# Patient Record
Sex: FEMALE | Race: WHITE | NOT HISPANIC OR LATINO | Employment: OTHER | URBAN - METROPOLITAN AREA
[De-identification: names, ages, dates, MRNs, and addresses within clinical notes are randomized per-mention and may not be internally consistent; named-entity substitution may affect disease eponyms.]

---

## 2017-01-11 ENCOUNTER — HOSPITAL ENCOUNTER (OUTPATIENT)
Dept: MAMMOGRAPHY | Facility: HOSPITAL | Age: 62
Discharge: HOME | End: 2017-01-11

## 2017-01-11 ENCOUNTER — HOSPITAL ENCOUNTER (OUTPATIENT)
Dept: MAMMOGRAPHY | Facility: HOSPITAL | Age: 62
Discharge: HOME | End: 2017-01-11
Admitting: OBSTETRICS & GYNECOLOGY

## 2017-01-11 DIAGNOSIS — R92.8 ABNORMAL MAMMOGRAM: ICD-10-CM

## 2017-01-11 PROCEDURE — 19081 BX BREAST 1ST LESION STRTCTC: CPT | Performed by: RADIOLOGY

## 2017-01-11 PROCEDURE — 88305 TISSUE EXAM BY PATHOLOGIST: CPT | Performed by: RADIOLOGY

## 2017-01-11 PROCEDURE — 76098 X-RAY EXAM SURGICAL SPECIMEN: CPT

## 2017-01-11 RX ORDER — LIDOCAINE HYDROCHLORIDE 10 MG/ML
5 INJECTION, SOLUTION INFILTRATION; PERINEURAL ONCE
Status: COMPLETED | OUTPATIENT
Start: 2017-01-11 | End: 2017-01-11

## 2017-01-11 RX ORDER — LIDOCAINE HYDROCHLORIDE AND EPINEPHRINE 10; 10 MG/ML; UG/ML
20 INJECTION, SOLUTION INFILTRATION; PERINEURAL ONCE
Status: COMPLETED | OUTPATIENT
Start: 2017-01-11 | End: 2017-01-11

## 2017-01-11 RX ADMIN — LIDOCAINE HYDROCHLORIDE 5 ML: 10 INJECTION, SOLUTION INFILTRATION; PERINEURAL at 10:54

## 2017-01-11 RX ADMIN — LIDOCAINE HYDROCHLORIDE AND EPINEPHRINE 10 ML: 10; 10 INJECTION, SOLUTION INFILTRATION; PERINEURAL at 10:55

## 2017-01-11 NOTE — PROGRESS NOTES
Alert and orientated. Denies discomfort. No active bleeding. Steri-strips & gauze dsg intact. Ice packs given. Verbalizes and demonstrates understanding of post-care instructions and written copy given.

## 2017-01-12 LAB
CYTO UR: NORMAL
LAB AP CASE REPORT: NORMAL
LAB AP CLINICAL INFORMATION: NORMAL
LAB AP DIAGNOSIS COMMENT: NORMAL
Lab: NORMAL
PATH REPORT.FINAL DX SPEC: NORMAL
PATH REPORT.GROSS SPEC: NORMAL

## 2017-01-13 ENCOUNTER — TELEPHONE (OUTPATIENT)
Dept: MAMMOGRAPHY | Facility: HOSPITAL | Age: 62
End: 2017-01-13

## 2017-01-13 ENCOUNTER — TRANSCRIBE ORDERS (OUTPATIENT)
Dept: ADMINISTRATIVE | Facility: HOSPITAL | Age: 62
End: 2017-01-13

## 2017-01-13 DIAGNOSIS — R92.8 ABNORMAL MAMMOGRAPHY: Primary | ICD-10-CM

## 2017-04-03 ENCOUNTER — TRANSCRIBE ORDERS (OUTPATIENT)
Dept: ADMINISTRATIVE | Facility: HOSPITAL | Age: 62
End: 2017-04-03

## 2017-04-03 ENCOUNTER — HOSPITAL ENCOUNTER (OUTPATIENT)
Dept: MAMMOGRAPHY | Facility: HOSPITAL | Age: 62
Discharge: HOME | End: 2017-04-03
Admitting: OBSTETRICS & GYNECOLOGY

## 2017-04-03 DIAGNOSIS — R92.8 ABNORMAL MAMMOGRAM: Primary | ICD-10-CM

## 2017-04-03 DIAGNOSIS — R92.8 ABNORMAL MAMMOGRAPHY: ICD-10-CM

## 2017-04-03 PROCEDURE — 77065 DX MAMMO INCL CAD UNI: CPT | Performed by: RADIOLOGY

## 2017-04-03 PROCEDURE — G0206 DX MAMMO INCL CAD UNI: HCPCS

## 2017-08-09 ENCOUNTER — HOSPITAL ENCOUNTER (OUTPATIENT)
Dept: MAMMOGRAPHY | Facility: HOSPITAL | Age: 62
Discharge: HOME | End: 2017-08-09
Admitting: OBSTETRICS & GYNECOLOGY

## 2017-08-09 DIAGNOSIS — R92.8 ABNORMAL MAMMOGRAM: ICD-10-CM

## 2017-08-09 PROCEDURE — 77065 DX MAMMO INCL CAD UNI: CPT | Performed by: RADIOLOGY

## 2017-08-09 PROCEDURE — G0206 DX MAMMO INCL CAD UNI: HCPCS

## 2018-02-20 ENCOUNTER — TRANSCRIBE ORDERS (OUTPATIENT)
Dept: ADMINISTRATIVE | Facility: HOSPITAL | Age: 63
End: 2018-02-20

## 2018-05-11 ENCOUNTER — APPOINTMENT (OUTPATIENT)
Dept: MAMMOGRAPHY | Facility: HOSPITAL | Age: 63
End: 2018-05-11

## 2018-10-17 ENCOUNTER — TRANSCRIBE ORDERS (OUTPATIENT)
Dept: ADMINISTRATIVE | Facility: HOSPITAL | Age: 63
End: 2018-10-17

## 2018-10-17 DIAGNOSIS — R92.8 ABNORMAL MAMMOGRAM: Primary | ICD-10-CM

## 2019-01-17 ENCOUNTER — HOSPITAL ENCOUNTER (OUTPATIENT)
Dept: MAMMOGRAPHY | Facility: HOSPITAL | Age: 64
Discharge: HOME | End: 2019-01-17
Admitting: OBSTETRICS & GYNECOLOGY

## 2019-01-17 DIAGNOSIS — R92.8 ABNORMAL MAMMOGRAM: ICD-10-CM

## 2019-01-17 PROCEDURE — 77066 DX MAMMO INCL CAD BI: CPT | Performed by: RADIOLOGY

## 2019-01-17 PROCEDURE — G0279 TOMOSYNTHESIS, MAMMO: HCPCS

## 2019-01-17 PROCEDURE — 77062 BREAST TOMOSYNTHESIS BI: CPT | Performed by: RADIOLOGY

## 2019-01-17 PROCEDURE — 77066 DX MAMMO INCL CAD BI: CPT

## 2020-05-21 ENCOUNTER — TRANSCRIBE ORDERS (OUTPATIENT)
Dept: ADMINISTRATIVE | Facility: HOSPITAL | Age: 65
End: 2020-05-21

## 2020-05-21 DIAGNOSIS — R92.8 ABNORMAL MAMMOGRAM: Primary | ICD-10-CM

## 2020-06-10 ENCOUNTER — HOSPITAL ENCOUNTER (OUTPATIENT)
Dept: MAMMOGRAPHY | Facility: HOSPITAL | Age: 65
Discharge: HOME | End: 2020-06-10
Admitting: NURSE PRACTITIONER

## 2020-06-10 DIAGNOSIS — R92.8 ABNORMAL MAMMOGRAM: ICD-10-CM

## 2020-06-10 PROCEDURE — 77066 DX MAMMO INCL CAD BI: CPT

## 2020-06-10 PROCEDURE — 77062 BREAST TOMOSYNTHESIS BI: CPT | Performed by: RADIOLOGY

## 2020-06-10 PROCEDURE — 77066 DX MAMMO INCL CAD BI: CPT | Performed by: RADIOLOGY

## 2020-06-10 PROCEDURE — G0279 TOMOSYNTHESIS, MAMMO: HCPCS

## 2021-07-02 ENCOUNTER — APPOINTMENT (OUTPATIENT)
Dept: CARDIOLOGY | Facility: HOSPITAL | Age: 66
End: 2021-07-02

## 2021-07-02 ENCOUNTER — APPOINTMENT (OUTPATIENT)
Dept: GENERAL RADIOLOGY | Facility: HOSPITAL | Age: 66
End: 2021-07-02

## 2021-07-02 ENCOUNTER — HOSPITAL ENCOUNTER (OUTPATIENT)
Facility: HOSPITAL | Age: 66
Setting detail: OBSERVATION
Discharge: HOME | End: 2021-07-02
Attending: EMERGENCY MEDICINE | Admitting: EMERGENCY MEDICINE

## 2021-07-02 VITALS
HEIGHT: 60 IN | SYSTOLIC BLOOD PRESSURE: 152 MMHG | WEIGHT: 136 LBS | RESPIRATION RATE: 12 BRPM | BODY MASS INDEX: 26.7 KG/M2 | TEMPERATURE: 98.1 F | DIASTOLIC BLOOD PRESSURE: 80 MMHG | OXYGEN SATURATION: 100 % | HEART RATE: 58 BPM

## 2021-07-02 DIAGNOSIS — R07.9 CHEST PAIN, UNSPECIFIED TYPE: ICD-10-CM

## 2021-07-02 DIAGNOSIS — R11.2 NON-INTRACTABLE VOMITING WITH NAUSEA, UNSPECIFIED VOMITING TYPE: Primary | ICD-10-CM

## 2021-07-02 PROBLEM — F41.0 ANXIETY ATTACK: Status: ACTIVE | Noted: 2021-07-02

## 2021-07-02 PROBLEM — I10 ESSENTIAL HYPERTENSION: Status: ACTIVE | Noted: 2021-07-02

## 2021-07-02 PROBLEM — Z63.4 BEREAVEMENT: Status: ACTIVE | Noted: 2021-07-02

## 2021-07-02 LAB
ALBUMIN SERPL-MCNC: 4.7 G/DL (ref 3.5–5.2)
ALBUMIN/GLOB SERPL: 1.7 G/DL
ALP SERPL-CCNC: 103 U/L (ref 39–117)
ALT SERPL W P-5'-P-CCNC: 13 U/L (ref 1–33)
ANION GAP SERPL CALCULATED.3IONS-SCNC: 11 MMOL/L (ref 5–15)
AST SERPL-CCNC: 16 U/L (ref 1–32)
BASOPHILS # BLD AUTO: 0.1 10*3/MM3 (ref 0–0.2)
BASOPHILS NFR BLD AUTO: 0.9 % (ref 0–1.5)
BH CV ECHO MEAS - AI DEC SLOPE: 194 CM/SEC^2
BH CV ECHO MEAS - AI MAX PG: 65.8 MMHG
BH CV ECHO MEAS - AI MAX VEL: 405 CM/SEC
BH CV ECHO MEAS - AI P1/2T: 611.5 MSEC
BH CV ECHO MEAS - AO ROOT AREA (BSA CORRECTED): 1.7
BH CV ECHO MEAS - AO ROOT AREA: 5.7 CM^2
BH CV ECHO MEAS - AO ROOT DIAM: 2.7 CM
BH CV ECHO MEAS - ASC AORTA: 2.9 CM
BH CV ECHO MEAS - BSA(HAYCOCK): 1.6 M^2
BH CV ECHO MEAS - BSA: 1.6 M^2
BH CV ECHO MEAS - BZI_BMI: 26.7 KILOGRAMS/M^2
BH CV ECHO MEAS - BZI_METRIC_HEIGHT: 152 CM
BH CV ECHO MEAS - BZI_METRIC_WEIGHT: 61.7 KG
BH CV ECHO MEAS - EDV(CUBED): 68.9 ML
BH CV ECHO MEAS - EDV(MOD-SP2): 69.9 ML
BH CV ECHO MEAS - EDV(MOD-SP4): 106 ML
BH CV ECHO MEAS - EDV(TEICH): 74.2 ML
BH CV ECHO MEAS - EF(CUBED): 77.3 %
BH CV ECHO MEAS - EF(MOD-BP): 57.3 %
BH CV ECHO MEAS - EF(MOD-SP2): 61.2 %
BH CV ECHO MEAS - EF(MOD-SP4): 53 %
BH CV ECHO MEAS - EF(TEICH): 69.9 %
BH CV ECHO MEAS - ESV(CUBED): 15.6 ML
BH CV ECHO MEAS - ESV(MOD-SP2): 27.1 ML
BH CV ECHO MEAS - ESV(MOD-SP4): 49.8 ML
BH CV ECHO MEAS - ESV(TEICH): 22.3 ML
BH CV ECHO MEAS - FS: 39 %
BH CV ECHO MEAS - IVS/LVPW: 0.9
BH CV ECHO MEAS - IVSD: 0.9 CM
BH CV ECHO MEAS - LA DIMENSION: 2.9 CM
BH CV ECHO MEAS - LA/AO: 1.1
BH CV ECHO MEAS - LAD MAJOR: 4.8 CM
BH CV ECHO MEAS - LAT PEAK E' VEL: 9.5 CM/SEC
BH CV ECHO MEAS - LATERAL E/E' RATIO: 7.3
BH CV ECHO MEAS - LV DIASTOLIC VOL/BSA (35-75): 67 ML/M^2
BH CV ECHO MEAS - LV MASS(C)D: 123 GRAMS
BH CV ECHO MEAS - LV MASS(C)DI: 77.8 GRAMS/M^2
BH CV ECHO MEAS - LV MAX PG: 6.6 MMHG
BH CV ECHO MEAS - LV MEAN PG: 2 MMHG
BH CV ECHO MEAS - LV SYSTOLIC VOL/BSA (12-30): 31.5 ML/M^2
BH CV ECHO MEAS - LV V1 MAX: 128 CM/SEC
BH CV ECHO MEAS - LV V1 MEAN: 65.2 CM/SEC
BH CV ECHO MEAS - LV V1 VTI: 29.6 CM
BH CV ECHO MEAS - LVIDD: 4.1 CM
BH CV ECHO MEAS - LVIDS: 2.5 CM
BH CV ECHO MEAS - LVLD AP2: 8.2 CM
BH CV ECHO MEAS - LVLD AP4: 7.9 CM
BH CV ECHO MEAS - LVLS AP2: 6.6 CM
BH CV ECHO MEAS - LVLS AP4: 6.8 CM
BH CV ECHO MEAS - LVOT AREA (M): 3.1 CM^2
BH CV ECHO MEAS - LVOT AREA: 3.1 CM^2
BH CV ECHO MEAS - LVOT DIAM: 2 CM
BH CV ECHO MEAS - LVPWD: 1 CM
BH CV ECHO MEAS - MED PEAK E' VEL: 7.8 CM/SEC
BH CV ECHO MEAS - MEDIAL E/E' RATIO: 8.9
BH CV ECHO MEAS - MV A MAX VEL: 83.6 CM/SEC
BH CV ECHO MEAS - MV DEC SLOPE: 344 CM/SEC^2
BH CV ECHO MEAS - MV DEC TIME: 0.2 SEC
BH CV ECHO MEAS - MV E MAX VEL: 69.4 CM/SEC
BH CV ECHO MEAS - MV E/A: 0.83
BH CV ECHO MEAS - MV MAX PG: 2.8 MMHG
BH CV ECHO MEAS - MV MEAN PG: 1 MMHG
BH CV ECHO MEAS - MV P1/2T MAX VEL: 90.9 CM/SEC
BH CV ECHO MEAS - MV P1/2T: 77.4 MSEC
BH CV ECHO MEAS - MV V2 MAX: 83.2 CM/SEC
BH CV ECHO MEAS - MV V2 MEAN: 49.2 CM/SEC
BH CV ECHO MEAS - MV V2 VTI: 32 CM
BH CV ECHO MEAS - MVA P1/2T LCG: 2.4 CM^2
BH CV ECHO MEAS - MVA(P1/2T): 2.8 CM^2
BH CV ECHO MEAS - MVA(VTI): 2.9 CM^2
BH CV ECHO MEAS - PA ACC TIME: 0.13 SEC
BH CV ECHO MEAS - PA PR(ACCEL): 20.5 MMHG
BH CV ECHO MEAS - PI END-D VEL: 92.3 CM/SEC
BH CV ECHO MEAS - SI(CUBED): 33.7 ML/M^2
BH CV ECHO MEAS - SI(LVOT): 58.8 ML/M^2
BH CV ECHO MEAS - SI(MOD-SP2): 27.1 ML/M^2
BH CV ECHO MEAS - SI(MOD-SP4): 35.5 ML/M^2
BH CV ECHO MEAS - SI(TEICH): 32.8 ML/M^2
BH CV ECHO MEAS - SV(CUBED): 53.3 ML
BH CV ECHO MEAS - SV(LVOT): 93 ML
BH CV ECHO MEAS - SV(MOD-SP2): 42.8 ML
BH CV ECHO MEAS - SV(MOD-SP4): 56.2 ML
BH CV ECHO MEAS - SV(TEICH): 51.9 ML
BH CV ECHO MEAS - TAPSE (>1.6): 2.1 CM
BH CV ECHO MEASUREMENTS AVERAGE E/E' RATIO: 8.02
BH CV VAS BP LEFT ARM: NORMAL MMHG
BH CV XLRA - RV BASE: 3.3 CM
BH CV XLRA - RV LENGTH: 5.6 CM
BH CV XLRA - RV MID: 2.4 CM
BH CV XLRA - TDI S': 22.5 CM/SEC
BILIRUB SERPL-MCNC: 0.4 MG/DL (ref 0–1.2)
BUN SERPL-MCNC: 8 MG/DL (ref 8–23)
BUN/CREAT SERPL: 10.7 (ref 7–25)
CALCIUM SPEC-SCNC: 9.6 MG/DL (ref 8.6–10.5)
CHLORIDE SERPL-SCNC: 104 MMOL/L (ref 98–107)
CO2 SERPL-SCNC: 26 MMOL/L (ref 22–29)
CREAT SERPL-MCNC: 0.75 MG/DL (ref 0.57–1)
DEPRECATED RDW RBC AUTO: 43.8 FL (ref 37–54)
EOSINOPHIL # BLD AUTO: 0.25 10*3/MM3 (ref 0–0.4)
EOSINOPHIL NFR BLD AUTO: 2.3 % (ref 0.3–6.2)
ERYTHROCYTE [DISTWIDTH] IN BLOOD BY AUTOMATED COUNT: 12.6 % (ref 12.3–15.4)
GFR SERPL CREATININE-BSD FRML MDRD: 78 ML/MIN/1.73
GLOBULIN UR ELPH-MCNC: 2.8 GM/DL
GLUCOSE SERPL-MCNC: 143 MG/DL (ref 65–99)
HCT VFR BLD AUTO: 42.5 % (ref 34–46.6)
HGB BLD-MCNC: 14.2 G/DL (ref 12–15.9)
HOLD SPECIMEN: NORMAL
IMM GRANULOCYTES # BLD AUTO: 0.03 10*3/MM3 (ref 0–0.05)
IMM GRANULOCYTES NFR BLD AUTO: 0.3 % (ref 0–0.5)
LEFT ATRIUM VOLUME INDEX: 20.6 ML/M^2
LEFT ATRIUM VOLUME: 32.5 ML
LIPASE SERPL-CCNC: 21 U/L (ref 13–60)
LYMPHOCYTES # BLD AUTO: 2.15 10*3/MM3 (ref 0.7–3.1)
LYMPHOCYTES NFR BLD AUTO: 20 % (ref 19.6–45.3)
MAXIMAL PREDICTED HEART RATE: 155 BPM
MCH RBC QN AUTO: 31.7 PG (ref 26.6–33)
MCHC RBC AUTO-ENTMCNC: 33.4 G/DL (ref 31.5–35.7)
MCV RBC AUTO: 94.9 FL (ref 79–97)
MONOCYTES # BLD AUTO: 0.66 10*3/MM3 (ref 0.1–0.9)
MONOCYTES NFR BLD AUTO: 6.1 % (ref 5–12)
NEUTROPHILS NFR BLD AUTO: 7.58 10*3/MM3 (ref 1.7–7)
NEUTROPHILS NFR BLD AUTO: 70.4 % (ref 42.7–76)
NRBC BLD AUTO-RTO: 0 /100 WBC (ref 0–0.2)
NT-PROBNP SERPL-MCNC: 267.2 PG/ML (ref 0–900)
PLATELET # BLD AUTO: 319 10*3/MM3 (ref 140–450)
PMV BLD AUTO: 9.4 FL (ref 6–12)
POTASSIUM SERPL-SCNC: 3.6 MMOL/L (ref 3.5–5.2)
PROT SERPL-MCNC: 7.5 G/DL (ref 6–8.5)
QT INTERVAL: 426 MS
QT INTERVAL: 444 MS
QTC INTERVAL: 428 MS
QTC INTERVAL: 439 MS
RBC # BLD AUTO: 4.48 10*6/MM3 (ref 3.77–5.28)
SODIUM SERPL-SCNC: 141 MMOL/L (ref 136–145)
STRESS TARGET HR: 132 BPM
TROPONIN T SERPL-MCNC: <0.01 NG/ML (ref 0–0.03)
WBC # BLD AUTO: 10.77 10*3/MM3 (ref 3.4–10.8)
WHOLE BLOOD HOLD SPECIMEN: NORMAL

## 2021-07-02 PROCEDURE — 93005 ELECTROCARDIOGRAM TRACING: CPT

## 2021-07-02 PROCEDURE — G0378 HOSPITAL OBSERVATION PER HR: HCPCS

## 2021-07-02 PROCEDURE — 93005 ELECTROCARDIOGRAM TRACING: CPT | Performed by: EMERGENCY MEDICINE

## 2021-07-02 PROCEDURE — 96374 THER/PROPH/DIAG INJ IV PUSH: CPT

## 2021-07-02 PROCEDURE — 71045 X-RAY EXAM CHEST 1 VIEW: CPT

## 2021-07-02 PROCEDURE — 83880 ASSAY OF NATRIURETIC PEPTIDE: CPT | Performed by: EMERGENCY MEDICINE

## 2021-07-02 PROCEDURE — 84484 ASSAY OF TROPONIN QUANT: CPT | Performed by: INTERNAL MEDICINE

## 2021-07-02 PROCEDURE — 93306 TTE W/DOPPLER COMPLETE: CPT

## 2021-07-02 PROCEDURE — 83690 ASSAY OF LIPASE: CPT | Performed by: EMERGENCY MEDICINE

## 2021-07-02 PROCEDURE — 99236 HOSP IP/OBS SAME DATE HI 85: CPT | Performed by: INTERNAL MEDICINE

## 2021-07-02 PROCEDURE — 84484 ASSAY OF TROPONIN QUANT: CPT | Performed by: EMERGENCY MEDICINE

## 2021-07-02 PROCEDURE — 96375 TX/PRO/DX INJ NEW DRUG ADDON: CPT

## 2021-07-02 PROCEDURE — 25010000002 ONDANSETRON PER 1 MG: Performed by: EMERGENCY MEDICINE

## 2021-07-02 PROCEDURE — 85025 COMPLETE CBC W/AUTO DIFF WBC: CPT | Performed by: EMERGENCY MEDICINE

## 2021-07-02 PROCEDURE — 93306 TTE W/DOPPLER COMPLETE: CPT | Performed by: INTERNAL MEDICINE

## 2021-07-02 PROCEDURE — 80053 COMPREHEN METABOLIC PANEL: CPT | Performed by: EMERGENCY MEDICINE

## 2021-07-02 PROCEDURE — 99284 EMERGENCY DEPT VISIT MOD MDM: CPT

## 2021-07-02 PROCEDURE — 25010000002 LORAZEPAM PER 2 MG: Performed by: EMERGENCY MEDICINE

## 2021-07-02 RX ORDER — ALPRAZOLAM 0.5 MG/1
0.5 TABLET ORAL 3 TIMES DAILY PRN
COMMUNITY
End: 2021-07-24

## 2021-07-02 RX ORDER — SODIUM CHLORIDE 0.9 % (FLUSH) 0.9 %
10 SYRINGE (ML) INJECTION AS NEEDED
Status: CANCELLED | OUTPATIENT
Start: 2021-07-02

## 2021-07-02 RX ORDER — MAGNESIUM SULFATE HEPTAHYDRATE 40 MG/ML
4 INJECTION, SOLUTION INTRAVENOUS AS NEEDED
Status: CANCELLED | OUTPATIENT
Start: 2021-07-02

## 2021-07-02 RX ORDER — POTASSIUM CHLORIDE 750 MG/1
40 CAPSULE, EXTENDED RELEASE ORAL AS NEEDED
Status: CANCELLED | OUTPATIENT
Start: 2021-07-02

## 2021-07-02 RX ORDER — METOPROLOL TARTRATE 50 MG/1
50 TABLET, FILM COATED ORAL 2 TIMES DAILY
COMMUNITY
End: 2021-07-02

## 2021-07-02 RX ORDER — SODIUM CHLORIDE 0.9 % (FLUSH) 0.9 %
10 SYRINGE (ML) INJECTION AS NEEDED
Status: DISCONTINUED | OUTPATIENT
Start: 2021-07-02 | End: 2021-07-02 | Stop reason: HOSPADM

## 2021-07-02 RX ORDER — METOPROLOL SUCCINATE 50 MG/1
50 TABLET, EXTENDED RELEASE ORAL DAILY
COMMUNITY

## 2021-07-02 RX ORDER — MAGNESIUM SULFATE HEPTAHYDRATE 40 MG/ML
2 INJECTION, SOLUTION INTRAVENOUS AS NEEDED
Status: CANCELLED | OUTPATIENT
Start: 2021-07-02

## 2021-07-02 RX ORDER — ONDANSETRON 2 MG/ML
4 INJECTION INTRAMUSCULAR; INTRAVENOUS EVERY 6 HOURS PRN
Status: CANCELLED | OUTPATIENT
Start: 2021-07-02

## 2021-07-02 RX ORDER — ACETAMINOPHEN 650 MG/1
650 SUPPOSITORY RECTAL EVERY 4 HOURS PRN
Status: CANCELLED | OUTPATIENT
Start: 2021-07-02

## 2021-07-02 RX ORDER — METOPROLOL TARTRATE 50 MG/1
50 TABLET, FILM COATED ORAL EVERY 12 HOURS SCHEDULED
Status: CANCELLED | OUTPATIENT
Start: 2021-07-02

## 2021-07-02 RX ORDER — POTASSIUM CHLORIDE 7.45 MG/ML
10 INJECTION INTRAVENOUS
Status: CANCELLED | OUTPATIENT
Start: 2021-07-02

## 2021-07-02 RX ORDER — POTASSIUM CHLORIDE 1.5 G/1.77G
40 POWDER, FOR SOLUTION ORAL AS NEEDED
Status: CANCELLED | OUTPATIENT
Start: 2021-07-02

## 2021-07-02 RX ORDER — ACETAMINOPHEN 325 MG/1
650 TABLET ORAL EVERY 4 HOURS PRN
Status: CANCELLED | OUTPATIENT
Start: 2021-07-02

## 2021-07-02 RX ORDER — LORAZEPAM 2 MG/ML
0.5 INJECTION INTRAMUSCULAR ONCE
Status: COMPLETED | OUTPATIENT
Start: 2021-07-02 | End: 2021-07-02

## 2021-07-02 RX ORDER — SODIUM CHLORIDE 0.9 % (FLUSH) 0.9 %
10 SYRINGE (ML) INJECTION EVERY 12 HOURS SCHEDULED
Status: CANCELLED | OUTPATIENT
Start: 2021-07-02

## 2021-07-02 RX ORDER — ASPIRIN 81 MG/1
324 TABLET, CHEWABLE ORAL ONCE
Status: COMPLETED | OUTPATIENT
Start: 2021-07-02 | End: 2021-07-02

## 2021-07-02 RX ORDER — ONDANSETRON 4 MG/1
4 TABLET, FILM COATED ORAL EVERY 6 HOURS PRN
Status: CANCELLED | OUTPATIENT
Start: 2021-07-02

## 2021-07-02 RX ORDER — CHOLECALCIFEROL (VITAMIN D3) 125 MCG
5 CAPSULE ORAL NIGHTLY PRN
Status: CANCELLED | OUTPATIENT
Start: 2021-07-02

## 2021-07-02 RX ORDER — ACETAMINOPHEN 160 MG/5ML
650 SOLUTION ORAL EVERY 4 HOURS PRN
Status: CANCELLED | OUTPATIENT
Start: 2021-07-02

## 2021-07-02 RX ORDER — ESCITALOPRAM OXALATE 10 MG/1
10 TABLET ORAL DAILY
COMMUNITY
End: 2021-07-24

## 2021-07-02 RX ORDER — LORAZEPAM 0.5 MG/1
0.5 TABLET ORAL EVERY 8 HOURS PRN
Status: CANCELLED | OUTPATIENT
Start: 2021-07-02 | End: 2021-07-09

## 2021-07-02 RX ORDER — ONDANSETRON 2 MG/ML
4 INJECTION INTRAMUSCULAR; INTRAVENOUS
Status: DISCONTINUED | OUTPATIENT
Start: 2021-07-02 | End: 2021-07-02 | Stop reason: HOSPADM

## 2021-07-02 RX ADMIN — LORAZEPAM 0.5 MG: 2 INJECTION INTRAMUSCULAR; INTRAVENOUS at 04:45

## 2021-07-02 RX ADMIN — SODIUM CHLORIDE 1000 ML: 9 INJECTION, SOLUTION INTRAVENOUS at 04:41

## 2021-07-02 RX ADMIN — ASPIRIN 324 MG: 81 TABLET, CHEWABLE ORAL at 04:35

## 2021-07-02 RX ADMIN — ONDANSETRON 4 MG: 2 INJECTION INTRAMUSCULAR; INTRAVENOUS at 04:43

## 2021-07-02 NOTE — CASE MANAGEMENT/SOCIAL WORK
Discharge Planning Assessment  [facility]     Patient Name: Clara Gonzalez  MRN: [id number]  Today's Date: 7/2/2021    Admit Date: 7/2/2021    Discharge Needs Assessment     Row Name 07/02/21 0910       Living Environment    Lives With  alone    Current Living Arrangements  home/apartment/condo    Potentially Unsafe Housing Conditions  unable to assess    Primary Care Provided by  self    Provides Primary Care For  no one    Family Caregiver if Needed  child(tila), adult    Family Caregiver Names  Mary Carmen Ramires (Daughter) [phone] (Home Phone)    Quality of Family Relationships  helpful;involved;supportive    Able to Return to Prior Arrangements  yes       Resource/Environmental Concerns    Resource/Environmental Concerns  none    Transportation Concerns  car, none       Transition Planning    Patient/Family Anticipates Transition to  home    Patient/Family Anticipated Services at Transition  none       Discharge Needs Assessment    Readmission Within the Last 30 Days  no previous admission in last 30 days    Equipment Currently Used at Home  none    Concerns to be Addressed  grief and loss    Anticipated Changes Related to Illness  none    Equipment Needed After Discharge  none    Provided Post Acute Provider List?  N/A    N/A Provider List Comment  denies need for outpt services    Provided Post Acute Provider Quality & Resource List?  N/A        Discharge Plan     Row Name 07/02/21 0912       Plan    Plan  initial    Plan Comments  Pt lives alone in [facility]. She is AxO, independent. She recently lost her spouse and is experiencing anxiety and depression. She has no past medical hx on file. Her daughter is with her today. No POA on file. Her plan is to return home at d/c. PCP is MEI Tran    Final Discharge Disposition Code  30 - still a patient        Continued Care and Services - Admitted Since 7/2/2021    Coordination has not been started for this encounter.         Demographic Summary    No  documentation.       Functional Status     Row Name 07/02/21 0909       Functional Status    Usual Activity Tolerance  good       Functional Status, IADL    Medications  independent    Meal Preparation  independent    Housekeeping  independent    Laundry  independent    Shopping  independent       Mental Status    General Appearance WDL  WDL       Mental Status Summary    Recent Changes in Mental Status/Cognitive Functioning  no changes       Employment/    Employment Status  retired        Psychosocial    No documentation.       Abuse/Neglect    No documentation.       Legal    No documentation.       Substance Abuse    No documentation.       Patient Forms    No documentation.           Ashley Cochran RN

## 2021-07-02 NOTE — ED PROVIDER NOTES
[facility]    EMERGENCY DEPARTMENT ENCOUNTER      Pt Name: Clara Gonzalez  MRN: [id number]  YOB: 1955  Date of evaluation: 7/2/2021  Provider: Loi Sarmiento DO    CHIEF COMPLAINT       Chief Complaint   Patient presents with   • Chest Pain         HISTORY OF PRESENT ILLNESS  (Location/Symptom, Timing/Onset, Context/Setting, Quality, Duration, Modifying Factors, Severity.)   Clara Gonzalez is a 66 y.o. female who presents to the emergency department for evaluation of generalized nausea, vomiting, intermittent chest which she describes as tingling, just not feeling well.  Increase stress and anxiety she just lost her  3 weeks ago yesterday.  She is rebounding with this pretty significantly and her anxiety is gone pretty bad with her increased stress.  She did see her PCP at [facility] who started her on Lexapro and check some basic labs and was told it was going no acute abnormalities.  She does state a history of underlying Eunice in stress which is been increased recently.  She denies any chest pressure, has had a cardiac work-up in the last few years with no acute abnormalities.  She denies a fever, chills, recent illness.  Denies any urinary complaints, has had chronic loose stools, nonbloody in nature.  She denies unilateral weakness, numbness or tingling, no headache, vision changes.  She denies any current chest pain during my evaluation.  She has no other acute systemic complaints this time.  She has a history of hypertension and takes medication, metoprolol for this.      Nursing notes were reviewed.    REVIEW OF SYSTEMS    (2-9 systems for level 4, 10 or more for level 5)   ROS:  General:  No fevers, no chills, no weakness  Cardiovascular:  + chest pain, no palpitations  Respiratory:  No shortness of breath, no cough, no wheezing  Gastrointestinal:  No pain, positive nausea, vomiting, no diarrhea  Musculoskeletal:  No muscle pain, no joint pain  Skin:  No  rash  Neurologic:  No headache, no extremity numbness, no extremity tingling, no extremity weakness  Psychiatric:  + stress, anxiety  Genitourinary:  No dysuria, no hematuria    Except as noted above the remainder of the review of systems was reviewed and negative.       PAST MEDICAL HISTORY   History reviewed. No pertinent past medical history.  Hypertension, anxiety    SURGICAL HISTORY       Past Surgical History:   Procedure Laterality Date   • BREAST CYST ASPIRATION Left 2005   • HYSTERECTOMY     • OOPHORECTOMY           CURRENT MEDICATIONS       Current Facility-Administered Medications:   •  ondansetron (ZOFRAN) injection 4 mg, 4 mg, Intravenous, Q30 Min PRN, Loi Sarmiento DO, 4 mg at 07/02/21 0443  •  sodium chloride 0.9 % flush 10 mL, 10 mL, Intravenous, PRN, Loi Sarmiento DO    Current Outpatient Medications:   •  metoprolol tartrate (LOPRESSOR) 50 MG tablet, Take 50 mg by mouth 2 (Two) Times a Day., Disp: , Rfl:     ALLERGIES     Patient has no known allergies.    FAMILY HISTORY       Family History   Problem Relation Age of Onset   • Breast cancer Mother 80   • Ovarian cancer Neg Hx           SOCIAL HISTORY       Social History     Socioeconomic History   • Marital status:      Spouse name: Not on file   • Number of children: Not on file   • Years of education: Not on file   • Highest education level: Not on file         PHYSICAL EXAM    (up to 7 for level 4, 8 or more for level 5)     Vitals:    07/02/21 0630 07/02/21 0640 07/02/21 0645 07/02/21 0700   BP:  123/70  123/70   BP Location:       Patient Position:       Pulse: 54 53 54 53   Resp:       Temp:       TempSrc:       SpO2: 99% 97% 97% 98%   Weight:       Height:           Physical Exam  General : Patient is awake, alert, oriented, in no acute distress, nontoxic appearing, answering questions appropriately  HEENT: Pupils are equally round and reactive to light, EOMI, conjunctivae clear, sclerae white, there is no injection  no icterus.  Oral mucosa is moist, no exudate. Uvula is midline  Neck: Neck is supple, full range of motion, trachea midline  Cardiac: Heart regular rate, rhythm, no murmurs, rubs, or gallops  Lungs: Lungs are clear to auscultation, there is no wheezing, rhonchi, or rales. There is no use of accessory muscles  Chest wall: There is no tenderness to palpation over the chest wall or over ribs  Abdomen: Abdomen is soft, nontender, nondistended. There are no firm or pulsatile masses, no rebound rigidity or guarding.   Musculoskeletal: 5 out of 5 strength in all 4 extremities.  No focal muscle deficits are appreciated  Neuro: Motor intact, sensory intact, level of consciousness is normal, GCS 15  Dermatology: Skin is warm and dry  Psych: Mentation is grossly normal, cognition is grossly normal. Affect is anxious      DIAGNOSTIC RESULTS     EKG: All EKG's are interpreted by the Emergency Department Physician who either signs or Co-signs this chart in the absence of a cardiologist.    ECG 12 Lead   Final Result   Test Reason : chest pain   Blood Pressure :   */*   mmHG   Vent. Rate :  64 BPM     Atrial Rate :  64 BPM      P-R Int : 130 ms          QRS Dur :  78 ms       QT Int : 426 ms       P-R-T Axes :  25   7  -1 degrees      QTc Int : 439 ms      Normal sinus rhythm    T inversions 3, avf, V1-V3.    Normal ECG   When compared with ECG of 02-JUL-2021 04:12,   No significant change was found   Confirmed by PA REBOLLAR MD ([id number]) on 7/2/2021 7:07:51 AM      Referred By: EDMD           Confirmed By: PA REBOLLAR MD      ECG 12 Lead   Final Result   Test Reason : CP   Blood Pressure :   */*   mmHG   Vent. Rate :  56 BPM     Atrial Rate :  56 BPM      P-R Int : 132 ms          QRS Dur :  92 ms       QT Int : 444 ms       P-R-T Axes :  21   3  -6 degrees      QTc Int : 428 ms      Sinus bradycardia   Nonspecific ST and T wave abnormality   T inversion 3, avf, V1-V3   Abnormal ECG   No previous ECGs available      EKG #1    Confirmed by PA REBOLLAR MD ([id number]) on 7/2/2021 4:22:34 AM      Referred By: ED MD           Confirmed By: PA REBOLLAR MD          RADIOLOGY:   Non-plain film images such as CT, Ultrasound and MRI are read by the radiologist. Plain radiographic images are visualized and preliminarily interpreted by the emergency physician with the below findings:      [] Radiologist's Report Reviewed:  XR Chest 1 View   Final Result   Cardiomegaly with no acute infiltrates identified. Ringlike density near the left hilum is presumably artifact external to the patient. Correlate clinically.      Signer Name: John Pineda MD    Signed: 7/2/2021 5:06 AM    Workstation Name: [email or web address]     [facility]            ED BEDSIDE ULTRASOUND:   Performed by ED Physician - none    LABS:    I have reviewed and interpreted all of the currently available lab results from this visit (if applicable):  Results for orders placed or performed during the hospital encounter of 07/02/21   Troponin    Specimen: Blood   Result Value Ref Range    Troponin T <0.010 0.000 - 0.030 ng/mL   Troponin    Specimen: Blood   Result Value Ref Range    Troponin T <0.010 0.000 - 0.030 ng/mL   Comprehensive Metabolic Panel    Specimen: Blood   Result Value Ref Range    Glucose 143 (H) 65 - 99 mg/dL    BUN 8 8 - 23 mg/dL    Creatinine 0.75 0.57 - 1.00 mg/dL    Sodium 141 136 - 145 mmol/L    Potassium 3.6 3.5 - 5.2 mmol/L    Chloride 104 98 - 107 mmol/L    CO2 26.0 22.0 - 29.0 mmol/L    Calcium 9.6 8.6 - 10.5 mg/dL    Total Protein 7.5 6.0 - 8.5 g/dL    Albumin 4.70 3.50 - 5.20 g/dL    ALT (SGPT) 13 1 - 33 U/L    AST (SGOT) 16 1 - 32 U/L    Alkaline Phosphatase 103 39 - 117 U/L    Total Bilirubin 0.4 0.0 - 1.2 mg/dL    eGFR Non African Amer 78 >60 mL/min/1.73    Globulin 2.8 gm/dL    A/G Ratio 1.7 g/dL    BUN/Creatinine Ratio 10.7 7.0 - 25.0    Anion Gap 11.0 5.0 - 15.0 mmol/L   Lipase    Specimen: Blood   Result Value Ref Range    Lipase  21 13 - 60 U/L   BNP    Specimen: Blood   Result Value Ref Range    proBNP 267.2 0.0 - 900.0 pg/mL   CBC Auto Differential    Specimen: Blood   Result Value Ref Range    WBC 10.77 3.40 - 10.80 10*3/mm3    RBC 4.48 3.77 - 5.28 10*6/mm3    Hemoglobin 14.2 12.0 - 15.9 g/dL    Hematocrit 42.5 34.0 - 46.6 %    MCV 94.9 79.0 - 97.0 fL    MCH 31.7 26.6 - 33.0 pg    MCHC 33.4 31.5 - 35.7 g/dL    RDW 12.6 12.3 - 15.4 %    RDW-SD 43.8 37.0 - 54.0 fl    MPV 9.4 6.0 - 12.0 fL    Platelets 319 140 - 450 10*3/mm3    Neutrophil % 70.4 42.7 - 76.0 %    Lymphocyte % 20.0 19.6 - 45.3 %    Monocyte % 6.1 5.0 - 12.0 %    Eosinophil % 2.3 0.3 - 6.2 %    Basophil % 0.9 0.0 - 1.5 %    Immature Grans % 0.3 0.0 - 0.5 %    Neutrophils, Absolute 7.58 (H) 1.70 - 7.00 10*3/mm3    Lymphocytes, Absolute 2.15 0.70 - 3.10 10*3/mm3    Monocytes, Absolute 0.66 0.10 - 0.90 10*3/mm3    Eosinophils, Absolute 0.25 0.00 - 0.40 10*3/mm3    Basophils, Absolute 0.10 0.00 - 0.20 10*3/mm3    Immature Grans, Absolute 0.03 0.00 - 0.05 10*3/mm3    nRBC 0.0 0.0 - 0.2 /100 WBC   ECG 12 Lead   Result Value Ref Range    QT Interval 444 ms    QTC Interval 428 ms   ECG 12 Lead   Result Value Ref Range    QT Interval 426 ms    QTC Interval 439 ms   Green Top (Gel)   Result Value Ref Range    Extra Tube Hold for add-ons.    Lavender Top   Result Value Ref Range    Extra Tube hold for add-on    Gold Top - SST   Result Value Ref Range    Extra Tube Hold for add-ons.         All other labs were within normal range or not returned as of this dictation.      EMERGENCY DEPARTMENT COURSE and DIFFERENTIAL DIAGNOSIS/MDM:   Vitals:    Vitals:    07/02/21 0630 07/02/21 0640 07/02/21 0645 07/02/21 0700   BP:  123/70  123/70   BP Location:       Patient Position:       Pulse: 54 53 54 53   Resp:       Temp:       TempSrc:       SpO2: 99% 97% 97% 98%   Weight:       Height:           ED Course as of Jul 02 0719 Fri Jul 02, 2021   0625 HEART Score for Major Cardiac Events from  MDCalc.com  on 7/2/2021  ** All calculations should be rechecked by clinician prior to use **    RESULT SUMMARY:  4 points  Moderate Score (4-6 points)    Risk of MACE of 12-16.6%.      INPUTS:  History -> 0 = Slightly suspicious  EKG -> 1 = Non-specific repolarization disturbance  Age -> 2 = =66  Risk factors -> 1 = 1-2 risk factors  Initial troponin -> 0 = =normal limit      [AP]      ED Course User Index  [AP] Loi Sarmiento DO       Patient with nausea vomiting, mild chest discomfort and tingling sensation since yesterday evening.  The patient a recent loss of her  with increased stress and anxiety over the last few weeks.  She does have a history of anxiety and panic attacks.  We did pain IV, labs, imaging, patient was placed in a cardiac monitor.  Initial EKG with some T wave inversions in inferior leads, V1, V2, V3.  We did place IV, she was given IV fluids, nausea medication, blood work labs and imaging obtained.  Results reviewed as above.  No acute significant normalities, troponin is negative.  Repeat EKG is obtained which does reveal T wave inversions in the inferior and anteroseptal leads, unchanged from earlier.  On reevaluation patient is resting comfortably, she is feeling significantly better than when she first arrived with her nausea vomiting chest pain.  Heart score 4, intermediate risk.  Patient is concerned given the increased stress from the recent passing of her  which is likely causing some of the patient's underlying symptoms, possible broken heart.  Given her presenting symptoms, increased stressors, intermediate risk we discussed admission to the hospital for further work-up treatment and evaluation.  Patient would prefer inpatient work-up rather than home with close follow-up.  Case discussed with hospitalist team for admission.          MEDICATIONS ADMINISTERED IN ED:  Medications   sodium chloride 0.9 % flush 10 mL (has no administration in time range)    ondansetron (ZOFRAN) injection 4 mg (4 mg Intravenous Given 7/2/21 3465)   aspirin chewable tablet 324 mg (324 mg Oral Given 7/2/21 1135)   sodium chloride 0.9 % bolus 1,000 mL (0 mL Intravenous Stopped 7/2/21 5210)   LORazepam (ATIVAN) injection 0.5 mg (0.5 mg Intravenous Given 7/2/21 3055)       PROCEDURES:  Procedures    CRITICAL CARE TIME    Total Critical Care time was 0 minutes, excluding separately reportable procedures.   There was a high probability of clinically significant/life threatening deterioration in the patient's condition which required my urgent intervention.      FINAL IMPRESSION      1. Non-intractable vomiting with nausea, unspecified vomiting type    2. Chest pain, unspecified type          DISPOSITION/PLAN     ED Disposition     ED Disposition Condition Comment    Decision to Admit                Comment: Please note this report has been produced using speech recognition software.      Loi Sarmiento DO  Attending Emergency Physician               Loi Sarmiento DO  07/02/21 4983

## 2021-07-02 NOTE — H&P
[facility] Medicine Services  SAME DAY ADMISSION & DISCHARGE    Patient Name: Clara Gonzalez  : 1955  MRN: [id number]    Date of Admission and Discharge: 2021    Primary Care Physician: Velai James APRN  Consults     No orders found from 6/3/2021 to 7/3/2021.          Subjective   Presentation and Brief Hospital Summary     Chief Complaint:  Non-intractable vomiting with nausea, unspecified vomiting type [R11.2]  Chest pain [R07.9]    Active Hospital Problems    Diagnosis  POA   • **Anxiety attack [F41.0]  Yes   • Chest pain [R07.9]  Yes   • Essential hypertension [I10]  Yes   • Bereavement [Z63.4]  Not Applicable      Resolved Hospital Problems   No resolved problems to display.         HPI and Brief Hospital Course:  Clara Gonzalez is a 66 y.o. female who's spouse passed away roughly 3 weeks ago and since that time has had ongoing issues with anxiety to point that she was recently started on PO Xanax and Celexa. She took a dose last night and went to bed as usual however around 0300 woke up abruptly with sensation of feeling overwhelmed, dizziness, nausea, and palpitations. She drove herself to her daughter's who then brought her to ED. Upon arrival her systolic blood pressure was markedly elevated. She was given IV ativan which improved her blood pressure and her symptoms. She underwent ACS r/o w/ troponin x 3 and echocardiogram which was negative. She can continue her PRN benzo's at d/c.    Review of Systems   Gen- No fevers, chills  Resp- No cough, dyspnea  GI- No N/V/D, abd pain    All other systems reviewed and are negative.    Personal History     History reviewed. No pertinent past medical history.    Past Surgical History:   Procedure Laterality Date   • BREAST CYST ASPIRATION Left    • HYSTERECTOMY     • OOPHORECTOMY         Family History: family history includes Breast cancer (age of onset: 80) in her mother.     Social History:  Denies tobacco,  ETOH, illicit drugs. Spouse passed away 3 weeks ago.    Allergies:  No Known Allergies    Objective   Objective Findings     Vital Signs:   Temp:  [98.1 °F (36.7 °C)] 98.1 °F (36.7 °C)  Heart Rate:  [51-86] 52  Resp:  [16] 16  BP: (106-201)/() 128/67        Physical Exam (if not performed and documented at time of presentation on same date):   Constitutional: Awake, alert  Eyes: PERRLA, sclerae anicteric, no conjunctival injection  HENT: NCAT, mucous membranes moist, mask  Neck: Supple, no thyromegaly, no lymphadenopathy, trachea midline  Respiratory: Clear to auscultation bilaterally, nonlabored respirations   Cardiovascular: RRR, no murmurs, rubs, or gallops, palpable pedal pulses bilaterally  Gastrointestinal: Positive bowel sounds, soft, nontender, nondistended  Musculoskeletal: No bilateral ankle edema, no clubbing or cyanosis to extremities  Psychiatric: Depressed  Neurologic: Oriented x 3, strength symmetric in all extremities, Cranial Nerves grossly intact to confrontation, speech clear  Skin: No rashes    Results Reviewed:  I have personally reviewed current lab, radiology, and data and agree.    Results from last 7 days   Lab Units 07/02/21  0440   WBC 10*3/mm3 10.77   HEMOGLOBIN g/dL 14.2   HEMATOCRIT % 42.5   PLATELETS 10*3/mm3 319     Results from last 7 days   Lab Units 07/02/21  0440   SODIUM mmol/L 141   POTASSIUM mmol/L 3.6   CHLORIDE mmol/L 104   CO2 mmol/L 26.0   BUN mg/dL 8   CREATININE mg/dL 0.75   GLUCOSE mg/dL 143*   CALCIUM mg/dL 9.6   ALT (SGPT) U/L 13   AST (SGOT) U/L 16   PROBNP pg/mL 267.2     No results found for: PHART, PCO2  Imaging Results (Last 24 Hours)     Procedure Component Value Units Date/Time    XR Chest 1 View [[id number]] Collected: 07/02/21 0506     Updated: 07/02/21 0508    Narrative:      CR Chest 1 Vw    INDICATION:   Chest pain today.     COMPARISON:    None available.    FINDINGS:  Single portable AP view(s) of the chest.  Heart is enlarged. Pulmonary vascularity  is normal. Ringlike density projects near the left pulmonary hilum, likely artifact external to the patient. Correlate clinically. Lungs otherwise are clear. No  pneumothorax is seen. Cholecystectomy clips are present in the right upper quadrant.      Impression:      Cardiomegaly with no acute infiltrates identified. Ringlike density near the left hilum is presumably artifact external to the patient. Correlate clinically.    Signer Name: John Pineda MD   Signed: 7/2/2021 5:06 AM   Workstation Name: [email or web address]    [facility]            [unfilled]  Discharge Details        Discharge Medications      ASK your doctor about these medications      Instructions Start Date   ALPRAZolam 0.5 MG tablet  Commonly known as: XANAX   0.5 mg, Oral, 3 Times Daily PRN      escitalopram 10 MG tablet  Commonly known as: LEXAPRO   10 mg, Oral, Daily      metoprolol succinate XL 50 MG 24 hr tablet  Commonly known as: TOPROL-XL   50 mg, Oral, Daily               Discharge Disposition: Home      Discharge Diet: Regular      Discharge Activity:      CODE STATUS:   There are no questions and answers to display.         No future appointments.    [unfilled]          Time Spent on Discharge: I spent  35  minutes on this discharge activity which included: face-to-face encounter with the patient, reviewing the data in the system, coordination of the care with the nursing staff as well as consultants, documentation, and entering orders.      Mary Carmen Marlow II, DO  07/02/21

## 2021-07-24 ENCOUNTER — APPOINTMENT (OUTPATIENT)
Dept: GENERAL RADIOLOGY | Facility: HOSPITAL | Age: 66
End: 2021-07-24

## 2021-07-24 ENCOUNTER — HOSPITAL ENCOUNTER (EMERGENCY)
Facility: HOSPITAL | Age: 66
Discharge: HOME | End: 2021-07-24
Attending: EMERGENCY MEDICINE | Admitting: EMERGENCY MEDICINE

## 2021-07-24 VITALS
SYSTOLIC BLOOD PRESSURE: 117 MMHG | WEIGHT: 135 LBS | TEMPERATURE: 98 F | RESPIRATION RATE: 18 BRPM | DIASTOLIC BLOOD PRESSURE: 64 MMHG | HEIGHT: 60 IN | BODY MASS INDEX: 26.5 KG/M2 | OXYGEN SATURATION: 94 % | HEART RATE: 53 BPM

## 2021-07-24 DIAGNOSIS — F41.9 ANXIETY: Primary | ICD-10-CM

## 2021-07-24 LAB
ALBUMIN SERPL-MCNC: 4.6 G/DL (ref 3.5–5.2)
ALBUMIN/GLOB SERPL: 1.6 G/DL
ALP SERPL-CCNC: 94 U/L (ref 39–117)
ALT SERPL W P-5'-P-CCNC: 10 U/L (ref 1–33)
ANION GAP SERPL CALCULATED.3IONS-SCNC: 12 MMOL/L (ref 5–15)
AST SERPL-CCNC: 13 U/L (ref 1–32)
BACTERIA UR QL AUTO: NORMAL /HPF
BASOPHILS # BLD AUTO: 0.1 10*3/MM3 (ref 0–0.2)
BASOPHILS NFR BLD AUTO: 1.3 % (ref 0–1.5)
BILIRUB SERPL-MCNC: 0.3 MG/DL (ref 0–1.2)
BILIRUB UR QL STRIP: NEGATIVE
BUN SERPL-MCNC: 13 MG/DL (ref 8–23)
BUN/CREAT SERPL: 18.8 (ref 7–25)
CALCIUM SPEC-SCNC: 9.7 MG/DL (ref 8.6–10.5)
CHLORIDE SERPL-SCNC: 105 MMOL/L (ref 98–107)
CLARITY UR: CLEAR
CO2 SERPL-SCNC: 23 MMOL/L (ref 22–29)
COLOR UR: YELLOW
CREAT SERPL-MCNC: 0.69 MG/DL (ref 0.57–1)
D-LACTATE SERPL-SCNC: 2.3 MMOL/L (ref 0.5–2)
DEPRECATED RDW RBC AUTO: 46.3 FL (ref 37–54)
EOSINOPHIL # BLD AUTO: 0.22 10*3/MM3 (ref 0–0.4)
EOSINOPHIL NFR BLD AUTO: 2.9 % (ref 0.3–6.2)
ERYTHROCYTE [DISTWIDTH] IN BLOOD BY AUTOMATED COUNT: 13 % (ref 12.3–15.4)
GFR SERPL CREATININE-BSD FRML MDRD: 85 ML/MIN/1.73
GLOBULIN UR ELPH-MCNC: 2.8 GM/DL
GLUCOSE SERPL-MCNC: 155 MG/DL (ref 65–99)
GLUCOSE UR STRIP-MCNC: NEGATIVE MG/DL
HCT VFR BLD AUTO: 40.3 % (ref 34–46.6)
HGB BLD-MCNC: 13.4 G/DL (ref 12–15.9)
HGB UR QL STRIP.AUTO: NEGATIVE
HOLD SPECIMEN: NORMAL
HYALINE CASTS UR QL AUTO: NORMAL /LPF
IMM GRANULOCYTES # BLD AUTO: 0.01 10*3/MM3 (ref 0–0.05)
IMM GRANULOCYTES NFR BLD AUTO: 0.1 % (ref 0–0.5)
KETONES UR QL STRIP: NEGATIVE
LEUKOCYTE ESTERASE UR QL STRIP.AUTO: ABNORMAL
LIPASE SERPL-CCNC: 25 U/L (ref 13–60)
LYMPHOCYTES # BLD AUTO: 2.32 10*3/MM3 (ref 0.7–3.1)
LYMPHOCYTES NFR BLD AUTO: 30.5 % (ref 19.6–45.3)
MCH RBC QN AUTO: 32 PG (ref 26.6–33)
MCHC RBC AUTO-ENTMCNC: 33.3 G/DL (ref 31.5–35.7)
MCV RBC AUTO: 96.2 FL (ref 79–97)
MONOCYTES # BLD AUTO: 0.48 10*3/MM3 (ref 0.1–0.9)
MONOCYTES NFR BLD AUTO: 6.3 % (ref 5–12)
NEUTROPHILS NFR BLD AUTO: 4.48 10*3/MM3 (ref 1.7–7)
NEUTROPHILS NFR BLD AUTO: 58.9 % (ref 42.7–76)
NITRITE UR QL STRIP: NEGATIVE
NRBC BLD AUTO-RTO: 0 /100 WBC (ref 0–0.2)
NT-PROBNP SERPL-MCNC: 224.5 PG/ML (ref 0–900)
PH UR STRIP.AUTO: 7 [PH] (ref 5–8)
PLATELET # BLD AUTO: 305 10*3/MM3 (ref 140–450)
PMV BLD AUTO: 9.5 FL (ref 6–12)
POTASSIUM SERPL-SCNC: 3.9 MMOL/L (ref 3.5–5.2)
PROT SERPL-MCNC: 7.4 G/DL (ref 6–8.5)
PROT UR QL STRIP: NEGATIVE
RBC # BLD AUTO: 4.19 10*6/MM3 (ref 3.77–5.28)
RBC # UR: NORMAL /HPF
REF LAB TEST METHOD: NORMAL
SODIUM SERPL-SCNC: 140 MMOL/L (ref 136–145)
SP GR UR STRIP: 1.01 (ref 1–1.03)
SQUAMOUS #/AREA URNS HPF: NORMAL /HPF
TROPONIN T SERPL-MCNC: <0.01 NG/ML (ref 0–0.03)
UROBILINOGEN UR QL STRIP: ABNORMAL
WBC # BLD AUTO: 7.61 10*3/MM3 (ref 3.4–10.8)
WBC UR QL AUTO: NORMAL /HPF
WHOLE BLOOD HOLD SPECIMEN: NORMAL

## 2021-07-24 PROCEDURE — 83690 ASSAY OF LIPASE: CPT | Performed by: EMERGENCY MEDICINE

## 2021-07-24 PROCEDURE — 84484 ASSAY OF TROPONIN QUANT: CPT | Performed by: EMERGENCY MEDICINE

## 2021-07-24 PROCEDURE — 85025 COMPLETE CBC W/AUTO DIFF WBC: CPT | Performed by: EMERGENCY MEDICINE

## 2021-07-24 PROCEDURE — 80053 COMPREHEN METABOLIC PANEL: CPT | Performed by: EMERGENCY MEDICINE

## 2021-07-24 PROCEDURE — 96375 TX/PRO/DX INJ NEW DRUG ADDON: CPT

## 2021-07-24 PROCEDURE — 96374 THER/PROPH/DIAG INJ IV PUSH: CPT

## 2021-07-24 PROCEDURE — 71045 X-RAY EXAM CHEST 1 VIEW: CPT

## 2021-07-24 PROCEDURE — 81001 URINALYSIS AUTO W/SCOPE: CPT | Performed by: EMERGENCY MEDICINE

## 2021-07-24 PROCEDURE — 25010000002 LORAZEPAM PER 2 MG: Performed by: EMERGENCY MEDICINE

## 2021-07-24 PROCEDURE — 25010000002 ONDANSETRON PER 1 MG: Performed by: EMERGENCY MEDICINE

## 2021-07-24 PROCEDURE — 83605 ASSAY OF LACTIC ACID: CPT | Performed by: EMERGENCY MEDICINE

## 2021-07-24 PROCEDURE — 99284 EMERGENCY DEPT VISIT MOD MDM: CPT

## 2021-07-24 PROCEDURE — 93005 ELECTROCARDIOGRAM TRACING: CPT | Performed by: EMERGENCY MEDICINE

## 2021-07-24 PROCEDURE — 83880 ASSAY OF NATRIURETIC PEPTIDE: CPT | Performed by: EMERGENCY MEDICINE

## 2021-07-24 RX ORDER — HYDROXYZINE HYDROCHLORIDE 10 MG/1
10 TABLET, FILM COATED ORAL 3 TIMES DAILY PRN
COMMUNITY

## 2021-07-24 RX ORDER — ONDANSETRON 2 MG/ML
4 INJECTION INTRAMUSCULAR; INTRAVENOUS ONCE
Status: COMPLETED | OUTPATIENT
Start: 2021-07-24 | End: 2021-07-24

## 2021-07-24 RX ORDER — LORAZEPAM 2 MG/ML
0.5 INJECTION INTRAMUSCULAR ONCE
Status: COMPLETED | OUTPATIENT
Start: 2021-07-24 | End: 2021-07-24

## 2021-07-24 RX ORDER — SODIUM CHLORIDE 9 MG/ML
10 INJECTION INTRAVENOUS AS NEEDED
Status: DISCONTINUED | OUTPATIENT
Start: 2021-07-24 | End: 2021-07-24 | Stop reason: HOSPADM

## 2021-07-24 RX ADMIN — LORAZEPAM 0.5 MG: 2 INJECTION INTRAMUSCULAR; INTRAVENOUS at 05:08

## 2021-07-24 RX ADMIN — ONDANSETRON 4 MG: 2 INJECTION INTRAMUSCULAR; INTRAVENOUS at 05:36

## 2021-07-24 NOTE — DISCHARGE INSTRUCTIONS
[facility]  [address] [place] [address]  [phone]      [facility]    [address]  [address]  [place] [address]  [phone] [address]  [address]  [place] [address]  [phone]      [facility] ([place] BCBS, [employer] HMO, [employer] PPO, [employer] EPO, [place] Student Ins, Medicare and Medicaid)    [address]  [address]rd [place] [address]  [phone]       [facility] (4 locations) Accepts most insurance and Medicaid ([place] location only) No Medicare, [place] or [place] [employer]    [facility]  [address] [place] [address]  [phone]       [facility]  [address] [place] [address]  [address]  [place] [address]  [phone]      [facility]  [address] [place] [address]  [phone]                     [facility]  [address]   [place], [place] [id number] [phone]       [facility] (Preferred Medicaid Provider and some Major Insurance Plans) [phone]    [place]  [address] [place] [address]       [facility]  [address] [place] [address]       [place]  [address] [place] [address]       [facility] (Most insurance and Medicaid)  [address]  [address]nd [place] [address]  [phone]      [facility]    [address]  [place] [address]  [phone]      [address] [facility]  [address]  [place] [address]       [facility]  [address] [place] [address] [phone]       [facility] 24-Hour Help Line [phone]      [facility]  [address] [address]  [phone]             [facility]  [address] [address]  [phone]             [facility]  [address] [id number]  [phone]                [facility]  [address] [facility] [address]  [place], [place] [address] [phone]       [facility]  [address] [place], [place] [address] [phone]      [facility]  [address] [place] [address]  [phone]              [facility]  [address]  [place], [place] [address] [phone]                  [facility] [place]  [address] [place] [address] [phone]      [place]  [address] [place] [address] [phone]                  [facility]  [address] [place] [address] [phone]      [facility]  [address] [place] [address] [phone]       [facility]  [address] [place] [address] [phone]      [facility]  [address] [place] [address] [phone]       [facility]  [address] [place] [address] [phone]

## 2021-07-24 NOTE — ED PROVIDER NOTES
"Subjective   66-year-old female presents for evaluation via EMS for \"anxiety attack.\"  Of note, the patient's  recently passed away 6 weeks ago.  The patient was seen here in the emergency department earlier this month for a similar episode.  Tonight, she states that she felt \"okay\" when she went to bed.  However, she woke up this morning with paresthesias, nausea, palpitations, and an overwhelming sensation of anxiety.  She called EMS and was subsequently brought to the emergency department for evaluation.  She states that her symptoms have improved a good bit spontaneously.  She denies any suicidal or homicidal thoughts.          Review of Systems   Cardiovascular: Positive for palpitations.   Gastrointestinal: Positive for nausea.   Neurological: Positive for numbness.   Psychiatric/Behavioral: The patient is nervous/anxious.    All other systems reviewed and are negative.      Past Medical History:   Diagnosis Date   • Anxiety        No Known Allergies    Past Surgical History:   Procedure Laterality Date   • BREAST CYST ASPIRATION Left 2005   • HYSTERECTOMY     • OOPHORECTOMY         Family History   Problem Relation Age of Onset   • Breast cancer Mother 80   • Ovarian cancer Neg Hx        Social History     Socioeconomic History   • Marital status:      Spouse name: Not on file   • Number of children: Not on file   • Years of education: Not on file   • Highest education level: Not on file   Tobacco Use   • Smoking status: Never Smoker   Vaping Use   • Vaping Use: Never used   Substance and Sexual Activity   • Alcohol use: Never   • Drug use: Never   • Sexual activity: Defer           Objective   Physical Exam  Vitals and nursing note reviewed.   Constitutional:       Appearance: Normal appearance. She is well-developed. She is not diaphoretic.      Comments: Nontoxic-appearing female, appears mildly anxious   HENT:      Head: Normocephalic and atraumatic.   Eyes:      Pupils: Pupils are equal, " "round, and reactive to light.   Cardiovascular:      Rate and Rhythm: Normal rate and regular rhythm.      Pulses: Normal pulses.      Heart sounds: Normal heart sounds. No murmur heard.   No friction rub. No gallop.    Pulmonary:      Effort: Pulmonary effort is normal. No respiratory distress.      Breath sounds: Normal breath sounds. No wheezing or rales.   Abdominal:      General: Bowel sounds are normal. There is no distension.      Palpations: Abdomen is soft. There is no mass.      Tenderness: There is no abdominal tenderness. There is no guarding or rebound.   Musculoskeletal:         General: Normal range of motion.      Cervical back: Neck supple.   Skin:     General: Skin is warm and dry.      Findings: No erythema or rash.   Neurological:      General: No focal deficit present.      Mental Status: She is alert and oriented to person, place, and time.      Comments: Awake, alert, and oriented x3, clear and fluent speech, no ataxia or dysmetria, neurovascularly intact distally in all fours with bounding distal pulses and normal sensation, 5 out of 5 strength in all fours, no cranial nerve deficits noted with cranial nerves II through XII grossly intact     Psychiatric:         Thought Content: Thought content normal.         Judgment: Judgment normal.      Comments: Anxious         Procedures           ED Course  ED Course as of Jul 24 0703   Sat Jul 24, 2021   0701 66-year-old female presents for evaluation of \"anxiety.\"  Of note, the patient was seen here in the emergency department for similar episode earlier this month after her  passed away 6 weeks ago.  On arrival to the ED, the patient is nontoxic-appearing.  Benign exam.  Vital signs are reassuring.  EKG revealed sinus bradycardia with a heart rate of 57 and nonspecific T wave flattening/abnormality noted to inferior leads and anterior precordial leads.  Upon reevaluation following medications, the patient feels quite well.  Doubt emergent " process at this time.  Reassured and counseled regarding symptomatic management.  The patient will follow up with her primary care physician within the next week.  Agreeable with plan and given appropriate strict return precautions.  Additionally, the patient was given outpatient resources to help her with her anxiety per her request and will follow-up as soon as possible.    [DD]      ED Course User Index  [DD] Modesto Gallagher MD                                 Recent Results (from the past 24 hour(s))   Comprehensive Metabolic Panel    Collection Time: 07/24/21  4:25 AM    Specimen: Blood   Result Value Ref Range    Glucose 155 (H) 65 - 99 mg/dL    BUN 13 8 - 23 mg/dL    Creatinine 0.69 0.57 - 1.00 mg/dL    Sodium 140 136 - 145 mmol/L    Potassium 3.9 3.5 - 5.2 mmol/L    Chloride 105 98 - 107 mmol/L    CO2 23.0 22.0 - 29.0 mmol/L    Calcium 9.7 8.6 - 10.5 mg/dL    Total Protein 7.4 6.0 - 8.5 g/dL    Albumin 4.60 3.50 - 5.20 g/dL    ALT (SGPT) 10 1 - 33 U/L    AST (SGOT) 13 1 - 32 U/L    Alkaline Phosphatase 94 39 - 117 U/L    Total Bilirubin 0.3 0.0 - 1.2 mg/dL    eGFR Non African Amer 85 >60 mL/min/1.73    Globulin 2.8 gm/dL    A/G Ratio 1.6 g/dL    BUN/Creatinine Ratio 18.8 7.0 - 25.0    Anion Gap 12.0 5.0 - 15.0 mmol/L   Lipase    Collection Time: 07/24/21  4:25 AM    Specimen: Blood   Result Value Ref Range    Lipase 25 13 - 60 U/L   Lactic Acid, Plasma    Collection Time: 07/24/21  4:25 AM    Specimen: Blood   Result Value Ref Range    Lactate 2.3 (C) 0.5 - 2.0 mmol/L   Green Top (Gel)    Collection Time: 07/24/21  4:25 AM   Result Value Ref Range    Extra Tube Hold for add-ons.    Lavender Top    Collection Time: 07/24/21  4:25 AM   Result Value Ref Range    Extra Tube hold for add-on    Gold Top - SST    Collection Time: 07/24/21  4:25 AM   Result Value Ref Range    Extra Tube Hold for add-ons.    CBC Auto Differential    Collection Time: 07/24/21  4:25 AM    Specimen: Blood   Result Value Ref Range     WBC 7.61 3.40 - 10.80 10*3/mm3    RBC 4.19 3.77 - 5.28 10*6/mm3    Hemoglobin 13.4 12.0 - 15.9 g/dL    Hematocrit 40.3 34.0 - 46.6 %    MCV 96.2 79.0 - 97.0 fL    MCH 32.0 26.6 - 33.0 pg    MCHC 33.3 31.5 - 35.7 g/dL    RDW 13.0 12.3 - 15.4 %    RDW-SD 46.3 37.0 - 54.0 fl    MPV 9.5 6.0 - 12.0 fL    Platelets 305 140 - 450 10*3/mm3    Neutrophil % 58.9 42.7 - 76.0 %    Lymphocyte % 30.5 19.6 - 45.3 %    Monocyte % 6.3 5.0 - 12.0 %    Eosinophil % 2.9 0.3 - 6.2 %    Basophil % 1.3 0.0 - 1.5 %    Immature Grans % 0.1 0.0 - 0.5 %    Neutrophils, Absolute 4.48 1.70 - 7.00 10*3/mm3    Lymphocytes, Absolute 2.32 0.70 - 3.10 10*3/mm3    Monocytes, Absolute 0.48 0.10 - 0.90 10*3/mm3    Eosinophils, Absolute 0.22 0.00 - 0.40 10*3/mm3    Basophils, Absolute 0.10 0.00 - 0.20 10*3/mm3    Immature Grans, Absolute 0.01 0.00 - 0.05 10*3/mm3    nRBC 0.0 0.0 - 0.2 /100 WBC   Troponin    Collection Time: 07/24/21  4:25 AM    Specimen: Blood   Result Value Ref Range    Troponin T <0.010 0.000 - 0.030 ng/mL   BNP    Collection Time: 07/24/21  4:25 AM    Specimen: Blood   Result Value Ref Range    proBNP 224.5 0.0 - 900.0 pg/mL   Urinalysis With Microscopic If Indicated (No Culture) - Urine, Clean Catch    Collection Time: 07/24/21  5:36 AM    Specimen: Urine, Clean Catch   Result Value Ref Range    Color, UA Yellow Yellow, Straw    Appearance, UA Clear Clear    pH, UA 7.0 5.0 - 8.0    Specific Gravity, UA 1.009 1.001 - 1.030    Glucose, UA Negative Negative    Ketones, UA Negative Negative    Bilirubin, UA Negative Negative    Blood, UA Negative Negative    Protein, UA Negative Negative    Leuk Esterase, UA Trace (A) Negative    Nitrite, UA Negative Negative    Urobilinogen, UA 0.2 E.U./dL 0.2 - 1.0 E.U./dL   Urinalysis, Microscopic Only - Urine, Clean Catch    Collection Time: 07/24/21  5:36 AM    Specimen: Urine, Clean Catch   Result Value Ref Range    RBC, UA 0-2 None Seen, 0-2 /HPF    WBC, UA 0-2 None Seen, 0-2 /HPF    Bacteria, UA  None Seen None Seen, Trace /HPF    Squamous Epithelial Cells, UA 0-2 None Seen, 0-2 /HPF    Hyaline Casts, UA None Seen 0 - 6 /LPF    Methodology Automated Microscopy      Note: In addition to lab results from this visit, the labs listed above may include labs taken at another facility or during a different encounter within the last 24 hours. Please correlate lab times with ED admission and discharge times for further clarification of the services performed during this visit.    XR Chest 1 View   Final Result   No acute cardiopulmonary findings.      Signer Name: John Pineda MD    Signed: 7/24/2021 5:05 AM    Workstation Name: [email or web address]     [facility]        Vitals:    07/24/21 0520 07/24/21 0530 07/24/21 0600 07/24/21 0630   BP:  148/79 131/76 117/64   BP Location:       Patient Position:       Pulse: 54 55 51 53   Resp:       Temp:       TempSrc:       SpO2: 98% 96% 95% 94%   Weight:       Height:         Medications   Sodium Chloride (PF) 0.9 % 10 mL (has no administration in time range)   LORazepam (ATIVAN) injection 0.5 mg (0.5 mg Intravenous Given 7/24/21 0508)   ondansetron (ZOFRAN) injection 4 mg (4 mg Intravenous Given 7/24/21 0536)     ECG/EMG Results (last 24 hours)     Procedure Component Value Units Date/Time    ECG 12 Lead [[id number]] Collected: 07/24/21 0413     Updated: 07/24/21 0414        ECG 12 Lead             Recent Results (from the past 24 hour(s))   Comprehensive Metabolic Panel    Collection Time: 07/24/21  4:25 AM    Specimen: Blood   Result Value Ref Range    Glucose 155 (H) 65 - 99 mg/dL    BUN 13 8 - 23 mg/dL    Creatinine 0.69 0.57 - 1.00 mg/dL    Sodium 140 136 - 145 mmol/L    Potassium 3.9 3.5 - 5.2 mmol/L    Chloride 105 98 - 107 mmol/L    CO2 23.0 22.0 - 29.0 mmol/L    Calcium 9.7 8.6 - 10.5 mg/dL    Total Protein 7.4 6.0 - 8.5 g/dL    Albumin 4.60 3.50 - 5.20 g/dL    ALT (SGPT) 10 1 - 33 U/L    AST (SGOT) 13 1 - 32 U/L    Alkaline Phosphatase 94 39 -  117 U/L    Total Bilirubin 0.3 0.0 - 1.2 mg/dL    eGFR Non African Amer 85 >60 mL/min/1.73    Globulin 2.8 gm/dL    A/G Ratio 1.6 g/dL    BUN/Creatinine Ratio 18.8 7.0 - 25.0    Anion Gap 12.0 5.0 - 15.0 mmol/L   Lipase    Collection Time: 07/24/21  4:25 AM    Specimen: Blood   Result Value Ref Range    Lipase 25 13 - 60 U/L   Lactic Acid, Plasma    Collection Time: 07/24/21  4:25 AM    Specimen: Blood   Result Value Ref Range    Lactate 2.3 (C) 0.5 - 2.0 mmol/L   Green Top (Gel)    Collection Time: 07/24/21  4:25 AM   Result Value Ref Range    Extra Tube Hold for add-ons.    Lavender Top    Collection Time: 07/24/21  4:25 AM   Result Value Ref Range    Extra Tube hold for add-on    Gold Top - SST    Collection Time: 07/24/21  4:25 AM   Result Value Ref Range    Extra Tube Hold for add-ons.    CBC Auto Differential    Collection Time: 07/24/21  4:25 AM    Specimen: Blood   Result Value Ref Range    WBC 7.61 3.40 - 10.80 10*3/mm3    RBC 4.19 3.77 - 5.28 10*6/mm3    Hemoglobin 13.4 12.0 - 15.9 g/dL    Hematocrit 40.3 34.0 - 46.6 %    MCV 96.2 79.0 - 97.0 fL    MCH 32.0 26.6 - 33.0 pg    MCHC 33.3 31.5 - 35.7 g/dL    RDW 13.0 12.3 - 15.4 %    RDW-SD 46.3 37.0 - 54.0 fl    MPV 9.5 6.0 - 12.0 fL    Platelets 305 140 - 450 10*3/mm3    Neutrophil % 58.9 42.7 - 76.0 %    Lymphocyte % 30.5 19.6 - 45.3 %    Monocyte % 6.3 5.0 - 12.0 %    Eosinophil % 2.9 0.3 - 6.2 %    Basophil % 1.3 0.0 - 1.5 %    Immature Grans % 0.1 0.0 - 0.5 %    Neutrophils, Absolute 4.48 1.70 - 7.00 10*3/mm3    Lymphocytes, Absolute 2.32 0.70 - 3.10 10*3/mm3    Monocytes, Absolute 0.48 0.10 - 0.90 10*3/mm3    Eosinophils, Absolute 0.22 0.00 - 0.40 10*3/mm3    Basophils, Absolute 0.10 0.00 - 0.20 10*3/mm3    Immature Grans, Absolute 0.01 0.00 - 0.05 10*3/mm3    nRBC 0.0 0.0 - 0.2 /100 WBC   Troponin    Collection Time: 07/24/21  4:25 AM    Specimen: Blood   Result Value Ref Range    Troponin T <0.010 0.000 - 0.030 ng/mL   BNP    Collection Time: 07/24/21   4:25 AM    Specimen: Blood   Result Value Ref Range    proBNP 224.5 0.0 - 900.0 pg/mL   Urinalysis With Microscopic If Indicated (No Culture) - Urine, Clean Catch    Collection Time: 07/24/21  5:36 AM    Specimen: Urine, Clean Catch   Result Value Ref Range    Color, UA Yellow Yellow, Straw    Appearance, UA Clear Clear    pH, UA 7.0 5.0 - 8.0    Specific Gravity, UA 1.009 1.001 - 1.030    Glucose, UA Negative Negative    Ketones, UA Negative Negative    Bilirubin, UA Negative Negative    Blood, UA Negative Negative    Protein, UA Negative Negative    Leuk Esterase, UA Trace (A) Negative    Nitrite, UA Negative Negative    Urobilinogen, UA 0.2 E.U./dL 0.2 - 1.0 E.U./dL   Urinalysis, Microscopic Only - Urine, Clean Catch    Collection Time: 07/24/21  5:36 AM    Specimen: Urine, Clean Catch   Result Value Ref Range    RBC, UA 0-2 None Seen, 0-2 /HPF    WBC, UA 0-2 None Seen, 0-2 /HPF    Bacteria, UA None Seen None Seen, Trace /HPF    Squamous Epithelial Cells, UA 0-2 None Seen, 0-2 /HPF    Hyaline Casts, UA None Seen 0 - 6 /LPF    Methodology Automated Microscopy      Note: In addition to lab results from this visit, the labs listed above may include labs taken at another facility or during a different encounter within the last 24 hours. Please correlate lab times with ED admission and discharge times for further clarification of the services performed during this visit.    XR Chest 1 View   Final Result   No acute cardiopulmonary findings.      Signer Name: John Pineda MD    Signed: 7/24/2021 5:05 AM    Workstation Name: [email or web address]     [facility]        Vitals:    07/24/21 0520 07/24/21 0530 07/24/21 0600 07/24/21 0630   BP:  148/79 131/76 117/64   BP Location:       Patient Position:       Pulse: 54 55 51 53   Resp:       Temp:       TempSrc:       SpO2: 98% 96% 95% 94%   Weight:       Height:         Medications   Sodium Chloride (PF) 0.9 % 10 mL (has no administration in time range)    LORazepam (ATIVAN) injection 0.5 mg (0.5 mg Intravenous Given 7/24/21 1490)   ondansetron (ZOFRAN) injection 4 mg (4 mg Intravenous Given 7/24/21 9686)     ECG/EMG Results (last 24 hours)     Procedure Component Value Units Date/Time    ECG 12 Lead [[id number]] Collected: 07/24/21 0413     Updated: 07/24/21 0414        ECG 12 Lead                     MDM    Final diagnoses:   Anxiety       ED Disposition  ED Disposition     ED Disposition Condition Comment    Discharge Stable           Velia James, APRN  [address] [facility] [address]  [phone]    In 1 week           Medication List      No changes were made to your prescriptions during this visit.          Modesto Gallagher MD  07/24/21 7611

## 2021-07-25 LAB
QT INTERVAL: 452 MS
QTC INTERVAL: 439 MS

## 2021-08-03 ENCOUNTER — TRANSCRIBE ORDERS (OUTPATIENT)
Dept: ADMINISTRATIVE | Facility: HOSPITAL | Age: 66
End: 2021-08-03

## 2021-08-03 DIAGNOSIS — Z12.31 VISIT FOR SCREENING MAMMOGRAM: Primary | ICD-10-CM

## 2021-08-08 ENCOUNTER — APPOINTMENT (OUTPATIENT)
Dept: GENERAL RADIOLOGY | Facility: HOSPITAL | Age: 66
End: 2021-08-08

## 2021-08-08 ENCOUNTER — HOSPITAL ENCOUNTER (EMERGENCY)
Facility: HOSPITAL | Age: 66
Discharge: HOME | End: 2021-08-08
Attending: EMERGENCY MEDICINE | Admitting: EMERGENCY MEDICINE

## 2021-08-08 VITALS
OXYGEN SATURATION: 98 % | HEIGHT: 60 IN | SYSTOLIC BLOOD PRESSURE: 152 MMHG | HEART RATE: 66 BPM | BODY MASS INDEX: 25.52 KG/M2 | RESPIRATION RATE: 18 BRPM | DIASTOLIC BLOOD PRESSURE: 88 MMHG | TEMPERATURE: 98.2 F | WEIGHT: 130 LBS

## 2021-08-08 DIAGNOSIS — S29.011A CHEST WALL MUSCLE STRAIN, INITIAL ENCOUNTER: Primary | ICD-10-CM

## 2021-08-08 PROCEDURE — 99282 EMERGENCY DEPT VISIT SF MDM: CPT

## 2021-08-08 PROCEDURE — 71046 X-RAY EXAM CHEST 2 VIEWS: CPT

## 2021-08-08 RX ORDER — LIDOCAINE 50 MG/G
1 PATCH TOPICAL EVERY 24 HOURS
Qty: 30 PATCH | Refills: 0 | Status: ON HOLD | OUTPATIENT
Start: 2021-08-08 | End: 2022-04-05

## 2021-08-08 NOTE — ED PROVIDER NOTES
Subjective   66-year-old female states she hurt her right rib cage trying to  her grandchild.  She reports she put 1 grandchild down went to  another 1 is leaning against the chair and began having pain.  She is not short of breath she had no fevers no chills.  She had no hemoptysis.  She has not on an anticoagulant.  She reports that pain is made worse with palpation she has no abdominal pain no nausea or vomiting associated with this.  She had no melena medic easier hematemesis.  He has no other complaints.      History provided by:  Patient   used: No    Chest Pain  Pain location:  R chest  Pain quality: sharp and stabbing    Pain radiates to:  Does not radiate  Pain severity:  Moderate  Onset quality:  Sudden  Duration:  2 days  Timing:  Constant  Progression:  Unchanged  Chronicity:  New  Context comment:  Leaning over to  her grandchild leaning against the arm of a chair began having pain  Relieved by:  Certain positions  Exacerbated by: Palpation flexion and movement.  Ineffective treatments:  None tried  Associated symptoms: no abdominal pain, no anorexia, no anxiety, no back pain, no claudication, no cough, no diaphoresis, no dysphagia, no fatigue, no fever, no nausea, no near-syncope, no orthopnea, no palpitations, no syncope, no vomiting and no weakness    Risk factors: no birth control, no diabetes mellitus, no high cholesterol, no hypertension and not obese        Review of Systems   Constitutional: Negative for diaphoresis, fatigue and fever.   HENT: Negative for trouble swallowing.    Respiratory: Negative for cough, chest tightness and wheezing.    Cardiovascular: Positive for chest pain. Negative for palpitations, orthopnea, claudication, syncope and near-syncope.   Gastrointestinal: Negative for abdominal pain, anorexia, nausea and vomiting.   Musculoskeletal: Negative for back pain and neck pain.   Skin: Negative for pallor and rash.   Neurological:  Negative for weakness.   Psychiatric/Behavioral: Negative.    All other systems reviewed and are negative.      Past Medical History:   Diagnosis Date   • Anxiety        No Known Allergies    Past Surgical History:   Procedure Laterality Date   • BREAST CYST ASPIRATION Left 2005   • HYSTERECTOMY     • OOPHORECTOMY         Family History   Problem Relation Age of Onset   • Breast cancer Mother 80   • Ovarian cancer Neg Hx        Social History     Socioeconomic History   • Marital status:      Spouse name: Not on file   • Number of children: Not on file   • Years of education: Not on file   • Highest education level: Not on file   Tobacco Use   • Smoking status: Never Smoker   • Smokeless tobacco: Never Used   Vaping Use   • Vaping Use: Never used   Substance and Sexual Activity   • Alcohol use: Never   • Drug use: Never   • Sexual activity: Defer           Objective   Physical Exam  Vitals and nursing note reviewed.   Constitutional:       Appearance: She is well-developed.   HENT:      Head: Normocephalic and atraumatic.      Right Ear: External ear normal.      Left Ear: External ear normal.      Nose: Nose normal.   Eyes:      General: No scleral icterus.     Conjunctiva/sclera: Conjunctivae normal.      Pupils: Pupils are equal, round, and reactive to light.   Neck:      Thyroid: No thyromegaly.   Cardiovascular:      Rate and Rhythm: Normal rate and regular rhythm.      Heart sounds: Normal heart sounds.      Comments: Right lateral chest wall has no ecchymosis abrasions or crepitus.  Is tender to palpation over the ribs the abdomen is completely soft and nontender  Pulmonary:      Effort: Pulmonary effort is normal. No respiratory distress.      Breath sounds: Normal breath sounds. No wheezing or rales.   Chest:      Chest wall: No tenderness.   Abdominal:      General: Bowel sounds are normal. There is no distension.      Palpations: Abdomen is soft.      Tenderness: There is no abdominal tenderness.       Comments: Soft nontender no guarding rebound or rigidity.   Musculoskeletal:         General: Normal range of motion.      Cervical back: Normal range of motion.   Lymphadenopathy:      Cervical: No cervical adenopathy.   Skin:     General: Skin is warm and dry.   Neurological:      Mental Status: She is alert and oriented to person, place, and time.      Cranial Nerves: No cranial nerve deficit.      Coordination: Coordination normal.      Deep Tendon Reflexes: Reflexes are normal and symmetric. Reflexes normal.   Psychiatric:         Behavior: Behavior normal.         Thought Content: Thought content normal.         Judgment: Judgment normal.         Procedures           ED Course                                           MDM  Number of Diagnoses or Management Options  Chest wall muscle strain, initial encounter: new and requires workup     Amount and/or Complexity of Data Reviewed  Tests in the radiology section of CPT®: reviewed and ordered  Discuss the patient with other providers: yes    Patient Progress  Patient progress: stable      Final diagnoses:   Chest wall muscle strain, initial encounter       ED Disposition  ED Disposition     ED Disposition Condition Comment    Discharge Stable           Velia James, APRN  [address] [id number] [phone]               Medication List      New Prescriptions    diclofenac 50 MG EC tablet  Commonly known as: VOLTAREN  Take 1 tablet by mouth 3 (Three) Times a Day.     lidocaine 5 %  Commonly known as: LIDODERM  Place 1 patch on the skin as directed by provider Daily. Remove & Discard patch within 12 hours or as directed by MD           Where to Get Your Medications      These medications were sent to [facility]/pharmacy #[id number] - [place], [place] - [address] [place] [address] AT [place] [address] - [phone]  - [phone] [address] [address]    Hours: 24-hours Phone: [phone]   · diclofenac 50 MG EC  tablet  · lidocaine 5 %          [place], RAEGAN Hess  08/10/21 0763

## 2021-09-01 ENCOUNTER — HOSPITAL ENCOUNTER (OUTPATIENT)
Dept: MAMMOGRAPHY | Facility: HOSPITAL | Age: 66
Discharge: HOME | End: 2021-09-01
Admitting: NURSE PRACTITIONER

## 2021-09-01 DIAGNOSIS — Z12.31 VISIT FOR SCREENING MAMMOGRAM: ICD-10-CM

## 2021-09-01 PROCEDURE — 77063 BREAST TOMOSYNTHESIS BI: CPT

## 2021-09-01 PROCEDURE — 77063 BREAST TOMOSYNTHESIS BI: CPT | Performed by: RADIOLOGY

## 2021-09-01 PROCEDURE — 77067 SCR MAMMO BI INCL CAD: CPT

## 2021-09-01 PROCEDURE — 77067 SCR MAMMO BI INCL CAD: CPT | Performed by: RADIOLOGY

## 2021-10-05 ENCOUNTER — HOSPITAL ENCOUNTER (OUTPATIENT)
Dept: MAMMOGRAPHY | Facility: HOSPITAL | Age: 66
Discharge: HOME | End: 2021-10-05
Admitting: RADIOLOGY

## 2021-10-05 DIAGNOSIS — R92.8 ABNORMAL MAMMOGRAM: ICD-10-CM

## 2021-10-05 PROCEDURE — G0279 TOMOSYNTHESIS, MAMMO: HCPCS | Performed by: RADIOLOGY

## 2021-10-05 PROCEDURE — G0279 TOMOSYNTHESIS, MAMMO: HCPCS

## 2021-10-05 PROCEDURE — 77065 DX MAMMO INCL CAD UNI: CPT

## 2021-10-05 PROCEDURE — 77065 DX MAMMO INCL CAD UNI: CPT | Performed by: RADIOLOGY

## 2022-04-05 ENCOUNTER — APPOINTMENT (OUTPATIENT)
Dept: CT IMAGING | Facility: HOSPITAL | Age: 67
End: 2022-04-05

## 2022-04-05 ENCOUNTER — HOSPITAL ENCOUNTER (OUTPATIENT)
Facility: HOSPITAL | Age: 67
Setting detail: OBSERVATION
Discharge: HOME | End: 2022-04-07
Attending: EMERGENCY MEDICINE | Admitting: FAMILY MEDICINE

## 2022-04-05 DIAGNOSIS — R10.10 PAIN OF UPPER ABDOMEN: ICD-10-CM

## 2022-04-05 DIAGNOSIS — R55 NEAR SYNCOPE: ICD-10-CM

## 2022-04-05 DIAGNOSIS — K52.9 COLITIS: Primary | ICD-10-CM

## 2022-04-05 DIAGNOSIS — R55 VASOVAGAL EPISODE: ICD-10-CM

## 2022-04-05 PROBLEM — K21.9 ACID REFLUX DISEASE: Status: ACTIVE | Noted: 2019-07-16

## 2022-04-05 PROBLEM — A41.9 SEPSIS (HCC): Status: ACTIVE | Noted: 2022-04-05

## 2022-04-05 LAB
ALBUMIN SERPL-MCNC: 4.9 G/DL (ref 3.5–5.2)
ALBUMIN/GLOB SERPL: 1.8 G/DL
ALP SERPL-CCNC: 107 U/L (ref 39–117)
ALT SERPL W P-5'-P-CCNC: 19 U/L (ref 1–33)
ANION GAP SERPL CALCULATED.3IONS-SCNC: 10 MMOL/L (ref 5–15)
AST SERPL-CCNC: 16 U/L (ref 1–32)
BASOPHILS # BLD AUTO: 0.14 10*3/MM3 (ref 0–0.2)
BASOPHILS NFR BLD AUTO: 1 % (ref 0–1.5)
BILIRUB SERPL-MCNC: 0.4 MG/DL (ref 0–1.2)
BILIRUB UR QL STRIP: NEGATIVE
BUN BLDA-MCNC: 12 MG/DL (ref 8–26)
BUN SERPL-MCNC: 12 MG/DL (ref 8–23)
BUN/CREAT SERPL: 16 (ref 7–25)
CA-I BLDA-SCNC: 1.23 MMOL/L (ref 1.2–1.32)
CALCIUM SPEC-SCNC: 9.6 MG/DL (ref 8.6–10.5)
CHLORIDE BLDA-SCNC: 106 MMOL/L (ref 98–109)
CHLORIDE SERPL-SCNC: 103 MMOL/L (ref 98–107)
CLARITY UR: CLEAR
CO2 BLDA-SCNC: 23 MMOL/L (ref 24–29)
CO2 SERPL-SCNC: 26 MMOL/L (ref 22–29)
COLOR UR: YELLOW
CREAT BLDA-MCNC: 0.8 MG/DL (ref 0.6–1.3)
CREAT SERPL-MCNC: 0.75 MG/DL (ref 0.57–1)
CRP SERPL-MCNC: <0.3 MG/DL (ref 0–0.5)
D-LACTATE SERPL-SCNC: 1.2 MMOL/L (ref 0.5–2)
DEPRECATED RDW RBC AUTO: 46.1 FL (ref 37–54)
EGFRCR SERPLBLD CKD-EPI 2021: 81.4 ML/MIN/1.73
EGFRCR SERPLBLD CKD-EPI 2021: 87.9 ML/MIN/1.73
EOSINOPHIL # BLD AUTO: 0.14 10*3/MM3 (ref 0–0.4)
EOSINOPHIL NFR BLD AUTO: 1 % (ref 0.3–6.2)
ERYTHROCYTE [DISTWIDTH] IN BLOOD BY AUTOMATED COUNT: 13.2 % (ref 12.3–15.4)
ERYTHROCYTE [SEDIMENTATION RATE] IN BLOOD: 9 MM/HR (ref 0–30)
GLOBULIN UR ELPH-MCNC: 2.7 GM/DL
GLUCOSE BLDC GLUCOMTR-MCNC: 121 MG/DL (ref 70–130)
GLUCOSE SERPL-MCNC: 93 MG/DL (ref 65–99)
GLUCOSE UR STRIP-MCNC: NEGATIVE MG/DL
HCT VFR BLD AUTO: 43.2 % (ref 34–46.6)
HCT VFR BLDA CALC: 39 % (ref 38–51)
HGB BLD-MCNC: 14.4 G/DL (ref 12–15.9)
HGB BLDA-MCNC: 13.3 G/DL (ref 12–17)
HGB UR QL STRIP.AUTO: NEGATIVE
HOLD SPECIMEN: NORMAL
IMM GRANULOCYTES # BLD AUTO: 0.03 10*3/MM3 (ref 0–0.05)
IMM GRANULOCYTES NFR BLD AUTO: 0.2 % (ref 0–0.5)
KETONES UR QL STRIP: NEGATIVE
LEUKOCYTE ESTERASE UR QL STRIP.AUTO: NEGATIVE
LIPASE SERPL-CCNC: 15 U/L (ref 13–60)
LYMPHOCYTES # BLD AUTO: 2.65 10*3/MM3 (ref 0.7–3.1)
LYMPHOCYTES NFR BLD AUTO: 19.7 % (ref 19.6–45.3)
MCH RBC QN AUTO: 31.7 PG (ref 26.6–33)
MCHC RBC AUTO-ENTMCNC: 33.3 G/DL (ref 31.5–35.7)
MCV RBC AUTO: 95.2 FL (ref 79–97)
MONOCYTES # BLD AUTO: 0.93 10*3/MM3 (ref 0.1–0.9)
MONOCYTES NFR BLD AUTO: 6.9 % (ref 5–12)
NEUTROPHILS NFR BLD AUTO: 71.2 % (ref 42.7–76)
NEUTROPHILS NFR BLD AUTO: 9.53 10*3/MM3 (ref 1.7–7)
NITRITE UR QL STRIP: NEGATIVE
NRBC BLD AUTO-RTO: 0 /100 WBC (ref 0–0.2)
NT-PROBNP SERPL-MCNC: 228 PG/ML (ref 0–900)
PH UR STRIP.AUTO: 5.5 [PH] (ref 5–8)
PLATELET # BLD AUTO: 363 10*3/MM3 (ref 140–450)
PMV BLD AUTO: 9.1 FL (ref 6–12)
POTASSIUM BLDA-SCNC: 4 MMOL/L (ref 3.5–4.9)
POTASSIUM SERPL-SCNC: 4.3 MMOL/L (ref 3.5–5.2)
PROCALCITONIN SERPL-MCNC: <0.2 NG/ML (ref 0–0.25)
PROT SERPL-MCNC: 7.6 G/DL (ref 6–8.5)
PROT UR QL STRIP: NEGATIVE
RBC # BLD AUTO: 4.54 10*6/MM3 (ref 3.77–5.28)
SARS-COV-2 RDRP RESP QL NAA+PROBE: NORMAL
SODIUM BLD-SCNC: 141 MMOL/L (ref 138–146)
SODIUM SERPL-SCNC: 139 MMOL/L (ref 136–145)
SP GR UR STRIP: 1.01 (ref 1–1.03)
TROPONIN T SERPL-MCNC: <0.01 NG/ML (ref 0–0.03)
UROBILINOGEN UR QL STRIP: NORMAL
WBC NRBC COR # BLD: 13.42 10*3/MM3 (ref 3.4–10.8)
WHOLE BLOOD HOLD SPECIMEN: NORMAL
WHOLE BLOOD HOLD SPECIMEN: NORMAL

## 2022-04-05 PROCEDURE — G0378 HOSPITAL OBSERVATION PER HR: HCPCS

## 2022-04-05 PROCEDURE — 87635 SARS-COV-2 COVID-19 AMP PRB: CPT | Performed by: PHYSICIAN ASSISTANT

## 2022-04-05 PROCEDURE — 99220 PR INITIAL OBSERVATION CARE/DAY 70 MINUTES: CPT | Performed by: FAMILY MEDICINE

## 2022-04-05 PROCEDURE — 83690 ASSAY OF LIPASE: CPT | Performed by: EMERGENCY MEDICINE

## 2022-04-05 PROCEDURE — 83880 ASSAY OF NATRIURETIC PEPTIDE: CPT | Performed by: PHYSICIAN ASSISTANT

## 2022-04-05 PROCEDURE — 87040 BLOOD CULTURE FOR BACTERIA: CPT | Performed by: PHYSICIAN ASSISTANT

## 2022-04-05 PROCEDURE — 81003 URINALYSIS AUTO W/O SCOPE: CPT | Performed by: EMERGENCY MEDICINE

## 2022-04-05 PROCEDURE — 25010000002 ONDANSETRON PER 1 MG: Performed by: PHYSICIAN ASSISTANT

## 2022-04-05 PROCEDURE — 83605 ASSAY OF LACTIC ACID: CPT | Performed by: EMERGENCY MEDICINE

## 2022-04-05 PROCEDURE — 74174 CTA ABD&PLVS W/CONTRAST: CPT

## 2022-04-05 PROCEDURE — C9803 HOPD COVID-19 SPEC COLLECT: HCPCS

## 2022-04-05 PROCEDURE — 71275 CT ANGIOGRAPHY CHEST: CPT

## 2022-04-05 PROCEDURE — 96361 HYDRATE IV INFUSION ADD-ON: CPT

## 2022-04-05 PROCEDURE — 85014 HEMATOCRIT: CPT

## 2022-04-05 PROCEDURE — 84145 PROCALCITONIN (PCT): CPT | Performed by: PHYSICIAN ASSISTANT

## 2022-04-05 PROCEDURE — 80047 BASIC METABLC PNL IONIZED CA: CPT

## 2022-04-05 PROCEDURE — 85652 RBC SED RATE AUTOMATED: CPT | Performed by: PHYSICIAN ASSISTANT

## 2022-04-05 PROCEDURE — 96365 THER/PROPH/DIAG IV INF INIT: CPT

## 2022-04-05 PROCEDURE — 85025 COMPLETE CBC W/AUTO DIFF WBC: CPT | Performed by: EMERGENCY MEDICINE

## 2022-04-05 PROCEDURE — 86140 C-REACTIVE PROTEIN: CPT | Performed by: PHYSICIAN ASSISTANT

## 2022-04-05 PROCEDURE — 93005 ELECTROCARDIOGRAM TRACING: CPT | Performed by: EMERGENCY MEDICINE

## 2022-04-05 PROCEDURE — 96375 TX/PRO/DX INJ NEW DRUG ADDON: CPT

## 2022-04-05 PROCEDURE — 99284 EMERGENCY DEPT VISIT MOD MDM: CPT

## 2022-04-05 PROCEDURE — 84484 ASSAY OF TROPONIN QUANT: CPT | Performed by: PHYSICIAN ASSISTANT

## 2022-04-05 PROCEDURE — 25010000002 PIPERACILLIN SOD-TAZOBACTAM PER 1 G: Performed by: PHYSICIAN ASSISTANT

## 2022-04-05 PROCEDURE — 80053 COMPREHEN METABOLIC PANEL: CPT | Performed by: EMERGENCY MEDICINE

## 2022-04-05 PROCEDURE — 0 IOPAMIDOL PER 1 ML: Performed by: EMERGENCY MEDICINE

## 2022-04-05 RX ORDER — SODIUM CHLORIDE, SODIUM LACTATE, POTASSIUM CHLORIDE, CALCIUM CHLORIDE 600; 310; 30; 20 MG/100ML; MG/100ML; MG/100ML; MG/100ML
100 INJECTION, SOLUTION INTRAVENOUS CONTINUOUS
Status: DISCONTINUED | OUTPATIENT
Start: 2022-04-05 | End: 2022-04-07

## 2022-04-05 RX ORDER — ONDANSETRON 2 MG/ML
4 INJECTION INTRAMUSCULAR; INTRAVENOUS EVERY 6 HOURS PRN
Status: DISCONTINUED | OUTPATIENT
Start: 2022-04-05 | End: 2022-04-07 | Stop reason: HOSPADM

## 2022-04-05 RX ORDER — SODIUM CHLORIDE 0.9 % (FLUSH) 0.9 %
10 SYRINGE (ML) INJECTION EVERY 12 HOURS SCHEDULED
Status: DISCONTINUED | OUTPATIENT
Start: 2022-04-05 | End: 2022-04-07 | Stop reason: HOSPADM

## 2022-04-05 RX ORDER — CHOLECALCIFEROL (VITAMIN D3) 125 MCG
5 CAPSULE ORAL NIGHTLY PRN
Status: DISCONTINUED | OUTPATIENT
Start: 2022-04-05 | End: 2022-04-07 | Stop reason: HOSPADM

## 2022-04-05 RX ORDER — ACETAMINOPHEN 325 MG/1
650 TABLET ORAL EVERY 4 HOURS PRN
Status: DISCONTINUED | OUTPATIENT
Start: 2022-04-05 | End: 2022-04-07 | Stop reason: HOSPADM

## 2022-04-05 RX ORDER — SODIUM CHLORIDE 9 MG/ML
10 INJECTION INTRAVENOUS AS NEEDED
Status: DISCONTINUED | OUTPATIENT
Start: 2022-04-05 | End: 2022-04-07 | Stop reason: HOSPADM

## 2022-04-05 RX ORDER — ONDANSETRON 2 MG/ML
4 INJECTION INTRAMUSCULAR; INTRAVENOUS ONCE
Status: COMPLETED | OUTPATIENT
Start: 2022-04-05 | End: 2022-04-05

## 2022-04-05 RX ORDER — SODIUM CHLORIDE 0.9 % (FLUSH) 0.9 %
10 SYRINGE (ML) INJECTION AS NEEDED
Status: DISCONTINUED | OUTPATIENT
Start: 2022-04-05 | End: 2022-04-07 | Stop reason: HOSPADM

## 2022-04-05 RX ADMIN — Medication 10 ML: at 20:30

## 2022-04-05 RX ADMIN — SODIUM CHLORIDE, POTASSIUM CHLORIDE, SODIUM LACTATE AND CALCIUM CHLORIDE 100 ML/HR: 600; 310; 30; 20 INJECTION, SOLUTION INTRAVENOUS at 20:30

## 2022-04-05 RX ADMIN — ACETAMINOPHEN 650 MG: 325 TABLET ORAL at 22:11

## 2022-04-05 RX ADMIN — ONDANSETRON 4 MG: 2 INJECTION INTRAMUSCULAR; INTRAVENOUS at 18:24

## 2022-04-05 RX ADMIN — TAZOBACTAM SODIUM AND PIPERACILLIN SODIUM 3.38 G: 375; 3 INJECTION, SOLUTION INTRAVENOUS at 18:27

## 2022-04-05 RX ADMIN — SODIUM CHLORIDE 1000 ML: 9 INJECTION, SOLUTION INTRAVENOUS at 15:00

## 2022-04-05 RX ADMIN — IOPAMIDOL 100 ML: 755 INJECTION, SOLUTION INTRAVENOUS at 17:05

## 2022-04-05 NOTE — ED PROVIDER NOTES
Subjective   Ms. Gonzalez 67-year-old female presents emergency department today with abdominal pain.  She reports that she has had the abdominal pain for 5 days.  When I initially saw her they were wheeling her back from the lobby and she had a pressure of 50 over palp.  She was diaphoretic she was pale but still semiconscious.  After a liter of fluid and her pressure came up she states she did have the abdominal pain for about 5 days and seem to be getting worse.  She was having severe pain with this episode occurred.  She had no melena hematochezia or hematemesis.  She has no prior history of inflammatory bowel disease.  Her last colonoscopy was about 10 years ago was unremarkable.  She states that she has had no prior history of heart disease or vascular disease.  She is not short of breath.      History provided by:  Patient   used: No    Abdominal Pain  Pain location:  LUQ, RUQ and epigastric  Pain quality: aching, cramping and gnawing    Pain radiates to:  Does not radiate  Pain severity:  Severe  Onset quality:  Sudden  Duration:  5 days  Timing:  Intermittent  Progression:  Waxing and waning  Chronicity:  New  Context: not alcohol use, not diet changes, not eating and not laxative use    Relieved by:  Nothing  Worsened by:  Nothing  Ineffective treatments:  None tried  Associated symptoms: nausea and vomiting    Associated symptoms: no anorexia, no chest pain, no chills, no constipation, no cough, no diarrhea, no fatigue, no hematemesis, no hematochezia and no hematuria    Risk factors: no alcohol abuse, no aspirin use, not elderly and has not had multiple surgeries        Review of Systems   Constitutional: Negative for chills and fatigue.   Respiratory: Negative for cough, chest tightness and wheezing.    Cardiovascular: Negative for chest pain and palpitations.   Gastrointestinal: Positive for abdominal pain, nausea and vomiting. Negative for anorexia, constipation, diarrhea,  hematemesis and hematochezia.   Genitourinary: Negative for hematuria.   Musculoskeletal: Negative for back pain and neck pain.   Skin: Negative for pallor and rash.   Psychiatric/Behavioral: Negative.    All other systems reviewed and are negative.      Past Medical History:   Diagnosis Date   • Anxiety    • Essential hypertension 7/2/2021   • GERD without esophagitis 7/16/2019       No Known Allergies    Past Surgical History:   Procedure Laterality Date   • BREAST CYST ASPIRATION Left 2005   • HYSTERECTOMY     • OOPHORECTOMY         Family History   Problem Relation Age of Onset   • Breast cancer Mother 80   • Ovarian cancer Neg Hx        Social History     Socioeconomic History   • Marital status:    Tobacco Use   • Smoking status: Never Smoker   • Smokeless tobacco: Never Used   Vaping Use   • Vaping Use: Never used   Substance and Sexual Activity   • Alcohol use: Never   • Drug use: Never   • Sexual activity: Defer           Objective   Physical Exam  Vitals and nursing note reviewed.   Constitutional:       General: She is in acute distress.      Appearance: She is well-developed and normal weight. She is ill-appearing, toxic-appearing and diaphoretic.      Comments: Upon initial exam diaphoretic pale but still conscious.   HENT:      Head: Normocephalic and atraumatic.      Right Ear: External ear normal.      Left Ear: External ear normal.      Nose: Nose normal.   Eyes:      General: No scleral icterus.     Conjunctiva/sclera: Conjunctivae normal.      Pupils: Pupils are equal, round, and reactive to light.   Neck:      Thyroid: No thyromegaly.   Cardiovascular:      Rate and Rhythm: Regular rhythm. Tachycardia present.      Heart sounds: Normal heart sounds.   Pulmonary:      Effort: Pulmonary effort is normal. No respiratory distress.      Breath sounds: Normal breath sounds. No wheezing or rales.   Chest:      Chest wall: No tenderness.   Abdominal:      General: Bowel sounds are normal. There is  no distension.      Palpations: Abdomen is soft.      Tenderness: There is abdominal tenderness in the right upper quadrant and left upper quadrant. There is no right CVA tenderness, left CVA tenderness, guarding or rebound. Negative signs include Loera's sign, Rovsing's sign, McBurney's sign, psoas sign and obturator sign.      Hernia: No hernia is present.   Musculoskeletal:         General: Normal range of motion.      Cervical back: Normal range of motion.   Lymphadenopathy:      Cervical: No cervical adenopathy.   Skin:     General: Skin is warm.   Neurological:      Mental Status: She is alert and oriented to person, place, and time.      Cranial Nerves: No cranial nerve deficit.      Coordination: Coordination normal.      Deep Tendon Reflexes: Reflexes are normal and symmetric. Reflexes normal.   Psychiatric:         Behavior: Behavior normal.         Thought Content: Thought content normal.         Judgment: Judgment normal.         Procedures           ED Course  ED Course as of 04/06/22 2235   Tue Apr 05, 2022   1532 She looks much improved after a liter and a half of fluids.  Her color is better she is not diaphoretic able to speak complete sentences and actually smiling. [DARNELL]   1557 Discussed with CT scan to expedite scans. [DARNELL]   1835 Myles with Dr. Schneider she will admit. [DARNELL]      ED Course User Index  [DARNELL] Mukul Avila PA                                         Recent Results (from the past 24 hour(s))   Basic Metabolic Panel    Collection Time: 04/06/22 12:26 PM    Specimen: Blood   Result Value Ref Range    Glucose 100 (H) 65 - 99 mg/dL    BUN 7 (L) 8 - 23 mg/dL    Creatinine 0.74 0.57 - 1.00 mg/dL    Sodium 142 136 - 145 mmol/L    Potassium 3.6 3.5 - 5.2 mmol/L    Chloride 109 (H) 98 - 107 mmol/L    CO2 24.0 22.0 - 29.0 mmol/L    Calcium 8.9 8.6 - 10.5 mg/dL    BUN/Creatinine Ratio 9.5 7.0 - 25.0    Anion Gap 9.0 5.0 - 15.0 mmol/L    eGFR 89.4 >60.0 mL/min/1.73   Gastrointestinal Panel,  PCR - Stool, Per Rectum    Collection Time: 04/06/22  2:01 PM    Specimen: Per Rectum; Stool   Result Value Ref Range    Campylobacter Not Detected Not Detected    Plesiomonas shigelloides Not Detected Not Detected    Salmonella Not Detected Not Detected    Vibrio Not Detected Not Detected    Vibrio cholerae Not Detected Not Detected    Yersinia enterocolitica Not Detected Not Detected    Enteroaggregative E. coli (EAEC) Not Detected Not Detected    Enteropathogenic E. coli (EPEC) Not Detected Not Detected    Enterotoxigenic E. coli (ETEC) lt/st Not Detected Not Detected    Shiga-like toxin-producing E. coli (STEC) stx1/stx2 Not Detected Not Detected    Shigella/Enteroinvasive E. coli (EIEC) Not Detected Not Detected    Cryptosporidium Not Detected Not Detected    Cyclospora cayetanensis Not Detected Not Detected    Entamoeba histolytica Not Detected Not Detected    Giardia lamblia Not Detected Not Detected    Adenovirus F40/41 Not Detected Not Detected    Astrovirus Not Detected Not Detected    Norovirus GI/GII Not Detected Not Detected    Rotavirus A Not Detected Not Detected    Sapovirus (I, II, IV or V) Not Detected Not Detected   CBC (No Diff)    Collection Time: 04/06/22  5:03 PM    Specimen: Blood   Result Value Ref Range    WBC 7.93 3.40 - 10.80 10*3/mm3    RBC 3.59 (L) 3.77 - 5.28 10*6/mm3    Hemoglobin 11.4 (L) 12.0 - 15.9 g/dL    Hematocrit 34.8 34.0 - 46.6 %    MCV 96.9 79.0 - 97.0 fL    MCH 31.8 26.6 - 33.0 pg    MCHC 32.8 31.5 - 35.7 g/dL    RDW 12.8 12.3 - 15.4 %    RDW-SD 45.8 37.0 - 54.0 fl    MPV 9.4 6.0 - 12.0 fL    Platelets 278 140 - 450 10*3/mm3     Note: In addition to lab results from this visit, the labs listed above may include labs taken at another facility or during a different encounter within the last 24 hours. Please correlate lab times with ED admission and discharge times for further clarification of the services performed during this visit.    CT Angiogram Abdomen Pelvis   Final  Result   No evidence of pulmonary embolic disease or other active   chest pathology.               ANGIOGRAPHIC CT SCAN OF THE ABDOMEN AND PELVIS WITH IV CONTRAST:       The abdominal aorta, common and external iliac arteries and visualized   superficial femoral arteries are normal in caliber and appearance with   no evidence of aneurysm or dissection or even significant   atherosclerotic disease. Celiac axis and SMA, renal arteries and PREETI all   appear to be normally patent.       There is diffuse fatty liver change, and a small left lobe liver cyst.   Gallbladder is surgically absent. Spleen is not enlarged. Pancreas,   adrenal glands, and kidneys appear within normal limits. No upper   abdominal free air ascites or adenopathy is seen. Bowel loops are normal   in caliber, however, there is mild diffuse inflammation of a long loop   of bowel extending from axial image 92 through the left mid abdomen,   crossing the midline, and apparently continuing as the terminal ileum.   Although the proximal inflamed small bowel is more cephalad in location   than usual for location of the ileum, this appears to be a variant of   normal, and this appears to represent a continuous inflamed loop of   ileum, whether from infectious etiology, or inflammatory bowel disease.   There is a milder degree of inflammation of the cecum and ascending   colon. No pneumatosis is seen. No other evidence of bowel wall   inflammation is seen elsewhere. A small amount of pelvic ascites is   probably secondary to the inflamed bowel. No abscess is seen. The   appendix is clearly identified and appears normal. Uterus and ovaries   are either atrophic or absent and not seen. Bony structures appear to be   intact.                   IMPRESSION:       1. Lung segment inflamed loop of bowel extending from the left upper   quadrant of the right upper pelvis, which appears to represent inflamed   ileum. Mild inflammation of the ascending colon. Consider  infectious   etiology as well as inflammatory bowel disease.   2. Small amount of pelvic ascites, likely secondary to inflamed bowel.   3. No other evidence of acute intra-abdominal or intrapelvic disease   elsewhere.       This report was finalized on 4/5/2022 5:57 PM by Dr. Nadir Wylie MD.          CT Angiogram Chest With & Without Contrast   Final Result   No evidence of pulmonary embolic disease or other active   chest pathology.               ANGIOGRAPHIC CT SCAN OF THE ABDOMEN AND PELVIS WITH IV CONTRAST:       The abdominal aorta, common and external iliac arteries and visualized   superficial femoral arteries are normal in caliber and appearance with   no evidence of aneurysm or dissection or even significant   atherosclerotic disease. Celiac axis and SMA, renal arteries and PREETI all   appear to be normally patent.       There is diffuse fatty liver change, and a small left lobe liver cyst.   Gallbladder is surgically absent. Spleen is not enlarged. Pancreas,   adrenal glands, and kidneys appear within normal limits. No upper   abdominal free air ascites or adenopathy is seen. Bowel loops are normal   in caliber, however, there is mild diffuse inflammation of a long loop   of bowel extending from axial image 92 through the left mid abdomen,   crossing the midline, and apparently continuing as the terminal ileum.   Although the proximal inflamed small bowel is more cephalad in location   than usual for location of the ileum, this appears to be a variant of   normal, and this appears to represent a continuous inflamed loop of   ileum, whether from infectious etiology, or inflammatory bowel disease.   There is a milder degree of inflammation of the cecum and ascending   colon. No pneumatosis is seen. No other evidence of bowel wall   inflammation is seen elsewhere. A small amount of pelvic ascites is   probably secondary to the inflamed bowel. No abscess is seen. The   appendix is clearly identified and appears  normal. Uterus and ovaries   are either atrophic or absent and not seen. Bony structures appear to be   intact.                   IMPRESSION:       1. Lung segment inflamed loop of bowel extending from the left upper   quadrant of the right upper pelvis, which appears to represent inflamed   ileum. Mild inflammation of the ascending colon. Consider infectious   etiology as well as inflammatory bowel disease.   2. Small amount of pelvic ascites, likely secondary to inflamed bowel.   3. No other evidence of acute intra-abdominal or intrapelvic disease   elsewhere.       This report was finalized on 4/5/2022 5:57 PM by Dr. Nadir Wylie MD.            Vitals:    04/06/22 0730 04/06/22 1105 04/06/22 1550 04/06/22 1852   BP: 129/74 147/82 158/86 150/80   BP Location: Right arm Right arm Right arm Right arm   Patient Position: Lying Lying Lying Lying   Pulse: 59 64 77 66   Resp: 20 18 18 14   Temp: 98.4 °F (36.9 °C) 98.1 °F (36.7 °C) 98.7 °F (37.1 °C) 98.6 °F (37 °C)   TempSrc: Oral Oral Oral Oral   SpO2: 98% 96% 96% 97%   Weight:       Height:         Medications   Sodium Chloride (PF) 0.9 % 10 mL (has no administration in time range)   sodium chloride 0.9 % flush 10 mL (10 mL Intravenous Not Given 4/6/22 2037)   sodium chloride 0.9 % flush 10 mL (has no administration in time range)   lactated ringers infusion (100 mL/hr Intravenous New Bag 4/6/22 1528)   acetaminophen (TYLENOL) tablet 650 mg (650 mg Oral Given 4/5/22 2211)   melatonin tablet 5 mg (has no administration in time range)   ondansetron (ZOFRAN) injection 4 mg (4 mg Intravenous Given 4/6/22 1252)   piperacillin-tazobactam (ZOSYN) 3.375 g in iso-osmotic dextrose 50 ml (premix) (3.375 g Intravenous New Bag 4/6/22 1418)   sodium chloride 0.9 % bolus 1,000 mL (1,000 mL Intravenous New Bag 4/5/22 1500)   iopamidol (ISOVUE-370) 76 % injection 100 mL (100 mL Intravenous Given 4/5/22 1705)   piperacillin-tazobactam (ZOSYN) 3.375 g in iso-osmotic dextrose 50 ml (premix)  (0 g Intravenous Stopped 4/5/22 1859)   ondansetron (ZOFRAN) injection 4 mg (4 mg Intravenous Given 4/5/22 1824)     ECG/EMG Results (last 24 hours)     Procedure Component Value Units Date/Time    ECG 12 Lead [[id number]] Collected: 04/05/22 1517     Updated: 04/05/22 1519        ECG 12 Lead   Preliminary Result                     MDM  Number of Diagnoses or Management Options  Colitis: new and requires workup  Near syncope: new and requires workup  Pain of upper abdomen: new and requires workup  Vasovagal episode: new and requires workup     Amount and/or Complexity of Data Reviewed  Clinical lab tests: reviewed and ordered  Tests in the radiology section of CPT®: ordered and reviewed  Tests in the medicine section of CPT®: reviewed and ordered  Discuss the patient with other providers: yes    Patient Progress  Patient progress: stable      Final diagnoses:   Colitis   Pain of upper abdomen   Vasovagal episode   Near syncope       ED Disposition  ED Disposition     ED Disposition   Decision to Admit    Condition   --    Comment   Level of Care: Telemetry [5]   Diagnosis: Colitis [[id number]]   Admitting Physician: HARSHAD PACHECO [[id number]]   Attending Physician: HARSHAD PACHECO [[id number]]               No follow-up provider specified.       Medication List      No changes were made to your prescriptions during this visit.          Mukul Avila PA  04/06/22 3586

## 2022-04-05 NOTE — H&P
[facility] Medicine Services  HISTORY AND PHYSICAL    Patient Name: Clara Gonzalez  : 1955  MRN: [id number]  Primary Care Physician: Velia James APRN  Date of admission: 2022    Subjective   Subjective     Chief Complaint:  Abdominal pain    HPI:  Clara Gonzalez is a 67 y.o. female with a past medical history significant for hypertension, GERD, and anxiety/depression. She presents today with complaints of intractable diffuse abdominal pain going on for the past month. Pain is constant. Rated 7/10 at its worst. Characterized as a sharp, cramping sensation without radiation. There is associated nausea without vomiting. Patient was concerned when pain acute worsened this morning after breakfast. Subsequently presented to [facility] ED for further evaluation and treatment.  Patient denies associated fever, diarrhea, constipation, melena, or hematochezia. She is eating and drinking normally. No cough, congestion, SOB, or chest pain. She had COVID-19 in 2021 and has since been vaccinated. Will admit to inpatient.      COVID Details:    Symptoms:    [x] NONE [] Fever []  Cough [] Shortness of breath [] Change in taste/smell      Review of Systems   Constitutional: Negative for chills and fever.   HENT: Negative for congestion and trouble swallowing.    Eyes: Negative for photophobia and visual disturbance.   Respiratory: Negative for cough and shortness of breath.    Cardiovascular: Negative for chest pain and leg swelling.   Gastrointestinal: Positive for abdominal pain and nausea. Negative for diarrhea and vomiting.   Endocrine: Negative for cold intolerance and heat intolerance.   Genitourinary: Negative for dysuria and flank pain.   Musculoskeletal: Negative for back pain and gait problem.   Skin: Negative for pallor and rash.   Allergic/Immunologic: Negative for immunocompromised state.   Neurological: Negative for dizziness, weakness and headaches.   Hematological:  Negative for adenopathy.   Psychiatric/Behavioral: Negative for agitation and confusion.        All other systems reviewed and are negative.     Personal History     Past Medical History:   Diagnosis Date   • Anxiety    • Essential hypertension 7/2/2021   • GERD without esophagitis 7/16/2019       Past Surgical History:   Procedure Laterality Date   • BREAST CYST ASPIRATION Left 2005   • HYSTERECTOMY     • OOPHORECTOMY         Family History: family history includes Breast cancer (age of onset: 80) in her mother. Otherwise pertinent FHx was reviewed and unremarkable.     Social History:  reports that she has never smoked. She has never used smokeless tobacco. She reports that she does not drink alcohol and does not use drugs.  Social History     Social History Narrative   • Not on file       Medications:  diclofenac, hydrOXYzine, lidocaine, metoprolol succinate XL, and sertraline    No Known Allergies    Objective   Objective     Vital Signs:   Temp:  [99.2 °F (37.3 °C)] 99.2 °F (37.3 °C)  Heart Rate:  [62-78] 68  Resp:  [18] 18  BP: ()/(32-94) 140/75    Physical Exam   Constitutional: Awake, alert  Eyes: PERRLA, sclerae anicteric, no conjunctival injection  HENT: NCAT, mucous membranes moist  Neck: Supple, no thyromegaly, no lymphadenopathy, trachea midline  Respiratory: Clear to auscultation bilaterally, nonlabored respirations   Cardiovascular: RRR, no murmurs, rubs, or gallops, palpable pedal pulses bilaterally  Gastrointestinal: Positive bowel sounds, soft, nondistended  Mild TTP. No guarding or rebound tenderness  Musculoskeletal: No bilateral ankle edema, no clubbing or cyanosis to extremities  Psychiatric: Appropriate affect, cooperative  Neurologic: Oriented x 3, strength symmetric in all extremities, Cranial Nerves grossly intact to confrontation, speech clear  Skin: No rashes      Results Reviewed:  I have personally reviewed most recent indicated data and agree with findings including:  [x]   Laboratory  [x]  Radiology  []  EKG/Telemetry  []  Pathology  []  Cardiac/Vascular Studies  []  Old records  []  Other:  Most pertinent findings include: hypotensive to 58/32. Febrile to 99.2. WBC 13.4. chemistry and hematology favorable. CT imaging shows colitis      LAB RESULTS:      Lab 04/05/22  1522 04/05/22  1437   WBC  --  13.42*   HEMOGLOBIN  --  14.4   HEMOGLOBIN, POC 13.3  --    HEMATOCRIT  --  43.2   HEMATOCRIT POC 39  --    PLATELETS  --  363   NEUTROS ABS  --  9.53*   IMMATURE GRANS (ABS)  --  0.03   LYMPHS ABS  --  2.65   MONOS ABS  --  0.93*   EOS ABS  --  0.14   MCV  --  95.2   PROCALCITONIN  --  <0.20   LACTATE  --  1.2         Lab 04/05/22  1522 04/05/22  1437   SODIUM  --  139   POTASSIUM  --  4.3   CHLORIDE  --  103   CO2  --  26.0   ANION GAP  --  10.0   BUN  --  12   CREATININE 0.80 0.75   EGFR 81.4 87.9   GLUCOSE  --  93   CALCIUM  --  9.6         Lab 04/05/22  1437   TOTAL PROTEIN 7.6   ALBUMIN 4.90   GLOBULIN 2.7   ALT (SGPT) 19   AST (SGOT) 16   BILIRUBIN 0.4   ALK PHOS 107   LIPASE 15         Lab 04/05/22  1437   PROBNP 228.0   TROPONIN T <0.010                 Brief Urine Lab Results  (Last result in the past 365 days)      Color   Clarity   Blood   Leuk Est   Nitrite   Protein   CREAT   Urine HCG        04/05/22 1440 Yellow   Clear   Negative   Negative   Negative   Negative               Microbiology Results (last 10 days)     ** No results found for the last 240 hours. **          CT Angiogram Chest With & Without Contrast    Result Date: 4/5/2022  DATE OF EXAM: 4/5/2022 4:55 PM  PROCEDURE: CT ANGIOGRAM CHEST W WO CONTRAST-, CT ANGIOGRAM ABDOMEN PELVIS-  INDICATIONS: Abd pain, near syncope  COMPARISON: No comparisons available.  TECHNIQUE: Contiguous axial imaging was obtained from the thoracic inlet through the upper chest abdomen and pelvis following the intravenous administration of 100 mL of Isovue 370. Reconstructed coronal and sagittal images were also obtained. Automated  exposure control and iterative reconstruction methods were used.  The radiation dose reduction device was turned on for each scan per the ALARA (As Low as Reasonably Achievable) protocol.  FINDINGS:  ANGIOGRAPHIC CT SCAN OF THE CHEST: There is good contrast opacification of the pulmonary arteries and no evidence of filling defect to suggest embolic disease. There is no evidence of thoracic aortic aneurysm or dissection, pericardial or pleural effusion or significant mediastinal adenopathy. No significant coronary artery calcification is seen. Lungs appear clear bilaterally.      Impression: No evidence of pulmonary embolic disease or other active chest pathology.    ANGIOGRAPHIC CT SCAN OF THE ABDOMEN AND PELVIS WITH IV CONTRAST:  The abdominal aorta, common and external iliac arteries and visualized superficial femoral arteries are normal in caliber and appearance with no evidence of aneurysm or dissection or even significant atherosclerotic disease. Celiac axis and SMA, renal arteries and PREETI all appear to be normally patent.  There is diffuse fatty liver change, and a small left lobe liver cyst. Gallbladder is surgically absent. Spleen is not enlarged. Pancreas, adrenal glands, and kidneys appear within normal limits. No upper abdominal free air ascites or adenopathy is seen. Bowel loops are normal in caliber, however, there is mild diffuse inflammation of a long loop of bowel extending from axial image 92 through the left mid abdomen, crossing the midline, and apparently continuing as the terminal ileum. Although the proximal inflamed small bowel is more cephalad in location than usual for location of the ileum, this appears to be a variant of normal, and this appears to represent a continuous inflamed loop of ileum, whether from infectious etiology, or inflammatory bowel disease. There is a milder degree of inflammation of the cecum and ascending colon. No pneumatosis is seen. No other evidence of bowel wall  inflammation is seen elsewhere. A small amount of pelvic ascites is probably secondary to the inflamed bowel. No abscess is seen. The appendix is clearly identified and appears normal. Uterus and ovaries are either atrophic or absent and not seen. Bony structures appear to be intact.     IMPRESSION:  1. Lung segment inflamed loop of bowel extending from the left upper quadrant of the right upper pelvis, which appears to represent inflamed ileum. Mild inflammation of the ascending colon. Consider infectious etiology as well as inflammatory bowel disease. 2. Small amount of pelvic ascites, likely secondary to inflamed bowel. 3. No other evidence of acute intra-abdominal or intrapelvic disease elsewhere.  This report was finalized on 4/5/2022 5:57 PM by Dr. Nadir Wylie MD.      CT Angiogram Abdomen Pelvis    Result Date: 4/5/2022  DATE OF EXAM: 4/5/2022 4:55 PM  PROCEDURE: CT ANGIOGRAM CHEST W WO CONTRAST-, CT ANGIOGRAM ABDOMEN PELVIS-  INDICATIONS: Abd pain, near syncope  COMPARISON: No comparisons available.  TECHNIQUE: Contiguous axial imaging was obtained from the thoracic inlet through the upper chest abdomen and pelvis following the intravenous administration of 100 mL of Isovue 370. Reconstructed coronal and sagittal images were also obtained. Automated exposure control and iterative reconstruction methods were used.  The radiation dose reduction device was turned on for each scan per the ALARA (As Low as Reasonably Achievable) protocol.  FINDINGS:  ANGIOGRAPHIC CT SCAN OF THE CHEST: There is good contrast opacification of the pulmonary arteries and no evidence of filling defect to suggest embolic disease. There is no evidence of thoracic aortic aneurysm or dissection, pericardial or pleural effusion or significant mediastinal adenopathy. No significant coronary artery calcification is seen. Lungs appear clear bilaterally.      Impression: No evidence of pulmonary embolic disease or other active chest pathology.     ANGIOGRAPHIC CT SCAN OF THE ABDOMEN AND PELVIS WITH IV CONTRAST:  The abdominal aorta, common and external iliac arteries and visualized superficial femoral arteries are normal in caliber and appearance with no evidence of aneurysm or dissection or even significant atherosclerotic disease. Celiac axis and SMA, renal arteries and PREETI all appear to be normally patent.  There is diffuse fatty liver change, and a small left lobe liver cyst. Gallbladder is surgically absent. Spleen is not enlarged. Pancreas, adrenal glands, and kidneys appear within normal limits. No upper abdominal free air ascites or adenopathy is seen. Bowel loops are normal in caliber, however, there is mild diffuse inflammation of a long loop of bowel extending from axial image 92 through the left mid abdomen, crossing the midline, and apparently continuing as the terminal ileum. Although the proximal inflamed small bowel is more cephalad in location than usual for location of the ileum, this appears to be a variant of normal, and this appears to represent a continuous inflamed loop of ileum, whether from infectious etiology, or inflammatory bowel disease. There is a milder degree of inflammation of the cecum and ascending colon. No pneumatosis is seen. No other evidence of bowel wall inflammation is seen elsewhere. A small amount of pelvic ascites is probably secondary to the inflamed bowel. No abscess is seen. The appendix is clearly identified and appears normal. Uterus and ovaries are either atrophic or absent and not seen. Bony structures appear to be intact.     IMPRESSION:  1. Lung segment inflamed loop of bowel extending from the left upper quadrant of the right upper pelvis, which appears to represent inflamed ileum. Mild inflammation of the ascending colon. Consider infectious etiology as well as inflammatory bowel disease. 2. Small amount of pelvic ascites, likely secondary to inflamed bowel. 3. No other evidence of acute  intra-abdominal or intrapelvic disease elsewhere.  This report was finalized on 4/5/2022 5:57 PM by Dr. Nadir Wylie MD.        Results for orders placed during the hospital encounter of 07/02/21    Adult Transthoracic Echo Complete W/ Cont if Necessary Per Protocol    Interpretation Summary  · Left ventricular ejection fraction appears to be 56 - 60%. Left ventricular systolic function is normal.  · Mild to moderate aortic valve regurgitation is present.      Assessment/Plan   Assessment & Plan       Sepsis (HCC)    Colitis    Essential hypertension    anxiety and depression    GERD without esophagitis      1. Sepsis Secondary to Colitis:  - febrile to 99.9 and hypotensive 58/32 (suspect vagal episode in ED)  - administered 1L with stabilization  - CT imaging shows; mild inflammation of the ascending colon. Consider infectious etiology as well as inflammatory bowel disease  - Lactic and Procal normal  - ESR/CRP normal so IBD unlikely   - Blood cultures x2 pending   - started on Zosyn in ED. Continue for now  - aggressive IV hydration  - pain and nausea control  - am labs  - Colonoscopy 10-15 years ago per patient and was normal     2. Hypertension:  - Labile. On metoprolol. Hold for now    3. Anxiety/depression:  - continue Zoloft  - on Atarax at home as needed      DVT prophylaxis: mechanical    CODE STATUS:  Full code  Level Of Support Discussed With: Patient  Code Status (Patient has no pulse and is not breathing): CPR (Attempt to Resuscitate)  Medical Interventions (Patient has pulse or is breathing): Full Support      This note has been completed as part of a split-shared workflow.     Electronically signed by Stephen Orona PA-C, 04/05/22, 8:06 PM EDT.        Attending   Admission Attestation       I have seen and examined the patient, performing an independent face-to-face diagnostic evaluation with plan of care reviewed and developed with the advanced practice clinician (APC).      Brief Summary Statement:    Clara Gonzalez is a 67 y.o. female with a past medical history significant for hypertension, GERD, and anxiety/depression.  Per patient she came to the emergency department today due to abdominal discomfort.  She denies any nausea, vomiting or diarrhea.  Patient states she had been evaluated in the emergency department lobby and states she was doing up to go outside and get some fresh air when her blood pressure dropped.  BP noted to be 58/32.  She was given a 1 L bolus and her blood pressure has since stabilized.  Suspect patient had a vagal episode.  Imaging in the ED concerning for colitis.  Patient started on IV Zosyn.  [facility] medicine asked to admit.    Remainder of detailed HPI is as noted by APC and has been reviewed and/or edited by me for completeness.    Attending Physical Exam:  Constitutional: Awake, alert, NAD, non-toxic   Eyes: PERRLA, sclerae anicteric, no conjunctival injection  HENT: NCAT, mucous membranes moist  Neck: Supple, no thyromegaly, no lymphadenopathy, trachea midline  Respiratory: Clear to auscultation bilaterally, nonlabored respirations   Cardiovascular: RRR, no murmurs, rubs, or gallops, palpable pedal pulses bilaterally  Gastrointestinal: Positive bowel sounds, soft, nontender, nondistended  Musculoskeletal: No bilateral ankle edema, no clubbing or cyanosis to extremities  Psychiatric: Appropriate affect, cooperative  Neurologic: Oriented x 3, strength symmetric in all extremities, Cranial Nerves grossly intact to confrontation, speech clear  Skin: No rashes    Brief Assessment/Plan :  Admit to telemetry.  Continue IV fluids and IV antibiotics.  Blood cultures are pending.  Suspect if patient is tolerating diet and labs are acceptable, she will be discharged home tomorrow.  See detailed assessment and plan developed with APC which I have reviewed and/or edited for completeness.        Admission Status: I believe that this patient meets OBSERVATION status, however if further  evaluation or treatment plans warrant, status may change.  Based upon current information, I predict patient's care encounter to be less than or equal to 2 midnights.        Ludy Schneider DO  04/05/22

## 2022-04-06 LAB
ADV 40+41 DNA STL QL NAA+NON-PROBE: NOT DETECTED
ANION GAP SERPL CALCULATED.3IONS-SCNC: 9 MMOL/L (ref 5–15)
ASTRO TYP 1-8 RNA STL QL NAA+NON-PROBE: NOT DETECTED
BUN SERPL-MCNC: 7 MG/DL (ref 8–23)
BUN/CREAT SERPL: 9.5 (ref 7–25)
C CAYETANENSIS DNA STL QL NAA+NON-PROBE: NOT DETECTED
C COLI+JEJ+UPSA DNA STL QL NAA+NON-PROBE: NOT DETECTED
CALCIUM SPEC-SCNC: 8.9 MG/DL (ref 8.6–10.5)
CHLORIDE SERPL-SCNC: 109 MMOL/L (ref 98–107)
CO2 SERPL-SCNC: 24 MMOL/L (ref 22–29)
CREAT SERPL-MCNC: 0.74 MG/DL (ref 0.57–1)
CRYPTOSP DNA STL QL NAA+NON-PROBE: NOT DETECTED
DEPRECATED RDW RBC AUTO: 45.8 FL (ref 37–54)
E HISTOLYT DNA STL QL NAA+NON-PROBE: NOT DETECTED
EAEC PAA PLAS AGGR+AATA ST NAA+NON-PRB: NOT DETECTED
EC STX1+STX2 GENES STL QL NAA+NON-PROBE: NOT DETECTED
EGFRCR SERPLBLD CKD-EPI 2021: 89.4 ML/MIN/1.73
EPEC EAE GENE STL QL NAA+NON-PROBE: NOT DETECTED
ERYTHROCYTE [DISTWIDTH] IN BLOOD BY AUTOMATED COUNT: 12.8 % (ref 12.3–15.4)
ETEC LTA+ST1A+ST1B TOX ST NAA+NON-PROBE: NOT DETECTED
G LAMBLIA DNA STL QL NAA+NON-PROBE: NOT DETECTED
GLUCOSE SERPL-MCNC: 100 MG/DL (ref 65–99)
HCT VFR BLD AUTO: 34.8 % (ref 34–46.6)
HGB BLD-MCNC: 11.4 G/DL (ref 12–15.9)
MCH RBC QN AUTO: 31.8 PG (ref 26.6–33)
MCHC RBC AUTO-ENTMCNC: 32.8 G/DL (ref 31.5–35.7)
MCV RBC AUTO: 96.9 FL (ref 79–97)
NOROVIRUS GI+II RNA STL QL NAA+NON-PROBE: NOT DETECTED
P SHIGELLOIDES DNA STL QL NAA+NON-PROBE: NOT DETECTED
PLATELET # BLD AUTO: 278 10*3/MM3 (ref 140–450)
PMV BLD AUTO: 9.4 FL (ref 6–12)
POTASSIUM SERPL-SCNC: 3.6 MMOL/L (ref 3.5–5.2)
RBC # BLD AUTO: 3.59 10*6/MM3 (ref 3.77–5.28)
RVA RNA STL QL NAA+NON-PROBE: NOT DETECTED
S ENT+BONG DNA STL QL NAA+NON-PROBE: NOT DETECTED
SAPO I+II+IV+V RNA STL QL NAA+NON-PROBE: NOT DETECTED
SHIGELLA SP+EIEC IPAH ST NAA+NON-PROBE: NOT DETECTED
SODIUM SERPL-SCNC: 142 MMOL/L (ref 136–145)
V CHOL+PARA+VUL DNA STL QL NAA+NON-PROBE: NOT DETECTED
V CHOLERAE DNA STL QL NAA+NON-PROBE: NOT DETECTED
WBC NRBC COR # BLD: 7.93 10*3/MM3 (ref 3.4–10.8)
Y ENTEROCOL DNA STL QL NAA+NON-PROBE: NOT DETECTED

## 2022-04-06 PROCEDURE — 80048 BASIC METABOLIC PNL TOTAL CA: CPT | Performed by: PHYSICIAN ASSISTANT

## 2022-04-06 PROCEDURE — 25010000002 PIPERACILLIN SOD-TAZOBACTAM PER 1 G: Performed by: PHYSICIAN ASSISTANT

## 2022-04-06 PROCEDURE — 96361 HYDRATE IV INFUSION ADD-ON: CPT

## 2022-04-06 PROCEDURE — 87507 IADNA-DNA/RNA PROBE TQ 12-25: CPT | Performed by: FAMILY MEDICINE

## 2022-04-06 PROCEDURE — 87999 UNLISTED MICROBIOLOGY PX: CPT | Performed by: FAMILY MEDICINE

## 2022-04-06 PROCEDURE — 25010000002 ONDANSETRON PER 1 MG: Performed by: PHYSICIAN ASSISTANT

## 2022-04-06 PROCEDURE — G0378 HOSPITAL OBSERVATION PER HR: HCPCS

## 2022-04-06 PROCEDURE — 85027 COMPLETE CBC AUTOMATED: CPT | Performed by: PHYSICIAN ASSISTANT

## 2022-04-06 PROCEDURE — 99226 PR SBSQ OBSERVATION CARE/DAY 35 MINUTES: CPT | Performed by: FAMILY MEDICINE

## 2022-04-06 PROCEDURE — 96366 THER/PROPH/DIAG IV INF ADDON: CPT

## 2022-04-06 PROCEDURE — 96376 TX/PRO/DX INJ SAME DRUG ADON: CPT

## 2022-04-06 RX ADMIN — TAZOBACTAM SODIUM AND PIPERACILLIN SODIUM 3.38 G: 375; 3 INJECTION, SOLUTION INTRAVENOUS at 02:23

## 2022-04-06 RX ADMIN — TAZOBACTAM SODIUM AND PIPERACILLIN SODIUM 3.38 G: 375; 3 INJECTION, SOLUTION INTRAVENOUS at 17:48

## 2022-04-06 RX ADMIN — ONDANSETRON 4 MG: 2 INJECTION INTRAMUSCULAR; INTRAVENOUS at 12:52

## 2022-04-06 RX ADMIN — SODIUM CHLORIDE, POTASSIUM CHLORIDE, SODIUM LACTATE AND CALCIUM CHLORIDE 100 ML/HR: 600; 310; 30; 20 INJECTION, SOLUTION INTRAVENOUS at 06:14

## 2022-04-06 RX ADMIN — SERTRALINE HYDROCHLORIDE 50 MG: 50 TABLET ORAL at 08:45

## 2022-04-06 RX ADMIN — TAZOBACTAM SODIUM AND PIPERACILLIN SODIUM 3.38 G: 375; 3 INJECTION, SOLUTION INTRAVENOUS at 09:56

## 2022-04-06 RX ADMIN — SODIUM CHLORIDE, POTASSIUM CHLORIDE, SODIUM LACTATE AND CALCIUM CHLORIDE 100 ML/HR: 600; 310; 30; 20 INJECTION, SOLUTION INTRAVENOUS at 15:28

## 2022-04-06 NOTE — CASE MANAGEMENT/SOCIAL WORK
Discharge Planning Assessment  [facility]     Patient Name: Clara Gonzalez  MRN: [id number]  Today's Date: 4/6/2022    Admit Date: 4/5/2022     Discharge Needs Assessment     Row Name 04/06/22 1313       Living Environment    People in Home alone    Current Living Arrangements home    Primary Care Provided by self    Provides Primary Care For no one    Family Caregiver if Needed child(tila), adult    Quality of Family Relationships unable to assess    Able to Return to Prior Arrangements yes       Transition Planning    Patient/Family Anticipates Transition to home    Transportation Anticipated family or friend will provide       Discharge Needs Assessment    Equipment Currently Used at Home none               Discharge Plan     Row Name 04/06/22 1313       Plan    Plan IDP    Patient/Family in Agreement with Plan yes    Plan Comments I spoke with Ms. Gonzalez at the bedside.Pt lives alone in [facility]. Independent with ADL's, still drives. No home O2/Cpap/HH/OP therapy. No DME. PCP-[facility] Tran. Confirmed Medicare, [employer], and prescriptions filled at [facility] in [facility]. No needs voiced or identified. Pt states that her daughter will transport her home. CM will continue to follow.    Final Discharge Disposition Code 01 - home or self-care              Continued Care and Services - Admitted Since 4/5/2022    Coordination has not been started for this encounter.          Demographic Summary     Row Name 04/06/22 1312       General Information    Reason for Consult discharge planning       Contact Information    Permission Granted to Share Info With                Functional Status     Row Name 04/06/22 1312       Functional Status    Usual Activity Tolerance good    Current Activity Tolerance good       Functional Status, IADL    Medications independent    Meal Preparation independent    Housekeeping independent    Laundry independent    Shopping independent               Psychosocial    No  documentation.                Abuse/Neglect    No documentation.                Legal    No documentation.                Substance Abuse    No documentation.                Patient Forms    No documentation.                   Britney Rodriguez RN

## 2022-04-06 NOTE — PROGRESS NOTES
[facility] Medicine Services  PROGRESS NOTE    Patient Name: Clara Gonzalez  : 1955  MRN: [id number]    Date of Admission: 2022  Primary Care Physician: Velia James APRN    Subjective   Subjective     CC:  F/u abd pain     HPI:  Pt reports an improvement with abdominal pain. She does have nausea but feels it is related to zoloft ( she no longer takes) and the zosyn infusion.     ROS:  Gen- No fevers, chills  CV- No chest pain, palpitations  Resp- No cough, dyspnea  GI- + N/V/D, +abd pain        Objective   Objective     Vital Signs:   Temp:  [98.1 °F (36.7 °C)-99.2 °F (37.3 °C)] 98.4 °F (36.9 °C)  Heart Rate:  [59-78] 59  Resp:  [18-20] 20  BP: ()/(32-94) 129/74     Physical Exam:  Constitutional: No acute distress, awake, alert  HENT: NCAT, mucous membranes moist  Respiratory: Clear to auscultation bilaterally, respiratory effort normal   Cardiovascular: RRR, no murmurs, rubs, or gallops  Gastrointestinal: + BS soft, nontender, nondistended  Musculoskeletal: No bilateral ankle edema  Psychiatric: Appropriate affect, cooperative  Neurologic: Oriented x 3, speech clear  Skin: No rashes      Results Reviewed:  LAB RESULTS:      Lab 22  1522 22  1437   WBC  --  13.42*   HEMOGLOBIN  --  14.4   HEMOGLOBIN, POC 13.3  --    HEMATOCRIT  --  43.2   HEMATOCRIT POC 39  --    PLATELETS  --  363   NEUTROS ABS  --  9.53*   IMMATURE GRANS (ABS)  --  0.03   LYMPHS ABS  --  2.65   MONOS ABS  --  0.93*   EOS ABS  --  0.14   MCV  --  95.2   SED RATE  --  9   CRP  --  <0.30   PROCALCITONIN  --  <0.20   LACTATE  --  1.2         Lab 22  1522 22  1437   SODIUM  --  139   POTASSIUM  --  4.3   CHLORIDE  --  103   CO2  --  26.0   ANION GAP  --  10.0   BUN  --  12   CREATININE 0.80 0.75   EGFR 81.4 87.9   GLUCOSE  --  93   CALCIUM  --  9.6         Lab 22  1437   TOTAL PROTEIN 7.6   ALBUMIN 4.90   GLOBULIN 2.7   ALT (SGPT) 19   AST (SGOT) 16   BILIRUBIN 0.4    ALK PHOS 107   LIPASE 15         Lab 04/05/22  1437   PROBNP 228.0   TROPONIN T <0.010                 Brief Urine Lab Results  (Last result in the past 365 days)      Color   Clarity   Blood   Leuk Est   Nitrite   Protein   CREAT   Urine HCG        04/05/22 1440 Yellow   Clear   Negative   Negative   Negative   Negative                 Microbiology Results Abnormal     Procedure Component Value - Date/Time    COVID PRE-OP / PRE-PROCEDURE SCREENING ORDER (NO ISOLATION) - Swab, Nasopharynx [[id number]]  (Normal) Collected: 04/05/22 2207    Lab Status: Final result Specimen: Swab from Nasopharynx Updated: 04/05/22 2253    Narrative:      The following orders were created for panel order COVID PRE-OP / PRE-PROCEDURE SCREENING ORDER (NO ISOLATION) - Swab, Nasopharynx.  Procedure                               Abnormality         Status                     ---------                               -----------         ------                     COVID-19, ABBOTT IN-HOUS...[[id number]]  Normal              Final result                 Please view results for these tests on the individual orders.    COVID-19, ABBOTT IN-HOUSE,NASAL Swab (NO TRANSPORT MEDIA) 2 HR TAT - Swab, Nasopharynx [[id number]]  (Normal) Collected: 04/05/22 2207    Lab Status: Final result Specimen: Swab from Nasopharynx Updated: 04/05/22 2253     COVID19 Presumptive Negative    Narrative:      Fact sheet for providers: https://www.fda.gov/media/565425/download     Fact sheet for patients: https://www.fda.gov/media/036097/download    Test performed by PCR.  If inconsistent with clinical signs and symptoms patient should be tested with different authorized molecular test.          CT Angiogram Chest With & Without Contrast    Result Date: 4/5/2022  DATE OF EXAM: 4/5/2022 4:55 PM  PROCEDURE: CT ANGIOGRAM CHEST W WO CONTRAST-, CT ANGIOGRAM ABDOMEN PELVIS-  INDICATIONS: Abd pain, near syncope  COMPARISON: No comparisons available.  TECHNIQUE: Contiguous axial  imaging was obtained from the thoracic inlet through the upper chest abdomen and pelvis following the intravenous administration of 100 mL of Isovue 370. Reconstructed coronal and sagittal images were also obtained. Automated exposure control and iterative reconstruction methods were used.  The radiation dose reduction device was turned on for each scan per the ALARA (As Low as Reasonably Achievable) protocol.  FINDINGS:  ANGIOGRAPHIC CT SCAN OF THE CHEST: There is good contrast opacification of the pulmonary arteries and no evidence of filling defect to suggest embolic disease. There is no evidence of thoracic aortic aneurysm or dissection, pericardial or pleural effusion or significant mediastinal adenopathy. No significant coronary artery calcification is seen. Lungs appear clear bilaterally.      Impression: No evidence of pulmonary embolic disease or other active chest pathology.    ANGIOGRAPHIC CT SCAN OF THE ABDOMEN AND PELVIS WITH IV CONTRAST:  The abdominal aorta, common and external iliac arteries and visualized superficial femoral arteries are normal in caliber and appearance with no evidence of aneurysm or dissection or even significant atherosclerotic disease. Celiac axis and SMA, renal arteries and PREETI all appear to be normally patent.  There is diffuse fatty liver change, and a small left lobe liver cyst. Gallbladder is surgically absent. Spleen is not enlarged. Pancreas, adrenal glands, and kidneys appear within normal limits. No upper abdominal free air ascites or adenopathy is seen. Bowel loops are normal in caliber, however, there is mild diffuse inflammation of a long loop of bowel extending from axial image 92 through the left mid abdomen, crossing the midline, and apparently continuing as the terminal ileum. Although the proximal inflamed small bowel is more cephalad in location than usual for location of the ileum, this appears to be a variant of normal, and this appears to represent a  continuous inflamed loop of ileum, whether from infectious etiology, or inflammatory bowel disease. There is a milder degree of inflammation of the cecum and ascending colon. No pneumatosis is seen. No other evidence of bowel wall inflammation is seen elsewhere. A small amount of pelvic ascites is probably secondary to the inflamed bowel. No abscess is seen. The appendix is clearly identified and appears normal. Uterus and ovaries are either atrophic or absent and not seen. Bony structures appear to be intact.     IMPRESSION:  1. Lung segment inflamed loop of bowel extending from the left upper quadrant of the right upper pelvis, which appears to represent inflamed ileum. Mild inflammation of the ascending colon. Consider infectious etiology as well as inflammatory bowel disease. 2. Small amount of pelvic ascites, likely secondary to inflamed bowel. 3. No other evidence of acute intra-abdominal or intrapelvic disease elsewhere.  This report was finalized on 4/5/2022 5:57 PM by Dr. Nadir Wylie MD.      CT Angiogram Abdomen Pelvis    Result Date: 4/5/2022  DATE OF EXAM: 4/5/2022 4:55 PM  PROCEDURE: CT ANGIOGRAM CHEST W WO CONTRAST-, CT ANGIOGRAM ABDOMEN PELVIS-  INDICATIONS: Abd pain, near syncope  COMPARISON: No comparisons available.  TECHNIQUE: Contiguous axial imaging was obtained from the thoracic inlet through the upper chest abdomen and pelvis following the intravenous administration of 100 mL of Isovue 370. Reconstructed coronal and sagittal images were also obtained. Automated exposure control and iterative reconstruction methods were used.  The radiation dose reduction device was turned on for each scan per the ALARA (As Low as Reasonably Achievable) protocol.  FINDINGS:  ANGIOGRAPHIC CT SCAN OF THE CHEST: There is good contrast opacification of the pulmonary arteries and no evidence of filling defect to suggest embolic disease. There is no evidence of thoracic aortic aneurysm or dissection, pericardial or  pleural effusion or significant mediastinal adenopathy. No significant coronary artery calcification is seen. Lungs appear clear bilaterally.      Impression: No evidence of pulmonary embolic disease or other active chest pathology.    ANGIOGRAPHIC CT SCAN OF THE ABDOMEN AND PELVIS WITH IV CONTRAST:  The abdominal aorta, common and external iliac arteries and visualized superficial femoral arteries are normal in caliber and appearance with no evidence of aneurysm or dissection or even significant atherosclerotic disease. Celiac axis and SMA, renal arteries and PREETI all appear to be normally patent.  There is diffuse fatty liver change, and a small left lobe liver cyst. Gallbladder is surgically absent. Spleen is not enlarged. Pancreas, adrenal glands, and kidneys appear within normal limits. No upper abdominal free air ascites or adenopathy is seen. Bowel loops are normal in caliber, however, there is mild diffuse inflammation of a long loop of bowel extending from axial image 92 through the left mid abdomen, crossing the midline, and apparently continuing as the terminal ileum. Although the proximal inflamed small bowel is more cephalad in location than usual for location of the ileum, this appears to be a variant of normal, and this appears to represent a continuous inflamed loop of ileum, whether from infectious etiology, or inflammatory bowel disease. There is a milder degree of inflammation of the cecum and ascending colon. No pneumatosis is seen. No other evidence of bowel wall inflammation is seen elsewhere. A small amount of pelvic ascites is probably secondary to the inflamed bowel. No abscess is seen. The appendix is clearly identified and appears normal. Uterus and ovaries are either atrophic or absent and not seen. Bony structures appear to be intact.     IMPRESSION:  1. Lung segment inflamed loop of bowel extending from the left upper quadrant of the right upper pelvis, which appears to represent  inflamed ileum. Mild inflammation of the ascending colon. Consider infectious etiology as well as inflammatory bowel disease. 2. Small amount of pelvic ascites, likely secondary to inflamed bowel. 3. No other evidence of acute intra-abdominal or intrapelvic disease elsewhere.  This report was finalized on 4/5/2022 5:57 PM by Dr. Nadir yWlie MD.        Results for orders placed during the hospital encounter of 07/02/21    Adult Transthoracic Echo Complete W/ Cont if Necessary Per Protocol    Interpretation Summary  · Left ventricular ejection fraction appears to be 56 - 60%. Left ventricular systolic function is normal.  · Mild to moderate aortic valve regurgitation is present.      I have reviewed the medications:  Scheduled Meds:piperacillin-tazobactam, 3.375 g, Intravenous, Q8H  sertraline, 50 mg, Oral, Daily  sodium chloride, 10 mL, Intravenous, Q12H      Continuous Infusions:lactated ringers, 100 mL/hr, Last Rate: 100 mL/hr (04/06/22 0848)      PRN Meds:.•  acetaminophen  •  melatonin  •  ondansetron  •  Sodium Chloride (PF)  •  sodium chloride    Assessment/Plan   Assessment & Plan     Active Hospital Problems    Diagnosis  POA   • **Sepsis (HCC) [A41.9]  Yes   • Colitis [K52.9]  Yes   • Essential hypertension [I10]  Yes   • anxiety and depression [F41.0]  Yes   • GERD without esophagitis [K21.9]  Yes      Resolved Hospital Problems   No resolved problems to display.        Brief Hospital Course to date:  Clara Gonzalez is a 67 y.o. female with a past medical history significant for hypertension, GERD, and anxiety/depression. She presents today with complaints of intractable diffuse abdominal pain going on for the past month.     1. Sepsis Secondary to Colitis:  - febrile to 99.9 and hypotensive 58/32 (suspect vagal episode in ED) on admission   - administered 1L with stabilization renee admission   - CT imaging shows; mild inflammation of the ascending colon. Consider infectious etiology as well as inflammatory  bowel disease  - Lactic and Procal normal  - ESR/CRP normal so IBD unlikely   - Blood cultures x2 pending   - started on Zosyn in ED. Continue for now  - Colonoscopy 10-15 years ago per patient and was normal   -GI PCR pending   -labs completely normal        2. Hypertension:  - Labile. On metoprolol. Hold for now     3. Anxiety/depression:  - on Atarax at home as needed    DVT prophylaxis:  Mechanical DVT prophylaxis orders are present.       AM-PAC 6 Clicks Score (PT): 24 (04/06/22 0824)    Disposition: I expect the patient to be discharged tomorrow     CODE STATUS:   Code Status and Medical Interventions:   Ordered at: 04/05/22 1938     Level Of Support Discussed With:    Patient     Code Status (Patient has no pulse and is not breathing):    CPR (Attempt to Resuscitate)     Medical Interventions (Patient has pulse or is breathing):    Full Support       Hailey Byrnes,   04/06/22

## 2022-04-07 VITALS
DIASTOLIC BLOOD PRESSURE: 85 MMHG | SYSTOLIC BLOOD PRESSURE: 152 MMHG | HEART RATE: 59 BPM | HEIGHT: 60 IN | BODY MASS INDEX: 26.5 KG/M2 | RESPIRATION RATE: 18 BRPM | WEIGHT: 135 LBS | OXYGEN SATURATION: 95 % | TEMPERATURE: 98.6 F

## 2022-04-07 PROBLEM — A41.9 SEPSIS: Status: RESOLVED | Noted: 2022-04-05 | Resolved: 2022-04-07

## 2022-04-07 PROBLEM — K52.9 COLITIS: Status: RESOLVED | Noted: 2022-04-05 | Resolved: 2022-04-07

## 2022-04-07 LAB
ALBUMIN SERPL-MCNC: 3.6 G/DL (ref 3.5–5.2)
ALBUMIN/GLOB SERPL: 1.5 G/DL
ALP SERPL-CCNC: 75 U/L (ref 39–117)
ALT SERPL W P-5'-P-CCNC: 16 U/L (ref 1–33)
ANION GAP SERPL CALCULATED.3IONS-SCNC: 10 MMOL/L (ref 5–15)
AST SERPL-CCNC: 17 U/L (ref 1–32)
BASOPHILS # BLD AUTO: 0.1 10*3/MM3 (ref 0–0.2)
BASOPHILS NFR BLD AUTO: 1.3 % (ref 0–1.5)
BILIRUB SERPL-MCNC: 0.5 MG/DL (ref 0–1.2)
BUN SERPL-MCNC: 5 MG/DL (ref 8–23)
BUN/CREAT SERPL: 6.8 (ref 7–25)
CALCIUM SPEC-SCNC: 8.8 MG/DL (ref 8.6–10.5)
CHLORIDE SERPL-SCNC: 106 MMOL/L (ref 98–107)
CO2 SERPL-SCNC: 24 MMOL/L (ref 22–29)
CREAT SERPL-MCNC: 0.73 MG/DL (ref 0.57–1)
CRP SERPL-MCNC: <0.3 MG/DL (ref 0–0.5)
DEPRECATED RDW RBC AUTO: 46.1 FL (ref 37–54)
EGFRCR SERPLBLD CKD-EPI 2021: 90.8 ML/MIN/1.73
EOSINOPHIL # BLD AUTO: 0.18 10*3/MM3 (ref 0–0.4)
EOSINOPHIL NFR BLD AUTO: 2.3 % (ref 0.3–6.2)
ERYTHROCYTE [DISTWIDTH] IN BLOOD BY AUTOMATED COUNT: 12.8 % (ref 12.3–15.4)
GLOBULIN UR ELPH-MCNC: 2.4 GM/DL
GLUCOSE SERPL-MCNC: 113 MG/DL (ref 65–99)
HCT VFR BLD AUTO: 35.8 % (ref 34–46.6)
HGB BLD-MCNC: 11.8 G/DL (ref 12–15.9)
IMM GRANULOCYTES # BLD AUTO: 0.02 10*3/MM3 (ref 0–0.05)
IMM GRANULOCYTES NFR BLD AUTO: 0.3 % (ref 0–0.5)
LYMPHOCYTES # BLD AUTO: 1.78 10*3/MM3 (ref 0.7–3.1)
LYMPHOCYTES NFR BLD AUTO: 22.4 % (ref 19.6–45.3)
MCH RBC QN AUTO: 32.3 PG (ref 26.6–33)
MCHC RBC AUTO-ENTMCNC: 33 G/DL (ref 31.5–35.7)
MCV RBC AUTO: 98.1 FL (ref 79–97)
MONOCYTES # BLD AUTO: 0.55 10*3/MM3 (ref 0.1–0.9)
MONOCYTES NFR BLD AUTO: 6.9 % (ref 5–12)
NEUTROPHILS NFR BLD AUTO: 5.33 10*3/MM3 (ref 1.7–7)
NEUTROPHILS NFR BLD AUTO: 66.8 % (ref 42.7–76)
NRBC BLD AUTO-RTO: 0 /100 WBC (ref 0–0.2)
PLATELET # BLD AUTO: 268 10*3/MM3 (ref 140–450)
PMV BLD AUTO: 9.3 FL (ref 6–12)
POTASSIUM SERPL-SCNC: 3.4 MMOL/L (ref 3.5–5.2)
PROCALCITONIN SERPL-MCNC: <0.2 NG/ML (ref 0–0.25)
PROT SERPL-MCNC: 6 G/DL (ref 6–8.5)
QT INTERVAL: 498 MS
QTC INTERVAL: 517 MS
RBC # BLD AUTO: 3.65 10*6/MM3 (ref 3.77–5.28)
SODIUM SERPL-SCNC: 140 MMOL/L (ref 136–145)
WBC NRBC COR # BLD: 7.96 10*3/MM3 (ref 3.4–10.8)

## 2022-04-07 PROCEDURE — 96376 TX/PRO/DX INJ SAME DRUG ADON: CPT

## 2022-04-07 PROCEDURE — 80053 COMPREHEN METABOLIC PANEL: CPT | Performed by: FAMILY MEDICINE

## 2022-04-07 PROCEDURE — 84145 PROCALCITONIN (PCT): CPT | Performed by: FAMILY MEDICINE

## 2022-04-07 PROCEDURE — 99217 PR OBSERVATION CARE DISCHARGE MANAGEMENT: CPT | Performed by: FAMILY MEDICINE

## 2022-04-07 PROCEDURE — 25010000002 ONDANSETRON PER 1 MG: Performed by: PHYSICIAN ASSISTANT

## 2022-04-07 PROCEDURE — 86140 C-REACTIVE PROTEIN: CPT | Performed by: FAMILY MEDICINE

## 2022-04-07 PROCEDURE — 85025 COMPLETE CBC W/AUTO DIFF WBC: CPT | Performed by: FAMILY MEDICINE

## 2022-04-07 PROCEDURE — 25010000002 PIPERACILLIN SOD-TAZOBACTAM PER 1 G: Performed by: PHYSICIAN ASSISTANT

## 2022-04-07 PROCEDURE — G0378 HOSPITAL OBSERVATION PER HR: HCPCS

## 2022-04-07 PROCEDURE — 96361 HYDRATE IV INFUSION ADD-ON: CPT

## 2022-04-07 RX ORDER — AMOXICILLIN AND CLAVULANATE POTASSIUM 875; 125 MG/1; MG/1
1 TABLET, FILM COATED ORAL EVERY 12 HOURS SCHEDULED
Status: DISCONTINUED | OUTPATIENT
Start: 2022-04-07 | End: 2022-04-07 | Stop reason: HOSPADM

## 2022-04-07 RX ORDER — AMOXICILLIN AND CLAVULANATE POTASSIUM 875; 125 MG/1; MG/1
1 TABLET, FILM COATED ORAL EVERY 12 HOURS SCHEDULED
Qty: 9 TABLET | Refills: 0 | Status: SHIPPED | OUTPATIENT
Start: 2022-04-07 | End: 2022-04-12

## 2022-04-07 RX ADMIN — AMOXICILLIN AND CLAVULANATE POTASSIUM 1 TABLET: 875; 125 TABLET, FILM COATED ORAL at 10:37

## 2022-04-07 RX ADMIN — TAZOBACTAM SODIUM AND PIPERACILLIN SODIUM 3.38 G: 375; 3 INJECTION, SOLUTION INTRAVENOUS at 01:09

## 2022-04-07 RX ADMIN — Medication 10 ML: at 10:37

## 2022-04-07 RX ADMIN — SODIUM CHLORIDE 500 ML: 9 INJECTION, SOLUTION INTRAVENOUS at 12:21

## 2022-04-07 RX ADMIN — ONDANSETRON 4 MG: 2 INJECTION INTRAMUSCULAR; INTRAVENOUS at 03:30

## 2022-04-07 RX ADMIN — SODIUM CHLORIDE, POTASSIUM CHLORIDE, SODIUM LACTATE AND CALCIUM CHLORIDE 100 ML/HR: 600; 310; 30; 20 INJECTION, SOLUTION INTRAVENOUS at 01:09

## 2022-04-07 NOTE — DISCHARGE SUMMARY
[facility] Medicine Services  DISCHARGE SUMMARY    Patient Name: Clara Gonzalez  : 1955  MRN: [id number]    Date of Admission: 2022  3:09 PM  Date of Discharge:  22    Primary Care Physician: Velia James APRN      Hospital Course     Presenting Problem:   Colitis [K52.9]    Active Hospital Problems    Diagnosis  POA   • Essential hypertension [I10]  Yes   • anxiety and depression [F41.0]  Yes   • GERD without esophagitis [K21.9]  Yes      Resolved Hospital Problems    Diagnosis Date Resolved POA   • **Sepsis (HCC) [A41.9] 2022 Yes   • Colitis [K52.9] 2022 Yes          Hospital Course:  Clara Gonzalez is a 67 y.o. female with a past medical history significant for hypertension, GERD, and anxiety/depression. She presents today with complaints of intractable diffuse abdominal pain going on for the past month.      Sepsis Secondary to Colitis:  - febrile to 99.9 and hypotensive 58/32 (suspect vagal episode in ED) on admission   - administered 1L with stabilization on admission   - CT imaging shows; mild inflammation of the ascending colon. Consider infectious etiology as well as inflammatory bowel disease  - Lactic and Procal normal  - ESR/CRP normal so IBD unlikely   - started on Zosyn in ED, now transitioned to Augmentin          Discharge Follow Up Recommendations for outpatient labs/diagnostics:  See PCP in 1 week to ensure resolution of symptoms, infectious etiology unclear after evaluation.  If symptoms continue would consider microscopic colitis and referral for outpatient colonoscopy.     Day of Discharge     HPI:   Stools less loose, alos much less frequent.  Tolerating full liquids.     Vital Signs:   Temp:  [98 °F (36.7 °C)-98.7 °F (37.1 °C)] 98.4 °F (36.9 °C)  Heart Rate:  [59-82] 64  Resp:  [12-18] 16  BP: (147-162)/(79-86) 149/79      Physical Exam:  Constitutional: No acute distress, awake, alert, nontoxic  HENT: Mucous membranes  moist  Respiratory: Good effort, nonlabored respirations on RA  Cardiovascular: NSR tele  Gastrointestinal: Soft, nondistended  Musculoskeletal: No overt peripheral edema, normal muscle tone for age  Psychiatric: Appropriate affect, cooperative  Skin: No visible rashes or mottling        Pertinent  and/or Most Recent Results     LAB RESULTS:      Lab 04/07/22  0537 04/06/22  1703 04/05/22  1522 04/05/22  1437   WBC 7.96 7.93  --  13.42*   HEMOGLOBIN 11.8* 11.4*  --  14.4   HEMOGLOBIN, POC  --   --  13.3  --    HEMATOCRIT 35.8 34.8  --  43.2   HEMATOCRIT POC  --   --  39  --    PLATELETS 268 278  --  363   NEUTROS ABS 5.33  --   --  9.53*   IMMATURE GRANS (ABS) 0.02  --   --  0.03   LYMPHS ABS 1.78  --   --  2.65   MONOS ABS 0.55  --   --  0.93*   EOS ABS 0.18  --   --  0.14   MCV 98.1* 96.9  --  95.2   SED RATE  --   --   --  9   CRP <0.30  --   --  <0.30   PROCALCITONIN <0.20  --   --  <0.20   LACTATE  --   --   --  1.2         Lab 04/07/22  0537 04/06/22  1226 04/05/22  1522 04/05/22  1437   SODIUM 140 142  --  139   POTASSIUM 3.4* 3.6  --  4.3   CHLORIDE 106 109*  --  103   CO2 24.0 24.0  --  26.0   ANION GAP 10.0 9.0  --  10.0   BUN 5* 7*  --  12   CREATININE 0.73 0.74 0.80 0.75   EGFR 90.8 89.4 81.4 87.9   GLUCOSE 113* 100*  --  93   CALCIUM 8.8 8.9  --  9.6         Lab 04/07/22  0537 04/05/22  1437   TOTAL PROTEIN 6.0 7.6   ALBUMIN 3.60 4.90   GLOBULIN 2.4 2.7   ALT (SGPT) 16 19   AST (SGOT) 17 16   BILIRUBIN 0.5 0.4   ALK PHOS 75 107   LIPASE  --  15         Lab 04/05/22  1437   PROBNP 228.0   TROPONIN T <0.010                 Brief Urine Lab Results  (Last result in the past 365 days)      Color   Clarity   Blood   Leuk Est   Nitrite   Protein   CREAT   Urine HCG        04/05/22 1440 Yellow   Clear   Negative   Negative   Negative   Negative               Microbiology Results (last 10 days)     Procedure Component Value - Date/Time    Gastrointestinal Panel, PCR - Stool, Per Rectum [[id number]]  (Normal)  Collected: 04/06/22 1401    Lab Status: Final result Specimen: Stool from Per Rectum Updated: 04/06/22 1543     Campylobacter Not Detected     Plesiomonas shigelloides Not Detected     Salmonella Not Detected     Vibrio Not Detected     Vibrio cholerae Not Detected     Yersinia enterocolitica Not Detected     Enteroaggregative E. coli (EAEC) Not Detected     Enteropathogenic E. coli (EPEC) Not Detected     Enterotoxigenic E. coli (ETEC) lt/st Not Detected     Shiga-like toxin-producing E. coli (STEC) stx1/stx2 Not Detected     Shigella/Enteroinvasive E. coli (EIEC) Not Detected     Cryptosporidium Not Detected     Cyclospora cayetanensis Not Detected     Entamoeba histolytica Not Detected     Giardia lamblia Not Detected     Adenovirus F40/41 Not Detected     Astrovirus Not Detected     Norovirus GI/GII Not Detected     Rotavirus A Not Detected     Sapovirus (I, II, IV or V) Not Detected    COVID PRE-OP / PRE-PROCEDURE SCREENING ORDER (NO ISOLATION) - Swab, Nasopharynx [[id number]]  (Normal) Collected: 04/05/22 2207    Lab Status: Final result Specimen: Swab from Nasopharynx Updated: 04/05/22 2253    Narrative:      The following orders were created for panel order COVID PRE-OP / PRE-PROCEDURE SCREENING ORDER (NO ISOLATION) - Swab, Nasopharynx.  Procedure                               Abnormality         Status                     ---------                               -----------         ------                     COVID-19, ABBOTT IN-HOUS...[[id number]]  Normal              Final result                 Please view results for these tests on the individual orders.    COVID-19, ABBOTT IN-HOUSE,NASAL Swab (NO TRANSPORT MEDIA) 2 HR TAT - Swab, Nasopharynx [[id number]]  (Normal) Collected: 04/05/22 2207    Lab Status: Final result Specimen: Swab from Nasopharynx Updated: 04/05/22 2253     COVID19 Presumptive Negative    Narrative:      Fact sheet for providers: https://www.fda.gov/media/729559/download     Fact sheet for  patients: https://www.[employer].gov/media/140712/download    Test performed by PCR.  If inconsistent with clinical signs and symptoms patient should be tested with different authorized molecular test.    Blood Culture - Blood, Hand, Left [[id number]]  (Normal) Collected: 04/05/22 2114    Lab Status: Preliminary result Specimen: Blood from Hand, Left Updated: 04/06/22 2202     Blood Culture No growth at 24 hours    Blood Culture - Blood, Hand, Right [[id number]]  (Normal) Collected: 04/05/22 2114    Lab Status: Preliminary result Specimen: Blood from Hand, Right Updated: 04/06/22 2202     Blood Culture No growth at 24 hours          CT Angiogram Chest With & Without Contrast    Result Date: 4/5/2022  DATE OF EXAM: 4/5/2022 4:55 PM  PROCEDURE: CT ANGIOGRAM CHEST W WO CONTRAST-, CT ANGIOGRAM ABDOMEN PELVIS-  INDICATIONS: Abd pain, near syncope  COMPARISON: No comparisons available.  TECHNIQUE: Contiguous axial imaging was obtained from the thoracic inlet through the upper chest abdomen and pelvis following the intravenous administration of 100 mL of Isovue 370. Reconstructed coronal and sagittal images were also obtained. Automated exposure control and iterative reconstruction methods were used.  The radiation dose reduction device was turned on for each scan per the ALARA (As Low as Reasonably Achievable) protocol.  FINDINGS:  ANGIOGRAPHIC CT SCAN OF THE CHEST: There is good contrast opacification of the pulmonary arteries and no evidence of filling defect to suggest embolic disease. There is no evidence of thoracic aortic aneurysm or dissection, pericardial or pleural effusion or significant mediastinal adenopathy. No significant coronary artery calcification is seen. Lungs appear clear bilaterally.      No evidence of pulmonary embolic disease or other active chest pathology.    ANGIOGRAPHIC CT SCAN OF THE ABDOMEN AND PELVIS WITH IV CONTRAST:  The abdominal aorta, common and external iliac arteries and visualized  superficial femoral arteries are normal in caliber and appearance with no evidence of aneurysm or dissection or even significant atherosclerotic disease. Celiac axis and SMA, renal arteries and PREETI all appear to be normally patent.  There is diffuse fatty liver change, and a small left lobe liver cyst. Gallbladder is surgically absent. Spleen is not enlarged. Pancreas, adrenal glands, and kidneys appear within normal limits. No upper abdominal free air ascites or adenopathy is seen. Bowel loops are normal in caliber, however, there is mild diffuse inflammation of a long loop of bowel extending from axial image 92 through the left mid abdomen, crossing the midline, and apparently continuing as the terminal ileum. Although the proximal inflamed small bowel is more cephalad in location than usual for location of the ileum, this appears to be a variant of normal, and this appears to represent a continuous inflamed loop of ileum, whether from infectious etiology, or inflammatory bowel disease. There is a milder degree of inflammation of the cecum and ascending colon. No pneumatosis is seen. No other evidence of bowel wall inflammation is seen elsewhere. A small amount of pelvic ascites is probably secondary to the inflamed bowel. No abscess is seen. The appendix is clearly identified and appears normal. Uterus and ovaries are either atrophic or absent and not seen. Bony structures appear to be intact.     IMPRESSION:  1. Lung segment inflamed loop of bowel extending from the left upper quadrant of the right upper pelvis, which appears to represent inflamed ileum. Mild inflammation of the ascending colon. Consider infectious etiology as well as inflammatory bowel disease. 2. Small amount of pelvic ascites, likely secondary to inflamed bowel. 3. No other evidence of acute intra-abdominal or intrapelvic disease elsewhere.  This report was finalized on 4/5/2022 5:57 PM by Dr. Nadir Wylie MD.      CT Angiogram Abdomen  Pelvis    Result Date: 4/5/2022  DATE OF EXAM: 4/5/2022 4:55 PM  PROCEDURE: CT ANGIOGRAM CHEST W WO CONTRAST-, CT ANGIOGRAM ABDOMEN PELVIS-  INDICATIONS: Abd pain, near syncope  COMPARISON: No comparisons available.  TECHNIQUE: Contiguous axial imaging was obtained from the thoracic inlet through the upper chest abdomen and pelvis following the intravenous administration of 100 mL of Isovue 370. Reconstructed coronal and sagittal images were also obtained. Automated exposure control and iterative reconstruction methods were used.  The radiation dose reduction device was turned on for each scan per the ALARA (As Low as Reasonably Achievable) protocol.  FINDINGS:  ANGIOGRAPHIC CT SCAN OF THE CHEST: There is good contrast opacification of the pulmonary arteries and no evidence of filling defect to suggest embolic disease. There is no evidence of thoracic aortic aneurysm or dissection, pericardial or pleural effusion or significant mediastinal adenopathy. No significant coronary artery calcification is seen. Lungs appear clear bilaterally.      No evidence of pulmonary embolic disease or other active chest pathology.    ANGIOGRAPHIC CT SCAN OF THE ABDOMEN AND PELVIS WITH IV CONTRAST:  The abdominal aorta, common and external iliac arteries and visualized superficial femoral arteries are normal in caliber and appearance with no evidence of aneurysm or dissection or even significant atherosclerotic disease. Celiac axis and SMA, renal arteries and PREETI all appear to be normally patent.  There is diffuse fatty liver change, and a small left lobe liver cyst. Gallbladder is surgically absent. Spleen is not enlarged. Pancreas, adrenal glands, and kidneys appear within normal limits. No upper abdominal free air ascites or adenopathy is seen. Bowel loops are normal in caliber, however, there is mild diffuse inflammation of a long loop of bowel extending from axial image 92 through the left mid abdomen, crossing the midline, and  apparently continuing as the terminal ileum. Although the proximal inflamed small bowel is more cephalad in location than usual for location of the ileum, this appears to be a variant of normal, and this appears to represent a continuous inflamed loop of ileum, whether from infectious etiology, or inflammatory bowel disease. There is a milder degree of inflammation of the cecum and ascending colon. No pneumatosis is seen. No other evidence of bowel wall inflammation is seen elsewhere. A small amount of pelvic ascites is probably secondary to the inflamed bowel. No abscess is seen. The appendix is clearly identified and appears normal. Uterus and ovaries are either atrophic or absent and not seen. Bony structures appear to be intact.     IMPRESSION:  1. Lung segment inflamed loop of bowel extending from the left upper quadrant of the right upper pelvis, which appears to represent inflamed ileum. Mild inflammation of the ascending colon. Consider infectious etiology as well as inflammatory bowel disease. 2. Small amount of pelvic ascites, likely secondary to inflamed bowel. 3. No other evidence of acute intra-abdominal or intrapelvic disease elsewhere.  This report was finalized on 4/5/2022 5:57 PM by Dr. Nadir Wylie MD.                Results for orders placed during the hospital encounter of 07/02/21    Adult Transthoracic Echo Complete W/ Cont if Necessary Per Protocol    Interpretation Summary  · Left ventricular ejection fraction appears to be 56 - 60%. Left ventricular systolic function is normal.  · Mild to moderate aortic valve regurgitation is present.      Plan for Follow-up of Pending Labs/Results:   Pending Labs     Order Current Status    Blood Culture - Blood, Hand, Left Preliminary result    Blood Culture - Blood, Hand, Right Preliminary result        Discharge Details        Discharge Medications      New Medications      Instructions Start Date   amoxicillin-clavulanate 875-125 MG per tablet  Commonly  known as: AUGMENTIN   1 tablet, Oral, Every 12 Hours Scheduled         Continue These Medications      Instructions Start Date   hydrOXYzine 10 MG tablet  Commonly known as: ATARAX   10 mg, Oral, 3 Times Daily PRN      metoprolol succinate XL 50 MG 24 hr tablet  Commonly known as: TOPROL-XL   50 mg, Oral, Daily             No Known Allergies      Discharge Disposition:  Home or Self Care    Diet:  Hospital:  Diet Order   Procedures   • Diet Regular          CODE STATUS:    Code Status and Medical Interventions:   Ordered at: 04/05/22 1938     Level Of Support Discussed With:    Patient     Code Status (Patient has no pulse and is not breathing):    CPR (Attempt to Resuscitate)     Medical Interventions (Patient has pulse or is breathing):    Full Support       No future appointments.    Additional Instructions for the Follow-ups that You Need to Schedule     Discharge Follow-up with PCP   As directed       Currently Documented PCP:    Velia James APRN    PCP Phone Number:    [phone]     Follow Up Details: 1 week for Transiton of Care -- ensure resolution of symptoms                     Zuleima Meadows MD  04/07/22      Time Spent on Discharge:  I spent  35  minutes on this discharge activity which included: face-to-face encounter with the patient, reviewing the data in the system, coordination of the care with the nursing staff as well as consultants, documentation, and entering orders.

## 2022-04-10 LAB
BACTERIA SPEC AEROBE CULT: NORMAL
BACTERIA SPEC AEROBE CULT: NORMAL

## 2022-06-14 PROBLEM — L71.0 PERIORAL DERMATITIS: Status: ACTIVE | Noted: 2019-04-19

## 2022-06-14 PROBLEM — M77.8 TENDINITIS OF LEFT SHOULDER: Status: ACTIVE | Noted: 2019-04-19

## 2022-06-14 PROBLEM — M54.6 ACUTE THORACIC BACK PAIN: Status: ACTIVE | Noted: 2020-05-28

## 2022-06-14 PROBLEM — R92.8 ABNORMAL MAMMOGRAM: Status: ACTIVE | Noted: 2019-04-22

## 2022-06-15 ENCOUNTER — OFFICE VISIT (OUTPATIENT)
Dept: GASTROENTEROLOGY | Facility: CLINIC | Age: 67
End: 2022-06-15

## 2022-06-15 VITALS
WEIGHT: 141.6 LBS | HEART RATE: 71 BPM | BODY MASS INDEX: 27.8 KG/M2 | SYSTOLIC BLOOD PRESSURE: 122 MMHG | DIASTOLIC BLOOD PRESSURE: 76 MMHG | HEIGHT: 60 IN | OXYGEN SATURATION: 98 % | TEMPERATURE: 98.1 F

## 2022-06-15 DIAGNOSIS — K90.89 BILE SALT-INDUCED DIARRHEA: ICD-10-CM

## 2022-06-15 DIAGNOSIS — R93.5 ABNORMAL CT OF THE ABDOMEN: ICD-10-CM

## 2022-06-15 DIAGNOSIS — K52.9 COLITIS: Primary | ICD-10-CM

## 2022-06-15 PROBLEM — R73.9 ELEVATED BLOOD SUGAR: Status: ACTIVE | Noted: 2019-04-19

## 2022-06-15 PROCEDURE — 99204 OFFICE O/P NEW MOD 45 MIN: CPT | Performed by: NURSE PRACTITIONER

## 2022-06-15 RX ORDER — ESCITALOPRAM OXALATE 10 MG/1
TABLET ORAL
COMMUNITY
Start: 2022-06-06 | End: 2022-10-20

## 2022-06-15 NOTE — PROGRESS NOTES
New Patient Consultation     Patient Name: Clara Gonzalez  : 1955   MRN: [id number]     Chief Complaint:    Chief Complaint   Patient presents with   • Abdominal Cramping       History of Present Illness: Clara Gonzalez is a 67 y.o. female who is here today for a Gastroenterology Consultation for colitis.    Clara was admitted to [facility] in April with intractable generalized abdominal pain.  She was diagnosed with sepsis secondary to colitis.  She was treated for her sepsis and discharged home on antibiotics and with PCP follow-up.  Her symptoms did resolve after several weeks.    Clara reports a history of postprandial diarrhea after [place]. Her symptoms are back to baseline. There is no blood in the stool. There is no unintentional weight loss.  Her appetite is good.  There is no nausea or vomiting.  Denies dysphagia.   Her last colonoscopy was around age 51- believes this was normal.    CT Angiogram Abdomen Pelvis (2022 17:05)      Abdominal surg hx: hysterectomy, bladder repair, madiosn  No family history of IBD.   Subjective      Review of Systems:   Review of Systems   Constitutional: Negative for appetite change and unexpected weight loss.   HENT: Negative for trouble swallowing.    Gastrointestinal: Positive for diarrhea. Negative for abdominal distention, abdominal pain, anal bleeding, blood in stool, constipation, nausea, rectal pain, vomiting, GERD and indigestion.       Past Medical History:   Past Medical History:   Diagnosis Date   • Anxiety    • Essential hypertension 2021   • GERD without esophagitis 2019       Past Surgical History:   Past Surgical History:   Procedure Laterality Date   • BREAST CYST ASPIRATION Left    • CHOLECYSTECTOMY     • COLONOSCOPY     • HYSTERECTOMY     • OOPHORECTOMY         Family History:   Family History   Problem Relation Age of Onset   • Breast cancer Mother 80   • Ovarian cancer Neg Hx    • Colon cancer Neg Hx    • Colon  "polyps Neg Hx    • Esophageal cancer Neg Hx        Social History:   Social History     Socioeconomic History   • Marital status:    Tobacco Use   • Smoking status: Never Smoker   • Smokeless tobacco: Never Used   Vaping Use   • Vaping Use: Never used   Substance and Sexual Activity   • Alcohol use: Never   • Drug use: Never   • Sexual activity: Defer       Alcohol/Tobacco History:   Social History     Substance and Sexual Activity   Alcohol Use Never     Social History     Tobacco Use   Smoking Status Never Smoker   Smokeless Tobacco Never Used       Medications:     Current Outpatient Medications:   •  metoprolol succinate XL (TOPROL-XL) 50 MG 24 hr tablet, Take 50 mg by mouth Daily., Disp: , Rfl:   •  escitalopram (LEXAPRO) 10 MG tablet, TAKE 1 TABLET BY MOUTH 1 TIME EACH DAY., Disp: , Rfl:   •  hydrOXYzine (ATARAX) 10 MG tablet, Take 10 mg by mouth 3 (Three) Times a Day As Needed for Itching., Disp: , Rfl:     Allergies:   No Known Allergies    Objective     Physical Exam:  Vital Signs:   Vitals:    06/15/22 0945   BP: 122/76   BP Location: Left arm   Patient Position: Sitting   Cuff Size: Adult   Pulse: 71   Temp: 98.1 °F (36.7 °C)   TempSrc: Temporal   SpO2: 98%   Weight: 64.2 kg (141 lb 9.6 oz)   Height: 152.4 cm (60\")     Body mass index is 27.65 kg/m².     Physical Exam  Vitals and nursing note reviewed.   Constitutional:       General: She is not in acute distress.     Appearance: She is well-developed. She is not diaphoretic.   Eyes:      General: No scleral icterus.     Conjunctiva/sclera: Conjunctivae normal.   Neck:      Thyroid: No thyromegaly.   Cardiovascular:      Rate and Rhythm: Normal rate and regular rhythm.   Pulmonary:      Effort: Pulmonary effort is normal.      Breath sounds: Normal breath sounds.   Abdominal:      General: Bowel sounds are normal. There is no distension.      Palpations: Abdomen is soft.      Tenderness: There is no abdominal tenderness. There is no guarding or " "rebound.      Hernia: No hernia is present.   Musculoskeletal:      Cervical back: Neck supple.      Right lower leg: No edema.      Left lower leg: No edema.   Skin:     General: Skin is warm and dry.      Capillary Refill: Capillary refill takes 2 to 3 seconds.      Coloration: Skin is not jaundiced or pale.      Findings: No bruising or petechiae.      Nails: There is no clubbing.   Neurological:      Mental Status: She is alert and oriented to person, place, and time.   Psychiatric:         Behavior: Behavior normal.         Thought Content: Thought content normal.         Judgment: Judgment normal.     Reviewed ER note, labs, primary care note    Assessment / Plan      Assessment/Plan:   Diagnoses and all orders for this visit:    1. Colitis (Primary)  -     Ambulatory Referral For Screening Colonoscopy    2. Abnormal CT of the abdomen  -     Ambulatory Referral For Screening Colonoscopy    3. Bile salt-induced diarrhea    He was recently treated for colitis and sepsis.  Her symptoms have resolved.  She is now back to her baseline which does include frequent postprandial diarrhea since having her cholecystectomy 1993.  Given her CT findings and chronic diarrhea, I do recommend colonoscopy.  She is also well past overdue for her colon cancer screening.  We did discuss that pathophysiology behind bile salt diarrhea.  She is \"used to this\" and is not particularly bothersome so we will avoid medication management at this time.    Follow Up:   Return if symptoms worsen or fail to improve.    Plan of care reviewed with the patient at the conclusion of today's visit.  Education was provided regarding diagnosis, management, and any prescribed or recommended OTC medications.  Patient verbalized understanding of and agreement with management plan.         MEI Winn  [facility]  "

## 2022-06-22 DIAGNOSIS — Z12.11 SCREENING FOR COLON CANCER: Primary | ICD-10-CM

## 2022-06-22 RX ORDER — SODIUM, POTASSIUM,MAG SULFATES 17.5-3.13G
1 SOLUTION, RECONSTITUTED, ORAL ORAL TAKE AS DIRECTED
Qty: 354 ML | Refills: 0 | OUTPATIENT
Start: 2022-06-22 | End: 2022-10-20

## 2022-06-27 ENCOUNTER — TELEPHONE (OUTPATIENT)
Dept: GENETICS | Facility: HOSPITAL | Age: 67
End: 2022-06-27

## 2022-07-20 ENCOUNTER — OUTSIDE FACILITY SERVICE (OUTPATIENT)
Dept: GASTROENTEROLOGY | Facility: CLINIC | Age: 67
End: 2022-07-20

## 2022-07-20 PROCEDURE — 45380 COLONOSCOPY AND BIOPSY: CPT | Performed by: INTERNAL MEDICINE

## 2022-07-20 PROCEDURE — 88305 TISSUE EXAM BY PATHOLOGIST: CPT | Performed by: INTERNAL MEDICINE

## 2022-07-21 ENCOUNTER — LAB REQUISITION (OUTPATIENT)
Dept: LAB | Facility: HOSPITAL | Age: 67
End: 2022-07-21

## 2022-07-21 DIAGNOSIS — K63.3 ULCER OF INTESTINE: ICD-10-CM

## 2022-07-21 DIAGNOSIS — K52.9 NONINFECTIVE GASTROENTERITIS AND COLITIS, UNSPECIFIED: ICD-10-CM

## 2022-07-21 DIAGNOSIS — K64.8 OTHER HEMORRHOIDS: ICD-10-CM

## 2022-07-21 DIAGNOSIS — R93.3 ABNORMAL FINDINGS ON DIAGNOSTIC IMAGING OF OTHER PARTS OF DIGESTIVE TRACT: ICD-10-CM

## 2022-07-21 DIAGNOSIS — K57.30 DIVERTICULOSIS OF LARGE INTESTINE WITHOUT PERFORATION OR ABSCESS WITHOUT BLEEDING: ICD-10-CM

## 2022-07-22 LAB
CYTO UR: NORMAL
LAB AP CASE REPORT: NORMAL
LAB AP CLINICAL INFORMATION: NORMAL
LAB AP DIAGNOSIS COMMENT: NORMAL
PATH REPORT.FINAL DX SPEC: NORMAL
PATH REPORT.GROSS SPEC: NORMAL

## 2022-07-25 ENCOUNTER — TELEPHONE (OUTPATIENT)
Dept: GASTROENTEROLOGY | Facility: CLINIC | Age: 67
End: 2022-07-25

## 2022-07-25 NOTE — TELEPHONE ENCOUNTER
----- Message from Tc Glaser MD sent at 7/22/2022  2:17 PM EDT -----  I spoke to Dr. Redding with pathology.  He did not believe there were significant features for Crohn's disease.  There was some mild inflammation in the ileum.  The colon biopsies were all normal.  I would recommend general follow-up at this time.  If the patient has any recurrent symptoms would suggest CT enterography and fecal calprotectin.

## 2022-07-26 ENCOUNTER — TELEPHONE (OUTPATIENT)
Dept: GASTROENTEROLOGY | Facility: CLINIC | Age: 67
End: 2022-07-26

## 2022-07-26 NOTE — TELEPHONE ENCOUNTER
The patient regarding the pathology.  She will follow-up with us on an as-needed basis if her symptoms return.  She verbalized understanding and denied further questions at this time.

## 2022-10-20 ENCOUNTER — APPOINTMENT (OUTPATIENT)
Dept: MRI IMAGING | Facility: HOSPITAL | Age: 67
End: 2022-10-20

## 2022-10-20 ENCOUNTER — APPOINTMENT (OUTPATIENT)
Dept: GENERAL RADIOLOGY | Facility: HOSPITAL | Age: 67
End: 2022-10-20

## 2022-10-20 ENCOUNTER — HOSPITAL ENCOUNTER (EMERGENCY)
Facility: HOSPITAL | Age: 67
Discharge: HOME | End: 2022-10-20
Attending: EMERGENCY MEDICINE | Admitting: EMERGENCY MEDICINE

## 2022-10-20 ENCOUNTER — APPOINTMENT (OUTPATIENT)
Dept: CT IMAGING | Facility: HOSPITAL | Age: 67
End: 2022-10-20

## 2022-10-20 VITALS
WEIGHT: 140 LBS | HEART RATE: 64 BPM | HEIGHT: 60 IN | RESPIRATION RATE: 16 BRPM | TEMPERATURE: 98.1 F | BODY MASS INDEX: 27.48 KG/M2 | SYSTOLIC BLOOD PRESSURE: 126 MMHG | DIASTOLIC BLOOD PRESSURE: 71 MMHG | OXYGEN SATURATION: 94 %

## 2022-10-20 DIAGNOSIS — R42 DIZZINESS, NONSPECIFIC: Primary | ICD-10-CM

## 2022-10-20 DIAGNOSIS — R11.2 NAUSEA AND VOMITING, UNSPECIFIED VOMITING TYPE: ICD-10-CM

## 2022-10-20 DIAGNOSIS — R19.7 DIARRHEA, UNSPECIFIED TYPE: ICD-10-CM

## 2022-10-20 LAB
ALBUMIN SERPL-MCNC: 4 G/DL (ref 3.5–5.2)
ALBUMIN/GLOB SERPL: 1.5 G/DL
ALP SERPL-CCNC: 117 U/L (ref 39–117)
ALT SERPL W P-5'-P-CCNC: 18 U/L (ref 1–33)
ANION GAP SERPL CALCULATED.3IONS-SCNC: 9 MMOL/L (ref 5–15)
AST SERPL-CCNC: 15 U/L (ref 1–32)
BASOPHILS # BLD AUTO: 0.03 10*3/MM3 (ref 0–0.2)
BASOPHILS NFR BLD AUTO: 0.4 % (ref 0–1.5)
BILIRUB SERPL-MCNC: 0.5 MG/DL (ref 0–1.2)
BILIRUB UR QL STRIP: NEGATIVE
BUN SERPL-MCNC: 11 MG/DL (ref 8–23)
BUN/CREAT SERPL: 15.1 (ref 7–25)
CALCIUM SPEC-SCNC: 9 MG/DL (ref 8.6–10.5)
CHLORIDE SERPL-SCNC: 106 MMOL/L (ref 98–107)
CLARITY UR: CLEAR
CO2 SERPL-SCNC: 23 MMOL/L (ref 22–29)
COLOR UR: YELLOW
CREAT SERPL-MCNC: 0.73 MG/DL (ref 0.57–1)
DEPRECATED RDW RBC AUTO: 44.3 FL (ref 37–54)
EGFRCR SERPLBLD CKD-EPI 2021: 90.8 ML/MIN/1.73
EOSINOPHIL # BLD AUTO: 0.05 10*3/MM3 (ref 0–0.4)
EOSINOPHIL NFR BLD AUTO: 0.6 % (ref 0.3–6.2)
ERYTHROCYTE [DISTWIDTH] IN BLOOD BY AUTOMATED COUNT: 12.8 % (ref 12.3–15.4)
FLUAV RNA RESP QL NAA+PROBE: NOT DETECTED
FLUBV RNA RESP QL NAA+PROBE: NOT DETECTED
GLOBULIN UR ELPH-MCNC: 2.6 GM/DL
GLUCOSE SERPL-MCNC: 128 MG/DL (ref 65–99)
GLUCOSE UR STRIP-MCNC: NEGATIVE MG/DL
HCT VFR BLD AUTO: 37 % (ref 34–46.6)
HGB BLD-MCNC: 12.4 G/DL (ref 12–15.9)
HGB UR QL STRIP.AUTO: NEGATIVE
HOLD SPECIMEN: NORMAL
IMM GRANULOCYTES # BLD AUTO: 0.03 10*3/MM3 (ref 0–0.05)
IMM GRANULOCYTES NFR BLD AUTO: 0.4 % (ref 0–0.5)
KETONES UR QL STRIP: NEGATIVE
LEUKOCYTE ESTERASE UR QL STRIP.AUTO: NEGATIVE
LYMPHOCYTES # BLD AUTO: 0.78 10*3/MM3 (ref 0.7–3.1)
LYMPHOCYTES NFR BLD AUTO: 9.7 % (ref 19.6–45.3)
MAGNESIUM SERPL-MCNC: 1.8 MG/DL (ref 1.6–2.4)
MCH RBC QN AUTO: 31.9 PG (ref 26.6–33)
MCHC RBC AUTO-ENTMCNC: 33.5 G/DL (ref 31.5–35.7)
MCV RBC AUTO: 95.1 FL (ref 79–97)
MONOCYTES # BLD AUTO: 0.35 10*3/MM3 (ref 0.1–0.9)
MONOCYTES NFR BLD AUTO: 4.4 % (ref 5–12)
NEUTROPHILS NFR BLD AUTO: 6.79 10*3/MM3 (ref 1.7–7)
NEUTROPHILS NFR BLD AUTO: 84.5 % (ref 42.7–76)
NITRITE UR QL STRIP: NEGATIVE
NRBC BLD AUTO-RTO: 0 /100 WBC (ref 0–0.2)
PH UR STRIP.AUTO: 6.5 [PH] (ref 5–8)
PLATELET # BLD AUTO: 281 10*3/MM3 (ref 140–450)
PMV BLD AUTO: 9.1 FL (ref 6–12)
POTASSIUM SERPL-SCNC: 3.9 MMOL/L (ref 3.5–5.2)
PROT SERPL-MCNC: 6.6 G/DL (ref 6–8.5)
PROT UR QL STRIP: NEGATIVE
QT INTERVAL: 420 MS
QTC INTERVAL: 443 MS
RBC # BLD AUTO: 3.89 10*6/MM3 (ref 3.77–5.28)
SARS-COV-2 RNA RESP QL NAA+PROBE: NOT DETECTED
SODIUM SERPL-SCNC: 138 MMOL/L (ref 136–145)
SP GR UR STRIP: 1.01 (ref 1–1.03)
TROPONIN T SERPL-MCNC: <0.01 NG/ML (ref 0–0.03)
UROBILINOGEN UR QL STRIP: NORMAL
WBC NRBC COR # BLD: 8.03 10*3/MM3 (ref 3.4–10.8)
WHOLE BLOOD HOLD COAG: NORMAL
WHOLE BLOOD HOLD SPECIMEN: NORMAL

## 2022-10-20 PROCEDURE — 99284 EMERGENCY DEPT VISIT MOD MDM: CPT

## 2022-10-20 PROCEDURE — 25010000002 LORAZEPAM PER 2 MG: Performed by: EMERGENCY MEDICINE

## 2022-10-20 PROCEDURE — 70450 CT HEAD/BRAIN W/O DYE: CPT

## 2022-10-20 PROCEDURE — 96375 TX/PRO/DX INJ NEW DRUG ADDON: CPT

## 2022-10-20 PROCEDURE — 25010000002 ONDANSETRON PER 1 MG: Performed by: NURSE PRACTITIONER

## 2022-10-20 PROCEDURE — 83735 ASSAY OF MAGNESIUM: CPT | Performed by: EMERGENCY MEDICINE

## 2022-10-20 PROCEDURE — 81003 URINALYSIS AUTO W/O SCOPE: CPT | Performed by: EMERGENCY MEDICINE

## 2022-10-20 PROCEDURE — 80053 COMPREHEN METABOLIC PANEL: CPT | Performed by: EMERGENCY MEDICINE

## 2022-10-20 PROCEDURE — 87636 SARSCOV2 & INF A&B AMP PRB: CPT | Performed by: NURSE PRACTITIONER

## 2022-10-20 PROCEDURE — 85025 COMPLETE CBC W/AUTO DIFF WBC: CPT | Performed by: EMERGENCY MEDICINE

## 2022-10-20 PROCEDURE — 70551 MRI BRAIN STEM W/O DYE: CPT

## 2022-10-20 PROCEDURE — 71045 X-RAY EXAM CHEST 1 VIEW: CPT

## 2022-10-20 PROCEDURE — 84484 ASSAY OF TROPONIN QUANT: CPT | Performed by: EMERGENCY MEDICINE

## 2022-10-20 PROCEDURE — 93005 ELECTROCARDIOGRAM TRACING: CPT | Performed by: EMERGENCY MEDICINE

## 2022-10-20 PROCEDURE — 96374 THER/PROPH/DIAG INJ IV PUSH: CPT

## 2022-10-20 RX ORDER — SODIUM CHLORIDE 0.9 % (FLUSH) 0.9 %
10 SYRINGE (ML) INJECTION AS NEEDED
Status: DISCONTINUED | OUTPATIENT
Start: 2022-10-20 | End: 2022-10-20 | Stop reason: HOSPADM

## 2022-10-20 RX ORDER — LORAZEPAM 2 MG/ML
1 INJECTION INTRAMUSCULAR ONCE
Status: COMPLETED | OUTPATIENT
Start: 2022-10-20 | End: 2022-10-20

## 2022-10-20 RX ORDER — CLONIDINE HYDROCHLORIDE 0.1 MG/1
0.1 TABLET ORAL ONCE
Status: DISCONTINUED | OUTPATIENT
Start: 2022-10-20 | End: 2022-10-20 | Stop reason: HOSPADM

## 2022-10-20 RX ORDER — ONDANSETRON 2 MG/ML
4 INJECTION INTRAMUSCULAR; INTRAVENOUS ONCE
Status: COMPLETED | OUTPATIENT
Start: 2022-10-20 | End: 2022-10-20

## 2022-10-20 RX ORDER — ONDANSETRON 4 MG/1
4 TABLET, ORALLY DISINTEGRATING ORAL 4 TIMES DAILY PRN
Qty: 16 TABLET | Refills: 0 | Status: SHIPPED | OUTPATIENT
Start: 2022-10-20

## 2022-10-20 RX ADMIN — ONDANSETRON 4 MG: 2 INJECTION INTRAMUSCULAR; INTRAVENOUS at 13:59

## 2022-10-20 RX ADMIN — SODIUM CHLORIDE 1000 ML: 9 INJECTION, SOLUTION INTRAVENOUS at 13:59

## 2022-10-20 RX ADMIN — LORAZEPAM 1 MG: 2 INJECTION INTRAMUSCULAR; INTRAVENOUS at 15:03

## 2022-10-20 NOTE — DISCHARGE INSTRUCTIONS
Continue your current medications.    Follow up with primary care physician as needed.      Return to the ER as needed.

## 2022-10-20 NOTE — ED PROVIDER NOTES
Subjective   History of Present Illness  Clara Gonzalez is a 67 yr old female that presents emergency department for complaints of dizziness.  Patient explains last night she experienced nausea and dry heaves.  She complains she was up all night with loose stools.  Patient did take some Pepto-Bismol.  Patient woke up this morning continued to feel dizzy as though the room was spinning.  Patient reports a history of vertigo.  Patient attempted to walk into the kitchen she had an episode of vomiting at this time.  Patient laid down on the floor because the room was spinning so bad.  She advises it has now stopped.  She reports an uneasy feeling.  Patient has been experiencing panic attacks over the past year.  Patient lost her  June 2021.  Patient is tearful at this time while talking about this loss.  She denies any headaches.  Negative for chest pain and shortness of breath.  She denies fevers and chills.  Patient reports a history of hypertension and anxiety.  Patient's not currently on any medication for her depression.  She advises that she is not very compliant with her meds.  Patient is supposed to take blood pressure meds daily she skipped her last 2 days.  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    History provided by:  Patient   used: No    Dizziness  Quality:  Room spinning  Progression:  Resolved  Relieved by:  Lying down  Associated symptoms: diarrhea, nausea, vomiting and weakness    Associated symptoms: no chest pain, no headaches and no shortness of breath        Review of Systems   Constitutional: Negative for chills and fever.   Eyes: Negative for photophobia and visual disturbance.   Respiratory: Negative for shortness of breath.    Cardiovascular: Negative for chest pain.   Gastrointestinal: Positive for diarrhea, nausea and vomiting. Negative for abdominal pain.   Genitourinary: Negative for difficulty urinating and dysuria.   Neurological: Positive for  dizziness, weakness and light-headedness. Negative for headaches.   All other systems reviewed and are negative.      Past Medical History:   Diagnosis Date   • Anxiety    • Essential hypertension 7/2/2021   • GERD without esophagitis 7/16/2019       No Known Allergies    Past Surgical History:   Procedure Laterality Date   • BREAST CYST ASPIRATION Left 2005   • CHOLECYSTECTOMY     • COLONOSCOPY     • HYSTERECTOMY     • OOPHORECTOMY         Family History   Problem Relation Age of Onset   • Breast cancer Mother 80   • Ovarian cancer Neg Hx    • Colon cancer Neg Hx    • Colon polyps Neg Hx    • Esophageal cancer Neg Hx        Social History     Socioeconomic History   • Marital status:    Tobacco Use   • Smoking status: Never   • Smokeless tobacco: Never   Vaping Use   • Vaping Use: Never used   Substance and Sexual Activity   • Alcohol use: Never   • Drug use: Never   • Sexual activity: Defer           Objective   Physical Exam  Vitals and nursing note reviewed.   Constitutional:       Appearance: Normal appearance. She is well-developed. She is ill-appearing. She is not toxic-appearing.      Comments: Tearful at times.   HENT:      Head: Normocephalic and atraumatic.      Mouth/Throat:      Mouth: Mucous membranes are moist.   Eyes:      General: Lids are normal.      Extraocular Movements: Extraocular movements intact.      Conjunctiva/sclera: Conjunctivae normal.      Pupils: Pupils are equal, round, and reactive to light.   Neck:      Trachea: Trachea normal.   Cardiovascular:      Rate and Rhythm: Regular rhythm.      Pulses: Normal pulses.      Heart sounds: Normal heart sounds.   Pulmonary:      Effort: Pulmonary effort is normal. No respiratory distress.      Breath sounds: Normal breath sounds. No decreased breath sounds, wheezing, rhonchi or rales.   Abdominal:      General: Bowel sounds are normal.      Palpations: Abdomen is soft.      Tenderness: There is no abdominal tenderness.    Musculoskeletal:         General: Normal range of motion.      Cervical back: Full passive range of motion without pain and normal range of motion.   Skin:     General: Skin is warm and dry.      Findings: No rash.   Neurological:      Mental Status: She is alert and oriented to person, place, and time.      Cranial Nerves: No cranial nerve deficit.   Psychiatric:         Speech: Speech normal.         Behavior: Behavior normal. Behavior is cooperative.         Procedures           ED Course  ED Course as of 10/20/22 1839   Thu Oct 20, 2022   1817 Results are discussed with patient at this time.  Patient will be discharged home.  Patient to follow-up with primary care physician.  Patient to return to the ED as needed.  Patient agrees with the treatment plan and verbalized understanding. [KG]      ED Course User Index  [KG] Sri Weston, MEI           Recent Results (from the past 24 hour(s))   Comprehensive Metabolic Panel    Collection Time: 10/20/22 12:02 PM    Specimen: Blood   Result Value Ref Range    Glucose 128 (H) 65 - 99 mg/dL    BUN 11 8 - 23 mg/dL    Creatinine 0.73 0.57 - 1.00 mg/dL    Sodium 138 136 - 145 mmol/L    Potassium 3.9 3.5 - 5.2 mmol/L    Chloride 106 98 - 107 mmol/L    CO2 23.0 22.0 - 29.0 mmol/L    Calcium 9.0 8.6 - 10.5 mg/dL    Total Protein 6.6 6.0 - 8.5 g/dL    Albumin 4.00 3.50 - 5.20 g/dL    ALT (SGPT) 18 1 - 33 U/L    AST (SGOT) 15 1 - 32 U/L    Alkaline Phosphatase 117 39 - 117 U/L    Total Bilirubin 0.5 0.0 - 1.2 mg/dL    Globulin 2.6 gm/dL    A/G Ratio 1.5 g/dL    BUN/Creatinine Ratio 15.1 7.0 - 25.0    Anion Gap 9.0 5.0 - 15.0 mmol/L    eGFR 90.8 >60.0 mL/min/1.73   Troponin    Collection Time: 10/20/22 12:02 PM    Specimen: Blood   Result Value Ref Range    Troponin T <0.010 0.000 - 0.030 ng/mL   Magnesium    Collection Time: 10/20/22 12:02 PM    Specimen: Blood   Result Value Ref Range    Magnesium 1.8 1.6 - 2.4 mg/dL   Green Top (Gel)    Collection Time: 10/20/22 12:02  PM   Result Value Ref Range    Extra Tube Hold for add-ons.    Lavender Top    Collection Time: 10/20/22 12:02 PM   Result Value Ref Range    Extra Tube hold for add-on    Gold Top - SST    Collection Time: 10/20/22 12:02 PM   Result Value Ref Range    Extra Tube Hold for add-ons.    Gray Top    Collection Time: 10/20/22 12:02 PM   Result Value Ref Range    Extra Tube Hold for add-ons.    Light Blue Top    Collection Time: 10/20/22 12:02 PM   Result Value Ref Range    Extra Tube Hold for add-ons.    CBC Auto Differential    Collection Time: 10/20/22 12:02 PM    Specimen: Blood   Result Value Ref Range    WBC 8.03 3.40 - 10.80 10*3/mm3    RBC 3.89 3.77 - 5.28 10*6/mm3    Hemoglobin 12.4 12.0 - 15.9 g/dL    Hematocrit 37.0 34.0 - 46.6 %    MCV 95.1 79.0 - 97.0 fL    MCH 31.9 26.6 - 33.0 pg    MCHC 33.5 31.5 - 35.7 g/dL    RDW 12.8 12.3 - 15.4 %    RDW-SD 44.3 37.0 - 54.0 fl    MPV 9.1 6.0 - 12.0 fL    Platelets 281 140 - 450 10*3/mm3    Neutrophil % 84.5 (H) 42.7 - 76.0 %    Lymphocyte % 9.7 (L) 19.6 - 45.3 %    Monocyte % 4.4 (L) 5.0 - 12.0 %    Eosinophil % 0.6 0.3 - 6.2 %    Basophil % 0.4 0.0 - 1.5 %    Immature Grans % 0.4 0.0 - 0.5 %    Neutrophils, Absolute 6.79 1.70 - 7.00 10*3/mm3    Lymphocytes, Absolute 0.78 0.70 - 3.10 10*3/mm3    Monocytes, Absolute 0.35 0.10 - 0.90 10*3/mm3    Eosinophils, Absolute 0.05 0.00 - 0.40 10*3/mm3    Basophils, Absolute 0.03 0.00 - 0.20 10*3/mm3    Immature Grans, Absolute 0.03 0.00 - 0.05 10*3/mm3    nRBC 0.0 0.0 - 0.2 /100 WBC   Urinalysis With Microscopic If Indicated (No Culture) - Urine, Clean Catch    Collection Time: 10/20/22 12:11 PM    Specimen: Urine, Clean Catch   Result Value Ref Range    Color, UA Yellow Yellow, Straw    Appearance, UA Clear Clear    pH, UA 6.5 5.0 - 8.0    Specific Gravity, UA 1.008 1.001 - 1.030    Glucose, UA Negative Negative    Ketones, UA Negative Negative    Bilirubin, UA Negative Negative    Blood, UA Negative Negative    Protein, UA  Negative Negative    Leuk Esterase, UA Negative Negative    Nitrite, UA Negative Negative    Urobilinogen, UA 0.2 E.U./dL 0.2 - 1.0 E.U./dL   ECG 12 Lead    Collection Time: 10/20/22 12:22 PM   Result Value Ref Range    QT Interval 420 ms    QTC Interval 443 ms   COVID-19 and FLU A/B PCR - Swab, Nasopharynx    Collection Time: 10/20/22  1:59 PM    Specimen: Nasopharynx; Swab   Result Value Ref Range    COVID19 Not Detected Not Detected - Ref. Range    Influenza A PCR Not Detected Not Detected    Influenza B PCR Not Detected Not Detected     Note: In addition to lab results from this visit, the labs listed above may include labs taken at another facility or during a different encounter within the last 24 hours. Please correlate lab times with ED admission and discharge times for further clarification of the services performed during this visit.    MRI Brain Without Contrast   Final Result           1.  Mild generalized parenchymal volume loss and changes of chronic   small vessel ischemic disease.  No acute intracranial abnormality.   2.  Chronic ethmoid and maxillary sinusitis.       This report was finalized on 10/20/2022 5:30 PM by Cody Madrigal MD.          CT Head Without Contrast   Final Result   No acute intracranial abnormality.       This report was finalized on 10/20/2022 1:10 PM by Alek Carson.          XR Chest 1 View   Final Result   IMPRESSION :    No acute process.[       This report was finalized on 10/20/2022 12:58 PM by Alek Carson.            Vitals:    10/20/22 1212 10/20/22 1330 10/20/22 1600 10/20/22 1608   BP: 169/83 164/80 126/71    BP Location:       Patient Position:       Pulse:  70 70 64   Resp:       Temp:       TempSrc:       SpO2:  100% 97% 94%   Weight:       Height:         Medications   sodium chloride 0.9 % flush 10 mL (has no administration in time range)   sodium chloride 0.9 % flush 10 mL (has no administration in time range)   cloNIDine (CATAPRES) tablet 0.1 mg (0 mg  Oral Hold 10/20/22 1402)   sodium chloride 0.9 % bolus 1,000 mL (0 mL Intravenous Stopped 10/20/22 1438)   ondansetron (ZOFRAN) injection 4 mg (4 mg Intravenous Given 10/20/22 1359)   LORazepam (ATIVAN) injection 1 mg (1 mg Intravenous Given 10/20/22 1503)     ECG/EMG Results (last 24 hours)     Procedure Component Value Units Date/Time    ECG 12 Lead [[id number]] Collected: 10/20/22 1222     Updated: 10/20/22 1235     QT Interval 420 ms      QTC Interval 443 ms     Narrative:      Test Reason : Weak/Dizzy/AMS protocol  Blood Pressure :   */*   mmHG  Vent. Rate :  67 BPM     Atrial Rate :  67 BPM     P-R Int : 130 ms          QRS Dur :  80 ms      QT Int : 420 ms       P-R-T Axes :  42  11 -19 degrees     QTc Int : 443 ms    Normal sinus rhythm  Nonspecific ST and T wave abnormality  Abnormal ECG  When compared with ECG of 05-APR-2022 15:17,  Nonspecific T wave abnormality, worse in Anterolateral leads  QT has shortened    Referred By: EDMD           Confirmed By:         ECG 12 Lead   Final Result   Test Reason : Weak/Dizzy/AMS protocol   Blood Pressure :   */*   mmHG   Vent. Rate :  67 BPM     Atrial Rate :  67 BPM      P-R Int : 130 ms          QRS Dur :  80 ms       QT Int : 420 ms       P-R-T Axes :  42  11 -19 degrees      QTc Int : 443 ms      Normal sinus rhythm   Nonspecific ST and T wave abnormality   Abnormal ECG   When compared with ECG of 05-APR-2022 15:17,   Nonspecific T wave abnormality, worse in Anterolateral leads   QT has shortened   Confirmed by MD Aileen, Eusebio ([id number]) on 10/20/2022 2:14:37 PM      Referred By: EDMD           Confirmed By: Eusebio Gallagher MD                                          [facility]    Final diagnoses:   Dizziness, nonspecific   Nausea and vomiting, unspecified vomiting type   Diarrhea, unspecified type       ED Disposition  ED Disposition     ED Disposition   Discharge    Condition   Stable    Comment   --             Velia James, APRN  [address]  [facility]  [address] [phone]               Medication List      New Prescriptions    ondansetron ODT 4 MG disintegrating tablet  Commonly known as: ZOFRAN-ODT  Place 1 tablet on the tongue 4 (Four) Times a Day As Needed for Nausea or Vomiting.        Stop    escitalopram 10 MG tablet  Commonly known as: LEXAPRO     Suprep Bowel Prep Kit 17.5-3.13-1.6 GM/177ML solution oral solution  Generic drug: sodium-potassium-magnesium sulfates           Where to Get Your Medications      These medications were sent to [facility]/pharmacy #[id number] - [place], [place] - [address] [place] [address] AT [place] [address] - [phone]  - [phone] [address] [address]    Hours: 24-hours Phone: [phone]   · ondansetron ODT 4 MG disintegrating tablet          Sri Weston, APRN  10/20/22 [id number]

## 2022-11-28 ENCOUNTER — TRANSCRIBE ORDERS (OUTPATIENT)
Dept: ADMINISTRATIVE | Facility: HOSPITAL | Age: 67
End: 2022-11-28

## 2022-11-28 DIAGNOSIS — Z12.31 VISIT FOR SCREENING MAMMOGRAM: Primary | ICD-10-CM

## 2022-12-28 ENCOUNTER — HOSPITAL ENCOUNTER (OUTPATIENT)
Dept: MAMMOGRAPHY | Facility: HOSPITAL | Age: 67
Discharge: HOME | End: 2022-12-28
Admitting: NURSE PRACTITIONER

## 2022-12-28 DIAGNOSIS — Z12.31 VISIT FOR SCREENING MAMMOGRAM: ICD-10-CM

## 2022-12-28 PROCEDURE — 77067 SCR MAMMO BI INCL CAD: CPT

## 2022-12-28 PROCEDURE — 77067 SCR MAMMO BI INCL CAD: CPT | Performed by: RADIOLOGY

## 2022-12-28 PROCEDURE — 77063 BREAST TOMOSYNTHESIS BI: CPT

## 2022-12-28 PROCEDURE — 77063 BREAST TOMOSYNTHESIS BI: CPT | Performed by: RADIOLOGY

## 2023-01-06 ENCOUNTER — TRANSCRIBE ORDERS (OUTPATIENT)
Dept: MAMMOGRAPHY | Facility: HOSPITAL | Age: 68
End: 2023-01-06
Payer: COMMERCIAL

## 2023-01-06 ENCOUNTER — HOSPITAL ENCOUNTER (OUTPATIENT)
Dept: ULTRASOUND IMAGING | Facility: HOSPITAL | Age: 68
Discharge: HOME | End: 2023-01-06
Payer: MEDICARE

## 2023-01-06 ENCOUNTER — HOSPITAL ENCOUNTER (OUTPATIENT)
Dept: MAMMOGRAPHY | Facility: HOSPITAL | Age: 68
Discharge: HOME | End: 2023-01-06
Payer: MEDICARE

## 2023-01-06 DIAGNOSIS — R92.8 ABNORMAL MAMMOGRAM: Primary | ICD-10-CM

## 2023-01-06 DIAGNOSIS — R92.8 ABNORMAL MAMMOGRAM: ICD-10-CM

## 2023-01-06 PROCEDURE — 76642 ULTRASOUND BREAST LIMITED: CPT

## 2023-01-06 PROCEDURE — 77065 DX MAMMO INCL CAD UNI: CPT

## 2023-01-06 PROCEDURE — 76642 ULTRASOUND BREAST LIMITED: CPT | Performed by: RADIOLOGY

## 2023-01-06 PROCEDURE — G0279 TOMOSYNTHESIS, MAMMO: HCPCS

## 2023-01-06 PROCEDURE — G0279 TOMOSYNTHESIS, MAMMO: HCPCS | Performed by: RADIOLOGY

## 2023-01-06 PROCEDURE — 77065 DX MAMMO INCL CAD UNI: CPT | Performed by: RADIOLOGY

## 2023-01-19 ENCOUNTER — HOSPITAL ENCOUNTER (OUTPATIENT)
Dept: MAMMOGRAPHY | Facility: HOSPITAL | Age: 68
Discharge: HOME | End: 2023-01-19
Payer: MEDICARE

## 2023-01-19 DIAGNOSIS — R92.8 ABNORMAL MAMMOGRAM: ICD-10-CM

## 2023-01-19 PROCEDURE — 0 LIDOCAINE 1 % SOLUTION: Performed by: NURSE PRACTITIONER

## 2023-01-19 RX ORDER — LIDOCAINE HYDROCHLORIDE 10 MG/ML
5 INJECTION, SOLUTION INFILTRATION; PERINEURAL ONCE
Status: COMPLETED | OUTPATIENT
Start: 2023-01-19 | End: 2023-01-19

## 2023-01-19 RX ORDER — LIDOCAINE HYDROCHLORIDE AND EPINEPHRINE 20; 5 MG/ML; UG/ML
10 INJECTION, SOLUTION EPIDURAL; INFILTRATION; INTRACAUDAL; PERINEURAL ONCE
Status: COMPLETED | OUTPATIENT
Start: 2023-01-19 | End: 2023-01-19

## 2023-01-19 RX ADMIN — LIDOCAINE HYDROCHLORIDE,EPINEPHRINE BITARTRATE 8 ML: 20; .005 INJECTION, SOLUTION EPIDURAL; INFILTRATION; INTRACAUDAL; PERINEURAL at 12:40

## 2023-01-19 RX ADMIN — Medication 5 ML: at 12:40

## 2023-02-21 ENCOUNTER — TRANSCRIBE ORDERS (OUTPATIENT)
Dept: MAMMOGRAPHY | Facility: HOSPITAL | Age: 68
End: 2023-02-21
Payer: COMMERCIAL

## 2023-02-21 DIAGNOSIS — R92.8 ABNORMAL MAMMOGRAM: Primary | ICD-10-CM

## 2023-02-27 ENCOUNTER — HOSPITAL ENCOUNTER (OUTPATIENT)
Dept: MAMMOGRAPHY | Facility: HOSPITAL | Age: 68
Discharge: HOME | End: 2023-02-27
Payer: MEDICARE

## 2023-02-27 DIAGNOSIS — R92.8 ABNORMAL MAMMOGRAM: ICD-10-CM

## 2023-02-27 PROCEDURE — 77065 DX MAMMO INCL CAD UNI: CPT

## 2023-02-27 PROCEDURE — 77065 DX MAMMO INCL CAD UNI: CPT | Performed by: RADIOLOGY

## 2023-02-27 RX ORDER — LIDOCAINE HYDROCHLORIDE 10 MG/ML
10 INJECTION, SOLUTION INFILTRATION; PERINEURAL ONCE
Status: DISCONTINUED | OUTPATIENT
Start: 2023-02-27 | End: 2023-04-09

## 2023-02-27 RX ORDER — LIDOCAINE HYDROCHLORIDE AND EPINEPHRINE 10; 10 MG/ML; UG/ML
7 INJECTION, SOLUTION INFILTRATION; PERINEURAL ONCE
Status: DISCONTINUED | OUTPATIENT
Start: 2023-02-27 | End: 2023-04-09

## 2023-04-09 ENCOUNTER — HOSPITAL ENCOUNTER (OUTPATIENT)
Facility: HOSPITAL | Age: 68
Setting detail: OBSERVATION
Discharge: HOME | End: 2023-04-11
Attending: EMERGENCY MEDICINE | Admitting: INTERNAL MEDICINE
Payer: MEDICARE

## 2023-04-09 ENCOUNTER — APPOINTMENT (OUTPATIENT)
Dept: GENERAL RADIOLOGY | Facility: HOSPITAL | Age: 68
End: 2023-04-09
Payer: MEDICARE

## 2023-04-09 ENCOUNTER — APPOINTMENT (OUTPATIENT)
Dept: MRI IMAGING | Facility: HOSPITAL | Age: 68
End: 2023-04-09
Payer: MEDICARE

## 2023-04-09 ENCOUNTER — APPOINTMENT (OUTPATIENT)
Dept: CT IMAGING | Facility: HOSPITAL | Age: 68
End: 2023-04-09
Payer: MEDICARE

## 2023-04-09 DIAGNOSIS — R41.89 UNRESPONSIVE EPISODE: Primary | ICD-10-CM

## 2023-04-09 DIAGNOSIS — R40.0 DAYTIME SLEEPINESS: ICD-10-CM

## 2023-04-09 DIAGNOSIS — R06.83 SNORING: ICD-10-CM

## 2023-04-09 DIAGNOSIS — U07.1 COVID-19: ICD-10-CM

## 2023-04-09 DIAGNOSIS — R73.09 ELEVATED GLUCOSE: ICD-10-CM

## 2023-04-09 PROBLEM — R40.4 UNRESPONSIVE EPISODE: Status: ACTIVE | Noted: 2023-04-09

## 2023-04-09 LAB
ALT SERPL W P-5'-P-CCNC: 45 U/L (ref 1–33)
AMPHET+METHAMPHET UR QL: NEGATIVE
AMPHETAMINES UR QL: NEGATIVE
APAP SERPL-MCNC: <5 MCG/ML (ref 0–30)
APTT PPP: 29.9 SECONDS (ref 22–39)
AST SERPL-CCNC: 35 U/L (ref 1–32)
BACTERIA UR QL AUTO: NORMAL /HPF
BARBITURATES UR QL SCN: NEGATIVE
BASOPHILS # BLD AUTO: 0.06 10*3/MM3 (ref 0–0.2)
BASOPHILS NFR BLD AUTO: 0.5 % (ref 0–1.5)
BENZODIAZ UR QL SCN: NEGATIVE
BILIRUB UR QL STRIP: NEGATIVE
BUN BLDA-MCNC: 14 MG/DL (ref 8–26)
BUPRENORPHINE SERPL-MCNC: NEGATIVE NG/ML
CA-I BLDA-SCNC: 1.21 MMOL/L (ref 1.2–1.32)
CANNABINOIDS SERPL QL: NEGATIVE
CHLORIDE BLDA-SCNC: 103 MMOL/L (ref 98–109)
CLARITY UR: CLEAR
CO2 BLDA-SCNC: 24 MMOL/L (ref 24–29)
COCAINE UR QL: NEGATIVE
COLOR UR: YELLOW
CREAT BLDA-MCNC: 0.9 MG/DL (ref 0.6–1.3)
D DIMER PPP FEU-MCNC: 0.68 MCGFEU/ML (ref 0–0.67)
DEPRECATED RDW RBC AUTO: 46.6 FL (ref 37–54)
EGFRCR SERPLBLD CKD-EPI 2021: 70.2 ML/MIN/1.73
EOSINOPHIL # BLD AUTO: 0.06 10*3/MM3 (ref 0–0.4)
EOSINOPHIL NFR BLD AUTO: 0.5 % (ref 0.3–6.2)
ERYTHROCYTE [DISTWIDTH] IN BLOOD BY AUTOMATED COUNT: 13.1 % (ref 12.3–15.4)
ETHANOL BLD-MCNC: <10 MG/DL (ref 0–10)
FLUAV RNA RESP QL NAA+PROBE: NOT DETECTED
FLUBV RNA RESP QL NAA+PROBE: NOT DETECTED
GLUCOSE BLDC GLUCOMTR-MCNC: 158 MG/DL (ref 70–130)
GLUCOSE UR STRIP-MCNC: NEGATIVE MG/DL
HCT VFR BLD AUTO: 41.2 % (ref 34–46.6)
HCT VFR BLDA CALC: 41 % (ref 38–51)
HGB BLD-MCNC: 13.5 G/DL (ref 12–15.9)
HGB BLDA-MCNC: 13.9 G/DL (ref 12–17)
HGB UR QL STRIP.AUTO: ABNORMAL
HOLD SPECIMEN: NORMAL
HYALINE CASTS UR QL AUTO: NORMAL /LPF
IMM GRANULOCYTES # BLD AUTO: 0.04 10*3/MM3 (ref 0–0.05)
IMM GRANULOCYTES NFR BLD AUTO: 0.3 % (ref 0–0.5)
INR PPP: 1.1 (ref 0.8–1.2)
KETONES UR QL STRIP: NEGATIVE
LEUKOCYTE ESTERASE UR QL STRIP.AUTO: NEGATIVE
LYMPHOCYTES # BLD AUTO: 1.19 10*3/MM3 (ref 0.7–3.1)
LYMPHOCYTES NFR BLD AUTO: 10.2 % (ref 19.6–45.3)
MAGNESIUM SERPL-MCNC: 2.3 MG/DL (ref 1.6–2.4)
MCH RBC QN AUTO: 31.5 PG (ref 26.6–33)
MCHC RBC AUTO-ENTMCNC: 32.8 G/DL (ref 31.5–35.7)
MCV RBC AUTO: 96.3 FL (ref 79–97)
METHADONE UR QL SCN: NEGATIVE
MONOCYTES # BLD AUTO: 0.71 10*3/MM3 (ref 0.1–0.9)
MONOCYTES NFR BLD AUTO: 6.1 % (ref 5–12)
NEUTROPHILS NFR BLD AUTO: 82.4 % (ref 42.7–76)
NEUTROPHILS NFR BLD AUTO: 9.59 10*3/MM3 (ref 1.7–7)
NITRITE UR QL STRIP: NEGATIVE
NRBC BLD AUTO-RTO: 0 /100 WBC (ref 0–0.2)
OPIATES UR QL: NEGATIVE
OXYCODONE UR QL SCN: NEGATIVE
PCP UR QL SCN: NEGATIVE
PH UR STRIP.AUTO: 7.5 [PH] (ref 5–8)
PLATELET # BLD AUTO: 284 10*3/MM3 (ref 140–450)
PMV BLD AUTO: 9.2 FL (ref 6–12)
POTASSIUM BLDA-SCNC: 4.3 MMOL/L (ref 3.5–4.9)
PROCALCITONIN SERPL-MCNC: 0.12 NG/ML (ref 0–0.25)
PROLACTIN SERPL-MCNC: 15.7 NG/ML (ref 4.79–23.3)
PROPOXYPH UR QL: NEGATIVE
PROT UR QL STRIP: NEGATIVE
PROTHROMBIN TIME: 13.3 SECONDS (ref 12.8–15.2)
QT INTERVAL: 414 MS
QTC INTERVAL: 471 MS
RBC # BLD AUTO: 4.28 10*6/MM3 (ref 3.77–5.28)
RBC # UR STRIP: NORMAL /HPF
REF LAB TEST METHOD: NORMAL
SALICYLATES SERPL-MCNC: <0.3 MG/DL
SARS-COV-2 RNA RESP QL NAA+PROBE: DETECTED
SODIUM BLD-SCNC: 138 MMOL/L (ref 138–146)
SP GR UR STRIP: 1.05 (ref 1–1.03)
SQUAMOUS #/AREA URNS HPF: NORMAL /HPF
TRICYCLICS UR QL SCN: NEGATIVE
TROPONIN T SERPL HS-MCNC: 8 NG/L
TSH SERPL DL<=0.05 MIU/L-ACNC: 3.56 UIU/ML (ref 0.27–4.2)
UROBILINOGEN UR QL STRIP: ABNORMAL
WBC # UR STRIP: NORMAL /HPF
WBC NRBC COR # BLD: 11.65 10*3/MM3 (ref 3.4–10.8)
WHOLE BLOOD HOLD COAG: NORMAL
WHOLE BLOOD HOLD SPECIMEN: NORMAL

## 2023-04-09 PROCEDURE — 80047 BASIC METABLC PNL IONIZED CA: CPT

## 2023-04-09 PROCEDURE — 36415 COLL VENOUS BLD VENIPUNCTURE: CPT

## 2023-04-09 PROCEDURE — 96375 TX/PRO/DX INJ NEW DRUG ADDON: CPT

## 2023-04-09 PROCEDURE — 84484 ASSAY OF TROPONIN QUANT: CPT | Performed by: EMERGENCY MEDICINE

## 2023-04-09 PROCEDURE — 25010000002 LORAZEPAM PER 2 MG: Performed by: INTERNAL MEDICINE

## 2023-04-09 PROCEDURE — 25010000002 ENOXAPARIN PER 10 MG: Performed by: INTERNAL MEDICINE

## 2023-04-09 PROCEDURE — 70496 CT ANGIOGRAPHY HEAD: CPT

## 2023-04-09 PROCEDURE — 93005 ELECTROCARDIOGRAM TRACING: CPT | Performed by: EMERGENCY MEDICINE

## 2023-04-09 PROCEDURE — G0378 HOSPITAL OBSERVATION PER HR: HCPCS

## 2023-04-09 PROCEDURE — 80306 DRUG TEST PRSMV INSTRMNT: CPT | Performed by: EMERGENCY MEDICINE

## 2023-04-09 PROCEDURE — 85379 FIBRIN DEGRADATION QUANT: CPT | Performed by: EMERGENCY MEDICINE

## 2023-04-09 PROCEDURE — 70551 MRI BRAIN STEM W/O DYE: CPT

## 2023-04-09 PROCEDURE — 25510000001 IOPAMIDOL PER 1 ML: Performed by: EMERGENCY MEDICINE

## 2023-04-09 PROCEDURE — 96374 THER/PROPH/DIAG INJ IV PUSH: CPT

## 2023-04-09 PROCEDURE — 99285 EMERGENCY DEPT VISIT HI MDM: CPT

## 2023-04-09 PROCEDURE — 84460 ALANINE AMINO (ALT) (SGPT): CPT | Performed by: EMERGENCY MEDICINE

## 2023-04-09 PROCEDURE — P9612 CATHETERIZE FOR URINE SPEC: HCPCS

## 2023-04-09 PROCEDURE — 85025 COMPLETE CBC W/AUTO DIFF WBC: CPT | Performed by: EMERGENCY MEDICINE

## 2023-04-09 PROCEDURE — 84146 ASSAY OF PROLACTIN: CPT | Performed by: EMERGENCY MEDICINE

## 2023-04-09 PROCEDURE — 84443 ASSAY THYROID STIM HORMONE: CPT | Performed by: EMERGENCY MEDICINE

## 2023-04-09 PROCEDURE — 85610 PROTHROMBIN TIME: CPT

## 2023-04-09 PROCEDURE — 83735 ASSAY OF MAGNESIUM: CPT | Performed by: EMERGENCY MEDICINE

## 2023-04-09 PROCEDURE — 84145 PROCALCITONIN (PCT): CPT | Performed by: EMERGENCY MEDICINE

## 2023-04-09 PROCEDURE — 85730 THROMBOPLASTIN TIME PARTIAL: CPT | Performed by: EMERGENCY MEDICINE

## 2023-04-09 PROCEDURE — 70450 CT HEAD/BRAIN W/O DYE: CPT

## 2023-04-09 PROCEDURE — 80143 DRUG ASSAY ACETAMINOPHEN: CPT | Performed by: EMERGENCY MEDICINE

## 2023-04-09 PROCEDURE — 87636 SARSCOV2 & INF A&B AMP PRB: CPT | Performed by: EMERGENCY MEDICINE

## 2023-04-09 PROCEDURE — 71260 CT THORAX DX C+: CPT

## 2023-04-09 PROCEDURE — 70498 CT ANGIOGRAPHY NECK: CPT

## 2023-04-09 PROCEDURE — 85014 HEMATOCRIT: CPT

## 2023-04-09 PROCEDURE — 80179 DRUG ASSAY SALICYLATE: CPT | Performed by: EMERGENCY MEDICINE

## 2023-04-09 PROCEDURE — 81001 URINALYSIS AUTO W/SCOPE: CPT | Performed by: EMERGENCY MEDICINE

## 2023-04-09 PROCEDURE — 25010000002 THIAMINE PER 100 MG: Performed by: EMERGENCY MEDICINE

## 2023-04-09 PROCEDURE — C9803 HOPD COVID-19 SPEC COLLECT: HCPCS

## 2023-04-09 PROCEDURE — 99223 1ST HOSP IP/OBS HIGH 75: CPT | Performed by: NURSE PRACTITIONER

## 2023-04-09 PROCEDURE — 84450 TRANSFERASE (AST) (SGOT): CPT | Performed by: EMERGENCY MEDICINE

## 2023-04-09 PROCEDURE — 0042T HC CT CEREBRAL PERFUSION W/WO CONTRAST: CPT

## 2023-04-09 PROCEDURE — 25010000002 ONDANSETRON PER 1 MG: Performed by: EMERGENCY MEDICINE

## 2023-04-09 PROCEDURE — 96372 THER/PROPH/DIAG INJ SC/IM: CPT

## 2023-04-09 PROCEDURE — 82077 ASSAY SPEC XCP UR&BREATH IA: CPT | Performed by: EMERGENCY MEDICINE

## 2023-04-09 RX ORDER — DEXTROSE AND SODIUM CHLORIDE 5; .45 G/100ML; G/100ML
100 INJECTION, SOLUTION INTRAVENOUS CONTINUOUS
Status: DISCONTINUED | OUTPATIENT
Start: 2023-04-09 | End: 2023-04-11 | Stop reason: HOSPADM

## 2023-04-09 RX ORDER — MECLIZINE HCL 12.5 MG/1
12.5 TABLET ORAL 3 TIMES DAILY PRN
COMMUNITY

## 2023-04-09 RX ORDER — ONDANSETRON 2 MG/ML
4 INJECTION INTRAMUSCULAR; INTRAVENOUS ONCE
Status: COMPLETED | OUTPATIENT
Start: 2023-04-09 | End: 2023-04-09

## 2023-04-09 RX ORDER — SODIUM CHLORIDE 0.9 % (FLUSH) 0.9 %
10 SYRINGE (ML) INJECTION EVERY 12 HOURS SCHEDULED
Status: DISCONTINUED | OUTPATIENT
Start: 2023-04-09 | End: 2023-04-11 | Stop reason: HOSPADM

## 2023-04-09 RX ORDER — LORAZEPAM 2 MG/ML
1 INJECTION INTRAMUSCULAR ONCE
Status: COMPLETED | OUTPATIENT
Start: 2023-04-09 | End: 2023-04-09

## 2023-04-09 RX ORDER — THIAMINE HYDROCHLORIDE 100 MG/ML
100 INJECTION, SOLUTION INTRAMUSCULAR; INTRAVENOUS ONCE
Status: COMPLETED | OUTPATIENT
Start: 2023-04-09 | End: 2023-04-09

## 2023-04-09 RX ORDER — ENOXAPARIN SODIUM 100 MG/ML
40 INJECTION SUBCUTANEOUS DAILY
Status: DISCONTINUED | OUTPATIENT
Start: 2023-04-09 | End: 2023-04-11 | Stop reason: HOSPADM

## 2023-04-09 RX ORDER — SODIUM CHLORIDE 0.9 % (FLUSH) 0.9 %
10 SYRINGE (ML) INJECTION AS NEEDED
Status: DISCONTINUED | OUTPATIENT
Start: 2023-04-09 | End: 2023-04-11 | Stop reason: HOSPADM

## 2023-04-09 RX ORDER — ACETAMINOPHEN 325 MG/1
650 TABLET ORAL EVERY 4 HOURS PRN
Status: DISCONTINUED | OUTPATIENT
Start: 2023-04-09 | End: 2023-04-11 | Stop reason: HOSPADM

## 2023-04-09 RX ORDER — SODIUM CHLORIDE 9 MG/ML
40 INJECTION, SOLUTION INTRAVENOUS AS NEEDED
Status: DISCONTINUED | OUTPATIENT
Start: 2023-04-09 | End: 2023-04-11 | Stop reason: HOSPADM

## 2023-04-09 RX ADMIN — IOPAMIDOL 115 ML: 755 INJECTION, SOLUTION INTRAVENOUS at 12:02

## 2023-04-09 RX ADMIN — LORAZEPAM 1 MG: 2 INJECTION INTRAMUSCULAR; INTRAVENOUS at 15:16

## 2023-04-09 RX ADMIN — DEXTROSE AND SODIUM CHLORIDE 100 ML/HR: 5; 450 INJECTION, SOLUTION INTRAVENOUS at 17:48

## 2023-04-09 RX ADMIN — ONDANSETRON 4 MG: 2 INJECTION INTRAMUSCULAR; INTRAVENOUS at 12:22

## 2023-04-09 RX ADMIN — Medication 10 ML: at 20:12

## 2023-04-09 RX ADMIN — ENOXAPARIN SODIUM 40 MG: 40 INJECTION SUBCUTANEOUS at 17:48

## 2023-04-09 RX ADMIN — THIAMINE HYDROCHLORIDE 100 MG: 100 INJECTION, SOLUTION INTRAMUSCULAR; INTRAVENOUS at 12:22

## 2023-04-09 NOTE — Clinical Note
Level of Care: Med/Surg [1]   Diagnosis: Unresponsive episode [[id number]]   Admitting Physician: AARON BIRCH [[id number]]   Attending Physician: AARON BIRCH [[id number]]

## 2023-04-09 NOTE — H&P
"    [facility]  HISTORY AND PHYSICAL    Patient Name: Clara Gonzalez  : 1955  MRN: [id number]  Primary Care Physician: Velia James APRN  Date of admission: 2023      Subjective   Subjective     Chief Complaint:   LOC    HPI:  Clara Gonzalez is a 68 y.o. female  With history HTN, anxiety/depression.  She has frequent diarrhea.  She also gets brief lightheadedness when standing at her baseline.  Today she woke up feeling fine, did not eat or drink, went to [employer], and during the service started feeling lightheaded - \"I put my head down and thought I would get better, but things kept getting darker and darker\" - and then awoke on the floor with people standing over her.   No tonic-clonic movements wer seen and she had no blowel/bladder incontinence.  She has vomited twice.  In the ED she slowly improved.  She has tested positive for COVID but has no resp symptoms.  Her mouth is a little dry.     Review of Systems   Frequent diarrhea at baseline  BP sometimes 'spikes up'  Had suspected vagal episode in ED in 2022 during that admission     Personal History     Past Medical History:   Diagnosis Date   • Anxiety    • Essential hypertension 2021   • GERD without esophagitis 2019             Past Surgical History:   Procedure Laterality Date   • BREAST CYST ASPIRATION Left    • CHOLECYSTECTOMY     • COLONOSCOPY     • HYSTERECTOMY     • OOPHORECTOMY         Family History: family history includes Breast cancer (age of onset: 80) in her mother.     Social History:  reports that she has never smoked. She has never used smokeless tobacco. She reports that she does not drink alcohol and does not use drugs.  Social History     Social History Narrative   • Not on file       Medications:  Available home medication information reviewed.  (Not in a hospital admission)      No Known Allergies    Objective   Objective     Vital Signs:   Temp:  [98.2 °F (36.8 " °C)] 98.2 °F (36.8 °C)  Heart Rate:  [73] 73  Resp:  [18] 18  BP: (145)/(79) 145/79       Physical Exam   Gen:  WD/WN woman in bed, looks tired but NAD on RA   Neuro: alert and oriented, clear speech, follows commands, grossly nonfocal,  equal and 4+ bilat  HEENT:  NC/AT  Neck:  Supple, no LAD  Heart RRR no murmur, rub, or gallop  Lungs entirely clear, nonlabored on RA   Abd:  Soft, nontender, no rebound or guarding, pos BS  Extrem:  No c/c/e\    Result Review:  I have personally reviewed the results from the time of this admission to 4/9/2023 13:30 EDT and agree with these findings:  [x]  Laboratory list / accordion  [x]  Microbiology  [x]  Radiology  []  EKG/Telemetry   []  Cardiology/Vascular   []  Pathology  [x]  Old records  []  Other:  Most notable findings include: Brain MRI nl. COVID +      LAB RESULTS:      Lab 04/09/23  1159 04/09/23  1157   HEMOGLOBIN, POC 13.9  --    HEMATOCRIT POC 41  --    PROTIME  --  13.3   INR  --  1.1         Lab 04/09/23  1159   CREATININE 0.90   EGFR 70.2                         UA    Urinalysis 10/20/22 4/9/23 4/9/23     1241 1241   Squamous Epithelial Cells, UA   0-2   Specific Gravity, UA 1.008 1.055 (A)    Ketones, UA Negative Negative    Blood, UA Negative Trace (A)    Leukocytes, UA Negative Negative    Nitrite, UA Negative Negative    RBC, UA   0-2   WBC, UA   0-2   Bacteria, UA   None Seen   (A) Abnormal value       Comments are available for some flowsheets but are not being displayed.             Microbiology Results (last 10 days)     Procedure Component Value - Date/Time    COVID-19 and FLU A/B PCR - Swab, Nasopharynx [[id number]]  (Abnormal) Collected: 04/09/23 1227    Lab Status: Final result Specimen: Swab from Nasopharynx Updated: 04/09/23 1312     COVID19 Detected     Influenza A PCR Not Detected     Influenza B PCR Not Detected    Narrative:      Fact sheet for providers: https://www.fda.gov/media/530424/download    Fact sheet for patients:  https://www.fda.gov/media/017774/download    Test performed by PCR.  Influenza A and Influenza B negative results should be considered presumptive in samples that have a positive SARS-CoV-2 result.    Competitive inhibition studies showed that SARS-CoV-2 virus, when present at concentrations above 3.6E+04 copies/mL, can inhibit the detection and amplification of influenza A and influenza B virus RNA if present at or below 1.8E+02 copies/mL or 4.9E+02 copies/mL, respectively, and may lead to false negative influenza virus results. If co-infection with influenza A or influenza B virus is suspected in samples with a positive SARS-CoV-2 result, the sample should be re-tested with another FDA cleared, approved, or authorized influenza test, if influenza virus detection would change clinical management.          CT Angiogram Neck    Result Date: 4/9/2023  CT ANGIOGRAM NECK, CT ANGIOGRAM HEAD W AI ANALYSIS OF LVO Date of Exam: 4/9/2023 11:54 AM EDT Indication: Neuro deficit, acute stroke suspected. Comparison: Same day CT head Technique: CTA of the head and neck was performed before and after the uneventful intravenous administration of 115 mL Isovue-370. Reconstructed coronal and sagittal images were also obtained. In addition, a 3-D volume rendered image was created for interpretation. Automated exposure control and iterative reconstruction methods were used. Findings: Mild motion degradation. Anterior circulation: Common and internal carotid arteries appear patent. Anterior cerebral arteries appear patent. Anterior communicating artery is seen. Middle cerebral arteries appear patent through the M2 division. No evidence of aneurysm. Posterior circulation: Vertebral arteries appear patent though the proximal left vertebral artery is not well visualized due to adjacent contrast within the veins. Diminutive distal left V4. Posterior-inferior and superior cerebellar arteries are seen. Anterior-inferior cerebellar arteries  are not well seen which may be technical. Fetal origin in the bilateral posterior cerebral arteries. Mild narrowing of the left posterior cerebral artery. The posterior communicating arteries appear patent. No evidence of aneurysm. Prominence of the basilar tip is likely due to branch vessel infundibula. Venous structures: Dural venous sinuses and central cerebral veins appear grossly patent. Internal jugular veins appear grossly patent. Bones: Disc degenerative disease most advanced at C6-7. No evidence of acute fracture. No high-grade bony canal or foraminal stenosis. Soft tissues: No abnormal brain parenchymal enhancement. Aerodigestive tract appears grossly patent. Thyroid is within normal limits. Visualized thoracic vessels are grossly patent. Lung apices are grossly clear. Bilateral dependent atelectasis.     Impression: Impression: No evidence of flow-limiting stenosis or occlusion of the major vessels of the head and neck. The proximal vertebral arteries not well visualized due to streak artifact from adjacent contrast bolus in the veins. Electronically Signed: Daljit Stern  4/9/2023 12:39 PM EDT  Workstation ID: [id number]    CT Chest With Contrast Diagnostic    Result Date: 4/9/2023  CT CHEST W CONTRAST DIAGNOSTIC Date of Exam: 4/9/2023 11:54 AM EDT Indication: Chest pain, nonspecific. Comparison: 4/5/2022 Technique: Axial CT images were obtained of the chest after the uneventful intravenous administration of intravenous breast.  Reconstructed coronal and sagittal images were also obtained. Automated exposure control and iterative construction methods were  used. Findings: Pulmonary Arteries: Adequately opacified. No emboli demonstrated Susan/mediastinum: Thoracic aorta normal in caliber with no intimal flap. No definite coronary calcification. No pericardial effusion. No adenopathy. Small hiatal hernia Lungs/pleura: Lungs clear. No pleural effusion or pneumothorax Upper Abdomen: Unremarkable Bones/soft  tissues: No acute bony abnormality     Impression: No evidence of pulmonary embolism. No acute cardiopulmonary process demonstrated Electronically Signed: Brett Fernández  4/9/2023 12:37 PM EDT  Workstation ID: [id number]    CT Head Without Contrast Stroke Protocol    Result Date: 4/9/2023  CT HEAD WO CONTRAST STROKE PROTOCOL Date of Exam: 4/9/2023 11:51 AM EDT Indication: Neuro deficit, acute, stroke suspected Neuro deficit, acute stroke suspected. Comparison: 10/20/2022 Technique: Axial CT images were obtained of the head without contrast administration.  Reconstructed coronal and sagittal images were also obtained. Automated exposure control and iterative construction methods were used. Scan Time:  11:50 AM Results discussed with the stroke team at 12:07 PM. Findings: Parenchyma:No acute intraparenchymal hemorrhage. Mild focal asymmetric hypodensity within the right cerebellum. This appears new from prior exam. Normal parenchymal volume. No substantial white matter disease. No midline shift or herniation. Ventricles and extra axial spaces:Normal caliber of ventricles and sulci. No extra axial fluid collection seen. Other:Lens replacements. Paranasal sinuses are clear. Mastoid air cells are clear. Calvarium is intact. No substantial intracranial atherosclerotic calcification.     Impression: Impression: Mild focal asymmetric hypodensity within the right cerebellum which appears new from prior examination. While this may be due to volume averaging or streak artifact from bone, infarct cannot be fully excluded. Consider MRI to further evaluate. Electronically Signed: Daljit Stern  4/9/2023 12:15 PM EDT  Workstation ID: [id number]    CT Angiogram Head w AI Analysis of LVO    Result Date: 4/9/2023  CT ANGIOGRAM NECK, CT ANGIOGRAM HEAD W AI ANALYSIS OF LVO Date of Exam: 4/9/2023 11:54 AM EDT Indication: Neuro deficit, acute stroke suspected. Comparison: Same day CT head Technique: CTA of the head and neck was performed  before and after the uneventful intravenous administration of 115 mL Isovue-370. Reconstructed coronal and sagittal images were also obtained. In addition, a 3-D volume rendered image was created for interpretation. Automated exposure control and iterative reconstruction methods were used. Findings: Mild motion degradation. Anterior circulation: Common and internal carotid arteries appear patent. Anterior cerebral arteries appear patent. Anterior communicating artery is seen. Middle cerebral arteries appear patent through the M2 division. No evidence of aneurysm. Posterior circulation: Vertebral arteries appear patent though the proximal left vertebral artery is not well visualized due to adjacent contrast within the veins. Diminutive distal left V4. Posterior-inferior and superior cerebellar arteries are seen. Anterior-inferior cerebellar arteries are not well seen which may be technical. Fetal origin in the bilateral posterior cerebral arteries. Mild narrowing of the left posterior cerebral artery. The posterior communicating arteries appear patent. No evidence of aneurysm. Prominence of the basilar tip is likely due to branch vessel infundibula. Venous structures: Dural venous sinuses and central cerebral veins appear grossly patent. Internal jugular veins appear grossly patent. Bones: Disc degenerative disease most advanced at C6-7. No evidence of acute fracture. No high-grade bony canal or foraminal stenosis. Soft tissues: No abnormal brain parenchymal enhancement. Aerodigestive tract appears grossly patent. Thyroid is within normal limits. Visualized thoracic vessels are grossly patent. Lung apices are grossly clear. Bilateral dependent atelectasis.     Impression: Impression: No evidence of flow-limiting stenosis or occlusion of the major vessels of the head and neck. The proximal vertebral arteries not well visualized due to streak artifact from adjacent contrast bolus in the veins. Electronically Signed:  Daljit Stern  4/9/2023 12:39 PM EDT  Workstation ID: [id number]    CT CEREBRAL PERFUSION WITH & WITHOUT CONTRAST    Result Date: 4/9/2023  CT CEREBRAL PERFUSION W WO CONTRAST Date of Exam: 4/9/2023 11:54 AM EDT Indication: Neuro deficit, acute stroke suspected.  Comparison: None available. Technique: Axial CT images of the brain were obtained prior to and after the administration of 115 mL Isovue-370. Core blood volume, core blood flow, mean transit time, and Tmax images were obtained utilizing the Rapid software protocol. A limited CT angiogram of the head was also performed to measure the blood vessel density. The radiation dose reduction device was turned on for each scan per the ALARA (As Low as Reasonably Achievable) protocol. Findings: Symmetric CBV and CBF. No substantially elevated Tmax. There is a slightly elevated T masses in the right distal MCA distribution with a volume of 71 mL.     Impression: Impression: No CT perfusion evidence of infarct or ischemia. There is slightly elevated Tmax in the right distal MCA distribution which may reflect hypoperfusion. Findings discussed with stroke team by Dr. Daljit Stern via telephone on 4/9/2023 12:23 PM EDT. Electronically Signed: Daljit Stern  4/9/2023 12:25 PM EDT  Workstation ID: [id number]      Results for orders placed during the hospital encounter of 07/02/21    Adult Transthoracic Echo Complete W/ Cont if Necessary Per Protocol    Interpretation Summary  · Left ventricular ejection fraction appears to be 56 - 60%. Left ventricular systolic function is normal.  · Mild to moderate aortic valve regurgitation is present.      Assessment & Plan   Assessment & Plan     There are no active hospital problems to display for this patient.    68 yr old woman with LOC at [employer] today       Syncope, likely orthostatic or vagal   - COVID infection and freq diarrhea likely contribute.   - witnessed event, no evidence of seizure but prolonged time to recovery.  -  MRI brain reassuring   - TWI inferior; hs trop 8, CT chest w contrast no infiltrates no PE    - plan:  Hydrate, check orthostatics.      COVID+  - on RA, no symptoms   - CT chest wnl   - Supportive care.        DVT prophylaxis:        CODE STATUS:  Full   There are no questions and answers to display.       Expected Discharge tomorrw        Crys Simeon MD  04/09/23

## 2023-04-09 NOTE — CONSULTS
Stroke Consult Note    Patient Name: Clara Gonzalez   MRN: [id number]  Age: 68 y.o.  Sex: female  : 1955    Primary Care Physician: Velia James APRN  Referring Physician:  Dr. Rivero    TIME STROKE TEAM CALLED: 1145 EST     TIME PATIENT SEEN: 1150 EST    Handedness: Right  Race:     Chief Complaint/Reason for Consultation: unresponsvieness    Subjective .  HPI: Clara Gonzalez is a 68 year old, , right handed female with a known diagnosis of HTN, anxiety and depression who presented with unresponsiveness.  Patient had been at [facility] this morning with family.  Toward the end of the service she went out to the [facility] without saying anything to the family.  Someone reportedly saw her sitting at a table with her head resting on her arms.  After about 5 mins.there was a call for help as she had passed out and apparently had an episode of vomiting.  EMS reported that she was completely unresponsive when they arrived with no response to painful stimuli.      Upon arrival to [facility] patient is drowsy but arousable, she is oriented x 3.  Does not recall much of the events at [facility] and reports feeling fatigued.  Her speech is clear, no aphasia.  Face is symmetrical, no drift in the bilateral upper extremities.  Initially a slight drift in the right lower extremity, but this was inconsistent.  Sensation intact bilaterally.  Patient and family deny any previous similar event.  She has had lightheaded before but never passed out.  Denies chest pain, denies palpitations    Last Known Normal Date/Time: 1045  EST     Review of Systems   Constitutional: Positive for fatigue. Negative for fever.   Cardiovascular: Negative for chest pain and palpitations.   Gastrointestinal: Positive for vomiting.   Neurological: Positive for weakness and light-headedness. Negative for speech difficulty, numbness and headaches.   Psychiatric/Behavioral: Negative.       Past Medical History:   Diagnosis Date   • Anxiety     • Essential hypertension 7/2/2021   • GERD without esophagitis 7/16/2019     Past Surgical History:   Procedure Laterality Date   • BREAST CYST ASPIRATION Left 2005   • CHOLECYSTECTOMY     • COLONOSCOPY     • HYSTERECTOMY     • OOPHORECTOMY       Family History   Problem Relation Age of Onset   • Breast cancer Mother 80   • Ovarian cancer Neg Hx    • Colon cancer Neg Hx    • Colon polyps Neg Hx    • Esophageal cancer Neg Hx      Social History     Socioeconomic History   • Marital status:    Tobacco Use   • Smoking status: Never   • Smokeless tobacco: Never   Vaping Use   • Vaping Use: Never used   Substance and Sexual Activity   • Alcohol use: Never   • Drug use: Never   • Sexual activity: Defer     No Known Allergies  Prior to Admission medications    Medication Sig Start Date End Date Taking? Authorizing Provider   hydrOXYzine (ATARAX) 10 MG tablet Take 10 mg by mouth 3 (Three) Times a Day As Needed for Itching.    ProviderYanira MD   metoprolol succinate XL (TOPROL-XL) 50 MG 24 hr tablet Take 50 mg by mouth Daily.    ProviderYanira MD   ondansetron ODT (ZOFRAN-ODT) 4 MG disintegrating tablet Place 1 tablet on the tongue 4 (Four) Times a Day As Needed for Nausea or Vomiting. 10/20/22   Sri Weston, APRN             Objective     Temp:  [98.2 °F (36.8 °C)] 98.2 °F (36.8 °C)  Heart Rate:  [73] 73  Resp:  [18] 18  BP: (145)/(79) 145/79  Neurological Exam  Mental Status  Alert and drowsy. Oriented to person, place and time. Oriented to person, place, and time. Speech is normal. Language is fluent with no aphasia.    Cranial Nerves  CN II: Visual fields full to confrontation.  CN III, IV, VI: Extraocular movements intact bilaterally. Normal lids and orbits bilaterally. Pupils equal round and reactive to light bilaterally.  CN V: Facial sensation is normal.  CN VII: Full and symmetric facial movement.  CN XI: Shoulder shrug strength is normal.  CN XII: Tongue midline without atrophy or  fasciculations.    Motor  Normal muscle bulk throughout. No fasciculations present. Normal muscle tone. No abnormal involuntary movements. Strength is 5/5 in all four extremities except as noted.  General weakness, 4/5 throughout.    Sensory  Light touch is normal in upper and lower extremities.     Reflexes                                            Right                      Left  Plantar                           Downgoing                Downgoing    Coordination    Finger-to-nose, rapid alternating movements and heel-to-shin normal bilaterally without dysmetria.    Gait    Not tested.      Physical Exam  Vitals reviewed.   Constitutional:       Appearance: Normal appearance.   HENT:      Head: Normocephalic and atraumatic.   Eyes:      General: Lids are normal.      Extraocular Movements: Extraocular movements intact.      Pupils: Pupils are equal, round, and reactive to light.   Cardiovascular:      Rate and Rhythm: Normal rate.   Pulmonary:      Effort: Pulmonary effort is normal. No respiratory distress.   Musculoskeletal:         General: No swelling. Normal range of motion.      Cervical back: Normal range of motion and neck supple.   Skin:     General: Skin is warm and dry.   Neurological:      General: No focal deficit present.      Mental Status: She is alert and oriented to person, place, and time.      Cranial Nerves: No cranial nerve deficit.      Sensory: No sensory deficit.      Motor: Weakness present.      Coordination: Coordination is intact.   Psychiatric:         Mood and Affect: Affect is flat.         Speech: Speech normal.         Behavior: Behavior normal.         Acute Stroke Data    IV Thrombolytic (TPA/Tenecteplase) Inclusion / Exclusion Criteria    Time: 12:00 EDT  Person Administering Scale: MIE John    Inclusion Criteria  [x]   18 years of age or greater   []   Onset of symptoms < 4.5 hours before beginning treatment (stroke onset = time patient was last seen well or  without symptoms).   []   Diagnosis of acute ischemic stroke causing measurable disabling deficit (Complete Hemianopia, Any Aphasia, Visual or Sensory Extinction, Any weakness limiting sustained effort against gravity)   []   Any remaining deficit considered potentially disabling in view of patient and practitioner   Exclusion criteria (Do not proceed with Alteplase if any are checked under exclusion criteria)  []   Onset unknown or GREATER than 4.5 hours   []   ICH on CT/MRI   []   CT demonstrates hypodensity representing acute or subacute infarct   []   Significant head trauma or prior stroke in the previous 3 months   []   Symptoms suggestive of subarachnoid hemorrhage   []   History of un-ruptured intracranial aneurysm GREATER than 10 mm   []   Recent intracranial or intraspinal surgery within the last 3 months   []   Arterial puncture at a non-compressible site in the previous 7 days   []   Active internal bleeding   []   Acute bleeding tendency   []   Platelet count LESS than 100,000 for known hematological diseases such as leukemia, thrombocytopenia or chronic cirrhosis   []   Current use of anticoagulant with INR GREATER than 1.7 or PT GREATER than 15 seconds, aPTT GREATER than 40 seconds   []   Heparin received within 48 hours, resulting in abnormally elevated aPTT GREATER than upper limit of normal   []   Current use of direct thrombin inhibitors or direct factor Xa inhibitors in the past 48 hours   []   Elevated blood pressure refractory to treatment (systolic GREATER than 185 mm/Hg or diastolic  GREATER than 110 mm/Hg   []   Suspected infective endocarditis and aortic arch dissection   []   Current use of therapeutic treatment dose of low-molecular-weight heparin (LMWH) within the previous 24 hours   []   Structural GI malignancy or bleed   Relative exclusion for all patients  [x]   Only minor nondisabling symptoms   []   Pregnancy   []   Seizure at onset with postictal residual neurological impairments    []   Major surgery or previous trauma within past 14 days   []   History of previous spontaneous ICH, intracranial neoplasm, or AV malformation   []   Postpartum (within previous 14 days)   []   Recent GI or urinary tract hemorrhage (within previous 21 days)   []   Recent acute MI (within previous 3 months)   []   History of unruptured intracranial aneurysm LESS than 10 mm   []   History of ruptured intracranial aneurysm   []   Blood glucose LESS than 50 mg/dL (2.7 mmol/L)   []   Dural puncture within the last 7 days   []   Known GREATER than 10 cerebral microbleeds   Additional exclusions for patients with symptoms onset between 3 and 4.5 hours.  []   Age > 80.   []   On any anticoagulants regardless of INR  >>> Warfarin (Coumadin), Heparin, Enoxaparin (Lovenox), fondaparinux (Arixtra), bivalirudin (Angiomax), Argatroban, dabigatran (Pradaxa), rivaroxaban (Xarelto), or apixaban (Eliquis)   []   Severe stroke (NIHSS > 25).   []   History of BOTH diabetes and previous ischemic stroke.   []   The risks and benefits have been discussed with the patient or family related to the administration of IV alteplase for stroke symptoms.   []   I have discussed and reviewed the patient's case and imaging with the attending prior to IV Thrombolytic (TPA/Tenecteplase).    Time Thrombolytic administered   Rapidly improving symptoms    Hospital Meds:  Scheduled- lidocaine, 10 mL, Injection, Once  lidocaine 1% - EPINEPHrine [id number], 7 mL, Injection, Once      Infusions-     PRNs- •  sodium chloride    Functional Status Prior to Current Stroke/Sayre Score: 0    NIH Stroke Scale  Time: 13:07 EDT  Person Administering Scale: MEI John    1a  Level of consciousness: 1=not alert but arousable by minor stimulation to obey, answer or respond   1b. LOC questions:  0=Answers both questions correctly   1c. LOC commands: 0=Performs both tasks correctly   2.  Best Gaze: 0=normal   3.  Visual: 0=No visual loss   4. Facial Palsy:  0=Normal symmetric movement   5a.  Motor left arm: 0=No drift, limb holds 90 (or 45) degrees for full 10 seconds   5b.  Motor right arm: 0=No drift, limb holds 90 (or 45) degrees for full 10 seconds   6a. motor left le=No drift, limb holds 90 (or 45) degrees for full 10 seconds   6b  Motor right le=No drift, limb holds 90 (or 45) degrees for full 10 seconds   7. Limb Ataxia: 0=Absent   8.  Sensory: 0=Normal; no sensory loss   9. Best Language:  0=No aphasia, normal   10. Dysarthria: 0=Normal   11. Extinction and Inattention: 0=No abnormality    Total:   1       Results Reviewed:  I have personally reviewed current lab, radiology, and data and agree with results.  Hemoglobin   Date Value Ref Range Status   2023 13.9 12.0 - 17.0 g/dL Final     Comment:     Serial Number: [id number]:  [id number]     Hematocrit   Date Value Ref Range Status   2023 41 38 - 51 % Final     Lab Results   Component Value Date    GLUCOSE 128 (H) 10/20/2022    BUN 11 10/20/2022    CREATININE 0.90 2023    EGFR 70.2 2023    BCR 15.1 10/20/2022    K 3.9 10/20/2022    CO2 23.0 10/20/2022    CALCIUM 9.0 10/20/2022    ALBUMIN 4.00 10/20/2022    BILITOT 0.5 10/20/2022    AST 15 10/20/2022    ALT 18 10/20/2022     CT Angiogram Neck    Result Date: 2023  Impression: No evidence of flow-limiting stenosis or occlusion of the major vessels of the head and neck. The proximal vertebral arteries not well visualized due to streak artifact from adjacent contrast bolus in the veins. Electronically Signed: Daljit Stern  2023 12:39 PM EDT  Workstation ID: [id number]    CT Chest With Contrast Diagnostic    Result Date: 2023  No evidence of pulmonary embolism. No acute cardiopulmonary process demonstrated Electronically Signed: Brett Fernández  2023 12:37 PM EDT  Workstation ID: [id number]    CT Head Without Contrast Stroke Protocol    Result Date: 2023  Impression: Mild focal asymmetric hypodensity within the  right cerebellum which appears new from prior examination. While this may be due to volume averaging or streak artifact from bone, infarct cannot be fully excluded. Consider MRI to further evaluate. Electronically Signed: Daljit Leena  4/9/2023 12:15 PM EDT  Workstation ID: [id number]    CT Angiogram Head w AI Analysis of LVO    Result Date: 4/9/2023  Impression: No evidence of flow-limiting stenosis or occlusion of the major vessels of the head and neck. The proximal vertebral arteries not well visualized due to streak artifact from adjacent contrast bolus in the veins. Electronically Signed: Daljit Leena  4/9/2023 12:39 PM EDT  Workstation ID: [id number]    CT CEREBRAL PERFUSION WITH & WITHOUT CONTRAST    Result Date: 4/9/2023  Impression: No CT perfusion evidence of infarct or ischemia. There is slightly elevated Tmax in the right distal MCA distribution which may reflect hypoperfusion. Findings discussed with stroke team by Dr. Daljit Stern via telephone on 4/9/2023 12:23 PM EDT. Electronically Signed: Daljit Leena  4/9/2023 12:25 PM EDT  Workstation ID: [id number]      Results for orders placed during the hospital encounter of 07/02/21    Adult Transthoracic Echo Complete W/ Cont if Necessary Per Protocol    Interpretation Summary  · Left ventricular ejection fraction appears to be 56 - 60%. Left ventricular systolic function is normal.  · Mild to moderate aortic valve regurgitation is present.        Assessment/Plan:  68 year old,  , right handed female with a known diagnosis of HTN, anxiety and depression who presented with following a syncopal event at [facility] this morning.  LKW 1045, lost consciousness in [facility] lobby after an episode of vomitting.  Nonresponsive to pain per EMS.  Nonfocal exam at [facility] though fatigued, NIHSS 1.  /79    PTA antiplatelet: none  PTA anticoagulant:  None      1. Syncope  -CT head shows a small hypodensity in the right cerebellum; suspect this is  artifactual and does not explain patients symptoms  -CT perfusion negative  -CTA head and neck negative for hemodynamically significant stenosis, no lvo  -MRI brain wo to further evaluate CT findings  -Will hold adding ischemic stroke orderset pending MRI results  -Needs syncope workup  -Orthostatic BP  -labs pending  -Will follow up on MRI results       Plan of care discussed with patient, family and Dr. Rivero.      Addendum 1707: MRI completed.  Negative for ischemia.  Patient did test positive for COVID 19.  No further stroke work-up is needed.  Please consult general neurology if needing further work-up for AMS.    MRI Brain Without Contrast    Result Date: 4/9/2023  Impression: No evidence of acute intracranial abnormality. Electronically Signed: Daljit Stern  4/9/2023 4:38 PM EDT  Workstation ID: [id number]            Adrianna Oscar, MEI  April 9, 2023  13:07 EDT

## 2023-04-09 NOTE — ED PROVIDER NOTES
Subjective   History of Present Illness  This is a 68-year-old female brought to the emergency room today by [facility] for altered mental status.  At her baseline she apparently has been  lives independently and is quite active drives her own car.  Her  was a  of [facility].    I reviewed her chart with visits here to the ED.  I reviewed her echo from 2 years ago.  No mention of syncope or seizures or stroke.    Apparently at [place] today at the patient began to feel unwell and had loss of consciousness with vomiting.  Several positions were on the scene and intended to her.  There was no reports of tonic-clonic activity but they thought she was weaker on the left side than the right.  EMS was called and transported the patient to our hospital for further evaluation the patient seen on the CT scan [facility].    EMS reports patient acceptable pulse ox and capnography in route.  Her blood pressure was normal and her fingerstick blood sugar was acceptable.  She was poorly to unresponsive to pain.    On arrival here patient would open her eyes to voice but would rouse throughout with a little cold water in the face but would localize a bit but not lift up and try to push my hand away.  Patient had emergent CT scan.  She did gag and seem to vomit here and we cleared her airway.    EMS reports she takes no regular medication.  I cannot get any other history or review of systems.        Review of Systems   Unable to perform ROS: Other       Past Medical History:   Diagnosis Date   • Anxiety    • Essential hypertension 7/2/2021   • GERD without esophagitis 7/16/2019     Interpretation Summary    • Left ventricular ejection fraction appears to be 56 - 60%. Left ventricular systolic function is normal.  • Mild to moderate aortic valve regurgitation is present.     Cardiac History    Diagnosis Date Comment Source   Essential hypertension 7/2/2021         No Known Allergies    Past Surgical  History:   Procedure Laterality Date   • BREAST CYST ASPIRATION Left 2005   • CHOLECYSTECTOMY     • COLONOSCOPY     • HYSTERECTOMY     • OOPHORECTOMY         Family History   Problem Relation Age of Onset   • Breast cancer Mother 80   • Ovarian cancer Neg Hx    • Colon cancer Neg Hx    • Colon polyps Neg Hx    • Esophageal cancer Neg Hx        Social History     Socioeconomic History   • Marital status:    Tobacco Use   • Smoking status: Never   • Smokeless tobacco: Never   Vaping Use   • Vaping Use: Never used   Substance and Sexual Activity   • Alcohol use: Never   • Drug use: Never   • Sexual activity: Defer           Objective   Physical Exam  Vitals and nursing note reviewed. Exam conducted with a chaperone present.   Constitutional:       Comments: This is a middle-aged female maintaining her airway GCS is 7.  She will respond to cold water on the face but will try to push my hand away.  I lift her arm up and drop it down to her face and avoids hitting her face.  She has some vomit on her close.  Appears nonbloody.   HENT:      Head: Normocephalic and atraumatic.      Right Ear: External ear normal.      Left Ear: External ear normal.      Nose: Nose normal.      Mouth/Throat:      Mouth: Mucous membranes are moist.      Pharynx: Oropharynx is clear.   Eyes:      Comments: Unable to open her eyelids and her pupils about 4 mm and reactive difficult access extraocular movements.   Neck:      Vascular: No carotid bruit.   Cardiovascular:      Rate and Rhythm: Normal rate and regular rhythm.      Pulses: Normal pulses.      Heart sounds: Normal heart sounds.   Pulmonary:      Effort: Pulmonary effort is normal.      Breath sounds: Normal breath sounds.   Abdominal:      Comments: Positive bowel sounds soft nontender no organomegaly, masses, or guarding.   Musculoskeletal:      Cervical back: Normal range of motion and neck supple. No rigidity or tenderness.      Comments: Equal pulses without synovitis,  rash, edema, or venous cords.   Lymphadenopathy:      Cervical: No cervical adenopathy.   Skin:     General: Skin is warm and dry.      Capillary Refill: Capillary refill takes less than 2 seconds.   Neurological:      GCS: GCS eye subscore is 1. GCS verbal subscore is 1. GCS motor subscore is 5.      Comments: Face symmetric patient does not speak she will turn her head and put cold water on it.  I lift her hands up and drop it down to her face and avoid hitting her.  She has no cogwheeling.  She has decreased motor tone throughout.  Her knee jerks are 1+ bilaterally and her plantar response are downturning bilaterally         Critical Care  Performed by: Pancho Rivero MD  Authorized by: Pancho Rivero MD     Critical care provider statement:     Critical care time (minutes):  50    Critical care was necessary to treat or prevent imminent or life-threatening deterioration of the following conditions:  CNS failure or compromise    Critical care was time spent personally by me on the following activities:  Development of treatment plan with patient or surrogate, obtaining history from patient or surrogate, examination of patient, evaluation of patient's response to treatment, discussions with consultants, ordering and review of laboratory studies, ordering and performing treatments and interventions, ordering and review of radiographic studies, pulse oximetry, re-evaluation of patient's condition and review of old charts               ED Course            Recent Results (from the past 24 hour(s))   POC Protime / INR    Collection Time: 04/09/23 11:57 AM    Specimen: Blood   Result Value Ref Range    Protime 13.3 12.8 - 15.2 seconds    INR 1.1 0.8 - 1.2   POC CHEM 8    Collection Time: 04/09/23 11:59 AM    Specimen: Blood   Result Value Ref Range    Glucose 158 (H) 70 - 130 mg/dL    BUN 14 8 - 26 mg/dL    Creatinine 0.90 0.60 - 1.30 mg/dL    Sodium 138 138 - 146 mmol/L    POC Potassium 4.3 3.5 - 4.9 mmol/L     Chloride 103 98 - 109 mmol/L    Total CO2 24 24 - 29 mmol/L    Hemoglobin 13.9 12.0 - 17.0 g/dL    Hematocrit 41 38 - 51 %    Ionized Calcium 1.21 1.20 - 1.32 mmol/L    eGFR 70.2 >60.0 mL/min/1.73   ECG 12 Lead ED Triage Standing Order; Acute Stroke (Onset <24 hrs)    Collection Time: 04/09/23 12:22 PM   Result Value Ref Range    QT Interval 414 ms    QTC Interval 471 ms   COVID-19 and FLU A/B PCR - Swab, Nasopharynx    Collection Time: 04/09/23 12:27 PM    Specimen: Nasopharynx; Swab   Result Value Ref Range    COVID19 Detected (C) Not Detected - Ref. Range    Influenza A PCR Not Detected Not Detected    Influenza B PCR Not Detected Not Detected   Urinalysis With Culture If Indicated - Straight Cath    Collection Time: 04/09/23 12:41 PM    Specimen: Straight Cath; Urine   Result Value Ref Range    Color, UA Yellow Yellow, Straw    Appearance, UA Clear Clear    pH, UA 7.5 5.0 - 8.0    Specific Gravity, UA 1.055 (H) 1.001 - 1.030    Glucose, UA Negative Negative    Ketones, UA Negative Negative    Bilirubin, UA Negative Negative    Blood, UA Trace (A) Negative    Protein, UA Negative Negative    Leuk Esterase, UA Negative Negative    Nitrite, UA Negative Negative    Urobilinogen, UA 0.2 E.U./dL 0.2 - 1.0 E.U./dL   Urinalysis, Microscopic Only - Straight Cath    Collection Time: 04/09/23 12:41 PM    Specimen: Straight Cath; Urine   Result Value Ref Range    RBC, UA 0-2 None Seen, 0-2 /HPF    WBC, UA 0-2 None Seen, 0-2 /HPF    Bacteria, UA None Seen None Seen, Trace /HPF    Squamous Epithelial Cells, UA 0-2 None Seen, 0-2 /HPF    Hyaline Casts, UA 0-6 0 - 6 /LPF    Methodology Automated Microscopy    Urine Drug Screen - Straight Cath    Collection Time: 04/09/23 12:41 PM    Specimen: Straight Cath; Urine   Result Value Ref Range    THC, Screen, Urine Negative Negative    Phencyclidine (PCP), Urine Negative Negative    Cocaine Screen, Urine Negative Negative    Methamphetamine, Ur Negative Negative    Opiate Screen  Negative Negative    Amphetamine Screen, Urine Negative Negative    Benzodiazepine Screen, Urine Negative Negative    Tricyclic Antidepressants Screen Negative Negative    Methadone Screen, Urine Negative Negative    Barbiturates Screen, Urine Negative Negative    Oxycodone Screen, Urine Negative Negative    Propoxyphene Screen Negative Negative    Buprenorphine, Screen, Urine Negative Negative   Single High Sensitivity Troponin T    Collection Time: 04/09/23  1:38 PM    Specimen: Blood   Result Value Ref Range    HS Troponin T 8 <10 ng/L   aPTT    Collection Time: 04/09/23  1:38 PM    Specimen: Blood   Result Value Ref Range    PTT 29.9 22.0 - 39.0 seconds   AST    Collection Time: 04/09/23  1:38 PM    Specimen: Blood   Result Value Ref Range    AST (SGOT) 35 (H) 1 - 32 U/L   ALT    Collection Time: 04/09/23  1:38 PM    Specimen: Blood   Result Value Ref Range    ALT (SGPT) 45 (H) 1 - 33 U/L   Green Top (Gel)    Collection Time: 04/09/23  1:38 PM   Result Value Ref Range    Extra Tube Hold for add-ons.    Lavender Top    Collection Time: 04/09/23  1:38 PM   Result Value Ref Range    Extra Tube hold for add-on    Gold Top - SST    Collection Time: 04/09/23  1:38 PM   Result Value Ref Range    Extra Tube Hold for add-ons.    Light Blue Top    Collection Time: 04/09/23  1:38 PM   Result Value Ref Range    Extra Tube Hold for add-ons.    CBC Auto Differential    Collection Time: 04/09/23  1:38 PM    Specimen: Blood   Result Value Ref Range    WBC 11.65 (H) 3.40 - 10.80 10*3/mm3    RBC 4.28 3.77 - 5.28 10*6/mm3    Hemoglobin 13.5 12.0 - 15.9 g/dL    Hematocrit 41.2 34.0 - 46.6 %    MCV 96.3 79.0 - 97.0 fL    MCH 31.5 26.6 - 33.0 pg    MCHC 32.8 31.5 - 35.7 g/dL    RDW 13.1 12.3 - 15.4 %    RDW-SD 46.6 37.0 - 54.0 fl    MPV 9.2 6.0 - 12.0 fL    Platelets 284 140 - 450 10*3/mm3    Neutrophil % 82.4 (H) 42.7 - 76.0 %    Lymphocyte % 10.2 (L) 19.6 - 45.3 %    Monocyte % 6.1 5.0 - 12.0 %    Eosinophil % 0.5 0.3 - 6.2 %     Basophil % 0.5 0.0 - 1.5 %    Immature Grans % 0.3 0.0 - 0.5 %    Neutrophils, Absolute 9.59 (H) 1.70 - 7.00 10*3/mm3    Lymphocytes, Absolute 1.19 0.70 - 3.10 10*3/mm3    Monocytes, Absolute 0.71 0.10 - 0.90 10*3/mm3    Eosinophils, Absolute 0.06 0.00 - 0.40 10*3/mm3    Basophils, Absolute 0.06 0.00 - 0.20 10*3/mm3    Immature Grans, Absolute 0.04 0.00 - 0.05 10*3/mm3    nRBC 0.0 0.0 - 0.2 /100 WBC   D-dimer, Quantitative    Collection Time: 04/09/23  1:38 PM    Specimen: Blood   Result Value Ref Range    D-Dimer, Quantitative 0.68 (H) 0.00 - 0.67 MCGFEU/mL   Procalcitonin    Collection Time: 04/09/23  1:38 PM    Specimen: Blood   Result Value Ref Range    Procalcitonin 0.12 0.00 - 0.25 ng/mL   Acetaminophen Level    Collection Time: 04/09/23  1:38 PM    Specimen: Blood   Result Value Ref Range    Acetaminophen <5.0 0.0 - 30.0 mcg/mL   Ethanol    Collection Time: 04/09/23  1:38 PM    Specimen: Blood   Result Value Ref Range    Ethanol <10 0 - 10 mg/dL   Salicylate Level    Collection Time: 04/09/23  1:38 PM    Specimen: Blood   Result Value Ref Range    Salicylate <0.3 <=30.0 mg/dL   TSH    Collection Time: 04/09/23  1:38 PM    Specimen: Blood   Result Value Ref Range    TSH 3.560 0.270 - 4.200 uIU/mL   Magnesium    Collection Time: 04/09/23  1:38 PM    Specimen: Blood   Result Value Ref Range    Magnesium 2.3 1.6 - 2.4 mg/dL     Note: In addition to lab results from this visit, the labs listed above may include labs taken at another facility or during a different encounter within the last 24 hours. Please correlate lab times with ED admission and discharge times for further clarification of the services performed during this visit.    CT Angiogram Head w AI Analysis of LVO   Final Result   Impression:   No evidence of flow-limiting stenosis or occlusion of the major vessels of the head and neck. The proximal vertebral arteries not well visualized due to streak artifact from adjacent contrast bolus in the veins.     "  Electronically Signed: Daljit Stern     4/9/2023 12:39 PM EDT     Workstation ID: [id number]      CT Angiogram Neck   Final Result   Impression:   No evidence of flow-limiting stenosis or occlusion of the major vessels of the head and neck. The proximal vertebral arteries not well visualized due to streak artifact from adjacent contrast bolus in the veins.      Electronically Signed: Daljit Stern     4/9/2023 12:39 PM EDT     Workstation ID: [id number]      CT CEREBRAL PERFUSION WITH & WITHOUT CONTRAST   Final Result   Impression:   No CT perfusion evidence of infarct or ischemia. There is slightly elevated Tmax in the right distal MCA distribution which may reflect hypoperfusion.      Findings discussed with stroke team by Dr. Daljit Stern via telephone on 4/9/2023 12:23 PM EDT.      Electronically Signed: Daljit Stern     4/9/2023 12:25 PM EDT     Workstation ID: [id number]      CT Chest With Contrast Diagnostic   Final Result   No evidence of pulmonary embolism. No acute cardiopulmonary process demonstrated            Electronically Signed: Brett Fernández     4/9/2023 12:37 PM EDT     Workstation ID: [id number]      CT Head Without Contrast Stroke Protocol   Final Result   Impression:   Mild focal asymmetric hypodensity within the right cerebellum which appears new from prior examination. While this may be due to volume averaging or streak artifact from bone, infarct cannot be fully excluded. Consider MRI to further evaluate.      Electronically Signed: Daljit Stern     4/9/2023 12:15 PM EDT     Workstation ID: [id number]      MRI Brain Without Contrast    (Results Pending)     Vitals:    04/09/23 1200 04/09/23 1201 04/09/23 1220 04/09/23 1242   BP:  145/79 141/76    Pulse: 73  78    Resp: 18      Temp:    98.2 °F (36.8 °C)   TempSrc:    Oral   SpO2: 98%  96%    Weight:  61.2 kg (135 lb)     Height:  162.6 cm (64\")       Medications   sodium chloride 0.9 % flush 10 mL (has no administration in time " range)   thiamine (B-1) injection 100 mg (100 mg Intravenous Given 4/9/23 1222)   iopamidol (ISOVUE-370) 76 % injection 150 mL (115 mL Intravenous Given 4/9/23 1202)   ondansetron (ZOFRAN) injection 4 mg (4 mg Intravenous Given 4/9/23 1222)   LORazepam (ATIVAN) injection 1 mg (1 mg Intravenous Given 4/9/23 1516)     ECG/EMG Results (last 24 hours)     Procedure Component Value Units Date/Time    ECG 12 Lead ED Triage Standing Order; Acute Stroke (Onset <24 hrs) [[id number]] Collected: 04/09/23 1222     Updated: 04/09/23 1222     QT Interval 414 ms      QTC Interval 471 ms     Narrative:      Test Reason : ED Triage Standing Order~  Blood Pressure :   */*   mmHG  Vent. Rate :  78 BPM     Atrial Rate :  78 BPM     P-R Int : 134 ms          QRS Dur :  78 ms      QT Int : 414 ms       P-R-T Axes :  24   2 -13 degrees     QTc Int : 471 ms    Normal sinus rhythm  Nonspecific ST and T wave abnormality  Prolonged QT  Abnormal ECG  When compared with ECG of 20-OCT-2022 12:22,  No significant change was found    Referred By: EDMD           Confirmed By:         ECG 12 Lead ED Triage Standing Order; Acute Stroke (Onset <24 hrs)   Final Result   Test Reason : ED Triage Standing Order~   Blood Pressure :   */*   mmHG   Vent. Rate :  78 BPM     Atrial Rate :  78 BPM      P-R Int : 134 ms          QRS Dur :  78 ms       QT Int : 414 ms       P-R-T Axes :  24   2 -13 degrees      QTc Int : 471 ms      Normal sinus rhythm   Nonspecific ST and T wave abnormality   Prolonged QT   Abnormal ECG   When compared with ECG of 20-OCT-2022 12:22,   No significant change was found   Confirmed by SUSIE RAMIREZ MD ([id number]) on 4/9/2023 12:30:54 PM      Referred By: EDMD           Confirmed By: SUSIE RAMIREZ MD                                          Medical Decision Making        Reviewed all available studies at the bedside with the patient and her family who are now at the bedside.  Daughter was present and witnessed some of this episode.  She  is never had anything like this in the past.  Patient now has regained her sensorium is alert and oriented and able to speak.  Her vital signs are acceptable.    I saw the patient CT scanner initially we did a CTA of the head with perfusion his brainstem stroke was certainly in the differential thankfully this is bland.  I reviewed everything with stroke navigator and they agree seeing the patient.    Given presentation vomiting I was worried about aspiration and the patient is a CTA of the chest as well also reassuring no PE no pneumonia.    Certainly large differential for this but thing that makes most sense is syncope with a prolonged recovery phase.  Quite an unusual presentation.  Also is possible she had a seizure with prolonged postictal state but there is no tonic-clonic activity.  Prolactin level is pending.    Complicating things with the fact that she has COVID-19 perhaps a precipitating event for this.  She has had URI-like symptoms for about a week.  She had been vaccinated remotely with the [employer] but has not received any boosters.    Her glucose is modestly elevated.    In speaking with the patient she snores and has fairly severe daytime sleepiness I suspect she has undiagnosed sleep apnea.  Certainly cataplectic event could be in the differential but this would be a very prolonged episode.  At some point I think she will need a sleep study.    At this point I think she needs to be admitted for serial exams ongoing monitoring consideration of remdesivir she is not hypoxic here.  CTA of the chest was reassuring no evidence of pneumonia or PE.    I spoke with Dr. Simeon on-call hospital medicine and she will admit the patient.    All are agreeable with the plan    COVID-19: complicated acute illness or injury with systemic symptoms  Daytime sleepiness: undiagnosed new problem with uncertain prognosis  Elevated glucose: undiagnosed new problem with uncertain prognosis  Snoring: undiagnosed new  problem with uncertain prognosis  Unresponsive episode: complicated acute illness or injury with systemic symptoms that poses a threat to life or bodily functions  Amount and/or Complexity of Data Reviewed  Independent Historian: guardian and EMS  External Data Reviewed: labs, radiology, ECG and notes.  Labs: ordered. Decision-making details documented in ED Course.  Radiology: ordered and independent interpretation performed. Decision-making details documented in ED Course.  ECG/medicine tests: ordered and independent interpretation performed. Decision-making details documented in ED Course.      Risk  Prescription drug management.  Decision regarding hospitalization.      Critical Care  Total time providing critical care: 50 minutes      Final diagnoses:   Unresponsive episode   Daytime sleepiness   Snoring   Elevated glucose   COVID-19       ED Disposition  ED Disposition     ED Disposition   Decision to Admit    Condition   --    Comment   Level of Care: Med/Surg [1]   Diagnosis: Unresponsive episode [[id number]]   Admitting Physician: AARON BIRCH [[id number]]   Attending Physician: AARON BIRCH [[id number]]               No follow-up provider specified.       Medication List      No changes were made to your prescriptions during this visit.          Pancho Rivero MD  04/09/23 [id number]

## 2023-04-10 LAB
ALBUMIN SERPL-MCNC: 4.1 G/DL (ref 3.5–5.2)
ALBUMIN/GLOB SERPL: 2.1 G/DL
ALP SERPL-CCNC: 119 U/L (ref 39–117)
ALT SERPL W P-5'-P-CCNC: 35 U/L (ref 1–33)
ANION GAP SERPL CALCULATED.3IONS-SCNC: 9 MMOL/L (ref 5–15)
AST SERPL-CCNC: 24 U/L (ref 1–32)
BILIRUB SERPL-MCNC: 0.4 MG/DL (ref 0–1.2)
BUN SERPL-MCNC: 11 MG/DL (ref 8–23)
BUN/CREAT SERPL: 14.3 (ref 7–25)
CALCIUM SPEC-SCNC: 8.8 MG/DL (ref 8.6–10.5)
CHLORIDE SERPL-SCNC: 105 MMOL/L (ref 98–107)
CO2 SERPL-SCNC: 24 MMOL/L (ref 22–29)
CREAT SERPL-MCNC: 0.77 MG/DL (ref 0.57–1)
CRP SERPL-MCNC: 0.63 MG/DL (ref 0–0.5)
DEPRECATED RDW RBC AUTO: 47.3 FL (ref 37–54)
EGFRCR SERPLBLD CKD-EPI 2021: 84.7 ML/MIN/1.73
ERYTHROCYTE [DISTWIDTH] IN BLOOD BY AUTOMATED COUNT: 13.2 % (ref 12.3–15.4)
ERYTHROCYTE [SEDIMENTATION RATE] IN BLOOD: 16 MM/HR (ref 0–30)
GLOBULIN UR ELPH-MCNC: 2 GM/DL
GLUCOSE SERPL-MCNC: 154 MG/DL (ref 65–99)
HCT VFR BLD AUTO: 37.1 % (ref 34–46.6)
HGB BLD-MCNC: 11.9 G/DL (ref 12–15.9)
MCH RBC QN AUTO: 31 PG (ref 26.6–33)
MCHC RBC AUTO-ENTMCNC: 32.1 G/DL (ref 31.5–35.7)
MCV RBC AUTO: 96.6 FL (ref 79–97)
PLATELET # BLD AUTO: 264 10*3/MM3 (ref 140–450)
PMV BLD AUTO: 9.4 FL (ref 6–12)
POTASSIUM SERPL-SCNC: 3.5 MMOL/L (ref 3.5–5.2)
PROT SERPL-MCNC: 6.1 G/DL (ref 6–8.5)
RBC # BLD AUTO: 3.84 10*6/MM3 (ref 3.77–5.28)
SODIUM SERPL-SCNC: 138 MMOL/L (ref 136–145)
WBC NRBC COR # BLD: 6.93 10*3/MM3 (ref 3.4–10.8)

## 2023-04-10 PROCEDURE — 96372 THER/PROPH/DIAG INJ SC/IM: CPT

## 2023-04-10 PROCEDURE — 25010000002 ONDANSETRON PER 1 MG: Performed by: INTERNAL MEDICINE

## 2023-04-10 PROCEDURE — 86140 C-REACTIVE PROTEIN: CPT | Performed by: INTERNAL MEDICINE

## 2023-04-10 PROCEDURE — 85027 COMPLETE CBC AUTOMATED: CPT | Performed by: INTERNAL MEDICINE

## 2023-04-10 PROCEDURE — 80053 COMPREHEN METABOLIC PANEL: CPT | Performed by: INTERNAL MEDICINE

## 2023-04-10 PROCEDURE — 96361 HYDRATE IV INFUSION ADD-ON: CPT

## 2023-04-10 PROCEDURE — 85652 RBC SED RATE AUTOMATED: CPT | Performed by: INTERNAL MEDICINE

## 2023-04-10 PROCEDURE — 96376 TX/PRO/DX INJ SAME DRUG ADON: CPT

## 2023-04-10 PROCEDURE — 25010000002 ENOXAPARIN PER 10 MG: Performed by: INTERNAL MEDICINE

## 2023-04-10 PROCEDURE — G0378 HOSPITAL OBSERVATION PER HR: HCPCS

## 2023-04-10 RX ORDER — ONDANSETRON 2 MG/ML
4 INJECTION INTRAMUSCULAR; INTRAVENOUS EVERY 6 HOURS PRN
Status: DISCONTINUED | OUTPATIENT
Start: 2023-04-10 | End: 2023-04-11 | Stop reason: HOSPADM

## 2023-04-10 RX ORDER — LOPERAMIDE HYDROCHLORIDE 2 MG/1
2 CAPSULE ORAL 3 TIMES DAILY PRN
Status: DISCONTINUED | OUTPATIENT
Start: 2023-04-10 | End: 2023-04-11 | Stop reason: HOSPADM

## 2023-04-10 RX ADMIN — ONDANSETRON 4 MG: 2 INJECTION INTRAMUSCULAR; INTRAVENOUS at 16:44

## 2023-04-10 RX ADMIN — DEXTROSE AND SODIUM CHLORIDE 100 ML/HR: 5; 450 INJECTION, SOLUTION INTRAVENOUS at 20:09

## 2023-04-10 RX ADMIN — LOPERAMIDE HYDROCHLORIDE 2 MG: 2 CAPSULE ORAL at 13:41

## 2023-04-10 RX ADMIN — Medication 10 ML: at 20:09

## 2023-04-10 RX ADMIN — ENOXAPARIN SODIUM 40 MG: 40 INJECTION SUBCUTANEOUS at 18:35

## 2023-04-10 RX ADMIN — Medication 10 ML: at 09:03

## 2023-04-10 RX ADMIN — DEXTROSE AND SODIUM CHLORIDE 100 ML/HR: 5; 450 INJECTION, SOLUTION INTRAVENOUS at 13:41

## 2023-04-10 NOTE — PROGRESS NOTES
[facility] Medicine Services  PROGRESS NOTE    Patient Name: Clara Gonzalez  : 1955  MRN: [id number]    Date of Admission: 2023  Primary Care Physician: Velia James APRN    Subjective   Subjective     CC:  Syncope in [facility]    HPI:  Still breathing well.  Appetite not great.  Having explosive diarrhea     ROS:  Clarifies that she has not had diarrhea since last April     Objective   Objective     Vital Signs:   Temp:  [97.6 °F (36.4 °C)-99.3 °F (37.4 °C)] 97.6 °F (36.4 °C)  Heart Rate:  [68-78] 68  Resp:  [16-18] 16  BP: (120-183)/(74-88) 120/79     Physical Exam:  Constitutional: Awake, alert in bed.  Dtr present   Eyes: PER  HENT: NCAT, mucous membranes moist  Neck: Supple,   Respiratory: on RA nonlabored respirations   Cardiovascular: RRR,   Gastrointestinal: Positive bowel sounds, soft, nontender, nondistended  Musculoskeletal: No bilateral ankle edema, no clubbing or cyanosis to extremities  Psychiatric: Appropriate affect, cooperative  Neurologic: Oriented x 3, strength symmetric in all extremities, Cranial Nerves grossly intact to confrontation, speech clear  Skin: No rashes      Results Reviewed:  LAB RESULTS:      Lab 04/10/23  0457 23  1338 23  1159 23  1157   WBC 6.93 11.65*  --   --    HEMOGLOBIN 11.9* 13.5  --   --    HEMOGLOBIN, POC  --   --  13.9  --    HEMATOCRIT 37.1 41.2  --   --    HEMATOCRIT POC  --   --  41  --    PLATELETS 264 284  --   --    NEUTROS ABS  --  9.59*  --   --    IMMATURE GRANS (ABS)  --  0.04  --   --    LYMPHS ABS  --  1.19  --   --    MONOS ABS  --  0.71  --   --    EOS ABS  --  0.06  --   --    MCV 96.6 96.3  --   --    SED RATE 16  --   --   --    CRP 0.63*  --   --   --    PROCALCITONIN  --  0.12  --   --    PROTIME  --   --   --  13.3   APTT  --  29.9  --   --    D DIMER QUANT  --  0.68*  --   --          Lab 04/10/23  0457 23  1338 23  1159   SODIUM 138  --   --    POTASSIUM 3.5  --   --     CHLORIDE 105  --   --    CO2 24.0  --   --    ANION GAP 9.0  --   --    BUN 11  --   --    CREATININE 0.77  --  0.90   EGFR 84.7  --  70.2   GLUCOSE 154*  --   --    CALCIUM 8.8  --   --    MAGNESIUM  --  2.3  --    TSH  --  3.560  --          Lab 04/10/23  0457 04/09/23  1338   TOTAL PROTEIN 6.1  --    ALBUMIN 4.1  --    GLOBULIN 2.0  --    ALT (SGPT) 35* 45*   AST (SGOT) 24 35*   BILIRUBIN 0.4  --    ALK PHOS 119*  --          Lab 04/09/23  1338 04/09/23  1157   HSTROP T 8  --    PROTIME  --  13.3   INR  --  1.1                 Brief Urine Lab Results  (Last result in the past 365 days)      Color   Clarity   Blood   Leuk Est   Nitrite   Protein   CREAT   Urine HCG        04/09/23 1241 Yellow   Clear   Trace   Negative   Negative   Negative                 Microbiology Results Abnormal     None          CT Angiogram Neck    Result Date: 4/9/2023  CT ANGIOGRAM NECK, CT ANGIOGRAM HEAD W AI ANALYSIS OF LVO Date of Exam: 4/9/2023 11:54 AM EDT Indication: Neuro deficit, acute stroke suspected. Comparison: Same day CT head Technique: CTA of the head and neck was performed before and after the uneventful intravenous administration of 115 mL Isovue-370. Reconstructed coronal and sagittal images were also obtained. In addition, a 3-D volume rendered image was created for interpretation. Automated exposure control and iterative reconstruction methods were used. Findings: Mild motion degradation. Anterior circulation: Common and internal carotid arteries appear patent. Anterior cerebral arteries appear patent. Anterior communicating artery is seen. Middle cerebral arteries appear patent through the M2 division. No evidence of aneurysm. Posterior circulation: Vertebral arteries appear patent though the proximal left vertebral artery is not well visualized due to adjacent contrast within the veins. Diminutive distal left V4. Posterior-inferior and superior cerebellar arteries are seen. Anterior-inferior cerebellar arteries  are not well seen which may be technical. Fetal origin in the bilateral posterior cerebral arteries. Mild narrowing of the left posterior cerebral artery. The posterior communicating arteries appear patent. No evidence of aneurysm. Prominence of the basilar tip is likely due to branch vessel infundibula. Venous structures: Dural venous sinuses and central cerebral veins appear grossly patent. Internal jugular veins appear grossly patent. Bones: Disc degenerative disease most advanced at C6-7. No evidence of acute fracture. No high-grade bony canal or foraminal stenosis. Soft tissues: No abnormal brain parenchymal enhancement. Aerodigestive tract appears grossly patent. Thyroid is within normal limits. Visualized thoracic vessels are grossly patent. Lung apices are grossly clear. Bilateral dependent atelectasis.     Impression: Impression: No evidence of flow-limiting stenosis or occlusion of the major vessels of the head and neck. The proximal vertebral arteries not well visualized due to streak artifact from adjacent contrast bolus in the veins. Electronically Signed: Daljit Stern  4/9/2023 12:39 PM EDT  Workstation ID: [id number]    CT Chest With Contrast Diagnostic    Result Date: 4/9/2023  CT CHEST W CONTRAST DIAGNOSTIC Date of Exam: 4/9/2023 11:54 AM EDT Indication: Chest pain, nonspecific. Comparison: 4/5/2022 Technique: Axial CT images were obtained of the chest after the uneventful intravenous administration of intravenous breast.  Reconstructed coronal and sagittal images were also obtained. Automated exposure control and iterative construction methods were  used. Findings: Pulmonary Arteries: Adequately opacified. No emboli demonstrated Susan/mediastinum: Thoracic aorta normal in caliber with no intimal flap. No definite coronary calcification. No pericardial effusion. No adenopathy. Small hiatal hernia Lungs/pleura: Lungs clear. No pleural effusion or pneumothorax Upper Abdomen: Unremarkable Bones/soft  tissues: No acute bony abnormality     Impression: No evidence of pulmonary embolism. No acute cardiopulmonary process demonstrated Electronically Signed: Brett Fernández  4/9/2023 12:37 PM EDT  Workstation ID: [id number]    MRI Brain Without Contrast    Result Date: 4/9/2023  MRI BRAIN WO CONTRAST Date of Exam: 4/9/2023 4:10 PM EDT Indication: Neuro deficit, acute, stroke suspected.  Comparison: Same day CT stroke, 10/20/2022 MRI brain Technique:  Routine multiplanar/multisequence sequence images of the brain were obtained without contrast administration. Findings: Parenchyma: No evidence of acute infarct. No evidence of hemorrhage. Midline structures appear grossly intact. No midline shift or herniation. Normal parenchymal volume. Minimal scattered periventricular and subcortical FLAIR hyperintensities, nonspecific, most commonly associated with chronic small vessel ischemic changes. Ventricles and extra-axial spaces: Normal caliber the ventricles and sulci. No extra-axial fluid collection seen. Other: Normal calvarial marrow signal. Intracranial flow voids appear grossly intact. Scattered paranasal sinus mucosal thickening. Trace bilateral mastoid effusions. Bilateral lens replacements.     Impression: Impression: No evidence of acute intracranial abnormality. Electronically Signed: Daljit Stern  4/9/2023 4:38 PM EDT  Workstation ID: [id number]    CT Head Without Contrast Stroke Protocol    Result Date: 4/9/2023  CT HEAD WO CONTRAST STROKE PROTOCOL Date of Exam: 4/9/2023 11:51 AM EDT Indication: Neuro deficit, acute, stroke suspected Neuro deficit, acute stroke suspected. Comparison: 10/20/2022 Technique: Axial CT images were obtained of the head without contrast administration.  Reconstructed coronal and sagittal images were also obtained. Automated exposure control and iterative construction methods were used. Scan Time:  11:50 AM Results discussed with the stroke team at 12:07 PM. Findings: Parenchyma:No acute  intraparenchymal hemorrhage. Mild focal asymmetric hypodensity within the right cerebellum. This appears new from prior exam. Normal parenchymal volume. No substantial white matter disease. No midline shift or herniation. Ventricles and extra axial spaces:Normal caliber of ventricles and sulci. No extra axial fluid collection seen. Other:Lens replacements. Paranasal sinuses are clear. Mastoid air cells are clear. Calvarium is intact. No substantial intracranial atherosclerotic calcification.     Impression: Impression: Mild focal asymmetric hypodensity within the right cerebellum which appears new from prior examination. While this may be due to volume averaging or streak artifact from bone, infarct cannot be fully excluded. Consider MRI to further evaluate. Electronically Signed: Daljit Stern  4/9/2023 12:15 PM EDT  Workstation ID: [id number]    CT Angiogram Head w AI Analysis of LVO    Result Date: 4/9/2023  CT ANGIOGRAM NECK, CT ANGIOGRAM HEAD W AI ANALYSIS OF LVO Date of Exam: 4/9/2023 11:54 AM EDT Indication: Neuro deficit, acute stroke suspected. Comparison: Same day CT head Technique: CTA of the head and neck was performed before and after the uneventful intravenous administration of 115 mL Isovue-370. Reconstructed coronal and sagittal images were also obtained. In addition, a 3-D volume rendered image was created for interpretation. Automated exposure control and iterative reconstruction methods were used. Findings: Mild motion degradation. Anterior circulation: Common and internal carotid arteries appear patent. Anterior cerebral arteries appear patent. Anterior communicating artery is seen. Middle cerebral arteries appear patent through the M2 division. No evidence of aneurysm. Posterior circulation: Vertebral arteries appear patent though the proximal left vertebral artery is not well visualized due to adjacent contrast within the veins. Diminutive distal left V4. Posterior-inferior and superior  cerebellar arteries are seen. Anterior-inferior cerebellar arteries are not well seen which may be technical. Fetal origin in the bilateral posterior cerebral arteries. Mild narrowing of the left posterior cerebral artery. The posterior communicating arteries appear patent. No evidence of aneurysm. Prominence of the basilar tip is likely due to branch vessel infundibula. Venous structures: Dural venous sinuses and central cerebral veins appear grossly patent. Internal jugular veins appear grossly patent. Bones: Disc degenerative disease most advanced at C6-7. No evidence of acute fracture. No high-grade bony canal or foraminal stenosis. Soft tissues: No abnormal brain parenchymal enhancement. Aerodigestive tract appears grossly patent. Thyroid is within normal limits. Visualized thoracic vessels are grossly patent. Lung apices are grossly clear. Bilateral dependent atelectasis.     Impression: Impression: No evidence of flow-limiting stenosis or occlusion of the major vessels of the head and neck. The proximal vertebral arteries not well visualized due to streak artifact from adjacent contrast bolus in the veins. Electronically Signed: Daljit Stern  4/9/2023 12:39 PM EDT  Workstation ID: [id number]    CT CEREBRAL PERFUSION WITH & WITHOUT CONTRAST    Result Date: 4/9/2023  CT CEREBRAL PERFUSION W WO CONTRAST Date of Exam: 4/9/2023 11:54 AM EDT Indication: Neuro deficit, acute stroke suspected.  Comparison: None available. Technique: Axial CT images of the brain were obtained prior to and after the administration of 115 mL Isovue-370. Core blood volume, core blood flow, mean transit time, and Tmax images were obtained utilizing the Rapid software protocol. A limited CT angiogram of the head was also performed to measure the blood vessel density. The radiation dose reduction device was turned on for each scan per the ALARA (As Low as Reasonably Achievable) protocol. Findings: Symmetric CBV and CBF. No substantially  elevated Tmax. There is a slightly elevated T masses in the right distal MCA distribution with a volume of 71 mL.     Impression: Impression: No CT perfusion evidence of infarct or ischemia. There is slightly elevated Tmax in the right distal MCA distribution which may reflect hypoperfusion. Findings discussed with stroke team by Dr. Daljit Stern via telephone on 4/9/2023 12:23 PM EDT. Electronically Signed: Daljit Stern  4/9/2023 12:25 PM EDT  Workstation ID: [id number]      Results for orders placed during the hospital encounter of 07/02/21    Adult Transthoracic Echo Complete W/ Cont if Necessary Per Protocol    Interpretation Summary  · Left ventricular ejection fraction appears to be 56 - 60%. Left ventricular systolic function is normal.  · Mild to moderate aortic valve regurgitation is present.      Current medications:  Scheduled Meds:enoxaparin, 40 mg, Subcutaneous, Daily  sodium chloride, 10 mL, Intravenous, Q12H      Continuous Infusions:dextrose 5 % and sodium chloride 0.45 %, 100 mL/hr, Last Rate: 100 mL/hr (04/10/23 0050)      PRN Meds:.•  acetaminophen  •  sodium chloride  •  sodium chloride  •  sodium chloride    Assessment & Plan   Assessment & Plan     Active Hospital Problems    Diagnosis  POA   • **Unresponsive episode [R41.89]  Yes      Resolved Hospital Problems   No resolved problems to display.        Brief Hospital Course to date:  Clara Gonzalez is a 68 y.o. female  Who had syncope at [facility]; reportedly she did not wake up for ten minutes    LOC  - likely vagal and orthostatic; COVID contributed.   - given poor appetite and diarrhea, she requests to stay another night and this is reasonable.  - orthostatics today:  150 lying, 120 standing      COVID+  - diagnosed 4/9  - no resp symptoms but having diarrhea   - imodium ordered           Expected Discharge Location and Transportation: home by car  Expected Discharge   Expected Discharge Date and Time     Expected Discharge Date  Expected Discharge Time    Apr 12, 2023            DVT prophylaxis:  Medical DVT prophylaxis orders are present.          CODE STATUS:   Code Status and Medical Interventions:   Ordered at: 04/09/23 1611     Level Of Support Discussed With:    Patient     Code Status (Patient has no pulse and is not breathing):    CPR (Attempt to Resuscitate)     Medical Interventions (Patient has pulse or is breathing):    Full Support       Crys Simeon MD  04/10/23

## 2023-04-10 NOTE — CASE MANAGEMENT/SOCIAL WORK
Discharge Planning Assessment  [facility]     Patient Name: Clara Gonzalez  MRN: [id number]  Today's Date: 4/10/2023    Admit Date: 4/9/2023    Plan: Home   Discharge Needs Assessment     Row Name 04/10/23 1117       Living Environment    People in Home alone    Current Living Arrangements home    Primary Care Provided by self    Provides Primary Care For no one    Family Caregiver if Needed child(tila), adult    Quality of Family Relationships unable to assess    Able to Return to Prior Arrangements yes       Resource/Environmental Concerns    Resource/Environmental Concerns none       Transition Planning    Patient/Family Anticipates Transition to home    Patient/Family Anticipated Services at Transition     Transportation Anticipated family or friend will provide       Discharge Needs Assessment    Readmission Within the Last 30 Days no previous admission in last 30 days    Equipment Currently Used at Home none    Concerns to be Addressed discharge planning    Anticipated Changes Related to Illness none    Equipment Needed After Discharge none               Discharge Plan     Row Name 04/10/23 1117       Plan    Plan Home    Patient/Family in Agreement with Plan yes    Plan Comments Spoke with patient's daughter by phone to initiate discharge planning.  Patient lives alone in [facility].  She is independent with ADL's.  She has no DME at home and is not current with home health.  Her PCP is Velia James.  She does not have an advanced directive.  Ms. Gonzalez has RX coverage and has her scripts filled at [facility].  Her plan is to return home at discharge.  No needs noted at this time.  CM will continue to follow.    Final Discharge Disposition Code 01 - home or self-care              Continued Care and Services - Admitted Since 4/9/2023    Coordination has not been started for this encounter.       Expected Discharge Date and Time     Expected Discharge Date Expected Discharge Time    Apr 12, 2023           Demographic Summary     Row Name 04/10/23 1116       General Information    Admission Type observation    Arrived From emergency department    Referral Source admission list    Reason for Consult discharge planning    Preferred Language English               Functional Status     Row Name 04/10/23 1117       Functional Status    Usual Activity Tolerance moderate    Current Activity Tolerance moderate       Functional Status, IADL    Medications independent    Meal Preparation independent    Housekeeping independent    Laundry independent    Shopping independent               Psychosocial    No documentation.                Abuse/Neglect    No documentation.                Legal    No documentation.                Substance Abuse    No documentation.                Patient Forms    No documentation.                   Liane Quintanilla RN

## 2023-04-11 VITALS
WEIGHT: 135 LBS | DIASTOLIC BLOOD PRESSURE: 75 MMHG | HEIGHT: 64 IN | BODY MASS INDEX: 23.05 KG/M2 | HEART RATE: 86 BPM | TEMPERATURE: 96.8 F | SYSTOLIC BLOOD PRESSURE: 145 MMHG | RESPIRATION RATE: 16 BRPM | OXYGEN SATURATION: 96 %

## 2023-04-11 PROCEDURE — G0378 HOSPITAL OBSERVATION PER HR: HCPCS

## 2023-04-11 RX ORDER — DEXTROMETHORPHAN POLISTIREX 30 MG/5ML
60 SUSPENSION ORAL EVERY 12 HOURS SCHEDULED
Qty: 148 ML | Refills: 0 | Status: SHIPPED | OUTPATIENT
Start: 2023-04-11

## 2023-04-11 RX ORDER — ONDANSETRON 4 MG/1
4 TABLET, FILM COATED ORAL EVERY 8 HOURS PRN
Qty: 25 TABLET | Refills: 0 | Status: SHIPPED | OUTPATIENT
Start: 2023-04-11

## 2023-04-11 RX ADMIN — Medication 10 ML: at 08:48

## 2023-04-11 NOTE — CASE MANAGEMENT/SOCIAL WORK
Case Management Discharge Note      Final Note: Spoke with patient by phone.  Her plan is still to return home at discharge.  No needs noted.         Selected Continued Care - Admitted Since 4/9/2023     Destination    No services have been selected for the patient.              Durable Medical Equipment    No services have been selected for the patient.              Dialysis/Infusion    No services have been selected for the patient.              Home Medical Care    No services have been selected for the patient.              Therapy    No services have been selected for the patient.              Community Resources    No services have been selected for the patient.              Community & DME    No services have been selected for the patient.                       Final Discharge Disposition Code: 01 - home or self-care

## 2023-04-11 NOTE — DISCHARGE SUMMARY
[facility] Medicine Services  DISCHARGE SUMMARY    Patient Name: Clara Gonzalez  : 1955  MRN: [id number]    Date of Admission: 2023 11:46 AM  Date of Discharge:  2023  Primary Care Physician: Velia James APRN    Consults     Date and Time Order Name Status Description    2023 11:49 AM Inpatient Neurology Consult Stroke Completed           Hospital Course     Presenting Problem:   Unresponsive episode [R41.89]    Active Hospital Problems    Diagnosis  POA   • **Unresponsive episode [R41.89]  Yes      Resolved Hospital Problems   No resolved problems to display.          Hospital Course:  Clara Gonzalez is a 68 y.o. female who was in [place] on  when she felt lightheaded, put her head down, thought she would get better, but instead lost consciousness and awoke on the floor with churchmates stadning over her.  She was brought to hospital where she initially had a stroke workup.  She tested positive for COVID.      Syncope  - due to dehydration, orthostasis, COVID infection  - improved.    - MRI brain and CTA head/neck unremarkable     COVID infection  Diarrhea  - Remains on RA. No dyspnea.  Clear CT chest w contrast   - no COVID treatment given   - she is eating better and her diarrhea has improved       Discharge Follow Up Recommendations for outpatient labs/diagnostics:   *FU with PT     Day of Discharge     HPI:   Feels much better.  Diarrhea resolved after imodium    Review of Systems  Eating and drinking and has gotten up    Vital Signs:   Temp:  [96.8 °F (36 °C)-99.1 °F (37.3 °C)] 96.8 °F (36 °C)  Heart Rate:  [63-76] 63  Resp:  [16] 16  BP: (120-183)/(76-88) 147/81      Physical Exam:  Gen:  WD/WN  Neuro: alert and oriented, clear speech, follows commands, grossly nonfocal  HEENT:  NC/AT PERRL, OP benign  Neck:  Supple, no LAD  Heart RRR no murmur, rub, or gallop  Abd:  Soft, nontender, no rebound or guarding, pos BS  Extrem:  No  c/c/e      Pertinent  and/or Most Recent Results     LAB RESULTS:      Lab 04/10/23  0457 04/09/23 1338 04/09/23  1159 04/09/23  1157   WBC 6.93 11.65*  --   --    HEMOGLOBIN 11.9* 13.5  --   --    HEMOGLOBIN, POC  --   --  13.9  --    HEMATOCRIT 37.1 41.2  --   --    HEMATOCRIT POC  --   --  41  --    PLATELETS 264 284  --   --    NEUTROS ABS  --  9.59*  --   --    IMMATURE GRANS (ABS)  --  0.04  --   --    LYMPHS ABS  --  1.19  --   --    MONOS ABS  --  0.71  --   --    EOS ABS  --  0.06  --   --    MCV 96.6 96.3  --   --    SED RATE 16  --   --   --    CRP 0.63*  --   --   --    PROCALCITONIN  --  0.12  --   --    PROTIME  --   --   --  13.3   APTT  --  29.9  --   --    D DIMER QUANT  --  0.68*  --   --          Lab 04/10/23  0457 04/09/23 1338 04/09/23  1159   SODIUM 138  --   --    POTASSIUM 3.5  --   --    CHLORIDE 105  --   --    CO2 24.0  --   --    ANION GAP 9.0  --   --    BUN 11  --   --    CREATININE 0.77  --  0.90   EGFR 84.7  --  70.2   GLUCOSE 154*  --   --    CALCIUM 8.8  --   --    MAGNESIUM  --  2.3  --    TSH  --  3.560  --          Lab 04/10/23  0457 04/09/23  1338   TOTAL PROTEIN 6.1  --    ALBUMIN 4.1  --    GLOBULIN 2.0  --    ALT (SGPT) 35* 45*   AST (SGOT) 24 35*   BILIRUBIN 0.4  --    ALK PHOS 119*  --          Lab 04/09/23 1338 04/09/23  1157   HSTROP T 8  --    PROTIME  --  13.3   INR  --  1.1                 Brief Urine Lab Results  (Last result in the past 365 days)      Color   Clarity   Blood   Leuk Est   Nitrite   Protein   CREAT   Urine HCG        04/09/23 1241 Yellow   Clear   Trace   Negative   Negative   Negative               Microbiology Results (last 10 days)     Procedure Component Value - Date/Time    COVID-19 and FLU A/B PCR - Swab, Nasopharynx [[id number]]  (Abnormal) Collected: 04/09/23 1227    Lab Status: Final result Specimen: Swab from Nasopharynx Updated: 04/09/23 1312     COVID19 Detected     Influenza A PCR Not Detected     Influenza B PCR Not Detected     Narrative:      Fact sheet for providers: https://www.fda.gov/media/975100/download    Fact sheet for patients: https://www.fda.gov/media/493135/download    Test performed by PCR.  Influenza A and Influenza B negative results should be considered presumptive in samples that have a positive SARS-CoV-2 result.    Competitive inhibition studies showed that SARS-CoV-2 virus, when present at concentrations above 3.6E+04 copies/mL, can inhibit the detection and amplification of influenza A and influenza B virus RNA if present at or below 1.8E+02 copies/mL or 4.9E+02 copies/mL, respectively, and may lead to false negative influenza virus results. If co-infection with influenza A or influenza B virus is suspected in samples with a positive SARS-CoV-2 result, the sample should be re-tested with another FDA cleared, approved, or authorized influenza test, if influenza virus detection would change clinical management.          CT Angiogram Neck    Result Date: 4/9/2023  CT ANGIOGRAM NECK, CT ANGIOGRAM HEAD W AI ANALYSIS OF LVO Date of Exam: 4/9/2023 11:54 AM EDT Indication: Neuro deficit, acute stroke suspected. Comparison: Same day CT head Technique: CTA of the head and neck was performed before and after the uneventful intravenous administration of 115 mL Isovue-370. Reconstructed coronal and sagittal images were also obtained. In addition, a 3-D volume rendered image was created for interpretation. Automated exposure control and iterative reconstruction methods were used. Findings: Mild motion degradation. Anterior circulation: Common and internal carotid arteries appear patent. Anterior cerebral arteries appear patent. Anterior communicating artery is seen. Middle cerebral arteries appear patent through the M2 division. No evidence of aneurysm. Posterior circulation: Vertebral arteries appear patent though the proximal left vertebral artery is not well visualized due to adjacent contrast within the veins. Diminutive distal  left V4. Posterior-inferior and superior cerebellar arteries are seen. Anterior-inferior cerebellar arteries are not well seen which may be technical. Fetal origin in the bilateral posterior cerebral arteries. Mild narrowing of the left posterior cerebral artery. The posterior communicating arteries appear patent. No evidence of aneurysm. Prominence of the basilar tip is likely due to branch vessel infundibula. Venous structures: Dural venous sinuses and central cerebral veins appear grossly patent. Internal jugular veins appear grossly patent. Bones: Disc degenerative disease most advanced at C6-7. No evidence of acute fracture. No high-grade bony canal or foraminal stenosis. Soft tissues: No abnormal brain parenchymal enhancement. Aerodigestive tract appears grossly patent. Thyroid is within normal limits. Visualized thoracic vessels are grossly patent. Lung apices are grossly clear. Bilateral dependent atelectasis.     Impression: No evidence of flow-limiting stenosis or occlusion of the major vessels of the head and neck. The proximal vertebral arteries not well visualized due to streak artifact from adjacent contrast bolus in the veins. Electronically Signed: Daljit Stern  4/9/2023 12:39 PM EDT  Workstation ID: [id number]    CT Chest With Contrast Diagnostic    Result Date: 4/9/2023  CT CHEST W CONTRAST DIAGNOSTIC Date of Exam: 4/9/2023 11:54 AM EDT Indication: Chest pain, nonspecific. Comparison: 4/5/2022 Technique: Axial CT images were obtained of the chest after the uneventful intravenous administration of intravenous breast.  Reconstructed coronal and sagittal images were also obtained. Automated exposure control and iterative construction methods were  used. Findings: Pulmonary Arteries: Adequately opacified. No emboli demonstrated Susan/mediastinum: Thoracic aorta normal in caliber with no intimal flap. No definite coronary calcification. No pericardial effusion. No adenopathy. Small hiatal hernia  Lungs/pleura: Lungs clear. No pleural effusion or pneumothorax Upper Abdomen: Unremarkable Bones/soft tissues: No acute bony abnormality     No evidence of pulmonary embolism. No acute cardiopulmonary process demonstrated Electronically Signed: Brett Fernández  4/9/2023 12:37 PM EDT  Workstation ID: [id number]    MRI Brain Without Contrast    Result Date: 4/9/2023  MRI BRAIN WO CONTRAST Date of Exam: 4/9/2023 4:10 PM EDT Indication: Neuro deficit, acute, stroke suspected.  Comparison: Same day CT stroke, 10/20/2022 MRI brain Technique:  Routine multiplanar/multisequence sequence images of the brain were obtained without contrast administration. Findings: Parenchyma: No evidence of acute infarct. No evidence of hemorrhage. Midline structures appear grossly intact. No midline shift or herniation. Normal parenchymal volume. Minimal scattered periventricular and subcortical FLAIR hyperintensities, nonspecific, most commonly associated with chronic small vessel ischemic changes. Ventricles and extra-axial spaces: Normal caliber the ventricles and sulci. No extra-axial fluid collection seen. Other: Normal calvarial marrow signal. Intracranial flow voids appear grossly intact. Scattered paranasal sinus mucosal thickening. Trace bilateral mastoid effusions. Bilateral lens replacements.     Impression: No evidence of acute intracranial abnormality. Electronically Signed: Daljit Stern  4/9/2023 4:38 PM EDT  Workstation ID: [id number]    CT Head Without Contrast Stroke Protocol    Result Date: 4/9/2023  CT HEAD WO CONTRAST STROKE PROTOCOL Date of Exam: 4/9/2023 11:51 AM EDT Indication: Neuro deficit, acute, stroke suspected Neuro deficit, acute stroke suspected. Comparison: 10/20/2022 Technique: Axial CT images were obtained of the head without contrast administration.  Reconstructed coronal and sagittal images were also obtained. Automated exposure control and iterative construction methods were used. Scan Time:  11:50 AM  Results discussed with the stroke team at 12:07 PM. Findings: Parenchyma:No acute intraparenchymal hemorrhage. Mild focal asymmetric hypodensity within the right cerebellum. This appears new from prior exam. Normal parenchymal volume. No substantial white matter disease. No midline shift or herniation. Ventricles and extra axial spaces:Normal caliber of ventricles and sulci. No extra axial fluid collection seen. Other:Lens replacements. Paranasal sinuses are clear. Mastoid air cells are clear. Calvarium is intact. No substantial intracranial atherosclerotic calcification.     Impression: Mild focal asymmetric hypodensity within the right cerebellum which appears new from prior examination. While this may be due to volume averaging or streak artifact from bone, infarct cannot be fully excluded. Consider MRI to further evaluate. Electronically Signed: Daljit Stern  4/9/2023 12:15 PM EDT  Workstation ID: [id number]    CT Angiogram Head w AI Analysis of LVO    Result Date: 4/9/2023  CT ANGIOGRAM NECK, CT ANGIOGRAM HEAD W AI ANALYSIS OF LVO Date of Exam: 4/9/2023 11:54 AM EDT Indication: Neuro deficit, acute stroke suspected. Comparison: Same day CT head Technique: CTA of the head and neck was performed before and after the uneventful intravenous administration of 115 mL Isovue-370. Reconstructed coronal and sagittal images were also obtained. In addition, a 3-D volume rendered image was created for interpretation. Automated exposure control and iterative reconstruction methods were used. Findings: Mild motion degradation. Anterior circulation: Common and internal carotid arteries appear patent. Anterior cerebral arteries appear patent. Anterior communicating artery is seen. Middle cerebral arteries appear patent through the M2 division. No evidence of aneurysm. Posterior circulation: Vertebral arteries appear patent though the proximal left vertebral artery is not well visualized due to adjacent contrast within the  veins. Diminutive distal left V4. Posterior-inferior and superior cerebellar arteries are seen. Anterior-inferior cerebellar arteries are not well seen which may be technical. Fetal origin in the bilateral posterior cerebral arteries. Mild narrowing of the left posterior cerebral artery. The posterior communicating arteries appear patent. No evidence of aneurysm. Prominence of the basilar tip is likely due to branch vessel infundibula. Venous structures: Dural venous sinuses and central cerebral veins appear grossly patent. Internal jugular veins appear grossly patent. Bones: Disc degenerative disease most advanced at C6-7. No evidence of acute fracture. No high-grade bony canal or foraminal stenosis. Soft tissues: No abnormal brain parenchymal enhancement. Aerodigestive tract appears grossly patent. Thyroid is within normal limits. Visualized thoracic vessels are grossly patent. Lung apices are grossly clear. Bilateral dependent atelectasis.     Impression: No evidence of flow-limiting stenosis or occlusion of the major vessels of the head and neck. The proximal vertebral arteries not well visualized due to streak artifact from adjacent contrast bolus in the veins. Electronically Signed: Daljit Stern  4/9/2023 12:39 PM EDT  Workstation ID: [id number]    CT CEREBRAL PERFUSION WITH & WITHOUT CONTRAST    Result Date: 4/9/2023  CT CEREBRAL PERFUSION W WO CONTRAST Date of Exam: 4/9/2023 11:54 AM EDT Indication: Neuro deficit, acute stroke suspected.  Comparison: None available. Technique: Axial CT images of the brain were obtained prior to and after the administration of 115 mL Isovue-370. Core blood volume, core blood flow, mean transit time, and Tmax images were obtained utilizing the Rapid software protocol. A limited CT angiogram of the head was also performed to measure the blood vessel density. The radiation dose reduction device was turned on for each scan per the ALARA (As Low as Reasonably Achievable)  protocol. Findings: Symmetric CBV and CBF. No substantially elevated Tmax. There is a slightly elevated T masses in the right distal MCA distribution with a volume of 71 mL.     Impression: No CT perfusion evidence of infarct or ischemia. There is slightly elevated Tmax in the right distal MCA distribution which may reflect hypoperfusion. Findings discussed with stroke team by Dr. Daljit Stern via telephone on 4/9/2023 12:23 PM EDT. Electronically Signed: Daljit Stern  4/9/2023 12:25 PM EDT  Workstation ID: [id number]              Results for orders placed during the hospital encounter of 07/02/21    Adult Transthoracic Echo Complete W/ Cont if Necessary Per Protocol    Interpretation Summary  · Left ventricular ejection fraction appears to be 56 - 60%. Left ventricular systolic function is normal.  · Mild to moderate aortic valve regurgitation is present.      Discharge Details        Discharge Medications      New Medications      Instructions Start Date   dextromethorphan polistirex ER 30 MG/5ML Suspension Extended Release oral suspension  Commonly known as: Delsym   60 mg, Oral, Every 12 Hours Scheduled      ondansetron 4 MG tablet  Commonly known as: Zofran   4 mg, Oral, Every 8 Hours PRN         Continue These Medications      Instructions Start Date   hydrOXYzine 10 MG tablet  Commonly known as: ATARAX   1-2 mg, Oral, 3 Times Daily PRN, Take 1 to 2 tablets three times a day as needed      meclizine 12.5 MG tablet  Commonly known as: ANTIVERT   12.5 mg, Oral, 3 Times Daily PRN      metoprolol succinate XL 50 MG 24 hr tablet  Commonly known as: TOPROL-XL   50 mg, Oral, Daily      metroNIDAZOLE 0.75 % cream  Commonly known as: METROCREAM   1 application, Topical, Apply a thin layer to face every night if tolerated after 2 weeks increase to twice a day.      ondansetron ODT 4 MG disintegrating tablet  Commonly known as: ZOFRAN-ODT   4 mg, Translingual, 4 Times Daily PRN             No Known  Allergies      Discharge Disposition:  Home or Self Care    Diet:  Hospital:  Diet Order   Procedures   • Diet: Regular/House Diet; Texture: Regular Texture (IDDSI 7); Fluid Consistency: Thin (IDDSI 0)       Activity:  Isolate x 5 days total          CODE STATUS:    Code Status and Medical Interventions:   Ordered at: 04/09/23 1611     Level Of Support Discussed With:    Patient     Code Status (Patient has no pulse and is not breathing):    CPR (Attempt to Resuscitate)     Medical Interventions (Patient has pulse or is breathing):    Full Support       Future Appointments   Date Time Provider Department Center   9/11/2023  9:00 AM [facility] FOLLOW-UP  [facility]                 Crys Simeon MD  04/11/23      Time Spent on Discharge:  I spent  35  minutes on this discharge activity which included: face-to-face encounter with the patient, reviewing the data in the system, coordination of the care with the nursing staff as well as consultants, documentation, and entering orders.

## 2023-07-25 ENCOUNTER — TRANSCRIBE ORDERS (OUTPATIENT)
Dept: ADMINISTRATIVE | Facility: HOSPITAL | Age: 68
End: 2023-07-25
Payer: COMMERCIAL

## 2023-07-25 DIAGNOSIS — Z13.820 SCREENING FOR OSTEOPOROSIS: ICD-10-CM

## 2023-07-25 DIAGNOSIS — Z78.0 MENOPAUSE: ICD-10-CM

## 2023-09-04 ENCOUNTER — APPOINTMENT (OUTPATIENT)
Dept: CT IMAGING | Facility: HOSPITAL | Age: 68
End: 2023-09-04
Payer: MEDICARE

## 2023-09-04 ENCOUNTER — HOSPITAL ENCOUNTER (EMERGENCY)
Facility: HOSPITAL | Age: 68
Discharge: HOME | End: 2023-09-04
Attending: EMERGENCY MEDICINE | Admitting: EMERGENCY MEDICINE
Payer: MEDICARE

## 2023-09-04 VITALS
HEIGHT: 60 IN | RESPIRATION RATE: 16 BRPM | TEMPERATURE: 98.2 F | WEIGHT: 140 LBS | BODY MASS INDEX: 27.48 KG/M2 | HEART RATE: 90 BPM | SYSTOLIC BLOOD PRESSURE: 167 MMHG | DIASTOLIC BLOOD PRESSURE: 120 MMHG | OXYGEN SATURATION: 98 %

## 2023-09-04 DIAGNOSIS — S20.229A CONTUSION OF BACK, UNSPECIFIED LATERALITY, INITIAL ENCOUNTER: ICD-10-CM

## 2023-09-04 DIAGNOSIS — S10.93XA CONTUSION OF NECK, INITIAL ENCOUNTER: ICD-10-CM

## 2023-09-04 DIAGNOSIS — S09.90XA INJURY OF HEAD, INITIAL ENCOUNTER: Primary | ICD-10-CM

## 2023-09-04 DIAGNOSIS — R91.1 PULMONARY NODULE: ICD-10-CM

## 2023-09-04 PROCEDURE — 70450 CT HEAD/BRAIN W/O DYE: CPT

## 2023-09-04 PROCEDURE — 72128 CT CHEST SPINE W/O DYE: CPT

## 2023-09-04 PROCEDURE — 99284 EMERGENCY DEPT VISIT MOD MDM: CPT

## 2023-09-04 PROCEDURE — 72125 CT NECK SPINE W/O DYE: CPT

## 2023-09-04 NOTE — ED PROVIDER NOTES
EMERGENCY DEPARTMENT ENCOUNTER    Pt Name: Clara Gonzalez  MRN: [id number]  Pt :   1955  Room Number:  RW5/R5  Date of encounter:  2023  PCP: Velia James APRN  ED Provider: MEI Landin    Historian: Patient    HPI:  Chief Complaint: Head, back injury.      Context: Clara Gonzalez is a 68 y.o. female who presents to the ED c/o head, back injury.  Patient was moving a  yesterday.  Patient explains that she took the cough of a hammer to assist with moving the  she went flying backwards landing on the grass.  She hit her buttock then her back in her head.  She denies any loss of consciousness.  She complains of continued discomfort to her posterior scalp, neck and back.  She denies any visual disturbance.  Negative for nausea, vomiting.  HPI     REVIEW OF SYSTEMS  A chief complaint appropriate review of systems was completed and is negative except as noted in the HPI.     PAST MEDICAL HISTORY  Past Medical History:   Diagnosis Date    Anxiety     Essential hypertension 2021    GERD without esophagitis 2019       PAST SURGICAL HISTORY  Past Surgical History:   Procedure Laterality Date    BREAST CYST ASPIRATION Left 2005    CHOLECYSTECTOMY      COLONOSCOPY      HYSTERECTOMY      OOPHORECTOMY         FAMILY HISTORY  Family History   Problem Relation Age of Onset    Breast cancer Mother 80    Ovarian cancer Neg Hx     Colon cancer Neg Hx     Colon polyps Neg Hx     Esophageal cancer Neg Hx        SOCIAL HISTORY  Social History     Socioeconomic History    Marital status:    Tobacco Use    Smoking status: Never    Smokeless tobacco: Never   Vaping Use    Vaping Use: Never used   Substance and Sexual Activity    Alcohol use: Never    Drug use: Never    Sexual activity: Defer       ALLERGIES  Patient has no known allergies.    PHYSICAL EXAM  Physical Exam  Vitals and nursing note reviewed.   Constitutional:       General: She is not in acute distress.      Appearance: Normal appearance. She is not ill-appearing.   HENT:      Head: Normocephalic and atraumatic.      Nose: Nose normal.      Mouth/Throat:      Mouth: Mucous membranes are moist.   Eyes:      Extraocular Movements: Extraocular movements intact.      Pupils: Pupils are equal, round, and reactive to light.   Cardiovascular:      Rate and Rhythm: Normal rate and regular rhythm.      Pulses: Normal pulses.      Heart sounds: Normal heart sounds.   Pulmonary:      Effort: Pulmonary effort is normal.      Breath sounds: Normal breath sounds.   Musculoskeletal:         General: Tenderness present.      Cervical back: Normal. Tenderness (posterior neck) present. Decreased range of motion.      Thoracic back: Tenderness present. Decreased range of motion.      Lumbar back: No tenderness. Normal range of motion.   Skin:     General: Skin is warm and dry.   Neurological:      General: No focal deficit present.      Mental Status: She is alert and oriented to person, place, and time. Mental status is at baseline.   Psychiatric:         Mood and Affect: Mood normal.         Behavior: Behavior normal.         LAB RESULTS  Results for orders placed or performed during the hospital encounter of 04/09/23   COVID-19 and FLU A/B PCR - Swab, Nasopharynx    Specimen: Nasopharynx; Swab   Result Value Ref Range    COVID19 Detected (C) Not Detected - Ref. Range    Influenza A PCR Not Detected Not Detected    Influenza B PCR Not Detected Not Detected   Single High Sensitivity Troponin T    Specimen: Blood   Result Value Ref Range    HS Troponin T 8 <10 ng/L   aPTT    Specimen: Blood   Result Value Ref Range    PTT 29.9 22.0 - 39.0 seconds   AST    Specimen: Blood   Result Value Ref Range    AST (SGOT) 35 (H) 1 - 32 U/L   ALT    Specimen: Blood   Result Value Ref Range    ALT (SGPT) 45 (H) 1 - 33 U/L   CBC Auto Differential    Specimen: Blood   Result Value Ref Range    WBC 11.65 (H) 3.40 - 10.80 10*3/mm3    RBC 4.28 3.77 - 5.28  10*6/mm3    Hemoglobin 13.5 12.0 - 15.9 g/dL    Hematocrit 41.2 34.0 - 46.6 %    MCV 96.3 79.0 - 97.0 fL    MCH 31.5 26.6 - 33.0 pg    MCHC 32.8 31.5 - 35.7 g/dL    RDW 13.1 12.3 - 15.4 %    RDW-SD 46.6 37.0 - 54.0 fl    MPV 9.2 6.0 - 12.0 fL    Platelets 284 140 - 450 10*3/mm3    Neutrophil % 82.4 (H) 42.7 - 76.0 %    Lymphocyte % 10.2 (L) 19.6 - 45.3 %    Monocyte % 6.1 5.0 - 12.0 %    Eosinophil % 0.5 0.3 - 6.2 %    Basophil % 0.5 0.0 - 1.5 %    Immature Grans % 0.3 0.0 - 0.5 %    Neutrophils, Absolute 9.59 (H) 1.70 - 7.00 10*3/mm3    Lymphocytes, Absolute 1.19 0.70 - 3.10 10*3/mm3    Monocytes, Absolute 0.71 0.10 - 0.90 10*3/mm3    Eosinophils, Absolute 0.06 0.00 - 0.40 10*3/mm3    Basophils, Absolute 0.06 0.00 - 0.20 10*3/mm3    Immature Grans, Absolute 0.04 0.00 - 0.05 10*3/mm3    nRBC 0.0 0.0 - 0.2 /100 WBC   Urinalysis With Culture If Indicated - Straight Cath    Specimen: Straight Cath; Urine   Result Value Ref Range    Color, UA Yellow Yellow, Straw    Appearance, UA Clear Clear    pH, UA 7.5 5.0 - 8.0    Specific Gravity, UA 1.055 (H) 1.001 - 1.030    Glucose, UA Negative Negative    Ketones, UA Negative Negative    Bilirubin, UA Negative Negative    Blood, UA Trace (A) Negative    Protein, UA Negative Negative    Leuk Esterase, UA Negative Negative    Nitrite, UA Negative Negative    Urobilinogen, UA 0.2 E.U./dL 0.2 - 1.0 E.U./dL   D-dimer, Quantitative    Specimen: Blood   Result Value Ref Range    D-Dimer, Quantitative 0.68 (H) 0.00 - 0.67 MCGFEU/mL   Procalcitonin    Specimen: Blood   Result Value Ref Range    Procalcitonin 0.12 0.00 - 0.25 ng/mL   Acetaminophen Level    Specimen: Blood   Result Value Ref Range    Acetaminophen <5.0 0.0 - 30.0 mcg/mL   Ethanol    Specimen: Blood   Result Value Ref Range    Ethanol <10 0 - 10 mg/dL   Salicylate Level    Specimen: Blood   Result Value Ref Range    Salicylate <0.3 <=30.0 mg/dL   TSH    Specimen: Blood   Result Value Ref Range    TSH 3.560 0.270 - 4.200  uIU/mL   Magnesium    Specimen: Blood   Result Value Ref Range    Magnesium 2.3 1.6 - 2.4 mg/dL   Prolactin    Specimen: Blood   Result Value Ref Range    Prolactin 15.70 4.79 - 23.30 ng/mL   Urinalysis, Microscopic Only - Straight Cath    Specimen: Straight Cath; Urine   Result Value Ref Range    RBC, UA 0-2 None Seen, 0-2 /HPF    WBC, UA 0-2 None Seen, 0-2 /HPF    Bacteria, UA None Seen None Seen, Trace /HPF    Squamous Epithelial Cells, UA 0-2 None Seen, 0-2 /HPF    Hyaline Casts, UA 0-6 0 - 6 /LPF    Methodology Automated Microscopy    Urine Drug Screen - Straight Cath    Specimen: Straight Cath; Urine   Result Value Ref Range    THC, Screen, Urine Negative Negative    Phencyclidine (PCP), Urine Negative Negative    Cocaine Screen, Urine Negative Negative    Methamphetamine, Ur Negative Negative    Opiate Screen Negative Negative    Amphetamine Screen, Urine Negative Negative    Benzodiazepine Screen, Urine Negative Negative    Tricyclic Antidepressants Screen Negative Negative    Methadone Screen, Urine Negative Negative    Barbiturates Screen, Urine Negative Negative    Oxycodone Screen, Urine Negative Negative    Propoxyphene Screen Negative Negative    Buprenorphine, Screen, Urine Negative Negative   CBC (No Diff)    Specimen: Blood   Result Value Ref Range    WBC 6.93 3.40 - 10.80 10*3/mm3    RBC 3.84 3.77 - 5.28 10*6/mm3    Hemoglobin 11.9 (L) 12.0 - 15.9 g/dL    Hematocrit 37.1 34.0 - 46.6 %    MCV 96.6 79.0 - 97.0 fL    MCH 31.0 26.6 - 33.0 pg    MCHC 32.1 31.5 - 35.7 g/dL    RDW 13.2 12.3 - 15.4 %    RDW-SD 47.3 37.0 - 54.0 fl    MPV 9.4 6.0 - 12.0 fL    Platelets 264 140 - 450 10*3/mm3   Comprehensive Metabolic Panel    Specimen: Blood   Result Value Ref Range    Glucose 154 (H) 65 - 99 mg/dL    BUN 11 8 - 23 mg/dL    Creatinine 0.77 0.57 - 1.00 mg/dL    Sodium 138 136 - 145 mmol/L    Potassium 3.5 3.5 - 5.2 mmol/L    Chloride 105 98 - 107 mmol/L    CO2 24.0 22.0 - 29.0 mmol/L    Calcium 8.8 8.6 - 10.5  mg/dL    Total Protein 6.1 6.0 - 8.5 g/dL    Albumin 4.1 3.5 - 5.2 g/dL    ALT (SGPT) 35 (H) 1 - 33 U/L    AST (SGOT) 24 1 - 32 U/L    Alkaline Phosphatase 119 (H) 39 - 117 U/L    Total Bilirubin 0.4 0.0 - 1.2 mg/dL    Globulin 2.0 gm/dL    A/G Ratio 2.1 g/dL    BUN/Creatinine Ratio 14.3 7.0 - 25.0    Anion Gap 9.0 5.0 - 15.0 mmol/L    eGFR 84.7 >60.0 mL/min/1.73   Sedimentation Rate    Specimen: Blood   Result Value Ref Range    Sed Rate 16 0 - 30 mm/hr   C-reactive Protein    Specimen: Blood   Result Value Ref Range    C-Reactive Protein 0.63 (H) 0.00 - 0.50 mg/dL   POC Protime / INR    Specimen: Blood   Result Value Ref Range    Protime 13.3 12.8 - 15.2 seconds    INR 1.1 0.8 - 1.2   POC CHEM 8    Specimen: Blood   Result Value Ref Range    Glucose 158 (H) 70 - 130 mg/dL    BUN 14 8 - 26 mg/dL    Creatinine 0.90 0.60 - 1.30 mg/dL    Sodium 138 138 - 146 mmol/L    POC Potassium 4.3 3.5 - 4.9 mmol/L    Chloride 103 98 - 109 mmol/L    Total CO2 24 24 - 29 mmol/L    Hemoglobin 13.9 12.0 - 17.0 g/dL    Hematocrit 41 38 - 51 %    Ionized Calcium 1.21 1.20 - 1.32 mmol/L    eGFR 70.2 >60.0 mL/min/1.73   ECG 12 Lead ED Triage Standing Order; Acute Stroke (Onset <24 hrs)   Result Value Ref Range    QT Interval 414 ms    QTC Interval 471 ms   Green Top (Gel)   Result Value Ref Range    Extra Tube Hold for add-ons.    Lavender Top   Result Value Ref Range    Extra Tube hold for add-on    Gold Top - SST   Result Value Ref Range    Extra Tube Hold for add-ons.    Gray Top   Result Value Ref Range    Extra Tube Hold for add-ons.    Light Blue Top   Result Value Ref Range    Extra Tube Hold for add-ons.        If labs were ordered, I independently reviewed the results and considered them in treating the patient.    RADIOLOGY  CT Head Without Contrast   Final Result   Impression:   No evidence of traumatic injury of the head, cervical spine or thoracic spine.      Incidental 4 mm nodule in the right middle lobe. Recommend  comparison with an outside study for evaluation of temporal stability. If not available, per Fleischner Society guidelines for an incidentally found single solid nodule measuring 6 mm and less,    no follow-up is needed if the patient is considered at low risk for lung cancer. If the patient is considered to be high-risk at for lung cancer, then a low-dose follow-up CT in 12 months can be considered.               Electronically Signed: Daljit Stern MD     9/4/2023 12:13 PM EDT     Workstation ID: [id number]      CT Cervical Spine Without Contrast   Final Result   Impression:   No evidence of traumatic injury of the head, cervical spine or thoracic spine.      Incidental 4 mm nodule in the right middle lobe. Recommend comparison with an outside study for evaluation of temporal stability. If not available, per Fleischner Society guidelines for an incidentally found single solid nodule measuring 6 mm and less,    no follow-up is needed if the patient is considered at low risk for lung cancer. If the patient is considered to be high-risk at for lung cancer, then a low-dose follow-up CT in 12 months can be considered.               Electronically Signed: Daljit Stern MD     9/4/2023 12:13 PM EDT     Workstation ID: [id number]      CT Thoracic Spine Without Contrast   Final Result   Impression:   No evidence of traumatic injury of the head, cervical spine or thoracic spine.      Incidental 4 mm nodule in the right middle lobe. Recommend comparison with an outside study for evaluation of temporal stability. If not available, per Fleischner Society guidelines for an incidentally found single solid nodule measuring 6 mm and less,    no follow-up is needed if the patient is considered at low risk for lung cancer. If the patient is considered to be high-risk at for lung cancer, then a low-dose follow-up CT in 12 months can be considered.               Electronically Signed: Daljit Stern MD     9/4/2023 12:13 PM EDT      Workstation ID: [id number]        [x] Radiologist's Report Reviewed:  I ordered and independently reviewed the above noted radiographic studies.  See radiologist's dictation for official interpretation.      PROCEDURES    Procedures    No orders to display       MEDICATIONS GIVEN IN ER    Medications - No data to display    MEDICAL DECISION MAKING, PROGRESS, and CONSULTS   Medical Decision Making  Clara Gonzalez is a 68 y.o. female who presents to the ED c/o head, back injury.  Patient was moving a  yesterday.  Patient explains that she took the cough of a hammer to assist with moving the  she went flying backwards landing on the grass.  She hit her buttock then her back in her head.  She denies any loss of consciousness.  She complains of continued discomfort to her posterior scalp, neck and back.  She denies any visual disturbance.  Negative for nausea, vomiting.      Problems Addressed:  Contusion of back, unspecified laterality, initial encounter: complicated acute illness or injury     Details: CT imaging revealed no acute findings.  Contusion of neck, initial encounter: complicated acute illness or injury     Details: CT imaging of the cervical spine revealed no acute findings.  Injury of head, initial encounter: complicated acute illness or injury     Details: CT imaging of the head revealed no acute findings.  Pulmonary nodule: complicated acute illness or injury     Details: CT imaging revealed a 4 mm right middle lobe pulmonary nodule.  Patient to follow-up with the pulmonary nodule clinic.  Patient aware we recommend Ct of chest within 12 months.    Amount and/or Complexity of Data Reviewed  Radiology: ordered. Decision-making details documented in ED Course.        All labs have been independently reviewed by me.  All radiology studies have been reviewed by me and the radiologist dictating the report.  All EKG's have been independently viewed by me.    [] Discussed with radiology  regarding test interpretation:    Discussion below represents my analysis of pertinent findings related to patient's condition, differential diagnosis, treatment plan and final disposition.    Differential diagnosis:  The differential diagnosis associated with the patient's presentation includes: Sprain, contusion, fracture, ICH, postconcussive syndrome.    Additional sources  Discussed/ obtained information from independent historians:   [] Spouse  [] Parent  [] Family member  [] Friend  [] EMS   [] Other:  External (non-ED) record review:   [x] Inpatient record:   [] Office record:   [x] Outpatient record:   [x] Prior Outpatient labs:   [x] Prior Outpatient radiology:   [] Primary Care record:   [] Outside ED record:   [] Other:   Patient's care impacted by:   [] Diabetes  [] Hypertension  [] Hyperlipidemia  [] Hypothyroidism   [] Coronary Artery Disease   [] COPD   [] Cancer   [] Obesity  [x] GERD   [] Tobacco Abuse   [] Substance Abuse    [x] Anxiety   [] Depression   [] Other:   Care significantly affected by Social Determinants of Health (housing and economic circumstances, unemployment)    [] Yes     [x] No   If yes, Patient's care significantly limited by  Social Determinants of Health including:   [] Inadequate housing   [] Low income   [] Alcoholism and drug addiction in family   [] Problems related to primary support group   [] Unemployment   [] Problems related to employment   [] Other Social Determinants of Health:     Shared decision making: Shared decision making with patient.  Incidental finding includes a 4 mm pulmonary nodule.  Patient encouraged to follow-up within a year for a CT scan of her chest.  We will refer the patient to the pulmonary nodule clinic.    Orders placed during this visit:  Orders Placed This Encounter   Procedures    CT Head Without Contrast    CT Cervical Spine Without Contrast    CT Thoracic Spine Without Contrast       I considered prescription management  with:   [] Pain  medication  [] Antiviral  [] Antibiotic   [] Other:   Rationale:  Additional orders considered but not ordered:  The following testing was considered but ultimately not selected after discussion with patient/family:    ED Course:    ED Course as of 09/04/23 1732   Mon Sep 04, 2023   1224 IMPRESSION:  Impression:  No evidence of traumatic injury of the head, cervical spine or thoracic spine.     Incidental 4 mm nodule in the right middle lobe. Recommend comparison with an outside study for evaluation of temporal stability. If not available, per Fleischner Society guidelines for an incidentally found single solid nodule measuring 6 mm and less,   no follow-up is needed if the patient is considered at low risk for lung cancer. If the patient is considered to be high-risk at for lung cancer, then a low-dose follow-up CT in 12 months can be considered.         [KG]      ED Course User Index  [KG] Sri Weston, APRSHANDRA            DIAGNOSIS  Final diagnoses:   Injury of head, initial encounter   Contusion of neck, initial encounter   Contusion of back, unspecified laterality, initial encounter   Pulmonary nodule       DISPOSITION    DISCHARGE    Patient discharged in stable condition.    Reviewed implications of results, diagnosis, meds, responsibility to follow up, warning signs and symptoms of possible worsening, potential complications and reasons to return to ER.    Patient/Family voiced understanding of above instructions.    Discussed plan for discharge, as there is no emergent indication for admission.  Pt/family is agreeable and understands need for follow up and possible repeat testing.  Pt/family is aware that discharge does not mean that nothing is wrong but that it indicates no emergency is currently present that requires admission and they must continue care with follow-up as given below or with a physician of their choice.     FOLLOW-UP  Velia James, MEI  [address]  [place]  [address]  [phone]          [facility]  [address]  [place] [address]  [phone]             Medication List      No changes were made to your prescriptions during this visit.          ED Disposition       ED Disposition   Discharge    Condition   Stable    Comment   --               Please note that portions of this document were completed with voice recognition software.       Sri Weston, APRN  09/04/23 1730

## 2023-09-04 NOTE — DISCHARGE INSTRUCTIONS
CT of your head, neck and upper back are all within normal limits.  There is no indication of fracture of your back or of your skull.  There is no intracranial abnormality.    Now there is an incidental finding of a 4 mm pulmonary nodule in the right middle lobe.    I have referred you to the pulmonology nodule clinic.  Recommendation include a CT of the chest within the next year.

## 2023-09-11 ENCOUNTER — HOSPITAL ENCOUNTER (OUTPATIENT)
Dept: MAMMOGRAPHY | Facility: HOSPITAL | Age: 68
Discharge: HOME | End: 2023-09-11
Admitting: RADIOLOGY
Payer: MEDICARE

## 2023-09-11 ENCOUNTER — TRANSCRIBE ORDERS (OUTPATIENT)
Dept: ADMINISTRATIVE | Facility: HOSPITAL | Age: 68
End: 2023-09-11
Payer: COMMERCIAL

## 2023-09-11 ENCOUNTER — APPOINTMENT (OUTPATIENT)
Dept: BONE DENSITY | Facility: HOSPITAL | Age: 68
End: 2023-09-11
Payer: MEDICARE

## 2023-09-11 DIAGNOSIS — R92.8 ABNORMAL MAMMOGRAM: ICD-10-CM

## 2023-09-11 DIAGNOSIS — R92.8 ABNORMAL MAMMOGRAM: Primary | ICD-10-CM

## 2023-09-11 PROCEDURE — 77065 DX MAMMO INCL CAD UNI: CPT

## 2023-09-11 PROCEDURE — 77065 DX MAMMO INCL CAD UNI: CPT | Performed by: RADIOLOGY

## 2023-09-11 PROCEDURE — G0279 TOMOSYNTHESIS, MAMMO: HCPCS | Performed by: RADIOLOGY

## 2023-09-11 PROCEDURE — G0279 TOMOSYNTHESIS, MAMMO: HCPCS

## 2023-10-16 ENCOUNTER — APPOINTMENT (OUTPATIENT)
Dept: CT IMAGING | Facility: HOSPITAL | Age: 68
End: 2023-10-16
Payer: MEDICARE

## 2023-10-16 ENCOUNTER — HOSPITAL ENCOUNTER (EMERGENCY)
Facility: HOSPITAL | Age: 68
Discharge: HOME | End: 2023-10-16
Attending: EMERGENCY MEDICINE | Admitting: EMERGENCY MEDICINE
Payer: MEDICARE

## 2023-10-16 VITALS
TEMPERATURE: 99.1 F | RESPIRATION RATE: 18 BRPM | BODY MASS INDEX: 25.52 KG/M2 | OXYGEN SATURATION: 99 % | HEIGHT: 60 IN | HEART RATE: 61 BPM | DIASTOLIC BLOOD PRESSURE: 83 MMHG | WEIGHT: 130 LBS | SYSTOLIC BLOOD PRESSURE: 152 MMHG

## 2023-10-16 DIAGNOSIS — R10.84 ABDOMINAL CRAMPING, GENERALIZED: ICD-10-CM

## 2023-10-16 DIAGNOSIS — R19.7 DIARRHEA OF PRESUMED INFECTIOUS ORIGIN: Primary | ICD-10-CM

## 2023-10-16 DIAGNOSIS — I10 ELEVATED BLOOD PRESSURE READING WITH DIAGNOSIS OF HYPERTENSION: ICD-10-CM

## 2023-10-16 LAB
ALBUMIN SERPL-MCNC: 4.7 G/DL (ref 3.5–5.2)
ALBUMIN/GLOB SERPL: 1.5 G/DL
ALP SERPL-CCNC: 132 U/L (ref 39–117)
ALT SERPL W P-5'-P-CCNC: 29 U/L (ref 1–33)
ANION GAP SERPL CALCULATED.3IONS-SCNC: 10 MMOL/L (ref 5–15)
AST SERPL-CCNC: 27 U/L (ref 1–32)
BACTERIA UR QL AUTO: ABNORMAL /HPF
BASOPHILS # BLD AUTO: 0.07 10*3/MM3 (ref 0–0.2)
BASOPHILS NFR BLD AUTO: 1 % (ref 0–1.5)
BILIRUB SERPL-MCNC: 0.4 MG/DL (ref 0–1.2)
BILIRUB UR QL STRIP: NEGATIVE
BUN SERPL-MCNC: 11 MG/DL (ref 8–23)
BUN/CREAT SERPL: 12.5 (ref 7–25)
CALCIUM SPEC-SCNC: 9.3 MG/DL (ref 8.6–10.5)
CHLORIDE SERPL-SCNC: 105 MMOL/L (ref 98–107)
CLARITY UR: ABNORMAL
CO2 SERPL-SCNC: 27 MMOL/L (ref 22–29)
COLOR UR: YELLOW
CREAT SERPL-MCNC: 0.88 MG/DL (ref 0.57–1)
D-LACTATE SERPL-SCNC: 1.2 MMOL/L (ref 0.5–2)
DEPRECATED RDW RBC AUTO: 45.6 FL (ref 37–54)
EGFRCR SERPLBLD CKD-EPI 2021: 72.1 ML/MIN/1.73
EOSINOPHIL # BLD AUTO: 0.18 10*3/MM3 (ref 0–0.4)
EOSINOPHIL NFR BLD AUTO: 2.6 % (ref 0.3–6.2)
ERYTHROCYTE [DISTWIDTH] IN BLOOD BY AUTOMATED COUNT: 12.6 % (ref 12.3–15.4)
GLOBULIN UR ELPH-MCNC: 3.1 GM/DL
GLUCOSE SERPL-MCNC: 124 MG/DL (ref 65–99)
GLUCOSE UR STRIP-MCNC: NEGATIVE MG/DL
HCT VFR BLD AUTO: 42.9 % (ref 34–46.6)
HGB BLD-MCNC: 14 G/DL (ref 12–15.9)
HGB UR QL STRIP.AUTO: ABNORMAL
HYALINE CASTS UR QL AUTO: ABNORMAL /LPF
IMM GRANULOCYTES # BLD AUTO: 0.02 10*3/MM3 (ref 0–0.05)
IMM GRANULOCYTES NFR BLD AUTO: 0.3 % (ref 0–0.5)
KETONES UR QL STRIP: ABNORMAL
LEUKOCYTE ESTERASE UR QL STRIP.AUTO: ABNORMAL
LIPASE SERPL-CCNC: 14 U/L (ref 13–60)
LYMPHOCYTES # BLD AUTO: 1.66 10*3/MM3 (ref 0.7–3.1)
LYMPHOCYTES NFR BLD AUTO: 23.8 % (ref 19.6–45.3)
MCH RBC QN AUTO: 31.6 PG (ref 26.6–33)
MCHC RBC AUTO-ENTMCNC: 32.6 G/DL (ref 31.5–35.7)
MCV RBC AUTO: 96.8 FL (ref 79–97)
MONOCYTES # BLD AUTO: 0.62 10*3/MM3 (ref 0.1–0.9)
MONOCYTES NFR BLD AUTO: 8.9 % (ref 5–12)
NEUTROPHILS NFR BLD AUTO: 4.42 10*3/MM3 (ref 1.7–7)
NEUTROPHILS NFR BLD AUTO: 63.4 % (ref 42.7–76)
NITRITE UR QL STRIP: NEGATIVE
NRBC BLD AUTO-RTO: 0 /100 WBC (ref 0–0.2)
PH UR STRIP.AUTO: 5.5 [PH] (ref 5–8)
PLATELET # BLD AUTO: 301 10*3/MM3 (ref 140–450)
PMV BLD AUTO: 9.1 FL (ref 6–12)
POTASSIUM SERPL-SCNC: 3.5 MMOL/L (ref 3.5–5.2)
PROT SERPL-MCNC: 7.8 G/DL (ref 6–8.5)
PROT UR QL STRIP: ABNORMAL
RBC # BLD AUTO: 4.43 10*6/MM3 (ref 3.77–5.28)
RBC # UR STRIP: ABNORMAL /HPF
REF LAB TEST METHOD: ABNORMAL
SODIUM SERPL-SCNC: 142 MMOL/L (ref 136–145)
SP GR UR STRIP: 1.02 (ref 1–1.03)
SQUAMOUS #/AREA URNS HPF: ABNORMAL /HPF
UROBILINOGEN UR QL STRIP: ABNORMAL
WBC # UR STRIP: ABNORMAL /HPF
WBC NRBC COR # BLD: 6.97 10*3/MM3 (ref 3.4–10.8)

## 2023-10-16 PROCEDURE — 83690 ASSAY OF LIPASE: CPT | Performed by: EMERGENCY MEDICINE

## 2023-10-16 PROCEDURE — 25810000003 LACTATED RINGERS SOLUTION: Performed by: EMERGENCY MEDICINE

## 2023-10-16 PROCEDURE — 99285 EMERGENCY DEPT VISIT HI MDM: CPT

## 2023-10-16 PROCEDURE — 96375 TX/PRO/DX INJ NEW DRUG ADDON: CPT

## 2023-10-16 PROCEDURE — 74177 CT ABD & PELVIS W/CONTRAST: CPT

## 2023-10-16 PROCEDURE — 96374 THER/PROPH/DIAG INJ IV PUSH: CPT

## 2023-10-16 PROCEDURE — 80053 COMPREHEN METABOLIC PANEL: CPT | Performed by: EMERGENCY MEDICINE

## 2023-10-16 PROCEDURE — 83605 ASSAY OF LACTIC ACID: CPT | Performed by: EMERGENCY MEDICINE

## 2023-10-16 PROCEDURE — 36415 COLL VENOUS BLD VENIPUNCTURE: CPT

## 2023-10-16 PROCEDURE — 25010000002 ONDANSETRON PER 1 MG: Performed by: EMERGENCY MEDICINE

## 2023-10-16 PROCEDURE — 85025 COMPLETE CBC W/AUTO DIFF WBC: CPT | Performed by: EMERGENCY MEDICINE

## 2023-10-16 PROCEDURE — 25510000001 IOPAMIDOL 61 % SOLUTION: Performed by: EMERGENCY MEDICINE

## 2023-10-16 PROCEDURE — 81001 URINALYSIS AUTO W/SCOPE: CPT | Performed by: EMERGENCY MEDICINE

## 2023-10-16 RX ORDER — ONDANSETRON 2 MG/ML
4 INJECTION INTRAMUSCULAR; INTRAVENOUS ONCE
Status: COMPLETED | OUTPATIENT
Start: 2023-10-16 | End: 2023-10-16

## 2023-10-16 RX ORDER — SACCHAROMYCES BOULARDII 250 MG
250 CAPSULE ORAL 2 TIMES DAILY
Qty: 20 CAPSULE | Refills: 0 | Status: SHIPPED | OUTPATIENT
Start: 2023-10-16 | End: 2023-10-26

## 2023-10-16 RX ORDER — DICYCLOMINE HYDROCHLORIDE 10 MG/1
20 CAPSULE ORAL ONCE
Status: COMPLETED | OUTPATIENT
Start: 2023-10-16 | End: 2023-10-16

## 2023-10-16 RX ORDER — SODIUM CHLORIDE 0.9 % (FLUSH) 0.9 %
10 SYRINGE (ML) INJECTION AS NEEDED
Status: DISCONTINUED | OUTPATIENT
Start: 2023-10-16 | End: 2023-10-17 | Stop reason: HOSPADM

## 2023-10-16 RX ORDER — FAMOTIDINE 10 MG/ML
20 INJECTION, SOLUTION INTRAVENOUS ONCE
Status: COMPLETED | OUTPATIENT
Start: 2023-10-16 | End: 2023-10-16

## 2023-10-16 RX ADMIN — IOPAMIDOL 85 ML: 612 INJECTION, SOLUTION INTRAVENOUS at 21:38

## 2023-10-16 RX ADMIN — SODIUM CHLORIDE, POTASSIUM CHLORIDE, SODIUM LACTATE AND CALCIUM CHLORIDE 1000 ML: 600; 310; 30; 20 INJECTION, SOLUTION INTRAVENOUS at 18:12

## 2023-10-16 RX ADMIN — ONDANSETRON 4 MG: 2 INJECTION INTRAMUSCULAR; INTRAVENOUS at 18:12

## 2023-10-16 RX ADMIN — DICYCLOMINE HYDROCHLORIDE 20 MG: 10 CAPSULE ORAL at 18:12

## 2023-10-16 RX ADMIN — FAMOTIDINE 20 MG: 10 INJECTION, SOLUTION INTRAVENOUS at 18:12

## 2023-10-16 NOTE — ED PROVIDER NOTES
Subjective   History of Present Illness  Patient is a 68-year-old female presenting to the emergency department with nausea, vomiting, and diarrhea associated with lower abdominal discomfort and crampy pain.  Symptoms started 2 days ago.  Patient reports that the first day was mainly vomiting and yesterday was diarrhea.  Patient did have symptoms somewhat similar in the past and was seen here and diagnosed with colitis.  Patient denies any hematemesis or hematochezia.    History provided by:  Patient      Review of Systems    Past Medical History:   Diagnosis Date    Anxiety     Breast injury 01/19/2023    right hematoma    Essential hypertension 07/02/2021    GERD without esophagitis 07/16/2019       No Known Allergies    Past Surgical History:   Procedure Laterality Date    BREAST CYST ASPIRATION Left 2005    CHOLECYSTECTOMY      COLONOSCOPY      HYSTERECTOMY      OOPHORECTOMY         Family History   Problem Relation Age of Onset    Breast cancer Mother 80    Ovarian cancer Neg Hx     Colon cancer Neg Hx     Colon polyps Neg Hx     Esophageal cancer Neg Hx        Social History     Socioeconomic History    Marital status:    Tobacco Use    Smoking status: Never    Smokeless tobacco: Never   Vaping Use    Vaping Use: Never used   Substance and Sexual Activity    Alcohol use: Never    Drug use: Never    Sexual activity: Defer           Objective   Physical Exam  Vitals and nursing note reviewed.   Constitutional:       General: She is not in acute distress.     Appearance: She is well-developed. She is not toxic-appearing.   Cardiovascular:      Rate and Rhythm: Normal rate and regular rhythm.      Heart sounds: Normal heart sounds.   Pulmonary:      Effort: Pulmonary effort is normal. No respiratory distress.      Breath sounds: Normal breath sounds.   Abdominal:      Palpations: Abdomen is soft.      Tenderness: There is abdominal tenderness in the right lower quadrant, suprapubic area and left lower  "quadrant.      Comments: Mild tenderness of the lower abdomen which she reports is more of a \"soreness\".   Neurological:      General: No focal deficit present.      Mental Status: She is alert and oriented to person, place, and time.   Psychiatric:         Mood and Affect: Mood normal.         Behavior: Behavior normal.         Procedures           ED Course  ED Course as of 10/17/23 0102   Mon Oct 16, 2023   2221 Patient is resting comfortably.  No emergent findings on her evaluation here in the ER.  Labs overall unremarkable.  There is some inflammatory changes in the urine though it does appear to be contaminated.  At this point, no indication for treatment of that with no dysuria otherwise urologic symptoms.  Will discharge with symptomatic management for the abdominal cramping as well as diarrhea. I have reviewed results, considerations, and diagnosis with the patient and/or their representative. Anticipatory guidance provided. Follow-up plan reviewed. Precautions for acute return for re-evaluations also reviewed. This including potential for worsening of the presenting condition and need for further evaluation, admission, and/or intervention as indicated. Opportunity to as questions provided. I advised them to return for any concerns and stressed the importance of timely follow-up and outpatient services. They verbalized understanding.   [RS]   2221 CT Abdomen Pelvis With Contrast  Personally reviewed CT scan of the abdomen pelvis.  On my interpretation there is no evidence of obstruction or acute surgical abnormality.  See report for radiology for details. [RS]      ED Course User Index  [RS] Omi Denny MD                                           Medical Decision Making  Problems Addressed:  Abdominal cramping, generalized: complicated acute illness or injury  Diarrhea of presumed infectious origin: complicated acute illness or injury  Elevated blood pressure reading with diagnosis of " hypertension: complicated acute illness or injury    Amount and/or Complexity of Data Reviewed  Labs: ordered.  Radiology: ordered. Decision-making details documented in ED Course.    Risk  OTC drugs.  Prescription drug management.        Final diagnoses:   Diarrhea of presumed infectious origin   Abdominal cramping, generalized   Elevated blood pressure reading with diagnosis of hypertension       ED Disposition  ED Disposition       ED Disposition   Discharge    Condition   Stable    Comment   --               Velia James, APRN  [address]  [facility] [address] [phone]    In 5 days  If not better/resolved.    [facility] EMERGENCY DEPARTMENT  [address] [facility] [address] [phone]    As needed, If symptoms worsen or ANY concerns.         Medication List        New Prescriptions      hyoscyamine 0.125 MG SL tablet  Commonly known as: LEVSIN  Take 1 tablet by mouth Every 4 (Four) Hours As Needed for Cramping.     saccharomyces boulardii 250 MG capsule  Commonly known as: FLORASTOR  Take 1 capsule by mouth 2 (Two) Times a Day for 10 days.            Stop      dextromethorphan polistirex ER 30 MG/5ML Suspension Extended Release oral suspension  Commonly known as: Delsym     meclizine 12.5 MG tablet  Commonly known as: ANTIVERT     metroNIDAZOLE 0.75 % cream  Commonly known as: METROCREAM               Where to Get Your Medications        These medications were sent to [facility]/pharmacy #[id number] - [place], [place] - [address] [place] [address] AT [address] [phone] [facility] [phone] [address] [address]      Hours: 24-hours Phone: [phone]   hyoscyamine 0.125 MG SL tablet  saccharomyces boulardii 250 MG capsule            Omi Denny MD  10/17/23 0102

## 2023-10-30 ENCOUNTER — HOSPITAL ENCOUNTER (OUTPATIENT)
Dept: MAMMOGRAPHY | Facility: HOSPITAL | Age: 68
Discharge: HOME | End: 2023-10-30
Payer: MEDICARE

## 2023-10-30 DIAGNOSIS — R92.8 ABNORMAL MAMMOGRAM: ICD-10-CM

## 2023-10-30 PROCEDURE — 25010000002 LIDOCAINE 1 % SOLUTION: Performed by: RADIOLOGY

## 2023-10-30 PROCEDURE — 88360 TUMOR IMMUNOHISTOCHEM/MANUAL: CPT | Performed by: RADIOLOGY

## 2023-10-30 PROCEDURE — 88341 IMHCHEM/IMCYTCHM EA ADD ANTB: CPT | Performed by: RADIOLOGY

## 2023-10-30 PROCEDURE — 88305 TISSUE EXAM BY PATHOLOGIST: CPT | Performed by: RADIOLOGY

## 2023-10-30 PROCEDURE — 88342 IMHCHEM/IMCYTCHM 1ST ANTB: CPT | Performed by: RADIOLOGY

## 2023-10-30 RX ORDER — LIDOCAINE HYDROCHLORIDE AND EPINEPHRINE 10; 10 MG/ML; UG/ML
10 INJECTION, SOLUTION INFILTRATION; PERINEURAL ONCE
Status: COMPLETED | OUTPATIENT
Start: 2023-10-30 | End: 2023-10-30

## 2023-10-30 RX ORDER — LIDOCAINE HYDROCHLORIDE 10 MG/ML
5 INJECTION, SOLUTION INFILTRATION; PERINEURAL ONCE
Status: COMPLETED | OUTPATIENT
Start: 2023-10-30 | End: 2023-10-30

## 2023-10-30 RX ADMIN — Medication 1 ML: at 10:35

## 2023-10-30 RX ADMIN — LIDOCAINE HYDROCHLORIDE,EPINEPHRINE BITARTRATE 10 ML: 10; .01 INJECTION, SOLUTION INFILTRATION; PERINEURAL at 10:36

## 2023-11-01 ENCOUNTER — TELEPHONE (OUTPATIENT)
Dept: MAMMOGRAPHY | Facility: HOSPITAL | Age: 68
End: 2023-11-01
Payer: COMMERCIAL

## 2023-11-03 ENCOUNTER — TELEPHONE (OUTPATIENT)
Dept: MAMMOGRAPHY | Facility: HOSPITAL | Age: 68
End: 2023-11-03
Payer: COMMERCIAL

## 2023-11-03 NOTE — TELEPHONE ENCOUNTER
Patient called in, left message requesting Dr SHAKIRA Green for surgical consult. Patient made aware previously she will be notified when an appointment is scheduled.

## 2023-11-03 NOTE — TELEPHONE ENCOUNTER
Referring provider's office notified pathology returned as cancer and patient will be notified.   Patient notified of pathology results and recommendation. Verbalizes understanding. Denies discomfort.. Denies signs and symptoms of infection.     Patient to research surgeon choice. Patient is to call back with decision; an appointment will then be scheduled and patient will be notified. Verbalizes understanding.

## 2023-11-09 ENCOUNTER — TELEPHONE (OUTPATIENT)
Dept: MAMMOGRAPHY | Facility: HOSPITAL | Age: 68
End: 2023-11-09
Payer: COMMERCIAL

## 2023-11-09 NOTE — TELEPHONE ENCOUNTER
Patient called in and left a message she would like to change her surgical consult appointment from Dr. SHAKIRA Green to Dr. STEPHANIE Johansen.  A new appointment was made.  Attempted to contact patient with details.  A message was left on her voicemail to return my call.

## 2023-11-09 NOTE — TELEPHONE ENCOUNTER
Patient notified of surgical consult appointment with Dr STEPHANIE Johansen on 11/22/23 @ 1100. Patient given office contact & location information. Told to bring photo ID, list of prescription & OTC medications, insurance information. May be accompanied by family member or friend for support. Encouraged pt to call back or contact surgeon's office with further questions. Patient verbalized understanding.

## 2023-11-21 ENCOUNTER — TRANSCRIBE ORDERS (OUTPATIENT)
Dept: PHYSICAL THERAPY | Facility: HOSPITAL | Age: 68
End: 2023-11-21
Payer: COMMERCIAL

## 2023-11-21 DIAGNOSIS — C50.911 MALIGNANT NEOPLASM OF RIGHT FEMALE BREAST, UNSPECIFIED ESTROGEN RECEPTOR STATUS, UNSPECIFIED SITE OF BREAST: Primary | ICD-10-CM

## 2023-11-22 ENCOUNTER — HOSPITAL ENCOUNTER (OUTPATIENT)
Dept: PHYSICAL THERAPY | Facility: HOSPITAL | Age: 68
Setting detail: THERAPIES SERIES
Discharge: HOME | End: 2023-11-22
Payer: MEDICARE

## 2023-11-22 ENCOUNTER — PATIENT OUTREACH (OUTPATIENT)
Dept: OTHER | Facility: HOSPITAL | Age: 68
End: 2023-11-22
Payer: COMMERCIAL

## 2023-11-22 DIAGNOSIS — C50.911 MALIGNANT NEOPLASM OF RIGHT FEMALE BREAST, UNSPECIFIED ESTROGEN RECEPTOR STATUS, UNSPECIFIED SITE OF BREAST: Primary | ICD-10-CM

## 2023-11-22 DIAGNOSIS — C50.411 MALIGNANT NEOPLASM OF UPPER-OUTER QUADRANT OF RIGHT BREAST IN FEMALE, ESTROGEN RECEPTOR POSITIVE: Primary | ICD-10-CM

## 2023-11-22 DIAGNOSIS — Z17.0 MALIGNANT NEOPLASM OF UPPER-OUTER QUADRANT OF RIGHT BREAST IN FEMALE, ESTROGEN RECEPTOR POSITIVE: Primary | ICD-10-CM

## 2023-11-22 PROCEDURE — 93702 BIS XTRACELL FLUID ANALYSIS: CPT

## 2023-11-22 PROCEDURE — 97162 PT EVAL MOD COMPLEX 30 MIN: CPT

## 2023-11-22 NOTE — SIGNIFICANT NOTE
Met patient and her daughter in consultation with Dr. MALIK to discuss her breast cancer diagnosis.  Dr. MALIK reviewed the pathology and surgical options. 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 Pt is interested in lumpectomy, so MRI has been ordered. NN provided education materials, contact info, answered questions, and walked pt to PT for bioimpedence eval.      11/22/23 1421   Nurse Navigation   Type of Visit New patient   Location of Visit Cancer Center   Visit Diagnosis Breast - malignant   Referral Source Health professional - outpatient   Treatments Surgery   Date of Diagnosis 11/01/23   Surgery - First Consult Appointment 11/22/23   Barriers to Care Educational   Practical Needs Nurse Navigator Referral   Emotional Needs emotional suppport/coping strategies   Intervention Tasks Performed Education;Symptom management;Community resources;Cancer prevention/Screening;Supportive services referral   Supportive services referral Bioimpedance/Lymphedema;Social Work referral   Time Navigated Today (Min) 60   Total Time Navigated (Min) 60   Acuity Rating   Time spent with patient 3- greater than 45 minutes   Multimodality treatment coordination and education 2-  Arrange, transfer care, second opinion   Caregiver support 1- Family/significant other support available   Distress score 2- 4-7   Coordination of care  - appts 2- Multiple appts   Appt compliance 1- Compliant   ECOG/Karnofsky Score 1- ECOG -Less than or equal to 1,  Karnofsky 90 or greater   PHQ 9 score  1- <10 clinical   Referrals to support services 2- One   Acuity score 15   Acuity level Medium acuity

## 2023-11-22 NOTE — THERAPY DISCHARGE NOTE
Outpatient Physical Therapy Lymphedema Initial Evaluation & Discharge  [facility]     Patient Name: Clara Gonzalez  : 1955  MRN: [id number]  Today's Date: 2023      Visit Date: 2023    Visit Dx:    ICD-10-CM ICD-9-CM   1. Malignant neoplasm of right female breast, unspecified estrogen receptor status, unspecified site of breast  C50.911 174.9       Patient Active Problem List   Diagnosis    Chest pain    anxiety and depression    Essential hypertension    Bereavement    GERD without esophagitis    Abnormal mammogram    Acute thoracic back pain    Allergic rhinitis    Perioral dermatitis    Tendinitis of left shoulder    Elevated blood sugar    Unresponsive episode        Past Medical History:   Diagnosis Date    Anxiety     Breast injury 2023    right hematoma    Essential hypertension 2021    GERD without esophagitis 2019        Past Surgical History:   Procedure Laterality Date    BREAST CYST ASPIRATION Left 2005    CHOLECYSTECTOMY      COLONOSCOPY      HYSTERECTOMY      OOPHORECTOMY              Lymphedema       Row Name 23 1330             Subjective Pain    Able to rate subjective pain? yes  -CA      Pre-Treatment Pain Level 0  -CA      Post-Treatment Pain Level 0  -CA         Subjective    Subjective Comments Pt presents with social support. She is planning for a R lumpectomy but is awaiting for additional imaging to  the need of a SLNB on the R side. 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  -CA         Lymphedema Assessment    Lymphedema Classification RUE:;at risk/stage 0  if nodes are removed  -CA         General ROM    GENERAL ROM COMMENTS WFL B UE  -CA         MMT (Manual Muscle Testing)    General MMT Comments WFL B UE  -CA         Lymphedema Edema Assessment    Edema Assessment Comment No edema present at this time.  -CA         Skin Changes/Observations    Location/Assessment Upper Extremity  -CA      Upper  Extremity Conditions bilateral:;normal;intact  -CA      Upper Extremity Color/Pigment bilateral:;normal  -CA         Lymphedema Sensation    Lymphedema Sensation Reports RUE:;LUE:  -CA      Lymphedema Sensation Tests light touch  -CA      Lymphedema Light Touch RUE:;LUE:;WNL  -CA         L-Dex Bioimpedence Screening    L-Dex Measurement Extremity RUE  -CA      L-Dex Patient Position Standing  -CA      L-Dex UE Dominate Side Right  -CA      L-Dex UE At Risk Side Right  -CA      L-Dex UE Pre Surgical Value Yes  -CA      L-Dex UE Score 5.9  -CA      L-Dex UE Baseline Score 5.9  -CA      L-Dex UE Value Change 0  -CA      L-Dex UE Comment The patient had SOZO measurement which I reviewed today. The score is in normal limits, see scanned to EMR. Bioimpedance spectroscopy helps identify the onset of lymphedema in an arm or leg before patients experience noticeable swelling. Research has shown that the early detection of lymphedema using L-Dex combined with treatment can reduce progression to chronic lymphedema by 95% in breast cancer patients. Whenever possible, patients are tested for baseline L-Dex score before cancer treatment begins and then are reassessed during regular follow-up visits using the SOZO device. Otherwise, this can be started postoperatively and continued during regular follow-up visits. If the patient’s L-Dex score increases above normal levels, that is a sign that lymphedema is developing and a referral is made to occupational or physical therapy for further evaluation and early compression treatment. Lymphedema assessment with the SOZO L-Dex score is recommended to be done every 3 months for the first 3 years and then every 6 months for years 4 and 5 followed by annually afterwards.  -CA      Skeletal Muscle Mass (%) 22.6 %  -CA      Fat Mass (%) 37.6 %  -CA      Hy-dex -28.6  -CA                User Key  (r) = Recorded By, (t) = Taken By, (c) = Cosigned By      Initials Name Provider Type    CA  Moni Ashby, PT Physical Therapist                    Therapy Education  Education Details: Pt educated on prehab evaluation assessments including bioimpedance. Pt was provided an HEP detailing information including lymphedema, risk reduction, and post op exercise. Strongly educated on impact of node removal vs a lumpectomy with lymphnode removal. Advised on long term self care post op.  Given: HEP, Symptoms/condition management, Posture/body mechanics  Program: New  How Provided: Verbal, Written  Provided to: Patient (Family)  Level of Understanding: Verbalized     OP Exercises       Row Name 11/22/23 8788             Precautions    Existing Precautions/Restrictions --  Educated on these exercise timelines specific to node removal. Encouraged ROM and the below ther ex as tolerated after surgery to regain motion if nodes are not removed.  -CA         Subjective    Subjective Comments Pt presents with social support. She is planning for a R lumpectomy but is awaiting for additional imaging to  the need of a SLNB on the R side. 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  -CA         Subjective Pain    Able to rate subjective pain? yes  -CA      Pre-Treatment Pain Level 0  -CA      Post-Treatment Pain Level 0  -CA         Exercise 1    Exercise Name 1 Elbow flexion/extension  -CA      Additional Comments 3-4x/day, HEP level 1- post op day 1 to day 7  -CA         Exercise 2    Exercise Name 2 Fist/wrist circles  -CA      Reps 2 x10  -CA      Additional Comments 3-4x/day, HEP level 1- post op day 1 to day 7  -CA         Exercise 3    Exercise Name 3 Neck Flexion/extension/rotation/lateral flexion  -CA      Reps 3 x10  -CA      Additional Comments 3-4x/day, HEP level 1- post op day 1 to day 7  -CA         Exercise 4    Exercise Name 4 horizontal abduction with hands behind neck  -CA      Reps 4 x10  -CA      Time 4 5 seconds  -CA      Additional Comments 3-4x/day, HEP level 2-  post op day 7 to day 14  -CA         Exercise 5    Exercise Name 5 lumbar trunk rotration  -CA      Reps 5 x10  -CA      Time 5 5 seconds  -CA      Additional Comments 3-4x/day, HEP level 2- post op day 7 to day 14  -CA         Exercise 6    Exercise Name 6 shoulder rolls back  -CA      Reps 6 x10  -CA      Time 6 5 seconds  -CA      Additional Comments 3-4x/day, HEP level 2- post op day 7 to day 14  -CA         Exercise 7    Exercise Name 7 horizontal abduction/adduction with elbows extended  -CA      Reps 7 x10  -CA      Time 7 5  -CA      Additional Comments 3-4x/day, HEP level 2- post op day 7 to day 14  -CA         Exercise 8    Exercise Name 8 wall walks for shoulder flexion  -CA      Reps 8 x10  -CA      Additional Comments 3-4x/day, HEP level 3- post op day 14 until motion is returned  -CA         Exercise 9    Exercise Name 9 IR with hands behind back  -CA      Reps 9 x10  -CA      Additional Comments 3-4x/day, HEP level 3- post op day 14 until motion is returned  -CA         Exercise 10    Exercise Name 10 Horizontal abduction with hands behind head  -CA      Reps 10 x10  -CA      Additional Comments 3-4x/day, HEP level 3- post op day 14 until motion is returned  -CA         Exercise 11    Exercise Name 11 PNF D1 shoulder flexion  -CA      Reps 11 x10  -CA      Additional Comments 3-4x/day, HEP level 3- post op day 14 until motion is returned  -CA         Exercise 12    Exercise Name 12 Trunk stretch with shoulder abduction  -CA      Reps 12 x10  -CA      Time 12 5 seconds  -CA      Additional Comments 3-4x/day, HEP level 3- post op day 14 until motion is returned  -CA                User Key  (r) = Recorded By, (t) = Taken By, (c) = Cosigned By      Initials Name Provider Type    Moni Pastor, PT Physical Therapist                     PT OP Goals       Row Name 11/22/23 1330          Long Term Goals    LTG Date to Achieve 11/22/23  -CA     LTG 1 Pt demonstrates awareness of post-operative  movement restrictions and HEP to facilitate lymphatic regeneration and reduce the risk of seroma formation, axillary web syndrome, and lymphedema while ensuring shoulder joint mobility.  -CA     LTG 1 Progress Met  -CA     LTG 2 Pt demonstrates understanding of post-operative basic lymphedema precautions  -CA     LTG 2 Progress Met  -CA        Time Calculation    PT Goal Re-Cert Due Date 11/22/23  -CA               User Key  (r) = Recorded By, (t) = Taken By, (c) = Cosigned By      Initials Name Provider Type    Moni Pastor, ELROY Physical Therapist                     PT Assessment/Plan       Row Name 11/22/23 2483          PT Assessment    Assessment Comments This patient presents to PT pre-operatively for planned BrCA surgery scheduled in a couple weeks. Baseline ROM, postural, and bioimpedance measurements were taken today to be compared to measurements retaken 3-4 weeks post surgery. At that time, any reduced movement, decline in function, or postural issues will be addressed with skilled care and new goals will be established.  Personal risk factors for lymphedema post-operatively for the R upper extremity and trunk quadrant were also assessed today and basic lymphedema precautions were discussed. A more detailed discussion regarding personal lymphedema risk factors can take place post-operatively once the number of lymph nodes removed and the plan for further medical care is known.  -CA     Please refer to paper survey for additional self-reported information Yes  -CA     Rehab Potential Good  -CA     Patient/caregiver participated in establishment of treatment plan and goals Yes  -CA     Patient would benefit from skilled therapy intervention Yes  -CA        PT Plan    PT Frequency One time visit  -CA     Planned CPT's? PT EVAL MOD COMPLELITY: 95626;PT BIS XTRACELL FLUID ANALYSIS: 72987  -CA     PT Plan Comments This patient may return to PT 3-4 weeks post operatively for re-evaluation measurements to  be compared to measurements taken today, at her pre-operative evaluation. In addition, she can be examined for possible post-BrCA surgery sequelae such as axillary web syndrome, scar adhesions, edema, worsened posture, scapular winging, pain, and reduced ROM and function. At that time, a future plan and goals will be established and skilled care continued if indicated. Currently, she has been provided with information before BrCA surgery in order to facilitate recovery and reduce the risk of post-operative sequelae.  -CA               User Key  (r) = Recorded By, (t) = Taken By, (c) = Cosigned By      Initials Name Provider Type    Moni Pastor, ELROY Physical Therapist                     Outcome Measure Options: Quick DASH  Quick DASH  Open a tight or new jar.: No Difficulty  Do heavy household chores (e.g., wash walls, wash floors): No Difficulty  Carry a shopping bag or briefcase: No Difficulty  Wash your back: No Difficulty  Use a knife to cut food: No Difficulty  Recreational activities in which you take some force or impact through your arm, should or hand (e.g. golf, hammering, tennis, etc.): No Difficulty  During the past week, to what extent has your arm, shoulder, or hand problem interfered with your normal social activites with family, friends, neighbors or groups?: Not at all  During the past week, were you limited in your work or other regular daily activities as a result of your arm, shoulder or hand problem?: Not limited at all  Arm, Shoulder, or hand pain: None  Tingling (pins and needles) in your arm, shoulder, or hand: None  During the past week, how much difficulty have you had sleeping because of the pain in your arm, shoulder or hand?: No difficulty  Number of Questions Answered: 11  Quick DASH Score: 0         Time Calculation:   Start Time: 1342  Stop Time: 1425  Time Calculation (min): 43 min  Untimed Charges  PT Eval/Re-eval Minutes: 43  Total Minutes  Untimed Charges Total Minutes:  43   Total Minutes: 43     Therapy Charges for Today       Code Description Service Date Service Provider Modifiers Qty    31401490534 HC PT EVAL MOD COMPLEXITY 3 11/22/2023 Moni Ashby, PT GP 1    34247382141 HC PT BIS XTRACELL FLUID ANALYSIS 11/22/2023 Moni Ashby, PT  1            PT G-Codes  Outcome Measure Options: Quick DASH  Quick DASH Score: 0            Moni Ashby, PT  11/22/2023

## 2023-11-30 ENCOUNTER — DOCUMENTATION (OUTPATIENT)
Dept: ONCOLOGY | Facility: CLINIC | Age: 68
End: 2023-11-30
Payer: COMMERCIAL

## 2023-11-30 NOTE — PROGRESS NOTES
"PT returned call and reported she was very pleased with her visit. Pt expressed nervousness and worry about scan. SW talked with pt about \"scanxiety\" and normalized her emotions. SW educated on role and services, and inquired about psychosocial needs. Pt denied any needs at this time, and thanked SW for the call. SW will be available for ongoing support and assistance.  "

## 2023-11-30 NOTE — PROGRESS NOTES
Distress Screening Follow-up    Name: Clara Gonzalez    : 1955    Diagnosis: Breast Cancer    Location of Distress Screening: Breast Surgery     Distress Score: 5     Interventions:       Comments:  SW called to follow up with pt regarding recent distress screen results. SW left a VM with pt introducing self, and offering additional support. SW provided call back number, ([phone]) and encouraged pt to reach out at her convenience.     SW will remain available to offer support.     Evie Harvey, WAQAS  Oncology Social Worker

## 2023-12-01 ENCOUNTER — HOSPITAL ENCOUNTER (OUTPATIENT)
Dept: MRI IMAGING | Facility: HOSPITAL | Age: 68
Discharge: HOME | End: 2023-12-01
Admitting: SURGERY
Payer: MEDICARE

## 2023-12-01 DIAGNOSIS — D05.11 INTRADUCTAL CARCINOMA IN SITU OF RIGHT BREAST: ICD-10-CM

## 2023-12-01 LAB — CREAT BLDA-MCNC: 0.7 MG/DL (ref 0.6–1.3)

## 2023-12-01 PROCEDURE — C8908 MRI W/O FOL W/CONT, BREAST,: HCPCS

## 2023-12-01 PROCEDURE — A9577 INJ MULTIHANCE: HCPCS | Performed by: SURGERY

## 2023-12-01 PROCEDURE — C8937 CAD BREAST MRI: HCPCS

## 2023-12-01 PROCEDURE — 82565 ASSAY OF CREATININE: CPT

## 2023-12-01 PROCEDURE — 0 GADOBENATE DIMEGLUMINE 529 MG/ML SOLUTION: Performed by: SURGERY

## 2023-12-01 RX ADMIN — GADOBENATE DIMEGLUMINE 12 ML: 529 INJECTION, SOLUTION INTRAVENOUS at 13:16

## 2023-12-11 ENCOUNTER — TELEPHONE (OUTPATIENT)
Dept: MRI IMAGING | Facility: HOSPITAL | Age: 68
End: 2023-12-11
Payer: COMMERCIAL

## 2023-12-11 NOTE — TELEPHONE ENCOUNTER
Patient was recommended from her MRI Breast for a Bilateral MRI Breast Biopsy. Scheduled for 12/20/2023 at 1:00 with 12:45 arrival at [facility]. No BT. Procedure described in detail and encouraged to call with any further questions.

## 2023-12-21 ENCOUNTER — HOSPITAL ENCOUNTER (OUTPATIENT)
Dept: MRI IMAGING | Facility: HOSPITAL | Age: 68
Discharge: HOME | End: 2023-12-21
Payer: MEDICARE

## 2023-12-21 ENCOUNTER — HOSPITAL ENCOUNTER (OUTPATIENT)
Dept: MAMMOGRAPHY | Facility: HOSPITAL | Age: 68
Discharge: HOME | End: 2023-12-21
Payer: MEDICARE

## 2023-12-21 DIAGNOSIS — D05.11 INTRADUCTAL CARCINOMA IN SITU OF RIGHT BREAST: ICD-10-CM

## 2023-12-21 PROCEDURE — A4648 IMPLANTABLE TISSUE MARKER: HCPCS

## 2023-12-21 PROCEDURE — 0 GADOBENATE DIMEGLUMINE 529 MG/ML SOLUTION: Performed by: SURGERY

## 2023-12-21 PROCEDURE — A9577 INJ MULTIHANCE: HCPCS | Performed by: SURGERY

## 2023-12-21 PROCEDURE — 25010000002 LIDOCAINE 1 % SOLUTION: Performed by: RADIOLOGY

## 2023-12-21 RX ORDER — LIDOCAINE HYDROCHLORIDE AND EPINEPHRINE 10; 10 MG/ML; UG/ML
6 INJECTION, SOLUTION INFILTRATION; PERINEURAL
Status: COMPLETED | OUTPATIENT
Start: 2023-12-21 | End: 2023-12-21

## 2023-12-21 RX ORDER — LIDOCAINE HYDROCHLORIDE 10 MG/ML
4 INJECTION, SOLUTION INFILTRATION; PERINEURAL
Status: COMPLETED | OUTPATIENT
Start: 2023-12-21 | End: 2023-12-21

## 2023-12-21 RX ADMIN — LIDOCAINE HYDROCHLORIDE,EPINEPHRINE BITARTRATE 6 ML: 10; .01 INJECTION, SOLUTION INFILTRATION; PERINEURAL at 10:03

## 2023-12-21 RX ADMIN — Medication 4 ML: at 10:03

## 2023-12-21 RX ADMIN — GADOBENATE DIMEGLUMINE 12 ML: 529 INJECTION, SOLUTION INTRAVENOUS at 10:02

## 2023-12-26 LAB
CYTO UR: NORMAL
LAB AP CASE REPORT: NORMAL
LAB AP CLINICAL INFORMATION: NORMAL
LAB AP DIAGNOSIS COMMENT: NORMAL
LAB AP SPECIAL STAINS: NORMAL
PATH REPORT.FINAL DX SPEC: NORMAL
PATH REPORT.GROSS SPEC: NORMAL

## 2023-12-27 ENCOUNTER — TELEPHONE (OUTPATIENT)
Dept: MAMMOGRAPHY | Facility: HOSPITAL | Age: 68
End: 2023-12-27
Payer: COMMERCIAL

## 2023-12-27 NOTE — TELEPHONE ENCOUNTER
Referring provider's office notified pathology returned as cancer and patient will be notified.   Patient notified of pathology results and recommendation. Verbalizes understanding. Denies discomfort.. Denies signs and symptoms of infection.     Patient established with  Dr STEPHANIE Johansen. Patient will be contacted directly by surgeon's office concerning follow-up.

## 2024-01-09 ENCOUNTER — TRANSCRIBE ORDERS (OUTPATIENT)
Dept: ADMINISTRATIVE | Facility: HOSPITAL | Age: 69
End: 2024-01-09
Payer: COMMERCIAL

## 2024-01-09 DIAGNOSIS — D05.11 INTRADUCTAL CARCINOMA IN SITU OF RIGHT BREAST: Primary | ICD-10-CM

## 2024-01-31 ENCOUNTER — PRE-ADMISSION TESTING (OUTPATIENT)
Dept: PREADMISSION TESTING | Facility: HOSPITAL | Age: 69
End: 2024-01-31
Payer: MEDICARE

## 2024-01-31 LAB
ALBUMIN SERPL-MCNC: 4.7 G/DL (ref 3.5–5.2)
ALBUMIN/GLOB SERPL: 1.9 G/DL
ALP SERPL-CCNC: 110 U/L (ref 39–117)
ALT SERPL W P-5'-P-CCNC: 17 U/L (ref 1–33)
ANION GAP SERPL CALCULATED.3IONS-SCNC: 9 MMOL/L (ref 5–15)
AST SERPL-CCNC: 17 U/L (ref 1–32)
BILIRUB SERPL-MCNC: 0.4 MG/DL (ref 0–1.2)
BUN SERPL-MCNC: 12 MG/DL (ref 8–23)
BUN/CREAT SERPL: 16.4 (ref 7–25)
CALCIUM SPEC-SCNC: 9.7 MG/DL (ref 8.6–10.5)
CHLORIDE SERPL-SCNC: 105 MMOL/L (ref 98–107)
CO2 SERPL-SCNC: 28 MMOL/L (ref 22–29)
CREAT SERPL-MCNC: 0.73 MG/DL (ref 0.57–1)
DEPRECATED RDW RBC AUTO: 47.5 FL (ref 37–54)
EGFRCR SERPLBLD CKD-EPI 2021: 90.3 ML/MIN/1.73
ERYTHROCYTE [DISTWIDTH] IN BLOOD BY AUTOMATED COUNT: 13.2 % (ref 12.3–15.4)
GLOBULIN UR ELPH-MCNC: 2.5 GM/DL
GLUCOSE SERPL-MCNC: 92 MG/DL (ref 65–99)
HCT VFR BLD AUTO: 41.8 % (ref 34–46.6)
HGB BLD-MCNC: 13.4 G/DL (ref 12–15.9)
MCH RBC QN AUTO: 31.5 PG (ref 26.6–33)
MCHC RBC AUTO-ENTMCNC: 32.1 G/DL (ref 31.5–35.7)
MCV RBC AUTO: 98.4 FL (ref 79–97)
PLATELET # BLD AUTO: 292 10*3/MM3 (ref 140–450)
PMV BLD AUTO: 9.4 FL (ref 6–12)
POTASSIUM SERPL-SCNC: 4.7 MMOL/L (ref 3.5–5.2)
PROT SERPL-MCNC: 7.2 G/DL (ref 6–8.5)
RBC # BLD AUTO: 4.25 10*6/MM3 (ref 3.77–5.28)
SODIUM SERPL-SCNC: 142 MMOL/L (ref 136–145)
WBC NRBC COR # BLD AUTO: 6.63 10*3/MM3 (ref 3.4–10.8)

## 2024-01-31 PROCEDURE — 93005 ELECTROCARDIOGRAM TRACING: CPT

## 2024-01-31 PROCEDURE — 93010 ELECTROCARDIOGRAM REPORT: CPT | Performed by: INTERNAL MEDICINE

## 2024-01-31 PROCEDURE — 80053 COMPREHEN METABOLIC PANEL: CPT

## 2024-01-31 PROCEDURE — 85027 COMPLETE CBC AUTOMATED: CPT

## 2024-01-31 PROCEDURE — 36415 COLL VENOUS BLD VENIPUNCTURE: CPT

## 2024-01-31 NOTE — PAT
An arrival time for procedure was not provided during PAT visit. If patient had any questions or concerns about their arrival time, they were instructed to contact their surgeon/physician.  Additionally, if the patient referred to an arrival time that was acquired from their my chart account, patient was encouraged to verify that time with their surgeon/physician. Arrival times are NOT provided in Pre Admission Testing Department.    Chart faxed to [facility] per protocol.

## 2024-02-01 LAB
QT INTERVAL: 416 MS
QTC INTERVAL: 425 MS

## 2024-02-09 ENCOUNTER — HOSPITAL ENCOUNTER (OUTPATIENT)
Dept: MAMMOGRAPHY | Facility: HOSPITAL | Age: 69
Discharge: HOME | End: 2024-02-09
Payer: MEDICARE

## 2024-02-09 ENCOUNTER — LAB REQUISITION (OUTPATIENT)
Dept: LAB | Facility: HOSPITAL | Age: 69
End: 2024-02-09
Payer: MEDICARE

## 2024-02-09 DIAGNOSIS — Z98.890 STATUS POST BREAST BIOPSY: ICD-10-CM

## 2024-02-09 DIAGNOSIS — D05.11 INTRADUCTAL CARCINOMA IN SITU OF RIGHT BREAST: ICD-10-CM

## 2024-02-09 PROCEDURE — C1819 TISSUE LOCALIZATION-EXCISION: HCPCS

## 2024-02-09 PROCEDURE — 25010000002 LIDOCAINE 1 % SOLUTION: Performed by: RADIOLOGY

## 2024-02-09 PROCEDURE — 88360 TUMOR IMMUNOHISTOCHEM/MANUAL: CPT

## 2024-02-09 PROCEDURE — 88341 IMHCHEM/IMCYTCHM EA ADD ANTB: CPT

## 2024-02-09 PROCEDURE — 76098 X-RAY EXAM SURGICAL SPECIMEN: CPT

## 2024-02-09 PROCEDURE — 88307 TISSUE EXAM BY PATHOLOGIST: CPT

## 2024-02-09 PROCEDURE — 88342 IMHCHEM/IMCYTCHM 1ST ANTB: CPT

## 2024-02-09 RX ORDER — LIDOCAINE HYDROCHLORIDE 10 MG/ML
5 INJECTION, SOLUTION INFILTRATION; PERINEURAL ONCE
Status: COMPLETED | OUTPATIENT
Start: 2024-02-09 | End: 2024-02-09

## 2024-02-09 RX ADMIN — Medication 3 ML: at 09:45

## 2024-02-09 RX ADMIN — Medication 3 ML: at 09:54

## 2024-02-14 ENCOUNTER — CONSULT (OUTPATIENT)
Dept: ONCOLOGY | Facility: CLINIC | Age: 69
End: 2024-02-14
Payer: MEDICARE

## 2024-02-14 ENCOUNTER — HOSPITAL ENCOUNTER (OUTPATIENT)
Dept: RADIATION ONCOLOGY | Facility: HOSPITAL | Age: 69
Setting detail: RADIATION/ONCOLOGY SERIES
End: 2024-02-14
Payer: MEDICARE

## 2024-02-14 ENCOUNTER — OFFICE VISIT (OUTPATIENT)
Dept: RADIATION ONCOLOGY | Facility: HOSPITAL | Age: 69
End: 2024-02-14
Payer: MEDICARE

## 2024-02-14 VITALS
SYSTOLIC BLOOD PRESSURE: 163 MMHG | RESPIRATION RATE: 20 BRPM | HEIGHT: 59 IN | BODY MASS INDEX: 28.36 KG/M2 | WEIGHT: 140.7 LBS | HEART RATE: 75 BPM | DIASTOLIC BLOOD PRESSURE: 79 MMHG

## 2024-02-14 VITALS
WEIGHT: 141 LBS | BODY MASS INDEX: 28.43 KG/M2 | HEART RATE: 75 BPM | RESPIRATION RATE: 20 BRPM | DIASTOLIC BLOOD PRESSURE: 79 MMHG | HEIGHT: 59 IN | SYSTOLIC BLOOD PRESSURE: 163 MMHG

## 2024-02-14 DIAGNOSIS — Z17.0 MALIGNANT NEOPLASM OF UPPER-OUTER QUADRANT OF RIGHT BREAST IN FEMALE, ESTROGEN RECEPTOR POSITIVE: Primary | ICD-10-CM

## 2024-02-14 DIAGNOSIS — C50.411 MALIGNANT NEOPLASM OF UPPER-OUTER QUADRANT OF RIGHT BREAST IN FEMALE, ESTROGEN RECEPTOR POSITIVE: Primary | ICD-10-CM

## 2024-02-14 PROCEDURE — G0463 HOSPITAL OUTPT CLINIC VISIT: HCPCS

## 2024-02-14 RX ORDER — TAMOXIFEN CITRATE 10 MG/1
5 TABLET ORAL DAILY
Qty: 15 TABLET | Refills: 11 | Status: SHIPPED | OUTPATIENT
Start: 2024-02-14

## 2024-02-15 ENCOUNTER — TELEPHONE (OUTPATIENT)
Dept: RADIATION ONCOLOGY | Facility: HOSPITAL | Age: 69
End: 2024-02-15
Payer: COMMERCIAL

## 2024-02-15 LAB — REF LAB TEST METHOD: NORMAL

## 2024-03-13 ENCOUNTER — OFFICE VISIT (OUTPATIENT)
Dept: RADIATION ONCOLOGY | Facility: HOSPITAL | Age: 69
End: 2024-03-13
Payer: MEDICARE

## 2024-03-13 ENCOUNTER — HOSPITAL ENCOUNTER (OUTPATIENT)
Dept: RADIATION ONCOLOGY | Facility: HOSPITAL | Age: 69
Setting detail: RADIATION/ONCOLOGY SERIES
End: 2024-03-13
Payer: MEDICARE

## 2024-03-13 VITALS
HEART RATE: 76 BPM | SYSTOLIC BLOOD PRESSURE: 164 MMHG | OXYGEN SATURATION: 96 % | RESPIRATION RATE: 20 BRPM | BODY MASS INDEX: 28.96 KG/M2 | TEMPERATURE: 98.4 F | WEIGHT: 143.4 LBS | DIASTOLIC BLOOD PRESSURE: 95 MMHG

## 2024-03-13 DIAGNOSIS — C50.411 MALIGNANT NEOPLASM OF UPPER-OUTER QUADRANT OF RIGHT BREAST IN FEMALE, ESTROGEN RECEPTOR POSITIVE: Primary | ICD-10-CM

## 2024-03-13 DIAGNOSIS — Z17.0 MALIGNANT NEOPLASM OF UPPER-OUTER QUADRANT OF RIGHT BREAST IN FEMALE, ESTROGEN RECEPTOR POSITIVE: Primary | ICD-10-CM

## 2024-03-13 PROCEDURE — G0463 HOSPITAL OUTPT CLINIC VISIT: HCPCS

## 2024-03-13 NOTE — PROGRESS NOTES
RE-EVALUATION    PATIENT:                                                      Clara Gonzalez  :                                                          1955  DATE:                          3/13/2024   DIAGNOSIS:     Malignant neoplasm of upper-outer quadrant of right breast in female, estrogen receptor positive  - Stage 0 (cTis (DCIS), cN0, cM0, G2)         BRIEF HISTORY:  The patient is a very pleasant 68 y.o. female  with recent diagnosis of intraductal breast cancer.  She is status post breast conserving surgery 2024.  She feels she is healing well after that procedure.  She does have residual tenderness and swelling in the right breast with some numbness medially.  She is back to most normal daily activities.  She enjoys babysitting.  She still has issues with anxiety due to circumstances over the past 3 years since the death of her  and almost annual occurrences of personal health issues.  We previously discussed referral to counseling but she did not call to get that initiated.  She is interested at this time.  We also previously discussed referral to genetic counseling for family history of breast cancer in her mother and because she has 1 daughter and 4 granddaughters.  She did not call us to initiate genetic counselor consultation, but is interested in having that now.      No Known Allergies    Review of Systems   All other systems reviewed and are negative.          Objective   VITAL SIGNS:   Vitals:    24 1423   BP: 164/95   Pulse: 76   Resp: 20   Temp: 98.4 °F (36.9 °C)   TempSrc: Skin   SpO2: 96%   Weight: 65 kg (143 lb 6.4 oz)   PainSc: 0-No pain        Karnofsky score: 90       Physical Exam  Vitals and nursing note reviewed.   Constitutional:       Appearance: She is well-developed.   HENT:      Head: Normocephalic and atraumatic.   Cardiovascular:      Rate and Rhythm: Normal rate and regular rhythm.      Heart sounds: Normal heart sounds. No murmur heard.  Pulmonary:       Effort: Pulmonary effort is normal.      Breath sounds: Normal breath sounds. No wheezing or rales.   Chest:   Breasts:     Right: Swelling (10 x 11 cm firm induration in the upper central and lateral breast tissue deep to overlying healing ecchymosis), skin change and tenderness (Moderate) present. No bleeding, mass or nipple discharge.   Abdominal:      General: Bowel sounds are normal. There is no distension.      Palpations: Abdomen is soft.      Tenderness: There is no abdominal tenderness.   Musculoskeletal:         General: No tenderness. Normal range of motion.      Cervical back: Normal range of motion and neck supple.   Lymphadenopathy:      Cervical: No cervical adenopathy.      Upper Body:      Right upper body: No supraclavicular adenopathy.      Left upper body: No supraclavicular adenopathy.   Skin:     General: Skin is warm and dry.   Neurological:      Mental Status: She is alert and oriented to person, place, and time.      Sensory: No sensory deficit.   Psychiatric:         Behavior: Behavior normal.         Thought Content: Thought content normal.         Judgment: Judgment normal.              The following portions of the patient's history were reviewed and updated as appropriate: allergies, current medications, past family history, past medical history, past social history, past surgical history, and problem list.    Diagnoses and all orders for this visit:    Malignant neoplasm of upper-outer quadrant of right breast in female, estrogen receptor positive  -     Ambulatory Referral to Genetic Counseling/Testing  -     Ambulatory Referral to Behavioral Health      IMPRESSION:  Ductal carcinoma in situ of the right breast, upper outer quadrant, clinical stage 0 (Tis, N0, M0).  She is now status post lumpectomy 2/9/2024.  She appears to be healing fairly well but does have fairly large seroma/hematoma.  This is evaluated today by Dr. MALIK and will be drained.  She is planned for adjuvant  radiotherapy to the right breast to complete conservation treatment.  We will give her another few weeks for healing but try to begin treatment prior to 60 days after surgery.  She was given prescription for tamoxifen but will start this after she completes radiotherapy.    RECOMMENDATIONS: She will return for simulation on 4/3/2024.  She will be seen in the clinic with Dr. MALIK to evaluate for healing on the same visit, prior to simulation.  The right breast received a dose of 40.05 Hanson delivered in 15 daily fractions followed by additional boost of 10 Gray to the lumpectomy site in 5 additional sessions to complete treatment over approximately 4-week duration.   Referral to [facility] for counseling.  Referral for genetic consultation.         Fortino Durant MD    Approximate 25 minutes was spent with patient, reviewing records and documentation.

## 2024-03-27 ENCOUNTER — TELEPHONE (OUTPATIENT)
Dept: RADIATION ONCOLOGY | Facility: HOSPITAL | Age: 69
End: 2024-03-27
Payer: COMMERCIAL

## 2024-03-27 NOTE — TELEPHONE ENCOUNTER
Per Dr. Durant, called patient to re-schedule CT Simulation for Thursday, 4/18/24 at 9:00 am to allow for additional healing.  Patient verbalized an understanding of date, time, and location of appointment and is agreeable to this plan.  Contact information provided and patient will call with additional questions or concerns.

## 2024-04-17 ENCOUNTER — OFFICE VISIT (OUTPATIENT)
Dept: RADIATION ONCOLOGY | Facility: HOSPITAL | Age: 69
End: 2024-04-17
Payer: MEDICARE

## 2024-04-17 VITALS
SYSTOLIC BLOOD PRESSURE: 173 MMHG | HEART RATE: 72 BPM | DIASTOLIC BLOOD PRESSURE: 92 MMHG | BODY MASS INDEX: 28.6 KG/M2 | WEIGHT: 141.6 LBS | RESPIRATION RATE: 20 BRPM

## 2024-04-17 DIAGNOSIS — Z17.0 MALIGNANT NEOPLASM OF UPPER-OUTER QUADRANT OF RIGHT BREAST IN FEMALE, ESTROGEN RECEPTOR POSITIVE: Primary | ICD-10-CM

## 2024-04-17 DIAGNOSIS — C50.411 MALIGNANT NEOPLASM OF UPPER-OUTER QUADRANT OF RIGHT BREAST IN FEMALE, ESTROGEN RECEPTOR POSITIVE: Primary | ICD-10-CM

## 2024-04-17 PROCEDURE — G0463 HOSPITAL OUTPT CLINIC VISIT: HCPCS

## 2024-04-17 RX ORDER — ESCITALOPRAM OXALATE 10 MG/1
10 TABLET ORAL DAILY
COMMUNITY
Start: 2024-04-08 | End: 2024-10-05

## 2024-04-17 NOTE — PROGRESS NOTES
RE-EVALUATION    PATIENT:                                                      Clara Gonzalez  :                                                          1955  DATE:                          2024   DIAGNOSIS:     Malignant neoplasm of upper-outer quadrant of right breast in female, estrogen receptor positive  - Stage 0 (cTis (DCIS), cN0, cM0, G2)         BRIEF HISTORY:  The patient is a very pleasant 69 y.o. female  with right breast cancer.  She has had some delayed healing from the complex surgical resection.  This is improving with conservative management.  She has had 3 needle drainages of seroma with very little removal of fluid.  Approximately 2 to 3 cc was withdrawn today by Dr. MALIK.    No Known Allergies    Review of Systems   All other systems reviewed and are negative.          Objective   VITAL SIGNS:   Vitals:    24 1118   BP: 173/92   Pulse: 72   Resp: 20   Weight: 64.2 kg (141 lb 9.6 oz)   PainSc: 0-No pain        Karnofsky score: 90   KSP %:  80    Physical Exam  Vitals and nursing note reviewed.   Constitutional:       Appearance: She is well-developed.   HENT:      Head: Normocephalic and atraumatic.   Cardiovascular:      Rate and Rhythm: Normal rate and regular rhythm.      Heart sounds: Normal heart sounds. No murmur heard.  Pulmonary:      Effort: Pulmonary effort is normal.      Breath sounds: Normal breath sounds. No wheezing or rales.   Chest:   Breasts:     Right: Swelling (Mild) and skin change (Decreasing size of the right upper central postsurgical induration, now 6 cm x 6 cm.  Clean dressing over central needle entrance site) present. No bleeding, nipple discharge or tenderness.   Abdominal:      General: Bowel sounds are normal. There is no distension.      Palpations: Abdomen is soft.      Tenderness: There is no abdominal tenderness.   Musculoskeletal:         General: No tenderness. Normal range of motion.      Cervical back: Normal range of motion and neck supple.    Lymphadenopathy:      Cervical: No cervical adenopathy.      Upper Body:      Right upper body: No supraclavicular adenopathy.      Left upper body: No supraclavicular adenopathy.   Skin:     General: Skin is warm and dry.   Neurological:      Mental Status: She is alert and oriented to person, place, and time.      Sensory: No sensory deficit.   Psychiatric:         Behavior: Behavior normal.         Thought Content: Thought content normal.         Judgment: Judgment normal.              The following portions of the patient's history were reviewed and updated as appropriate: allergies, current medications, past family history, past medical history, past social history, past surgical history, and problem list.    Diagnoses and all orders for this visit:    Malignant neoplasm of upper-outer quadrant of right breast in female, estrogen receptor positive    Other orders  -     escitalopram (LEXAPRO) 10 MG tablet; Take 1 tablet by mouth Daily.      IMPRESSION:  Ductal carcinoma in situ of the right breast, upper outer quadrant, clinical stage 0 (Tis, N0, M0).  She is now status post lumpectomy 2/9/2024.  She has had a delay in initiation of adjuvant radiotherapy due to slow healing but is now ready to begin treatment.    RECOMMENDATIONS: She will return tomorrow for CT simulation.  The right breast will receive a dose of 40 Gray delivered over 3 weeks followed by 10 Gray over 1 week boost to the surgery site.  She has pending appointments with genetic screening and an appointment for counseling for situational anxiety.  She has had no change in health since informed consent for radiotherapy was obtained on 2/14/2024 and she remains a candidate for treatment.       Fortino Durant MD      Approximate 15 minutes was spent with patient and for documentation.

## 2024-04-18 ENCOUNTER — HOSPITAL ENCOUNTER (OUTPATIENT)
Dept: RADIATION ONCOLOGY | Facility: HOSPITAL | Age: 69
Setting detail: RADIATION/ONCOLOGY SERIES
Discharge: HOME | End: 2024-04-18
Payer: MEDICARE

## 2024-04-18 ENCOUNTER — HOSPITAL ENCOUNTER (OUTPATIENT)
Dept: RADIATION ONCOLOGY | Facility: HOSPITAL | Age: 69
Discharge: HOME | End: 2024-04-18

## 2024-04-18 PROCEDURE — 77333 RADIATION TREATMENT AID(S): CPT | Performed by: RADIOLOGY

## 2024-04-18 PROCEDURE — 77290 THER RAD SIMULAJ FIELD CPLX: CPT | Performed by: RADIOLOGY

## 2024-04-22 ENCOUNTER — TELEMEDICINE (OUTPATIENT)
Dept: PSYCHIATRY | Facility: CLINIC | Age: 69
End: 2024-04-22
Payer: MEDICARE

## 2024-04-22 DIAGNOSIS — F43.22 ADJUSTMENT DISORDER WITH ANXIOUS MOOD: Primary | ICD-10-CM

## 2024-04-22 DIAGNOSIS — G47.9 SLEEP DISTURBANCE: ICD-10-CM

## 2024-04-22 PROCEDURE — 90792 PSYCH DIAG EVAL W/MED SRVCS: CPT | Performed by: NURSE PRACTITIONER

## 2024-04-22 RX ORDER — TRAZODONE HYDROCHLORIDE 50 MG/1
TABLET ORAL
Qty: 30 TABLET | Refills: 1 | Status: SHIPPED | OUTPATIENT
Start: 2024-04-22

## 2024-04-22 NOTE — PROGRESS NOTES
"    Subjective   Clara Gonzalez is a  69 y.o. female who is here today for initial appointment, consenting to audio, visual telemedicine session. Patient was referred by: Dr. Durant Radiation Oncologist for anxiety. Patient was diagnosed with DCIS of the right breast. She underwent lumpectomy by Dr. Kendal Hernandez surgeon and will be starting radiation treatments soon. She reports she will then begin tamoxifen daily after radiation.     Velia HURLEY, PCP  China Masters MD, Medical Oncologist  Fortino Durant MD, Radiation Oncologist  Kayla Johansen Breast Surgeon     IN : 1100 am   OUT: 1145 am    PATIENT AT: [facility]  PROVIDER AT:  [facility] [address], [address]  [place] [address]    Chief Complaint:  anxiety and panic feelings        History of Present Illness The patient reports the following symptoms of generalized anxiety: constant anxiety/worry, restlessness/on edge, difficulty concentrating, mind goes blank, muscle tension, and sleep disturbance. The symptoms have been present for at least 6 month(s) and have caused impairment in important areas of functioning. She reports being placed on escitalopram 10 mg two weeks ago. She cont to have anxiety but denies side effects from medication, taking it every morning. Panic like feelings include feelings of being overwhelmed with worry, fears within context of health. She reports was also placed on hydroxyzine 10 mg as needed for panic like feelings. She has taken it in those high anxiety moments and it has helped. Her PCP has placed her on both. She reports not sleeping well at all. \"I fall asleep but awaken and can't go back to sleep resulting in thinking dark thoughts, worries\". This has been going on since diagnosis in Oct 2023 when she went unconscious for twenty minutes and was hospitalized not finding out why this happened, why she wasn't able to wake up for " "twenty minutes she states. Concerned it could happen again. Concerns about cancer, radiation treatments to begin, fatigue \"because I'm already very tired, fatigued feeling\". Hasn't slept well in months.      Stressors:   in  from \"heart attack\", in  has colitis into sepsis and hospitalized, in  [facility] out in lobby [facility] and they couldn't arouse me and I woke up in the hospital and in hospital for few days being tested and never figured out why it happened. Fall  \"I'm diagnosed with breast cancer\"     The following portions of the patient's history were reviewed and updated as appropriate: allergies, current medications, past family history, past medical history, past social history, past surgical history, and problem list.    Evaluate the six pillars of health: Nutrition, Activity, Sleep, Social Engagement, Stress Management, decreasing toxins ie nicotine, alcohol, illicit drugs   Past Psych History:    Substance Abuse: denies all     GEO REVIEWED: no red flags      family history includes Breast cancer (age of onset: 80) in her mother.      Social History: Patient is a   dying in  from heart attack she states. They have two grown children a daughter and son who live in the area with their families. 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 She also began a non profit in  called [employer] and just had a celebration of it at the [employer]. She continues that work. But felt with her health challenges she would \"pull back some\" and end the  which she knows is the right thing to do at this time in life. She has good support from family and friends and her [employer] family. She likes to tend to her flower garden in her backyard, go antiquing, work on her non profit, and enjoys sharing time with her family, grandchildren, " great grands. She reports when she got grandchildren she took care of them, and they are now graduating from college and having their own children.       Medical/Surgical History:  Past Medical History:   Diagnosis Date    Anxiety     Breast cancer     Breast injury 01/19/2023    right hematoma    Essential hypertension 07/02/2021    GERD without esophagitis 07/16/2019     Past Surgical History:   Procedure Laterality Date    BREAST CYST ASPIRATION Left 2005    BREAST LUMPECTOMY      CHOLECYSTECTOMY      COLONOSCOPY      HYSTERECTOMY      OOPHORECTOMY         No Known Allergies    Current Medications:   Current Outpatient Medications   Medication Sig Dispense Refill    escitalopram (LEXAPRO) 10 MG tablet Take 1 tablet by mouth Daily.      hydrOXYzine (ATARAX) 10 MG tablet Take 1-2 mg by mouth 3 (Three) Times a Day As Needed for Itching. Take 1 to 2 tablets three times a day as needed      metoprolol succinate XL (TOPROL-XL) 50 MG 24 hr tablet Take 1 tablet by mouth Daily.      ondansetron ODT (ZOFRAN-ODT) 4 MG disintegrating tablet Place 1 tablet on the tongue 4 (Four) Times a Day As Needed for Nausea or Vomiting. 16 tablet 0    tamoxifen (NOLVADEX) 10 MG tablet Take 0.5 tablets by mouth Daily. 15 tablet 11    traZODone (DESYREL) 50 MG tablet Take 1/2 - 1 tablet at bedtime for sleep 30 tablet 1     No current facility-administered medications for this visit.       Lab Results: reviewed in [facility]         Review of Systems Constitutional: Negative for appetite change, chills, diaphoresis, fatigue, fever and unexpected weight change.   HENT: Negative for hearing loss, sore throat, trouble swallowing and voice change.    Eyes: Negative for photophobia and visual disturbance.   Respiratory: Negative for cough, chest tightness and shortness of breath.    Cardiovascular: Negative for chest pain and palpitations.   Gastrointestinal: Negative for abdominal pain, constipation, nausea and vomiting.   Endocrine: Negative for cold  intolerance and heat intolerance.   Genitourinary: Negative for dysuria and frequency.   Musculoskeletal: Negative for arthralgia, back pain, joint swelling and neck stiffness.   Skin: Negative for color change and wound.   Allergic/Immunologic: Negative for environmental allergies and immunocompromised state.   Neurological: Negative for dizziness, tremors, seizures, syncope, weakness, light-headedness and headaches.   Hematological: Negative for adenopathy. Does not bruise/bleed easily.    Objective   Physical Exam  There were no vitals taken for this visit.    TONO-7:    Over the last two weeks, how often have you been bothered by the following problems?  Feeling nervous, anxious or on edge: Nearly every day  Not being able to stop or control worrying: More than half the days  Worrying too much about different things: More than half the days  Trouble Relaxing: Several days  Being so restless that it is hard to sit still: Several days  Becoming easily annoyed or irritable: Not at all  Feeling afraid as if something awful might happen: Several days  TONO 7 Total Score: 10  If you checked any problems, how difficult have these problems made it for you to do your work, take care of things at home, or get along with other people: Very difficult  0-4: Minimal anxiety  5-9: Mild anxiety  10-14: Moderate anxiety  15-21: Severe anxiety    PHQ-9:      4/22/2024     4:00 PM   PHQ-2/PHQ-9 Depression Screening   Little Interest or Pleasure in Doing Things 0-->not at all   Feeling Down, Depressed or Hopeless 0-->not at all   PHQ-9: Brief Depression Severity Measure Score 0      5-9: Minimal symptoms  10-14: Major depression mild  15-19: Major depression moderate  Greater then 20: Major depression severe        Mental Status Exam:   Appearance: appropriate  Hygiene:   good  Cooperation:  Cooperative  Eye Contact:  Good  Psychomotor Behavior:  Appropriate  Mood:  anxious  Affect:  Appropriate  Hopelessness: Denies  Speech:   Normal  Thought Process:  Linear  Thought Content:  Normal  Suicidal:  None  Homicidal:  None  Hallucinations:  None  Delusion:  None  Memory:  Intact  Orientation:  Person, Place, Time, and Situation  Reliability:  good  Insight:  Fair  Judgement:  Good  Impulse Control:  Good        Short-term goals: Patient will be compliant with clinic appointments.  Patient will be engaged in therapy, medication compliant with minimal side effects. Patient  will report decrease of symptoms and frequency.    Long-term goals: Patient will have minimal symptoms of mental health disorder with continued treatment. Patient will be compliant with treatment and appointments.       Problem list: adjustment disorder anxious mood , sleep disturbance   Strengths: patient appears motivated for treatment          Assessment & Plan   Diagnoses and all orders for this visit:    1. Adjustment disorder with anxious mood (Primary)    2. Sleep disturbance    Other orders  -     traZODone (DESYREL) 50 MG tablet; Take 1/2 - 1 tablet at bedtime for sleep  Dispense: 30 tablet; Refill: 1      A psychological evaluation was conducted in order to assess past and current level of functioning. Areas assessed included, but were not limited to: perception of social support, perception of ability to face and deal with challenges in life (positive functioning), anxiety symptoms, depressive symptoms, perspective on beliefs/belief system, coping skills for stress, intelligence level,  Therapeutic rapport was established.     Assisted patient in processing above session content; acknowledged and normalized patient’s thoughts, feelings, and concerns.  Applied  positive coping skills and behavior management in session. Allowed patient to freely discuss issues without interruption or judgment. Provided safe, confidential environment to facilitate the development of positive therapeutic relationship and encourage open, honest communication. Relaxation techniques  reviewed and practiced with pt.     Assisted patient in identifying risk factors which would indicate the need for higher level of care including thoughts to harm self or others and/or self-harming behavior and encouraged patient to contact this office, call 911, or present to the nearest emergency room should any of these events occur. Discussed crisis intervention services and means to access.  Patient adamantly and convincingly denies current suicidal or homicidal ideation or perceptual disturbance.    Discussed diagnosis and recommendations for treatment:    PROVIDE: Cognitive Behavioral Therapy and Solution Focused Therapy to improve functioning, maintain stability, and avoid decompensation and the need for higher level of care.    MEDICATION MANAGEMENT RECOMMENDATIONS: patient has only been on escitalopram 10 mg for two weeks, would hold off on increasing to 20 mg yet, would give two more weeks and utilize the hydroxyzine for intermittent overwhelmed feelings.   ADD Trazodone 50 mg taking 1.2- 1 tablet nightly at 9:30-10 pm and go to bed at same time nightly. No Iphone Ipad before bed. Get up at same time in am.       We discussed risks, benefits,goals and side effects of the above medication and the patient was agreeable with the plan.Patient was educated on the importance of compliance with treatment and follow-up appointments.To call for questions or concerns and return early if necessary. Crisis plan reviewed including going to the Emergency department.       Treatment Plan: stabilize mood,  patient will stay out of the hospital and be at optimal level of functioning, take all medication as prescribed. Patient verbalized  understanding and agreement to plan.      Return in about 2 weeks (around 5/6/2024).

## 2024-04-23 PROCEDURE — 77334 RADIATION TREATMENT AID(S): CPT | Performed by: RADIOLOGY

## 2024-04-23 PROCEDURE — 77295 3-D RADIOTHERAPY PLAN: CPT | Performed by: RADIOLOGY

## 2024-04-23 PROCEDURE — 77300 RADIATION THERAPY DOSE PLAN: CPT | Performed by: RADIOLOGY

## 2024-04-24 ENCOUNTER — HOSPITAL ENCOUNTER (OUTPATIENT)
Dept: RADIATION ONCOLOGY | Facility: HOSPITAL | Age: 69
Discharge: HOME | End: 2024-04-24

## 2024-04-24 PROCEDURE — 77280 THER RAD SIMULAJ FIELD SMPL: CPT | Performed by: RADIOLOGY

## 2024-04-25 ENCOUNTER — HOSPITAL ENCOUNTER (OUTPATIENT)
Dept: RADIATION ONCOLOGY | Facility: HOSPITAL | Age: 69
Discharge: HOME | End: 2024-04-25

## 2024-04-25 PROCEDURE — 77336 RADIATION PHYSICS CONSULT: CPT | Performed by: RADIOLOGY

## 2024-04-25 PROCEDURE — 77412 RADIATION TX DELIVERY LVL 3: CPT | Performed by: RADIOLOGY

## 2024-04-26 ENCOUNTER — HOSPITAL ENCOUNTER (OUTPATIENT)
Dept: RADIATION ONCOLOGY | Facility: HOSPITAL | Age: 69
Setting detail: RADIATION/ONCOLOGY SERIES
Discharge: HOME | End: 2024-04-26
Payer: MEDICARE

## 2024-04-26 PROCEDURE — 77412 RADIATION TX DELIVERY LVL 3: CPT | Performed by: RADIOLOGY

## 2024-04-29 ENCOUNTER — HOSPITAL ENCOUNTER (OUTPATIENT)
Dept: RADIATION ONCOLOGY | Facility: HOSPITAL | Age: 69
Setting detail: RADIATION/ONCOLOGY SERIES
Discharge: HOME | End: 2024-04-29
Payer: MEDICARE

## 2024-04-29 PROCEDURE — 77412 RADIATION TX DELIVERY LVL 3: CPT | Performed by: RADIOLOGY

## 2024-04-29 PROCEDURE — 77387 GUIDANCE FOR RADJ TX DLVR: CPT | Performed by: RADIOLOGY

## 2024-04-30 ENCOUNTER — HOSPITAL ENCOUNTER (OUTPATIENT)
Dept: RADIATION ONCOLOGY | Facility: HOSPITAL | Age: 69
Setting detail: RADIATION/ONCOLOGY SERIES
Discharge: HOME | End: 2024-04-30
Payer: MEDICARE

## 2024-04-30 ENCOUNTER — DOCUMENTATION (OUTPATIENT)
Dept: NUTRITION | Facility: HOSPITAL | Age: 69
End: 2024-04-30
Payer: COMMERCIAL

## 2024-04-30 VITALS — BODY MASS INDEX: 28.46 KG/M2 | WEIGHT: 140.9 LBS

## 2024-04-30 PROCEDURE — 77412 RADIATION TX DELIVERY LVL 3: CPT | Performed by: RADIOLOGY

## 2024-04-30 NOTE — PROGRESS NOTES
"Outpatient Oncology Nutrition     Reason for Visit:     Oncology Nutrition Screening and Patient Education    Patient Name:  Clara Gonzalez    :  1955    MRN:  [id number]    Date of Encounter: 2024    Nutrition Assessment     Diagnosis:  Malignant neoplasm of upper-outer quadrant of right breast in female, estrogen receptor positive /   Stage 0 (cTis (DCIS), cN0, cM0, G2)    Surgery:  Status post breast conserving surgery (lumpectomy) 2024     Tamoxifen, but will start this after she completes radiotherapy     Radiation:  40.05 Gray to be delivered in 15 daily fractions followed by additional boost of 10 Gray to the lumpectomy site in 5 additional sessions to complete treatment over approximately 4-week duration / patient has completed 4/15 treatments     Patient Active Problem List   Diagnosis    Chest pain    anxiety and depression    Essential hypertension    Bereavement    GERD without esophagitis    Abnormal mammogram    Acute thoracic back pain    Allergic rhinitis    Perioral dermatitis    Tendinitis of left shoulder    Elevated blood sugar    Unresponsive episode    Malignant neoplasm of upper-outer quadrant of right breast in female, estrogen receptor positive     Hydration Status     Goal: 70 ounces    Patient drinks predominantly coffee and tea, but does try to drink water    Anthropometric Measurements     Height:    Ht Readings from Last 1 Encounters:   24 149.9 cm (59\")       Weight:    Wt Readings from Last 1 Encounters:   24 63.9 kg (140 lb 14.4 oz)       BMI:  28.46 / overweight    Weight Change: Stable weight    Medication Review     Reviewed 24    Nutrition Impact Symptoms   None noted    Physical Activity      Not my normal self, but able to be up and about with fairly normal activities    Current Nutritional Intake     Oral diet:      Oral nutritional supplements:    Intake:    Malnutrition Risk Assessment     Recent weight loss over the past 6 months:  0 = " No    How much weight loss:      Eating poorly because of a decreased appetite:  0 = No    Malnutrition Screening Score:     MST = 0 or 1 Patient not at risk for malnutrition    Nutrition Intervention   Consultation with patient during RAD ONC status checks.       With treatment plan, discussed the anticipated side effect of fatigue and importance of focusing on healthy food choices that are nutrient dense, high protein and adequate hydration.     Provided breast cancer survivorship education for nutritional considerations including adequate protein, maintenance of calorie intake to prevent weight gain, emphasis on plant sources, whole grains, limitation on processed foods, lower fat intake, as well as increasing physical activity.    Goal   Patient education to increase nutrition knowledge and surveillance for treatment side effects and survivorship.    Monitoring / Evaluation

## 2024-05-01 ENCOUNTER — HOSPITAL ENCOUNTER (OUTPATIENT)
Dept: RADIATION ONCOLOGY | Facility: HOSPITAL | Age: 69
Setting detail: RADIATION/ONCOLOGY SERIES
Discharge: HOME | End: 2024-05-01
Payer: MEDICARE

## 2024-05-01 PROCEDURE — 77412 RADIATION TX DELIVERY LVL 3: CPT | Performed by: RADIOLOGY

## 2024-05-01 PROCEDURE — 77417 THER RADIOLOGY PORT IMAGE(S): CPT | Performed by: RADIOLOGY

## 2024-05-02 ENCOUNTER — HOSPITAL ENCOUNTER (OUTPATIENT)
Dept: RADIATION ONCOLOGY | Facility: HOSPITAL | Age: 69
Setting detail: RADIATION/ONCOLOGY SERIES
Discharge: HOME | End: 2024-05-02
Payer: MEDICARE

## 2024-05-02 PROCEDURE — 77336 RADIATION PHYSICS CONSULT: CPT | Performed by: RADIOLOGY

## 2024-05-02 PROCEDURE — 77412 RADIATION TX DELIVERY LVL 3: CPT | Performed by: RADIOLOGY

## 2024-05-03 ENCOUNTER — HOSPITAL ENCOUNTER (OUTPATIENT)
Dept: RADIATION ONCOLOGY | Facility: HOSPITAL | Age: 69
Setting detail: RADIATION/ONCOLOGY SERIES
Discharge: HOME | End: 2024-05-03
Payer: MEDICARE

## 2024-05-03 PROCEDURE — 77412 RADIATION TX DELIVERY LVL 3: CPT | Performed by: RADIOLOGY

## 2024-05-06 ENCOUNTER — HOSPITAL ENCOUNTER (OUTPATIENT)
Dept: RADIATION ONCOLOGY | Facility: HOSPITAL | Age: 69
Setting detail: RADIATION/ONCOLOGY SERIES
Discharge: HOME | End: 2024-05-06
Payer: MEDICARE

## 2024-05-06 PROCEDURE — 77412 RADIATION TX DELIVERY LVL 3: CPT | Performed by: RADIOLOGY

## 2024-05-07 ENCOUNTER — HOSPITAL ENCOUNTER (OUTPATIENT)
Dept: RADIATION ONCOLOGY | Facility: HOSPITAL | Age: 69
Setting detail: RADIATION/ONCOLOGY SERIES
Discharge: HOME | End: 2024-05-07
Payer: MEDICARE

## 2024-05-07 VITALS — WEIGHT: 141.3 LBS | BODY MASS INDEX: 28.54 KG/M2

## 2024-05-07 PROCEDURE — 77412 RADIATION TX DELIVERY LVL 3: CPT | Performed by: RADIOLOGY

## 2024-05-08 ENCOUNTER — HOSPITAL ENCOUNTER (OUTPATIENT)
Dept: RADIATION ONCOLOGY | Facility: HOSPITAL | Age: 69
Setting detail: RADIATION/ONCOLOGY SERIES
Discharge: HOME | End: 2024-05-08
Payer: MEDICARE

## 2024-05-08 PROCEDURE — 77417 THER RADIOLOGY PORT IMAGE(S): CPT | Performed by: RADIOLOGY

## 2024-05-08 PROCEDURE — 77412 RADIATION TX DELIVERY LVL 3: CPT | Performed by: RADIOLOGY

## 2024-05-09 ENCOUNTER — HOSPITAL ENCOUNTER (OUTPATIENT)
Dept: RADIATION ONCOLOGY | Facility: HOSPITAL | Age: 69
Discharge: HOME | End: 2024-05-09

## 2024-05-09 ENCOUNTER — HOSPITAL ENCOUNTER (OUTPATIENT)
Dept: RADIATION ONCOLOGY | Facility: HOSPITAL | Age: 69
Setting detail: RADIATION/ONCOLOGY SERIES
Discharge: HOME | End: 2024-05-09
Payer: MEDICARE

## 2024-05-09 PROCEDURE — 77412 RADIATION TX DELIVERY LVL 3: CPT | Performed by: RADIOLOGY

## 2024-05-09 PROCEDURE — 77336 RADIATION PHYSICS CONSULT: CPT | Performed by: RADIOLOGY

## 2024-05-09 PROCEDURE — 77290 THER RAD SIMULAJ FIELD CPLX: CPT | Performed by: RADIOLOGY

## 2024-05-09 PROCEDURE — 77417 THER RADIOLOGY PORT IMAGE(S): CPT | Performed by: RADIOLOGY

## 2024-05-10 ENCOUNTER — HOSPITAL ENCOUNTER (OUTPATIENT)
Dept: RADIATION ONCOLOGY | Facility: HOSPITAL | Age: 69
Setting detail: RADIATION/ONCOLOGY SERIES
Discharge: HOME | End: 2024-05-10
Payer: MEDICARE

## 2024-05-10 PROCEDURE — 77412 RADIATION TX DELIVERY LVL 3: CPT | Performed by: RADIOLOGY

## 2024-05-13 ENCOUNTER — HOSPITAL ENCOUNTER (OUTPATIENT)
Dept: RADIATION ONCOLOGY | Facility: HOSPITAL | Age: 69
Setting detail: RADIATION/ONCOLOGY SERIES
Discharge: HOME | End: 2024-05-13
Payer: MEDICARE

## 2024-05-13 PROCEDURE — 77412 RADIATION TX DELIVERY LVL 3: CPT | Performed by: RADIOLOGY

## 2024-05-14 ENCOUNTER — HOSPITAL ENCOUNTER (OUTPATIENT)
Dept: RADIATION ONCOLOGY | Facility: HOSPITAL | Age: 69
Setting detail: RADIATION/ONCOLOGY SERIES
Discharge: HOME | End: 2024-05-14
Payer: MEDICARE

## 2024-05-14 VITALS — BODY MASS INDEX: 28.64 KG/M2 | WEIGHT: 141.8 LBS

## 2024-05-14 PROCEDURE — 77412 RADIATION TX DELIVERY LVL 3: CPT | Performed by: RADIOLOGY

## 2024-05-15 ENCOUNTER — HOSPITAL ENCOUNTER (OUTPATIENT)
Dept: RADIATION ONCOLOGY | Facility: HOSPITAL | Age: 69
Setting detail: RADIATION/ONCOLOGY SERIES
Discharge: HOME | End: 2024-05-15
Payer: MEDICARE

## 2024-05-15 DIAGNOSIS — G47.9 SLEEP DISTURBANCE: Primary | ICD-10-CM

## 2024-05-15 PROCEDURE — 77280 THER RAD SIMULAJ FIELD SMPL: CPT | Performed by: RADIOLOGY

## 2024-05-15 PROCEDURE — 77334 RADIATION TREATMENT AID(S): CPT | Performed by: RADIOLOGY

## 2024-05-15 PROCEDURE — 77295 3-D RADIOTHERAPY PLAN: CPT | Performed by: RADIOLOGY

## 2024-05-15 PROCEDURE — 77300 RADIATION THERAPY DOSE PLAN: CPT | Performed by: RADIOLOGY

## 2024-05-15 PROCEDURE — 77290 THER RAD SIMULAJ FIELD CPLX: CPT | Performed by: RADIOLOGY

## 2024-05-15 PROCEDURE — 77412 RADIATION TX DELIVERY LVL 3: CPT | Performed by: RADIOLOGY

## 2024-05-15 PROCEDURE — 77321 SPECIAL TELETX PORT PLAN: CPT | Performed by: RADIOLOGY

## 2024-05-15 RX ORDER — TRAZODONE HYDROCHLORIDE 50 MG/1
TABLET ORAL
Qty: 90 TABLET | Refills: 1 | Status: SHIPPED | OUTPATIENT
Start: 2024-05-15

## 2024-05-16 ENCOUNTER — HOSPITAL ENCOUNTER (OUTPATIENT)
Dept: RADIATION ONCOLOGY | Facility: HOSPITAL | Age: 69
Setting detail: RADIATION/ONCOLOGY SERIES
Discharge: HOME | End: 2024-05-16
Payer: MEDICARE

## 2024-05-16 PROCEDURE — 77412 RADIATION TX DELIVERY LVL 3: CPT | Performed by: RADIOLOGY

## 2024-05-17 ENCOUNTER — HOSPITAL ENCOUNTER (OUTPATIENT)
Dept: RADIATION ONCOLOGY | Facility: HOSPITAL | Age: 69
Setting detail: RADIATION/ONCOLOGY SERIES
Discharge: HOME | End: 2024-05-17
Payer: MEDICARE

## 2024-05-17 PROCEDURE — 77412 RADIATION TX DELIVERY LVL 3: CPT | Performed by: RADIOLOGY

## 2024-05-17 PROCEDURE — 77336 RADIATION PHYSICS CONSULT: CPT | Performed by: RADIOLOGY

## 2024-05-20 ENCOUNTER — HOSPITAL ENCOUNTER (OUTPATIENT)
Dept: RADIATION ONCOLOGY | Facility: HOSPITAL | Age: 69
Setting detail: RADIATION/ONCOLOGY SERIES
Discharge: HOME | End: 2024-05-20
Payer: MEDICARE

## 2024-05-20 PROCEDURE — 77412 RADIATION TX DELIVERY LVL 3: CPT | Performed by: RADIOLOGY

## 2024-05-21 ENCOUNTER — HOSPITAL ENCOUNTER (OUTPATIENT)
Dept: RADIATION ONCOLOGY | Facility: HOSPITAL | Age: 69
Setting detail: RADIATION/ONCOLOGY SERIES
Discharge: HOME | End: 2024-05-21
Payer: MEDICARE

## 2024-05-21 VITALS — BODY MASS INDEX: 28.34 KG/M2 | WEIGHT: 140.3 LBS

## 2024-05-21 PROCEDURE — 77412 RADIATION TX DELIVERY LVL 3: CPT | Performed by: RADIOLOGY

## 2024-05-22 ENCOUNTER — HOSPITAL ENCOUNTER (OUTPATIENT)
Dept: RADIATION ONCOLOGY | Facility: HOSPITAL | Age: 69
Setting detail: RADIATION/ONCOLOGY SERIES
Discharge: HOME | End: 2024-05-22
Payer: MEDICARE

## 2024-05-22 PROCEDURE — 77412 RADIATION TX DELIVERY LVL 3: CPT | Performed by: RADIOLOGY

## 2024-06-04 ENCOUNTER — HOSPITAL ENCOUNTER (OUTPATIENT)
Facility: HOSPITAL | Age: 69
Setting detail: OBSERVATION
Discharge: HOME | End: 2024-06-06
Attending: EMERGENCY MEDICINE | Admitting: HOSPITALIST
Payer: MEDICARE

## 2024-06-04 ENCOUNTER — APPOINTMENT (OUTPATIENT)
Dept: CT IMAGING | Facility: HOSPITAL | Age: 69
End: 2024-06-04
Payer: COMMERCIAL

## 2024-06-04 ENCOUNTER — APPOINTMENT (OUTPATIENT)
Dept: GENERAL RADIOLOGY | Facility: HOSPITAL | Age: 69
End: 2024-06-04
Payer: MEDICARE

## 2024-06-04 ENCOUNTER — APPOINTMENT (OUTPATIENT)
Dept: CT IMAGING | Facility: HOSPITAL | Age: 69
End: 2024-06-04
Payer: MEDICARE

## 2024-06-04 DIAGNOSIS — R47.01 APHASIA: ICD-10-CM

## 2024-06-04 DIAGNOSIS — M54.6 ACUTE THORACIC BACK PAIN, UNSPECIFIED BACK PAIN LATERALITY: ICD-10-CM

## 2024-06-04 DIAGNOSIS — R55 SYNCOPE AND COLLAPSE: Primary | ICD-10-CM

## 2024-06-04 DIAGNOSIS — M77.8 TENDINITIS OF LEFT SHOULDER: ICD-10-CM

## 2024-06-04 DIAGNOSIS — G45.9 TIA (TRANSIENT ISCHEMIC ATTACK): ICD-10-CM

## 2024-06-04 LAB
ALT SERPL W P-5'-P-CCNC: 20 U/L (ref 1–33)
APTT PPP: 29 SECONDS (ref 22–39)
AST SERPL-CCNC: 21 U/L (ref 1–32)
B PARAPERT DNA SPEC QL NAA+PROBE: NOT DETECTED
B PERT DNA SPEC QL NAA+PROBE: NOT DETECTED
BACTERIA UR QL AUTO: ABNORMAL /HPF
BASOPHILS # BLD AUTO: 0.1 10*3/MM3 (ref 0–0.2)
BASOPHILS NFR BLD AUTO: 1.5 % (ref 0–1.5)
BILIRUB UR QL STRIP: NEGATIVE
BUN BLDA-MCNC: 20 MG/DL (ref 8–26)
C PNEUM DNA NPH QL NAA+NON-PROBE: NOT DETECTED
CA-I BLDA-SCNC: 1.25 MMOL/L (ref 1.2–1.32)
CHLORIDE BLDA-SCNC: 105 MMOL/L (ref 98–109)
CHOLEST SERPL-MCNC: 182 MG/DL (ref 0–200)
CLARITY UR: CLEAR
CO2 BLDA-SCNC: 24 MMOL/L (ref 24–29)
COLOR UR: YELLOW
CREAT BLDA-MCNC: 1 MG/DL (ref 0.6–1.3)
D-LACTATE SERPL-SCNC: 1.1 MMOL/L (ref 0.5–2)
DEPRECATED RDW RBC AUTO: 45.9 FL (ref 37–54)
EGFRCR SERPLBLD CKD-EPI 2021: 61.5 ML/MIN/1.73
EOSINOPHIL # BLD AUTO: 0.23 10*3/MM3 (ref 0–0.4)
EOSINOPHIL NFR BLD AUTO: 3.4 % (ref 0.3–6.2)
ERYTHROCYTE [DISTWIDTH] IN BLOOD BY AUTOMATED COUNT: 12.9 % (ref 12.3–15.4)
FLUAV SUBTYP SPEC NAA+PROBE: NOT DETECTED
FLUBV RNA ISLT QL NAA+PROBE: NOT DETECTED
GLUCOSE BLDC GLUCOMTR-MCNC: 108 MG/DL (ref 70–130)
GLUCOSE UR STRIP-MCNC: NEGATIVE MG/DL
HADV DNA SPEC NAA+PROBE: NOT DETECTED
HCOV 229E RNA SPEC QL NAA+PROBE: NOT DETECTED
HCOV HKU1 RNA SPEC QL NAA+PROBE: NOT DETECTED
HCOV NL63 RNA SPEC QL NAA+PROBE: NOT DETECTED
HCOV OC43 RNA SPEC QL NAA+PROBE: NOT DETECTED
HCT VFR BLD AUTO: 42 % (ref 34–46.6)
HCT VFR BLDA CALC: 39 % (ref 38–51)
HDLC SERPL-MCNC: 57 MG/DL (ref 40–60)
HGB BLD-MCNC: 13.8 G/DL (ref 12–15.9)
HGB BLDA-MCNC: 13.3 G/DL (ref 12–17)
HGB UR QL STRIP.AUTO: NEGATIVE
HMPV RNA NPH QL NAA+NON-PROBE: NOT DETECTED
HOLD SPECIMEN: NORMAL
HPIV1 RNA ISLT QL NAA+PROBE: NOT DETECTED
HPIV2 RNA SPEC QL NAA+PROBE: NOT DETECTED
HPIV3 RNA NPH QL NAA+PROBE: NOT DETECTED
HPIV4 P GENE NPH QL NAA+PROBE: NOT DETECTED
HYALINE CASTS UR QL AUTO: ABNORMAL /LPF
IMM GRANULOCYTES # BLD AUTO: 0.01 10*3/MM3 (ref 0–0.05)
IMM GRANULOCYTES NFR BLD AUTO: 0.1 % (ref 0–0.5)
KETONES UR QL STRIP: NEGATIVE
LDLC SERPL CALC-MCNC: 108 MG/DL (ref 0–100)
LDLC/HDLC SERPL: 1.86 {RATIO}
LEUKOCYTE ESTERASE UR QL STRIP.AUTO: ABNORMAL
LYMPHOCYTES # BLD AUTO: 1.54 10*3/MM3 (ref 0.7–3.1)
LYMPHOCYTES NFR BLD AUTO: 23.1 % (ref 19.6–45.3)
M PNEUMO IGG SER IA-ACNC: NOT DETECTED
MAGNESIUM SERPL-MCNC: 2.1 MG/DL (ref 1.6–2.4)
MCH RBC QN AUTO: 31.5 PG (ref 26.6–33)
MCHC RBC AUTO-ENTMCNC: 32.9 G/DL (ref 31.5–35.7)
MCV RBC AUTO: 95.9 FL (ref 79–97)
MONOCYTES # BLD AUTO: 0.58 10*3/MM3 (ref 0.1–0.9)
MONOCYTES NFR BLD AUTO: 8.7 % (ref 5–12)
NEUTROPHILS NFR BLD AUTO: 4.21 10*3/MM3 (ref 1.7–7)
NEUTROPHILS NFR BLD AUTO: 63.2 % (ref 42.7–76)
NITRITE UR QL STRIP: NEGATIVE
NRBC BLD AUTO-RTO: 0 /100 WBC (ref 0–0.2)
PH UR STRIP.AUTO: 7 [PH] (ref 5–8)
PLATELET # BLD AUTO: 269 10*3/MM3 (ref 140–450)
PMV BLD AUTO: 9.3 FL (ref 6–12)
POTASSIUM BLDA-SCNC: 4 MMOL/L (ref 3.5–4.9)
PROCALCITONIN SERPL-MCNC: 0.03 NG/ML (ref 0–0.25)
PROT UR QL STRIP: NEGATIVE
RBC # BLD AUTO: 4.38 10*6/MM3 (ref 3.77–5.28)
RBC # UR STRIP: ABNORMAL /HPF
REF LAB TEST METHOD: ABNORMAL
RHINOVIRUS RNA SPEC NAA+PROBE: NOT DETECTED
RSV RNA NPH QL NAA+NON-PROBE: NOT DETECTED
SARS-COV-2 RNA NPH QL NAA+NON-PROBE: NOT DETECTED
SODIUM BLD-SCNC: 140 MMOL/L (ref 138–146)
SP GR UR STRIP: 1.03 (ref 1–1.03)
SQUAMOUS #/AREA URNS HPF: ABNORMAL /HPF
T4 FREE SERPL-MCNC: 1.04 NG/DL (ref 0.92–1.68)
TRIGL SERPL-MCNC: 95 MG/DL (ref 0–150)
TROPONIN T SERPL HS-MCNC: <6 NG/L
TROPONIN T SERPL HS-MCNC: <6 NG/L
TSH SERPL DL<=0.05 MIU/L-ACNC: 6.95 UIU/ML (ref 0.27–4.2)
UROBILINOGEN UR QL STRIP: ABNORMAL
VLDLC SERPL-MCNC: 17 MG/DL (ref 5–40)
WBC # UR STRIP: ABNORMAL /HPF
WBC NRBC COR # BLD AUTO: 6.67 10*3/MM3 (ref 3.4–10.8)
WHOLE BLOOD HOLD COAG: NORMAL
WHOLE BLOOD HOLD SPECIMEN: NORMAL

## 2024-06-04 PROCEDURE — 84450 TRANSFERASE (AST) (SGOT): CPT | Performed by: EMERGENCY MEDICINE

## 2024-06-04 PROCEDURE — 80047 BASIC METABLC PNL IONIZED CA: CPT | Performed by: EMERGENCY MEDICINE

## 2024-06-04 PROCEDURE — 83605 ASSAY OF LACTIC ACID: CPT | Performed by: EMERGENCY MEDICINE

## 2024-06-04 PROCEDURE — 84443 ASSAY THYROID STIM HORMONE: CPT | Performed by: EMERGENCY MEDICINE

## 2024-06-04 PROCEDURE — 84145 PROCALCITONIN (PCT): CPT | Performed by: EMERGENCY MEDICINE

## 2024-06-04 PROCEDURE — 99291 CRITICAL CARE FIRST HOUR: CPT

## 2024-06-04 PROCEDURE — 0202U NFCT DS 22 TRGT SARS-COV-2: CPT | Performed by: EMERGENCY MEDICINE

## 2024-06-04 PROCEDURE — 71045 X-RAY EXAM CHEST 1 VIEW: CPT

## 2024-06-04 PROCEDURE — 80061 LIPID PANEL: CPT

## 2024-06-04 PROCEDURE — 0042T HC CT CEREBRAL PERFUSION W/WO CONTRAST: CPT

## 2024-06-04 PROCEDURE — 85730 THROMBOPLASTIN TIME PARTIAL: CPT | Performed by: EMERGENCY MEDICINE

## 2024-06-04 PROCEDURE — 84484 ASSAY OF TROPONIN QUANT: CPT | Performed by: EMERGENCY MEDICINE

## 2024-06-04 PROCEDURE — 70498 CT ANGIOGRAPHY NECK: CPT

## 2024-06-04 PROCEDURE — 99214 OFFICE O/P EST MOD 30 MIN: CPT

## 2024-06-04 PROCEDURE — 36415 COLL VENOUS BLD VENIPUNCTURE: CPT

## 2024-06-04 PROCEDURE — 82565 ASSAY OF CREATININE: CPT

## 2024-06-04 PROCEDURE — 85014 HEMATOCRIT: CPT | Performed by: EMERGENCY MEDICINE

## 2024-06-04 PROCEDURE — 70450 CT HEAD/BRAIN W/O DYE: CPT

## 2024-06-04 PROCEDURE — 84460 ALANINE AMINO (ALT) (SGPT): CPT | Performed by: EMERGENCY MEDICINE

## 2024-06-04 PROCEDURE — 85025 COMPLETE CBC W/AUTO DIFF WBC: CPT | Performed by: EMERGENCY MEDICINE

## 2024-06-04 PROCEDURE — 25510000001 IOPAMIDOL PER 1 ML: Performed by: EMERGENCY MEDICINE

## 2024-06-04 PROCEDURE — 70496 CT ANGIOGRAPHY HEAD: CPT

## 2024-06-04 PROCEDURE — 93005 ELECTROCARDIOGRAM TRACING: CPT | Performed by: EMERGENCY MEDICINE

## 2024-06-04 PROCEDURE — 81001 URINALYSIS AUTO W/SCOPE: CPT | Performed by: EMERGENCY MEDICINE

## 2024-06-04 PROCEDURE — 83735 ASSAY OF MAGNESIUM: CPT | Performed by: EMERGENCY MEDICINE

## 2024-06-04 PROCEDURE — 84439 ASSAY OF FREE THYROXINE: CPT | Performed by: EMERGENCY MEDICINE

## 2024-06-04 RX ORDER — ATORVASTATIN CALCIUM 40 MG/1
80 TABLET, FILM COATED ORAL NIGHTLY
Status: DISCONTINUED | OUTPATIENT
Start: 2024-06-04 | End: 2024-06-06 | Stop reason: HOSPADM

## 2024-06-04 RX ORDER — SODIUM CHLORIDE 0.9 % (FLUSH) 0.9 %
10 SYRINGE (ML) INJECTION AS NEEDED
Status: DISCONTINUED | OUTPATIENT
Start: 2024-06-04 | End: 2024-06-05

## 2024-06-04 RX ORDER — ASPIRIN 81 MG/1
324 TABLET, CHEWABLE ORAL ONCE
Status: COMPLETED | OUTPATIENT
Start: 2024-06-04 | End: 2024-06-04

## 2024-06-04 RX ADMIN — ASPIRIN 324 MG: 81 TABLET, CHEWABLE ORAL at 23:15

## 2024-06-04 RX ADMIN — IOPAMIDOL 120 ML: 755 INJECTION, SOLUTION INTRAVENOUS at 21:05

## 2024-06-04 RX ADMIN — ATORVASTATIN CALCIUM 80 MG: 40 TABLET, FILM COATED ORAL at 23:15

## 2024-06-05 ENCOUNTER — APPOINTMENT (OUTPATIENT)
Dept: CARDIOLOGY | Facility: HOSPITAL | Age: 69
End: 2024-06-05
Payer: MEDICARE

## 2024-06-05 ENCOUNTER — APPOINTMENT (OUTPATIENT)
Dept: MRI IMAGING | Facility: HOSPITAL | Age: 69
End: 2024-06-05
Payer: MEDICARE

## 2024-06-05 PROBLEM — G45.9 TIA (TRANSIENT ISCHEMIC ATTACK): Status: ACTIVE | Noted: 2024-06-05

## 2024-06-05 LAB
ASCENDING AORTA: 3.2 CM
BH CV ECHO MEAS - AO MAX PG: 10.7 MMHG
BH CV ECHO MEAS - AO MEAN PG: 6 MMHG
BH CV ECHO MEAS - AO ROOT DIAM: 2.7 CM
BH CV ECHO MEAS - AO V2 MAX: 163.9 CM/SEC
BH CV ECHO MEAS - AO V2 VTI: 35.6 CM
BH CV ECHO MEAS - AVA(I,D): 1.6 CM2
BH CV ECHO MEAS - EF(MOD-BP): 56 %
BH CV ECHO MEAS - IVS/LVPW: 1.09 CM
BH CV ECHO MEAS - IVSD: 1.2 CM
BH CV ECHO MEAS - LA DIMENSION: 3.7 CM
BH CV ECHO MEAS - LAT PEAK E' VEL: 7 CM/SEC
BH CV ECHO MEAS - LV MAX PG: 2.6 MMHG
BH CV ECHO MEAS - LV V1 MAX: 80.9 CM/SEC
BH CV ECHO MEAS - LVIDD: 4.2 CM
BH CV ECHO MEAS - LVIDS: 2.7 CM
BH CV ECHO MEAS - LVOT DIAM: 2 CM
BH CV ECHO MEAS - LVPWD: 1.1 CM
BH CV ECHO MEAS - MED PEAK E' VEL: 5.3 CM/SEC
BH CV ECHO MEAS - MV A MAX VEL: 57.1 CM/SEC
BH CV ECHO MEAS - MV E MAX VEL: 50.3 CM/SEC
BH CV ECHO MEAS - MV E/A: 0.88
BH CV ECHO MEAS - PA ACC TIME: 0.1 SEC
BH CV ECHO MEAS - PA V2 MAX: 85.6 CM/SEC
BH CV ECHO MEAS - PAPD(PI EDV): 3 MMHG
BH CV ECHO MEAS - PI END-D VEL: 88.4 CM/SEC
BH CV ECHO MEAS - RAP SYSTOLE: 8 MMHG
BH CV ECHO MEAS - RVSP: 30 MMHG
BH CV ECHO MEAS - TAPSE (>1.6): 3 CM
BH CV ECHO MEAS - TR MAX PG: 22 MMHG
BH CV ECHO MEAS - TR MAX VEL: 234.7 CM/SEC
BH CV ECHO MEASUREMENTS AVERAGE E/E' RATIO: 8.18
BH CV ECHO SHUNT ASSESSMENT PERFORMED (HIDDEN SCRIPTING): 1
BH CV VAS BP LEFT ARM: NORMAL MMHG
BH CV XLRA - RV BASE: 4.45 CM
BH CV XLRA - RV LENGTH: 6.98 CM
BH CV XLRA - RV MID: 3.54 CM
BH CV XLRA - TDI S': 15.9 CM/SEC
CREAT BLDA-MCNC: 1 MG/DL (ref 0.6–1.3)
GLUCOSE BLDC GLUCOMTR-MCNC: 103 MG/DL (ref 70–130)
GLUCOSE BLDC GLUCOMTR-MCNC: 135 MG/DL (ref 70–130)
IVRT: 93 MS
LEFT ATRIUM VOLUME INDEX: 28.6 ML/M2
LV EF 2D ECHO EST: 56 %

## 2024-06-05 PROCEDURE — 93306 TTE W/DOPPLER COMPLETE: CPT | Performed by: INTERNAL MEDICINE

## 2024-06-05 PROCEDURE — G0378 HOSPITAL OBSERVATION PER HR: HCPCS

## 2024-06-05 PROCEDURE — 92523 SPEECH SOUND LANG COMPREHEN: CPT

## 2024-06-05 PROCEDURE — 97165 OT EVAL LOW COMPLEX 30 MIN: CPT

## 2024-06-05 PROCEDURE — 99214 OFFICE O/P EST MOD 30 MIN: CPT | Performed by: STUDENT IN AN ORGANIZED HEALTH CARE EDUCATION/TRAINING PROGRAM

## 2024-06-05 PROCEDURE — 97161 PT EVAL LOW COMPLEX 20 MIN: CPT

## 2024-06-05 PROCEDURE — 63710000001 PROCHLORPERAZINE MALEATE PER 5 MG: Performed by: HOSPITALIST

## 2024-06-05 PROCEDURE — 93306 TTE W/DOPPLER COMPLETE: CPT

## 2024-06-05 PROCEDURE — 99232 SBSQ HOSP IP/OBS MODERATE 35: CPT | Performed by: HOSPITALIST

## 2024-06-05 PROCEDURE — 70551 MRI BRAIN STEM W/O DYE: CPT

## 2024-06-05 PROCEDURE — 82948 REAGENT STRIP/BLOOD GLUCOSE: CPT

## 2024-06-05 RX ORDER — SODIUM CHLORIDE 0.9 % (FLUSH) 0.9 %
10 SYRINGE (ML) INJECTION AS NEEDED
Status: DISCONTINUED | OUTPATIENT
Start: 2024-06-05 | End: 2024-06-05

## 2024-06-05 RX ORDER — BISACODYL 5 MG/1
5 TABLET, DELAYED RELEASE ORAL DAILY PRN
Status: DISCONTINUED | OUTPATIENT
Start: 2024-06-05 | End: 2024-06-06 | Stop reason: HOSPADM

## 2024-06-05 RX ORDER — AMOXICILLIN 250 MG
2 CAPSULE ORAL 2 TIMES DAILY PRN
Status: DISCONTINUED | OUTPATIENT
Start: 2024-06-05 | End: 2024-06-06 | Stop reason: HOSPADM

## 2024-06-05 RX ORDER — ALPRAZOLAM 0.5 MG/1
0.5 TABLET ORAL ONCE
Status: COMPLETED | OUTPATIENT
Start: 2024-06-05 | End: 2024-06-05

## 2024-06-05 RX ORDER — METOPROLOL SUCCINATE 50 MG/1
50 TABLET, EXTENDED RELEASE ORAL DAILY
Status: DISCONTINUED | OUTPATIENT
Start: 2024-06-05 | End: 2024-06-06 | Stop reason: HOSPADM

## 2024-06-05 RX ORDER — SODIUM CHLORIDE 9 MG/ML
40 INJECTION, SOLUTION INTRAVENOUS AS NEEDED
Status: DISCONTINUED | OUTPATIENT
Start: 2024-06-05 | End: 2024-06-05

## 2024-06-05 RX ORDER — SODIUM CHLORIDE 9 MG/ML
40 INJECTION, SOLUTION INTRAVENOUS AS NEEDED
Status: DISCONTINUED | OUTPATIENT
Start: 2024-06-05 | End: 2024-06-06 | Stop reason: HOSPADM

## 2024-06-05 RX ORDER — ASPIRIN 300 MG/1
300 SUPPOSITORY RECTAL DAILY
Status: DISCONTINUED | OUTPATIENT
Start: 2024-06-05 | End: 2024-06-05

## 2024-06-05 RX ORDER — SODIUM CHLORIDE 0.9 % (FLUSH) 0.9 %
10 SYRINGE (ML) INJECTION EVERY 12 HOURS SCHEDULED
Status: DISCONTINUED | OUTPATIENT
Start: 2024-06-05 | End: 2024-06-06 | Stop reason: HOSPADM

## 2024-06-05 RX ORDER — SODIUM CHLORIDE 0.9 % (FLUSH) 0.9 %
10 SYRINGE (ML) INJECTION AS NEEDED
Status: DISCONTINUED | OUTPATIENT
Start: 2024-06-05 | End: 2024-06-06 | Stop reason: HOSPADM

## 2024-06-05 RX ORDER — ESCITALOPRAM OXALATE 10 MG/1
10 TABLET ORAL DAILY
Status: DISCONTINUED | OUTPATIENT
Start: 2024-06-05 | End: 2024-06-06 | Stop reason: HOSPADM

## 2024-06-05 RX ORDER — ASPIRIN 81 MG/1
81 TABLET, CHEWABLE ORAL DAILY
Status: DISCONTINUED | OUTPATIENT
Start: 2024-06-05 | End: 2024-06-06 | Stop reason: HOSPADM

## 2024-06-05 RX ORDER — TAMOXIFEN CITRATE 10 MG/1
5 TABLET ORAL DAILY
Status: DISCONTINUED | OUTPATIENT
Start: 2024-06-05 | End: 2024-06-06 | Stop reason: HOSPADM

## 2024-06-05 RX ORDER — NITROGLYCERIN 0.4 MG/1
0.4 TABLET SUBLINGUAL
Status: DISCONTINUED | OUTPATIENT
Start: 2024-06-05 | End: 2024-06-06 | Stop reason: HOSPADM

## 2024-06-05 RX ORDER — POLYETHYLENE GLYCOL 3350 17 G/17G
17 POWDER, FOR SOLUTION ORAL DAILY PRN
Status: DISCONTINUED | OUTPATIENT
Start: 2024-06-05 | End: 2024-06-06 | Stop reason: HOSPADM

## 2024-06-05 RX ORDER — SODIUM CHLORIDE 0.9 % (FLUSH) 0.9 %
10 SYRINGE (ML) INJECTION EVERY 12 HOURS SCHEDULED
Status: DISCONTINUED | OUTPATIENT
Start: 2024-06-05 | End: 2024-06-05

## 2024-06-05 RX ORDER — PROCHLORPERAZINE MALEATE 5 MG/1
5 TABLET ORAL EVERY 6 HOURS PRN
Status: DISCONTINUED | OUTPATIENT
Start: 2024-06-05 | End: 2024-06-06 | Stop reason: HOSPADM

## 2024-06-05 RX ORDER — PROCHLORPERAZINE EDISYLATE 5 MG/ML
5 INJECTION INTRAMUSCULAR; INTRAVENOUS EVERY 6 HOURS PRN
Status: DISCONTINUED | OUTPATIENT
Start: 2024-06-05 | End: 2024-06-06 | Stop reason: HOSPADM

## 2024-06-05 RX ORDER — PROCHLORPERAZINE 25 MG
25 SUPPOSITORY, RECTAL RECTAL EVERY 12 HOURS PRN
Status: DISCONTINUED | OUTPATIENT
Start: 2024-06-05 | End: 2024-06-06 | Stop reason: HOSPADM

## 2024-06-05 RX ORDER — BISACODYL 10 MG
10 SUPPOSITORY, RECTAL RECTAL DAILY PRN
Status: DISCONTINUED | OUTPATIENT
Start: 2024-06-05 | End: 2024-06-06 | Stop reason: HOSPADM

## 2024-06-05 RX ADMIN — PROCHLORPERAZINE MALEATE 5 MG: 5 TABLET ORAL at 12:54

## 2024-06-05 RX ADMIN — SENNOSIDES AND DOCUSATE SODIUM 2 TABLET: 8.6; 5 TABLET ORAL at 12:56

## 2024-06-05 RX ADMIN — Medication 10 ML: at 20:15

## 2024-06-05 RX ADMIN — ESCITALOPRAM OXALATE 10 MG: 10 TABLET ORAL at 10:16

## 2024-06-05 RX ADMIN — ALPRAZOLAM 0.5 MG: 0.5 TABLET ORAL at 18:51

## 2024-06-05 RX ADMIN — ASPIRIN 81 MG: 81 TABLET, CHEWABLE ORAL at 10:16

## 2024-06-05 RX ADMIN — ATORVASTATIN CALCIUM 80 MG: 40 TABLET, FILM COATED ORAL at 20:12

## 2024-06-05 RX ADMIN — METOPROLOL SUCCINATE 50 MG: 50 TABLET, EXTENDED RELEASE ORAL at 10:16

## 2024-06-05 NOTE — THERAPY EVALUATION
Patient Name: Clara Gonzalez  : 1955    MRN: [id number]                              Today's Date: 2024       Admit Date: 2024    Visit Dx:     ICD-10-CM ICD-9-CM   1. Syncope and collapse  R55 780.2   2. Aphasia  R47.01 784.3     Patient Active Problem List   Diagnosis    Chest pain    anxiety and depression    Essential hypertension    Bereavement    GERD without esophagitis    Abnormal mammogram    Acute thoracic back pain    Allergic rhinitis    Perioral dermatitis    Tendinitis of left shoulder    Elevated blood sugar    Unresponsive episode    Malignant neoplasm of upper-outer quadrant of right breast in female, estrogen receptor positive    TIA (transient ischemic attack)     Past Medical History:   Diagnosis Date    Anxiety     Breast cancer     Breast injury 2023    right hematoma    Essential hypertension 2021    GERD without esophagitis 2019     Past Surgical History:   Procedure Laterality Date    BREAST CYST ASPIRATION Left 2005    BREAST LUMPECTOMY      CHOLECYSTECTOMY      COLONOSCOPY      HYSTERECTOMY      OOPHORECTOMY        General Information       Row Name 24 1109          Physical Therapy Time and Intention    Document Type evaluation (P)   -JUMANA     Mode of Treatment individual therapy;physical therapy (P)   -JUMANA       Row Name 24 1109          General Information    Patient Profile Reviewed yes (P)   -JUMANA     Prior Level of Function independent:;all household mobility;community mobility;gait;transfer;ADL's (P)   -JUMANA     Existing Precautions/Restrictions fall (P)   -JUMANA     Barriers to Rehab medically complex (P)   -JUMANA       Row Name 24 1109          Living Environment    People in Home alone;other (see comments) (P)   grand-daughter lives down the road  -       Row Name 24 1109          Home Main Entrance    Number of Stairs, Main Entrance one (P)   -JUMANA     Stair Railings, Main Entrance none (P)   -JUMANA       Row Name 24 1103           Stairs Within Home, Primary    Number of Stairs, Within Home, Primary none (P)   -JUMANA       Row Name 06/05/24 1109          Cognition    Orientation Status (Cognition) oriented x 4 (P)   -JUMANA       Row Name 06/05/24 1109          Safety Issues, Functional Mobility    Safety Issues Affecting Function (Mobility) insight into deficits/self-awareness (P)   -JUMANA     Impairments Affecting Function (Mobility) endurance/activity tolerance;strength (P)   -JUMANA               User Key  (r) = Recorded By, (t) = Taken By, (c) = Cosigned By      Initials Name Provider Type    Padma Kennedy, PT Student PT Student                   Mobility       Row Name 06/05/24 1110          Bed Mobility    Bed Mobility sit-supine;scooting/bridging (P)   -JUMANA     Scooting/Bridging [place] (Bed Mobility) independent (P)   -JUMANA     Sit-Supine [place] (Bed Mobility) modified independence (P)   -JUMANA     Assistive Device (Bed Mobility) head of bed elevated (P)   -JUMANA     Comment, (Bed Mobility) no verbal cuing required, denied dizziness (P)   -JUMANA       Row Name 06/05/24 1110          Sit-Stand Transfer    Sit-Stand [place] (Transfers) independent (P)   -JUMANA       Row Name 06/05/24 1110          Gait/Stairs (Locomotion)    [place] Level (Gait) standby assist (P)   -JUMANA     Patient was able to Ambulate yes (P)   -JUMANA     Distance in Feet (Gait) 80 (P)   80+80  -JUMANA     Deviations/Abnormal Patterns (Gait) base of support, narrow;amy decreased;gait speed decreased;stride length decreased (P)   -JUMANA     Bilateral Gait Deviations forward flexed posture (P)   -JUMANA     [place] Level (Stairs) contact guard (P)   -JUMANA     Handrail Location (Stairs) left side (ascending);right side (descending) (P)   -JUMANA     Number of Steps (Stairs) 2 (P)   -JUMANA     Ascending Technique (Stairs) step-to-step (P)   -JUMANA     Descending Technique (Stairs) step-to-step (P)   -JUMANA     Comment, (Gait/Stairs) Pt reported heaviness in her legs when ambulating, no knee  buckling or LOB. Distance limited by fatigue.  -KR               User Key  (r) = Recorded By, (t) = Taken By, (c) = Cosigned By      Initials Name Provider Type    Sully Zamora, PT Physical Therapist    Padma Kennedy, PT Student PT Student                   Obj/Interventions       Row Name 06/05/24 1113          Range of Motion Comprehensive    General Range of Motion no range of motion deficits identified (P)   -JUMANA       Row Name 06/05/24 1113          Strength Comprehensive (MMT)    General Manual Muscle Testing (MMT) Assessment no strength deficits identified (P)   -JUMANA     Comment, General Manual Muscle Testing (MMT) Assessment BLE grossly 4+/5 (P)   -JUMANA       Row Name 06/05/24 1113          Balance    Balance Assessment sitting static balance;sit to stand dynamic balance;standing static balance;standing dynamic balance (P)   -JUMANA     Static Sitting Balance independent (P)   -JUMANA     Position, Sitting Balance unsupported;sitting in chair;sitting edge of bed (P)   -JUMANA     Sit to Stand Dynamic Balance independent (P)   -JUMANA     Static Standing Balance independent (P)   -JUMANA     Dynamic Standing Balance standby assist (P)   -JUMANA     Position/Device Used, Standing Balance unsupported (P)   -JUMANA     Balance Interventions sitting;standing;sit to stand;static;dynamic (P)   -JUMANA     Comment, Balance no cues/support requried, no LOB (P)   -JUMANA       Row Name 06/05/24 1113          Sensory Assessment (Somatosensory)    Sensory Assessment (Somatosensory) LE sensation intact (P)   -JUMANA               User Key  (r) = Recorded By, (t) = Taken By, (c) = Cosigned By      Initials Name Provider Type    Padma Kennedy, PT Student PT Student                   Goals/Plan       Row Name 06/05/24 1122          Bed Mobility Goal 1 (PT)    Activity/Assistive Device (Bed Mobility Goal 1, PT) bed mobility activities, all (P)   -JUMANA     [place] Level/Cues Needed (Bed Mobility Goal 1, PT) independent (P)   -JUMANA     Time Frame (Bed  Mobility Goal 1, PT) short term goal (STG);2 days (P)   -JUMANA       Row Name 06/05/24 1122          Transfer Goal 1 (PT)    Activity/Assistive Device (Transfer Goal 1, PT) -- (P)   -JUMANA     [place] Level/Cues Needed (Transfer Goal 1, PT) -- (P)   -JUMANA     Time Frame (Transfer Goal 1, PT) -- (P)   -JUMANA       Row Name 06/05/24 1122          Gait Training Goal 1 (PT)    Activity/Assistive Device (Gait Training Goal 1, PT) gait (walking locomotion);improve balance and speed;increase endurance/gait distance (P)   -JUMANA     [place] Level (Gait Training Goal 1, PT) independent (P)   -JUMANA     Distance (Gait Training Goal 1, PT) 200 feet (P)   -JUMANA     Time Frame (Gait Training Goal 1, PT) long term goal (LTG);5 days (P)   -JUMANA       Row Name 06/05/24 1122          Stairs Goal 1 (PT)    Activity/Assistive Device (Stairs Goal 1, PT) stairs, all skills (P)   -JUMANA     [place] Level/Cues Needed (Stairs Goal 1, PT) independent (P)   -JUMANA     Number of Stairs (Stairs Goal 1, PT) 1 (P)   -JUMANA     Time Frame (Stairs Goal 1, PT) long term goal (LTG);5 days (P)   -JUMANA       Row Name 06/05/24 1122          Therapy Assessment/Plan (PT)    Planned Therapy Interventions (PT) balance training;bed mobility training;gait training;home exercise program;motor coordination training;neuromuscular re-education;patient/family education;postural re-education;stair training;strengthening;transfer training (P)   -               User Key  (r) = Recorded By, (t) = Taken By, (c) = Cosigned By      Initials Name Provider Type    Padma Kennedy, PT Student PT Student                   Clinical Impression       Row Name 06/05/24 1116          Pain    Pretreatment Pain Rating 0/10 - no pain (P)   -JUMANA     Posttreatment Pain Rating 0/10 - no pain (P)   -[facility] Name 06/05/24 1116          Plan of Care Review    Plan of Care Reviewed With patient  -KR     Progress no change (P)   -JUMANA     Outcome Evaluation Pt demonstrates BLE strength and balance  below baseline contributing to ambulation and stair navigation deficits. PT rec to address functional limitations. Rec d/c to home with assist and OP PT.  -KIA       Row Name 06/05/24 1116          Therapy Assessment/Plan (PT)    Patient/Family Therapy Goals Statement (PT) return home (P)   -JUMANA     Rehab Potential (PT) good, to achieve stated therapy goals (P)   -JUMANA     Criteria for Skilled Interventions Met (PT) yes;skilled treatment is necessary (P)   -JUMANA     Therapy Frequency (PT) daily (P)   -JUMANA     Predicted Duration of Therapy Intervention (PT) 5 days (P)   -JUMANA       Row Name 06/05/24 1116          Vital Signs    Pre Systolic BP Rehab 153 (P)   -JUMANA     Pre Treatment Diastolic BP 84 (P)   -JUMANA     Post Systolic BP Rehab 170 (P)   -JUMANA     Post Treatment Diastolic BP 88 (P)   -JUMANA     Pre Patient Position Sitting (P)   -JUMANA     Intra Patient Position Standing (P)   -JUMANA     Post Patient Position Supine (P)   -JUMANA       Row Name 06/05/24 1116          Positioning and Restraints    Pre-Treatment Position sitting in chair/recliner (P)   -JUMANA     Post Treatment Position bed (P)   -JUMANA     In Bed supine;with family/caregiver;with other staff (P)   with diagnostic imaging  -JUMANA               User Key  (r) = Recorded By, (t) = Taken By, (c) = Cosigned By      Initials Name Provider Type    Sully Zamora, PT Physical Therapist    Padma Kennedy, PT Student PT Student                   Outcome Measures       Row Name 06/05/24 1123          How much help from another person do you currently need...    Turning from your back to your side while in flat bed without using bedrails? 4 (P)   -JUMANA     Moving from lying on back to sitting on the side of a flat bed without bedrails? 4 (P)   -JUMANA     Moving to and from a bed to a chair (including a wheelchair)? 4 (P)   -JUMANA     Standing up from a chair using your arms (e.g., wheelchair, bedside chair)? 4 (P)   -JUMANA     Climbing 3-5 steps with a railing? 3 (P)   -JUMANA     To walk in  hospital room? 4 (P)   -     AM-PAC 6 Clicks Score (PT) 23 (P)   -     Highest Level of Mobility Goal 7 --> Walk 25 feet or more (P)   -JUMANA       Row Name 06/05/24 1123 06/05/24 0956       Modified Jose Rafael Scale    Pre-Stroke Modified Garvin Scale 0 - No Symptoms at all. (P)   -JUMANA --    Modified Garvin Scale 1 - No significant disability despite symptoms.  Able to carry out all usual duties and activities. (P)   -JUMANA 0 - No Symptoms at all.  -      Row Name 06/05/24 1123 06/05/24 0956       Functional Assessment    Outcome Measure Options AM-PAC 6 Clicks Basic Mobility (PT) (P)   - AM-PAC 6 Clicks Daily Activity (OT);Modified Garvin  -CS              User Key  (r) = Recorded By, (t) = Taken By, (c) = Cosigned By      Initials Name Provider Type     Frantz Delgado OT Occupational Therapist    Padma Kennedy, PT Student PT Student                                 Physical Therapy Education       Title: PT OT SLP Therapies (In Progress)       Topic: Physical Therapy (In Progress)       Point: Mobility training (Done)       Learning Progress Summary             Patient Acceptance, E,TB, VU by  at 6/5/2024 1127                         Point: Home exercise program (Not Started)       Learner Progress:  Not documented in this visit.              Point: Body mechanics (Done)       Learning Progress Summary             Patient Acceptance, E,TB, VU by  at 6/5/2024 1127                         Point: Precautions (Done)       Learning Progress Summary             Patient Acceptance, E,TB, VU by  at 6/5/2024 1127                                         User Key       Initials Effective Dates Name Provider Type [facility] 05/07/24 -  Padma Avitia, PT Student PT Student PT                  PT Recommendation and Plan     Plan of Care Reviewed With: patient  Outcome Evaluation: Pt demonstrates BLE strength and balance below baseline contributing to ambulation and stair navigation deficits. PT rec to address  functional limitations. Rec d/c to home with assist and OP PT.     Time Calculation:         PT Charges       Row Name 06/05/24 1132             Time Calculation    Start Time 1058 (P)   -JUMANA      PT Received On 06/05/24 (P)   -JUMANA      PT Goal Re-Cert Due Date 06/15/24 (P)   -JUMANA         Untimed Charges    PT Eval/Re-eval Minutes 40 (P)   -JUMANA         Total Minutes    Untimed Charges Total Minutes 40 (P)   -JUMANA       Total Minutes 40 (P)   -JUMANA                User Key  (r) = Recorded By, (t) = Taken By, (c) = Cosigned By      Initials Name Provider Type    Padma Kennedy, PT Student PT Student                      PT G-Codes  Outcome Measure Options: (P) AM-PAC 6 Clicks Basic Mobility (PT)  AM-PAC 6 Clicks Score (PT): (P) 23  AM-PAC 6 Clicks Score (OT): 24  Modified Jose Rafael Scale: (P) 1 - No significant disability despite symptoms.  Able to carry out all usual duties and activities.       Sully Bruno, PT  6/5/2024

## 2024-06-05 NOTE — PLAN OF CARE
Goal Outcome Evaluation:  Plan of Care Reviewed With: patient        Progress: improving         Anticipated Discharge Disposition (SLP): No further SLP services warranted    SLP Diagnosis: functional cognitive-linguistic skills (06/05/24 8362)

## 2024-06-05 NOTE — ED PROVIDER NOTES
Subjective   History of Present Illness    Pt presents for altered mental status.  She reportedly collapsed in a chair and then had slurred speech and generalized weakness and some left facial droop.  EMS reports patient was hypertensive with normal blood sugar, slow to speak but able to communicate.  She reports having a headache and nausea as well.    She says she had a similar episode about a year ago, but at present can't tell us much about it.    PMH per chart HTN breast cancer GERD.  No known anticoagulant use.    History provided by:  Patient, medical records and EMS personnel      Review of Systems   Unable to perform ROS: Mental status change       Past Medical History:   Diagnosis Date    Anxiety     Breast cancer     Breast injury 01/19/2023    right hematoma    Essential hypertension 07/02/2021    GERD without esophagitis 07/16/2019       No Known Allergies    Past Surgical History:   Procedure Laterality Date    BREAST CYST ASPIRATION Left 2005    BREAST LUMPECTOMY      CHOLECYSTECTOMY      COLONOSCOPY      HYSTERECTOMY      OOPHORECTOMY         Family History   Problem Relation Age of Onset    Breast cancer Mother 80    Ovarian cancer Neg Hx     Colon cancer Neg Hx     Colon polyps Neg Hx     Esophageal cancer Neg Hx        Social History     Socioeconomic History    Marital status:    Tobacco Use    Smoking status: Never    Smokeless tobacco: Never   Vaping Use    Vaping status: Never Used   Substance and Sexual Activity    Alcohol use: Never    Drug use: Never    Sexual activity: Defer           Objective   Physical Exam  Vitals and nursing note reviewed.   Constitutional:       General: She is not in acute distress.     Appearance: Normal appearance. She is not ill-appearing.   HENT:      Head: Normocephalic and atraumatic.      Mouth/Throat:      Mouth: Mucous membranes are moist.   Eyes:      General: No scleral icterus.        Right eye: No discharge.         Left eye: No discharge.       Conjunctiva/sclera: Conjunctivae normal.   Cardiovascular:      Rate and Rhythm: Normal rate and regular rhythm.      Heart sounds: No murmur heard.  Pulmonary:      Effort: Pulmonary effort is normal. No respiratory distress.      Breath sounds: Normal breath sounds. No wheezing.   Abdominal:      General: Bowel sounds are normal. There is no distension.      Palpations: Abdomen is soft.      Tenderness: There is no abdominal tenderness. There is no guarding or rebound.   Musculoskeletal:         General: No swelling. Normal range of motion.      Cervical back: Normal range of motion and neck supple.   Skin:     General: Skin is warm and dry.      Findings: No rash.   Neurological:      Mental Status: She is alert.      Motor: Weakness (symmetric) present.      Comments: Slow but appropriate speech. No facial weakness.  Symmetric weakness arms and legs and .  No seizure activity.   Psychiatric:         Mood and Affect: Mood normal.         Behavior: Behavior normal.         Thought Content: Thought content normal.         Critical Care    Performed by: Cody Morrissey MD  Authorized by: Cody Morrissey MD    Critical care provider statement:     Critical care time (minutes):  35    Critical care time was exclusive of:  Separately billable procedures and treating other patients    Critical care was necessary to treat or prevent imminent or life-threatening deterioration of the following conditions:  CNS failure or compromise    Critical care was time spent personally by me on the following activities:  Discussions with consultants, examination of patient, obtaining history from patient or surrogate, ordering and performing treatments and interventions, ordering and review of laboratory studies, ordering and review of radiographic studies, review of old charts and re-evaluation of patient's condition    I assumed direction of critical care for this patient from another provider in my specialty: no   "    Care discussed with: admitting provider               ED Course    Pt seen on arrival, in CT scanner, with stroke team.  Discussed with stroke team.    I reviewed prior records.  MRI brain 4/9/23 negative.  CTAs head and neck same date negative.  Discharge summary 4/11/23 after admission for \"unresponsive episode\" with negative stroke workup, found to have covid.  Radiation oncology note 4/17/24 for breast cancer.      CT head read by me: no acute process.    There were delays in care related to significant difficulty obtaining IV access.    On rechecks she is significantly more alert and feels back to normal.  She reports she had a right posterior headache and nausea for about an hour and then had sudden onset of severe generalized weakness \"as if all the blood had been drained out\" of her.  She fainted.  She came to when EMS was present. She says she could hear everyone talking to her but was unable to speak normally, had to focus and speak very slowly to get words out.  She felt weak all over, as if even moving a limb took all of her strength.  Her thoughts felt cloudy.  She denies having any focal weakness at any point.  She says this episode is very similar to her prior episode in Apr 2023 described above.    CTA/CTP negative.  Labs benign.  All radiologist reports reviewed.    Stroke team wants to admit for more workup. I am more concerned for possible arrhythmia or seizure rather than stroke but agree she should stay.    Patient stable on serial rechecks.  Discussed exam findings, test results so far and concerns in detail at the bedside.  Discussed need for admission for further evaluation and treatment.  I discussed the patient's presentation, test results and need for admission with the hospitalist.                            Total (NIH Stroke Scale): 0                  Medical Decision Making  Problems Addressed:  Aphasia: complicated acute illness or injury that poses a threat to life or bodily " functions  Syncope and collapse: complicated acute illness or injury with systemic symptoms    Amount and/or Complexity of Data Reviewed  Independent Historian: EMS  Labs: ordered. Decision-making details documented in ED Course.  Radiology: ordered and independent interpretation performed. Decision-making details documented in ED Course.  ECG/medicine tests: ordered and independent interpretation performed. Decision-making details documented in ED Course.  Discussion of management or test interpretation with external provider(s): Stroke neurology, hospitalist    Risk  Prescription drug management.  Decision regarding hospitalization.    Critical Care  Total time providing critical care: 35 minutes        Final diagnoses:   Syncope and collapse   Aphasia       ED Disposition  ED Disposition       ED Disposition   Decision to Admit    Condition   --    Comment   Level of Care: Telemetry [5]   Diagnosis: TIA (transient ischemic attack) [[id number]]   Admitting Physician: VINICIO MOSS [[id number]]   Attending Physician: VINICIO MOSS [[id number]]                 No follow-up provider specified.       Medication List      No changes were made to your prescriptions during this visit.            Cody Morrissey MD  06/05/24 0210       Cody Morrissey MD  06/05/24 0212

## 2024-06-05 NOTE — ED NOTES
Clara Gonzalez    Nursing Report ED to Floor:  Mental status: a&ox4  Ambulatory status: sb  Oxygen Therapy:  ra  Cardiac Rhythm: nsr  Admitted from: home  Safety Concerns:  fall risk   Social Issues: na  ED Room #:  23    ED Nurse Phone Extension - [phone] or may call [phone].      HPI:   Chief Complaint   Patient presents with    Stroke       Past Medical History:  Past Medical History:   Diagnosis Date    Anxiety     Breast cancer     Breast injury 01/19/2023    right hematoma    Essential hypertension 07/02/2021    GERD without esophagitis 07/16/2019        Past Surgical History:  Past Surgical History:   Procedure Laterality Date    BREAST CYST ASPIRATION Left 2005    BREAST LUMPECTOMY      CHOLECYSTECTOMY      COLONOSCOPY      HYSTERECTOMY      OOPHORECTOMY          Admitting Doctor:   Lashonda Morgan MD    Consulting Provider(s):  Consults       Date and Time Order Name Status Description    6/4/2024  8:21 PM Inpatient Neurology Consult Stroke Completed              Admitting Diagnosis:   The primary encounter diagnosis was Syncope and collapse. A diagnosis of Aphasia was also pertinent to this visit.    Most Recent Vitals:   Vitals:    06/05/24 0047 06/05/24 0102 06/05/24 0117 06/05/24 0130   BP: 148/88 146/76 127/73 135/73   Pulse: 73 70 67 69   Resp:       Temp:       TempSrc:       SpO2: 94% 95% 95% 95%   Weight:       Height:           Active LDAs/IV Access:   Lines, Drains & Airways       Active LDAs       Name Placement date Placement time Site Days    Peripheral IV 06/04/24 2103 Anterior;Left Forearm 06/04/24 2103  Forearm  less than 1                    Labs (abnormal labs have a star):   Labs Reviewed   TSH - Abnormal; Notable for the following components:       Result Value    TSH 6.950 (*)     All other components within normal limits    Narrative:     Due to abnormal TSH results, suggest ordering Free T4.   URINALYSIS W/ MICROSCOPIC IF INDICATED (NO CULTURE) - Abnormal; Notable for the following  components:    Leuk Esterase, UA Moderate (2+) (*)     All other components within normal limits   URINALYSIS, MICROSCOPIC ONLY - Abnormal; Notable for the following components:    WBC, UA 11-20 (*)     All other components within normal limits   LIPID PANEL - Abnormal; Notable for the following components:    LDL Cholesterol  108 (*)     All other components within normal limits    Narrative:     Cholesterol Reference Ranges  (U.S. Department of Health and Human Services ATP III Classifications)    Desirable          <200 mg/dL  Borderline High    200-239 mg/dL  High Risk          >240 mg/dL      Triglyceride Reference Ranges  (U.S. Department of Health and Human Services ATP III Classifications)    Normal           <150 mg/dL  Borderline High  150-199 mg/dL  High             200-499 mg/dL  Very High        >500 mg/dL    HDL Reference Ranges  (U.S. Department of Health and Human Services ATP III Classifications)    Low     <40 mg/dl (major risk factor for CHD)  High    >60 mg/dl ('negative' risk factor for CHD)        LDL Reference Ranges  (U.S. Department of Health and Human Services ATP III Classifications)    Optimal          <100 mg/dL  Near Optimal     100-129 mg/dL  Borderline High  130-159 mg/dL  High             160-189 mg/dL  Very High        >189 mg/dL   RESPIRATORY PANEL PCR W/ COVID-19 (SARS-COV-2), NP SWAB IN UTM/VTP, 2 HR TAT - Normal    Narrative:     In the setting of a positive respiratory panel with a viral infection PLUS a negative procalcitonin without other underlying concern for bacterial infection, consider observing off antibiotics or discontinuation of antibiotics and continue supportive care. If the respiratory panel is positive for atypical bacterial infection (Bordetella pertussis, Chlamydophila pneumoniae, or Mycoplasma pneumoniae), consider antibiotic de-escalation to target atypical bacterial infection.   SINGLE HS TROPONIN T - Normal    Narrative:     High Sensitive Troponin T  "Reference Range:  <14.0 ng/L- Negative Female for AMI  <22.0 ng/L- Negative Male for AMI  >=14 - Abnormal Female indicating possible myocardial injury.  >=22 - Abnormal Male indicating possible myocardial injury.   Clinicians would have to utilize clinical acumen, EKG, Troponin, and serial changes to determine if it is an Acute Myocardial Infarction or myocardial injury due to an underlying chronic condition.        APTT - Normal    Narrative:     PTT = The equivalent PTT values for the therapeutic range of heparin levels at 0.3 to 0.5 U/ml are 60 to 70 seconds.   AST - Normal   ALT - Normal   CBC WITH AUTO DIFFERENTIAL - Normal   MAGNESIUM - Normal   T4, FREE - Normal   LACTIC ACID, PLASMA - Normal   PROCALCITONIN - Normal    Narrative:     As a Marker for Sepsis (Non-Neonates):    1. <0.5 ng/mL represents a low risk of severe sepsis and/or septic shock.  2. >2 ng/mL represents a high risk of severe sepsis and/or septic shock.    As a Marker for Lower Respiratory Tract Infections that require antibiotic therapy:    PCT on Admission    Antibiotic Therapy       6-12 Hrs later    >0.5                Strongly Recommended  >0.25 - <0.5        Recommended   0.1 - 0.25          Discouraged              Remeasure/reassess PCT  <0.1                Strongly Discouraged     Remeasure/reassess PCT    As 28 day mortality risk marker: \"Change in Procalcitonin Result\" (>80% or <=80%) if Day 0 (or Day 1) and Day 4 values are available. Refer to [email or web address]    Change in PCT <=80%  A decrease of PCT levels below or equal to 80% defines a positive change in PCT test result representing a higher risk for 28-day all-cause mortality of patients diagnosed with severe sepsis for septic shock.    Change in PCT >80%  A decrease of PCT levels of more than 80% defines a negative change in PCT result representing a lower risk for 28-day all-cause mortality of patients diagnosed with severe sepsis or septic shock.    "   SINGLE HS TROPONIN T - Normal    Narrative:     High Sensitive Troponin T Reference Range:  <14.0 ng/L- Negative Female for AMI  <22.0 ng/L- Negative Male for AMI  >=14 - Abnormal Female indicating possible myocardial injury.  >=22 - Abnormal Male indicating possible myocardial injury.   Clinicians would have to utilize clinical acumen, EKG, Troponin, and serial changes to determine if it is an Acute Myocardial Infarction or myocardial injury due to an underlying chronic condition.        POCT CHEM 8 - Normal   COVID PRE-OP / PRE-PROCEDURE SCREENING ORDER (NO ISOLATION)    Narrative:     The following orders were created for panel order COVID PRE-OP / PRE-PROCEDURE SCREENING ORDER (NO ISOLATION) - Swab, Nasal Cavity.  Procedure                               Abnormality         Status                     ---------                               -----------         ------                     Respiratory Panel PCR w/...[[id number]]  Normal              Final result                 Please view results for these tests on the individual orders.   RAINBOW DRAW    Narrative:     The following orders were created for panel order [place] Draw.  Procedure                               Abnormality         Status                     ---------                               -----------         ------                     Green Top (Gel)[[id number]]                                  Final result               Lavender Top[[id number]]                                     Final result               Gold Top - SST[[id number]]                                   Final result               Hanson Top[[id number]]                                         Final result               Light Blue Top[[id number]]                                   Final result                 Please view results for these tests on the individual orders.   POCT PROTIME - INR   POCT GLUCOSE FINGERSTICK   POCT GLUCOSE FINGERSTICK   POCT GLUCOSE FINGERSTICK   POCT GLUCOSE  FINGERSTICK   POCT GLUCOSE FINGERSTICK   CBC AND DIFFERENTIAL    Narrative:     The following orders were created for panel order CBC & Differential.  Procedure                               Abnormality         Status                     ---------                               -----------         ------                     CBC Auto Differential[[id number]]        Normal              Final result               Scan Slide[[id number]]                                                                    Please view results for these tests on the individual orders.   GREEN TOP   LAVENDER TOP   GOLD TOP - SST   GRAY TOP   LIGHT BLUE TOP       Meds Given in ED:   Medications   sodium chloride 0.9 % flush 10 mL (has no administration in time range)   atorvastatin (LIPITOR) tablet 80 mg (80 mg Oral Given 6/4/24 2315)   iopamidol (ISOVUE-370) 76 % injection 150 mL (120 mL Intravenous Given 6/4/24 2105)   aspirin chewable tablet 324 mg (324 mg Oral Given 6/4/24 2315)           Last NIH score:  Interval: baseline  1a. Level of Consciousness: 0-->Alert, keenly responsive  1b. LOC Questions: 0-->Answers both questions correctly  1c. LOC Commands: 0-->Performs both tasks correctly  2. Best Gaze: 0-->Normal  3. Visual: 0-->No visual loss  4. Facial Palsy: 0-->Normal symmetrical movements  5a. Motor Arm, Left: 0-->No drift, limb holds 90 (or 45) degrees for full 10 secs  5b. Motor Arm, Right: 0-->No drift, limb holds 90 (or 45) degrees for full 10 secs  6a. Motor Leg, Left: 0-->No drift, leg holds 30 degree position for full 5 secs  6b. Motor Leg, Right: 0-->No drift, leg holds 30 degree position for full 5 secs  7. Limb Ataxia: 0-->Absent  8. Sensory: 0-->Normal, no sensory loss  9. Best Language: 0-->No aphasia, normal  10. Dysarthria: 0-->Normal  11. Extinction and Inattention (formerly Neglect): 0-->No abnormality    Total (NIH Stroke Scale): 0     Dysphagia screening results:  Patient Factors Component (Dysphagia:Stroke or  Rule-out)  Best Eye Response: 4-->(E4) spontaneous (06/04/24 2310)  Best Motor Response: 6-->(M6) obeys commands (06/04/24 2310)  Best Verbal Response: 5-->(V5) oriented (06/04/24 2310)  Holmes Coma Scale Score: 15 (06/04/24 2310)  Is there Facial Asymmetry/Weakness?: No (06/04/24 2310)  Is there Tongue Asymmetry/Weakness?: No (06/04/24 2310)  Is there Palatal Asymmetry/Weakness?: No (06/04/24 2310)  Patient Assessment Result: Pass - Proceed to Water Test (06/04/24 2310)     Holmes Coma Scale:  No data recorded     CIWA:        Restraint Type:            Isolation Status:  No active isolations

## 2024-06-05 NOTE — CASE MANAGEMENT/SOCIAL WORK
Discharge Planning Assessment  [facility]     Patient Name: Clara Gonzalez  MRN: [id number]  Today's Date: 6/5/2024    Admit Date: 6/4/2024    Plan: Home   Discharge Needs Assessment       Row Name 06/05/24 1437       Living Environment    People in Home alone    Current Living Arrangements home    Primary Care Provided by self    Provides Primary Care For no one    Family Caregiver if Needed child(tila), adult;friend(s)    Quality of Family Relationships supportive    Able to Return to Prior Arrangements yes       Resource/Environmental Concerns    Transportation Concerns none       Transition Planning    Patient/Family Anticipates Transition to home    Patient/Family Anticipated Services at Transition none    Transportation Anticipated family or friend will provide       Discharge Needs Assessment    Readmission Within the Last 30 Days no previous admission in last 30 days    Equipment Currently Used at Home none                   Discharge Plan       Row Name 06/05/24 1438       Plan    Plan Home    Patient/Family in Agreement with Plan yes    Plan Comments Spoke with Ms. Gonzalez at the bedside. She lives alone in [facility]. She has neighbors and family that assist if needed. She is independent with ADL's. She does not use any DME, Oxygen or Home Health. She does not have advance directives. Her PCP is Velia James and she has Medicare A and B and [employer] insurance. Discharge plan is home. CM will continue to follow for discharge needs.    Final Discharge Disposition Code 01 - home or self-care                  Continued Care and Services - Admitted Since 6/4/2024    No active coordination exists for this encounter.       Expected Discharge Date and Time       Expected Discharge Date Expected Discharge Time    Jun 7, 2024            Demographic Summary       Row Name 06/05/24 1436       General Information    Admission Type observation    Arrived From home    Referral Source admission list    Reason for  Consult discharge planning    Preferred Language English       Contact Information    Permission Granted to Share Info With                    Functional Status       Row Name 06/05/24 1436       Functional Status, IADL    Medications independent    Meal Preparation independent    Housekeeping independent    Laundry independent    Shopping independent       Mental Status    General Appearance WDL WDL                               Priscila Mckenna RN

## 2024-06-05 NOTE — PLAN OF CARE
Goal Outcome Evaluation:  Plan of Care Reviewed With: patient           Outcome Evaluation: Pt demonstrates BLE strength and balance below baseline contributing to ambulation and stair navigation deficits. PT rec to address functional limitations. Rec d/c to home with assist and OP PT.

## 2024-06-05 NOTE — PROGRESS NOTES
Stroke Progress Note       Chief Complaint:  speech difficulty    Subjective    Subjective     Subjective:  No acute events overnight     Review of Systems   Constitutional: No fatigue  HENT: Negative for nosebleeds and rhinorrhea.    Eyes: Negative for redness.   Respiratory: Negative for cough.    Gastrointestinal: Negative for anal bleeding.   Endocrine: Negative for polydipsia.   Genitourinary: Negative for enuresis and urgency.   Musculoskeletal: Negative for joint swelling.   Neurological: Negative for tremors.   Psychiatric/Behavioral: Negative for hallucinations.      Objective      Temp:  [98.1 °F (36.7 °C)-98.4 °F (36.9 °C)] 98.3 °F (36.8 °C)  Heart Rate:  [63-77] 76  Resp:  [18-20] 18  BP: (127-170)/(73-88) 170/88        GEN: NAD, pleasant, cooperative  Eyes-show anicteric sclera, moist conjunctiva with no lid lag, no redness  Neck-trachea midline.  There is no thyromegaly.  ENMT-oropharynx clear with moist mucous membranes and good dentition.  Skin-no rash, lesions or ulcers.  Cardiovascular exam-no pedal edema, regular rate and rhythm.  CHEST: No signs of resp distress, on room air  Abdomen-no abdominal distention, nontender.  Psychiatric exam-alert oriented x3 with intact judgment and insight      NEURO    MENTAL STATUS: AAOx3, memory intact, fund of knowledge appropriate    LANG/SPEECH: Naming and repetition intact, fluent, follows 3-step commands    CRANIAL NERVES:      II: Pupils equal and reactive, no RAPD, no VF deficits, fundus(not done)      III, IV, VI: EOM intact, no gaze preference or deviation, no nystagmus.      V: normal sensation in V1, V2, and V3 segments bilaterally      VII: no asymmetry, no nasolabial fold flattening      VIII: normal hearing to speech      IX, X: normal palatal elevation, no uvular deviation      XI: 5/5 head turn and 5/5 shoulder shrug bilaterally      XII: midline tongue protrusion    MOTOR:  Normal tone throughout  5/5 muscle power in Rt shoulder  abductors/adductors, elbow flexors/extensors, wrist flexors/extensors, finger abductors/adductors.  5/5 in Rt hip flexors/extensors, knee flexors/extensors, ankle dorsiflexors and plantar flexors.    5/5 muscle power in Lt shoulder abductors/adductors, elbow flexors/extensors, wrist flexors/extensors, finger abductors/adductors.  5/5 in Lt hip flexors/extensors, knee flexors/extensors, ankle dorsiflexors and plantea flexors.    REFLEXES:  no Rocha's, no clonus    SENSORY:    Normal to light touch all throughout     COORDINATION: Normal finger to nose and heel to shin, no tremor, no dysmetria    STATION: Not assessed due to patient condition    GAIT: Not assessed due to patient condition       Results Review:    I reviewed the patient's new clinical results.    Lab Results (last 24 hours)       Procedure Component Value Units Date/Time    POC Creatinine [[id number]]  (Normal) Collected: 06/04/24 2042    Specimen: Blood Updated: 06/05/24 1153     Creatinine 1.00 mg/dL      Comment: Serial Number: [id number]:  [id number]       POC Glucose Once [[id number]]  (Normal) Collected: 06/05/24 0610    Specimen: Blood Updated: 06/05/24 0611     Glucose 103 mg/dL     POC Glucose Once [[id number]]  (Abnormal) Collected: 06/05/24 0218    Specimen: Blood Updated: 06/05/24 0218     Glucose 135 mg/dL     Single High Sensitivity Troponin T [[id number]]  (Normal) Collected: 06/04/24 2305    Specimen: Blood Updated: 06/04/24 2335     HS Troponin T <6 ng/L     Narrative:      High Sensitive Troponin T Reference Range:  <14.0 ng/L- Negative Female for AMI  <22.0 ng/L- Negative Male for AMI  >=14 - Abnormal Female indicating possible myocardial injury.  >=22 - Abnormal Male indicating possible myocardial injury.   Clinicians would have to utilize clinical acumen, EKG, Troponin, and serial changes to determine if it is an Acute Myocardial Infarction or myocardial injury due to an underlying chronic condition.         Lipid Panel [[id number]]   (Abnormal) Collected: 06/04/24 2305    Specimen: Blood Updated: 06/04/24 2335     Total Cholesterol 182 mg/dL      Triglycerides 95 mg/dL      HDL Cholesterol 57 mg/dL      LDL Cholesterol  108 mg/dL      VLDL Cholesterol 17 mg/dL      LDL/HDL Ratio 1.86    Narrative:      Cholesterol Reference Ranges  (U.S. Department of Health and Human Services ATP III Classifications)    Desirable          <200 mg/dL  Borderline High    200-239 mg/dL  High Risk          >240 mg/dL      Triglyceride Reference Ranges  (U.S. Department of Health and Human Services ATP III Classifications)    Normal           <150 mg/dL  Borderline High  150-199 mg/dL  High             200-499 mg/dL  Very High        >500 mg/dL    HDL Reference Ranges  (U.S. Department of Health and Human Services ATP III Classifications)    Low     <40 mg/dl (major risk factor for CHD)  High    >60 mg/dl ('negative' risk factor for CHD)        LDL Reference Ranges  (U.S. Department of Health and Human Services ATP III Classifications)    Optimal          <100 mg/dL  Near Optimal     100-129 mg/dL  Borderline High  130-159 mg/dL  High             160-189 mg/dL  Very High        >189 mg/dL    COVID PRE-OP / PRE-PROCEDURE SCREENING ORDER (NO ISOLATION) - Swab, Nasal Cavity [[id number]]  (Normal) Collected: 06/04/24 2137    Specimen: Swab from Nasal Cavity Updated: 06/04/24 2235    Narrative:      The following orders were created for panel order COVID PRE-OP / PRE-PROCEDURE SCREENING ORDER (NO ISOLATION) - Swab, Nasal Cavity.  Procedure                               Abnormality         Status                     ---------                               -----------         ------                     Respiratory Panel PCR w/...[[id number]]  Normal              Final result                 Please view results for these tests on the individual orders.    Respiratory Panel PCR w/COVID-19(SARS-CoV-2) OLEG/JARET/KRISTIAN/PAD/COR/MELANY In-House, NP Swab in UTM/VTM, 2 HR TAT - Swab,  Nasopharynx [[id number]]  (Normal) Collected: 06/04/24 2137    Specimen: Swab from Nasopharynx Updated: 06/04/24 2235     ADENOVIRUS, PCR Not Detected     Coronavirus 229E Not Detected     Coronavirus HKU1 Not Detected     Coronavirus NL63 Not Detected     Coronavirus OC43 Not Detected     COVID19 Not Detected     Human Metapneumovirus Not Detected     Human Rhinovirus/Enterovirus Not Detected     Influenza A PCR Not Detected     Influenza B PCR Not Detected     Parainfluenza Virus 1 Not Detected     Parainfluenza Virus 2 Not Detected     Parainfluenza Virus 3 Not Detected     Parainfluenza Virus 4 Not Detected     RSV, PCR Not Detected     Bordetella pertussis pcr Not Detected     Bordetella parapertussis PCR Not Detected     Chlamydophila pneumoniae PCR Not Detected     Mycoplasma pneumo by PCR Not Detected    Narrative:      In the setting of a positive respiratory panel with a viral infection PLUS a negative procalcitonin without other underlying concern for bacterial infection, consider observing off antibiotics or discontinuation of antibiotics and continue supportive care. If the respiratory panel is positive for atypical bacterial infection (Bordetella pertussis, Chlamydophila pneumoniae, or Mycoplasma pneumoniae), consider antibiotic de-escalation to target atypical bacterial infection.    Procalcitonin [[id number]]  (Normal) Collected: 06/04/24 2044    Specimen: Blood from Arm, Right Updated: 06/04/24 2218     Procalcitonin 0.03 ng/mL     Narrative:      As a Marker for Sepsis (Non-Neonates):    1. <0.5 ng/mL represents a low risk of severe sepsis and/or septic shock.  2. >2 ng/mL represents a high risk of severe sepsis and/or septic shock.    As a Marker for Lower Respiratory Tract Infections that require antibiotic therapy:    PCT on Admission    Antibiotic Therapy       6-12 Hrs later    >0.5                Strongly Recommended  >0.25 - <0.5        Recommended   0.1 - 0.25          Discouraged              " Remeasure/reassess PCT  <0.1                Strongly Discouraged     Remeasure/reassess PCT    As 28 day mortality risk marker: \"Change in Procalcitonin Result\" (>80% or <=80%) if Day 0 (or Day 1) and Day 4 values are available. Refer to http://www.[facility]-pct-calculator.com    Change in PCT <=80%  A decrease of PCT levels below or equal to 80% defines a positive change in PCT test result representing a higher risk for 28-day all-cause mortality of patients diagnosed with severe sepsis for septic shock.    Change in PCT >80%  A decrease of PCT levels of more than 80% defines a negative change in PCT result representing a lower risk for 28-day all-cause mortality of patients diagnosed with severe sepsis or septic shock.       Urinalysis With Microscopic If Indicated (No Culture) - Urine, Clean Catch [[id number]]  (Abnormal) Collected: 06/04/24 2141    Specimen: Urine, Clean Catch Updated: 06/04/24 2159     Color, UA Yellow     Appearance, UA Clear     pH, UA 7.0     Specific Gravity, UA 1.027     Glucose, UA Negative     Ketones, UA Negative     Bilirubin, UA Negative     Blood, UA Negative     Protein, UA Negative     Leuk Esterase, UA Moderate (2+)     Nitrite, UA Negative     Urobilinogen, UA 0.2 E.U./dL    Urinalysis, Microscopic Only - Urine, Clean Catch [[id number]]  (Abnormal) Collected: 06/04/24 2141    Specimen: Urine, Clean Catch Updated: 06/04/24 2159     RBC, UA 0-2 /HPF      WBC, UA 11-20 /HPF      Bacteria, UA None Seen /HPF      Squamous Epithelial Cells, UA 0-2 /HPF      Hyaline Casts, UA None Seen /LPF      Methodology Automated Microscopy    TSH [[id number]]  (Abnormal) Collected: 06/04/24 2044    Specimen: Blood from Arm, Right Updated: 06/04/24 2144     TSH 6.950 uIU/mL     Narrative:      Due to abnormal TSH results, suggest ordering Free T4.    T4, Free [[id number]]  (Normal) Collected: 06/04/24 2044    Specimen: Blood from Arm, Right Updated: 06/04/24 2144     Free T4 1.04 ng/dL     Lactic Acid, " Plasma [[id number]]  (Normal) Collected: 06/04/24 2048    Specimen: Blood Updated: 06/04/24 2131     Lactate 1.1 mmol/L      Comment: Falsely depressed results may occur on samples drawn from patients receiving N-Acetylcysteine (NAC) or Metamizole.       Magnesium [[id number]]  (Normal) Collected: 06/04/24 2044    Specimen: Blood from Arm, Right Updated: 06/04/24 2131     Magnesium 2.1 mg/dL     AST [[id number]]  (Normal) Collected: 06/04/24 2044    Specimen: Blood from Arm, Right Updated: 06/04/24 2122     AST (SGOT) 21 U/L     Single High Sensitivity Troponin T [[id number]]  (Normal) Collected: 06/04/24 2044    Specimen: Blood from Arm, Right Updated: 06/04/24 2122     HS Troponin T <6 ng/L     Narrative:      High Sensitive Troponin T Reference Range:  <14.0 ng/L- Negative Female for AMI  <22.0 ng/L- Negative Male for AMI  >=14 - Abnormal Female indicating possible myocardial injury.  >=22 - Abnormal Male indicating possible myocardial injury.   Clinicians would have to utilize clinical acumen, EKG, Troponin, and serial changes to determine if it is an Acute Myocardial Infarction or myocardial injury due to an underlying chronic condition.         ALT [[id number]]  (Normal) Collected: 06/04/24 2044    Specimen: Blood from Arm, Right Updated: 06/04/24 2122     ALT (SGPT) 20 U/L     aPTT [[id number]]  (Normal) Collected: 06/04/24 2048    Specimen: Blood Updated: 06/04/24 2117     PTT 29.0 seconds     Narrative:      PTT = The equivalent PTT values for the therapeutic range of heparin levels at 0.3 to 0.5 U/ml are 60 to 70 seconds.    CBC & Differential [[id number]] Collected: 06/04/24 2044    Specimen: Blood from Arm, Right Updated: 06/04/24 2109    Narrative:      The following orders were created for panel order CBC & Differential.  Procedure                               Abnormality         Status                     ---------                               -----------         ------                     CBC Auto  Differential[[id number]]        Normal              Final result               Scan Slide[[id number]]                                                                    Please view results for these tests on the individual orders.    CBC Auto Differential [[id number]]  (Normal) Collected: 06/04/24 2044    Specimen: Blood from Arm, Right Updated: 06/04/24 2109     WBC 6.67 10*3/mm3      RBC 4.38 10*6/mm3      Hemoglobin 13.8 g/dL      Hematocrit 42.0 %      MCV 95.9 fL      MCH 31.5 pg      MCHC 32.9 g/dL      RDW 12.9 %      RDW-SD 45.9 fl      MPV 9.3 fL      Platelets 269 10*3/mm3      Neutrophil % 63.2 %      Lymphocyte % 23.1 %      Monocyte % 8.7 %      Eosinophil % 3.4 %      Basophil % 1.5 %      Immature Grans % 0.1 %      Neutrophils, Absolute 4.21 10*3/mm3      Lymphocytes, Absolute 1.54 10*3/mm3      Monocytes, Absolute 0.58 10*3/mm3      Eosinophils, Absolute 0.23 10*3/mm3      Basophils, Absolute 0.10 10*3/mm3      Immature Grans, Absolute 0.01 10*3/mm3      nRBC 0.0 /100 WBC     [place] Draw [[id number]] Collected: 06/04/24 2044    Specimen: Blood Updated: 06/04/24 2101    Narrative:      The following orders were created for panel order [place] Draw.  Procedure                               Abnormality         Status                     ---------                               -----------         ------                     Green Top (Gel)[[id number]]                                  Final result               Lavender Top[[id number]]                                     Final result               Gold Top - SST[[id number]]                                   Final result               Hanson Top[[id number]]                                         Final result               Light Blue Top[[id number]]                                   Final result                 Please view results for these tests on the individual orders.    Lavender Top [[id number]] Collected: 06/04/24 2044    Specimen: Blood from Arm, Right  Updated: 06/04/24 2101     Extra Tube hold for add-on     Comment: Auto resulted       Gold Top - SST [[id number]] Collected: 06/04/24 2048    Specimen: Blood Updated: 06/04/24 2101     Extra Tube Hold for add-ons.     Comment: Auto resulted.       Light Blue Top [[id number]] Collected: 06/04/24 2048    Specimen: Blood Updated: 06/04/24 2101     Extra Tube Hold for add-ons.     Comment: Auto resulted       Green Top (Gel) [[id number]] Collected: 06/04/24 2044    Specimen: Blood from Arm, Right Updated: 06/04/24 2101     Extra Tube Hold for add-ons.     Comment: Auto resulted.       Hanson Top [[id number]] Collected: 06/04/24 2048    Specimen: Blood Updated: 06/04/24 2101     Extra Tube Hold for add-ons.     Comment: Auto resulted.       POC CHEM 8 [[id number]]  (Normal) Collected: 06/04/24 2044    Specimen: Blood Updated: 06/04/24 2052     Glucose 108 mg/dL      BUN 20 mg/dL      Creatinine 1.00 mg/dL      Sodium 140 mmol/L      POC Potassium 4.0 mmol/L      Chloride 105 mmol/L      Total CO2 24 mmol/L      Hemoglobin 13.3 g/dL      Comment: Serial Number: [id number]:  [id number]        Hematocrit 39 %      Ionized Calcium 1.25 mmol/L      eGFR 61.5 mL/min/1.73           XR Chest 1 View    Result Date: 6/4/2024  Impression: No active cardiopulmonary disease Electronically Signed: Brett Fernández  6/4/2024 10:29 PM EDT  Workstation ID: [id number]    CT Angiogram Head w AI Analysis of LVO    Result Date: 6/4/2024  Impression: No core infarct or ischemic tissue at risk. No large vessel occlusion or flow-limiting stenosis. Electronically Signed: Dagnelo Ann MD  6/4/2024 9:32 PM EDT  Workstation ID: [id number]    CT Angiogram Neck    Result Date: 6/4/2024  Impression: No core infarct or ischemic tissue at risk. No large vessel occlusion or flow-limiting stenosis. Electronically Signed: Dangelo Ann MD  6/4/2024 9:32 PM EDT  Workstation ID: [id number]    CT CEREBRAL PERFUSION WITH & WITHOUT CONTRAST    Result Date:  6/4/2024  Impression: No core infarct or ischemic tissue at risk. No large vessel occlusion or flow-limiting stenosis. Electronically Signed: Dangelo Ann MD  6/4/2024 9:32 PM EDT  Workstation ID: [id number]    CT Head Without Contrast Stroke Protocol    Result Date: 6/4/2024  Impression: No evidence of acute intracranial abnormality. Electronically Signed: Dangelo Ann MD  6/4/2024 8:45 PM EDT  Workstation ID: [id number]   Results for orders placed during the hospital encounter of 07/02/21    Adult Transthoracic Echo Complete W/ Cont if Necessary Per Protocol    Interpretation Summary  · Left ventricular ejection fraction appears to be 56 - 60%. Left ventricular systolic function is normal.  · Mild to moderate aortic valve regurgitation is present.            Assessment/Plan     Assessment/Plan:  Ms. Gonzalez, 69, with anxiety, GERD, HTN, and breast CA, presented to [facility] ED with new AMS, speech difficulty, and generalized weakness. She became acutely weak at work, and EMS brought her in. She had a similar episode last year requiring admission, where a stroke workup was unremarkable, and syncope was attributed to dehydration, orthostasis, and COVID infection. She declined IV thrombolytic therapy due to rapid symptom improvement.    Likely Transient ischemic attack   -DAPT for 21 days followed by Plavix 75 daily    Hypertension- normal blood pressure goals     Hyperlipidemia-Lipitor 80    MRI brain and stroke work up pending         Erick Flores MD  06/05/24  16:11 EDT

## 2024-06-05 NOTE — THERAPY DISCHARGE NOTE
Acute Care - Speech Language Pathology Initial Evaluation/Discharge  [facility]  Cognitive-Communication Evaluation     Patient Name: Clara Gonzalez  : 1955  MRN: [id number]  Today's Date: 2024               Admit Date: 2024     Visit Dx:    ICD-10-CM ICD-9-CM   1. Syncope and collapse  R55 780.2   2. Aphasia  R47.01 784.3     Patient Active Problem List   Diagnosis    Chest pain    anxiety and depression    Essential hypertension    Bereavement    GERD without esophagitis    Abnormal mammogram    Acute thoracic back pain    Allergic rhinitis    Perioral dermatitis    Tendinitis of left shoulder    Elevated blood sugar    Unresponsive episode    Malignant neoplasm of upper-outer quadrant of right breast in female, estrogen receptor positive    TIA (transient ischemic attack)     Past Medical History:   Diagnosis Date    Anxiety     Breast cancer     Breast injury 2023    right hematoma    Essential hypertension 2021    GERD without esophagitis 2019     Past Surgical History:   Procedure Laterality Date    BREAST CYST ASPIRATION Left 2005    BREAST LUMPECTOMY      CHOLECYSTECTOMY      COLONOSCOPY      HYSTERECTOMY      OOPHORECTOMY         SLP Recommendation and Plan  SLP Diagnosis: functional cognitive-linguistic skills (24)        SLC Criteria for Skilled Therapy Interventions Met: no problems identified which require skilled intervention (24)  Anticipated Discharge Disposition (SLP): No further SLP services warranted (24)     Therapy Frequency (SLP SLC): evaluation only (24)                                     Plan of Care Reviewed With: patient (24)  Progress: improving (24)    SLP EVALUATION (Last 72 Hours)       SLP SLC Evaluation       Row Name 24                   Communication Assessment/Intervention    Document Type evaluation  -SM        Subjective Information no complaints  -SM        Patient  Observations alert;cooperative  -SM        Patient Effort good  -SM           General Information    Patient Profile Reviewed yes  -SM        Pertinent History Of Current Problem Adm after feeling was going to pass out and then 30 mins of difficulty responding. Reports of mild aphasia, dysarthria, and L droop on admit. All seemingly resolved since. CT negative. MRI pending.  -SM        Prior Level of Function-Communication WFL  -        Plans/Goals Discussed with patient;agreed upon  -        Barriers to Rehab none identified  -           Pain    Additional Documentation Pain Scale: Numbers Pre/Post-Treatment (Group)  -           Pain Scale: Numbers Pre/Post-Treatment    Pretreatment Pain Rating 0/10 - no pain  -SM        Posttreatment Pain Rating 0/10 - no pain  -SM           Comprehension Assessment/Intervention    Comprehension Assessment/Intervention Auditory Comprehension  -           Auditory Comprehension Assessment/Intervention    Auditory Comprehension (Communication) WFL;other (see comments)  -        Answers Questions (Communication) yes/no;wh questions;personal;simple;concrete;complex;abstract;WFL  -        Able to Follow Commands (Communication) 1-step;2-step;WFL  -        Narrative Discourse conversational level;WFL  -SM        Auditory Comprehension Communication, Comment Mild increased processing time with abstract/complex. Pt reports this to be baseline  -           Expression Assessment/Intervention    Expression Assessment/Intervention verbal expression  -           Verbal Expression Assessment/Intervention    Confrontational Naming WFL  -        Conversational Discourse/Fluency WFL  -           Oral Motor Structure and Function    Oral Motor Structure and Function WFL  -SM           Motor Speech Assessment/Intervention    Motor Speech Function L  -SM           Cognitive Assessment Intervention- SLP    Orientation Status (Cognition) person;place;time;situation;L  -SM         Attention (Cognitive) selective;sustained;WFL;attention to detail;mild impairment  -SM        Thought Organization (Cognitive) abstract divergent;abstract convergent;mental manipulation;WFL;mild impairment  -SM        Reasoning (Cognitive) simple;mental flexibility;WFL;mild impairment  -SM        Problem Solving (Cognitive) WFL  -SM        Functional Math (Cognitive) WFL  -SM        Executive Function (Cognition) deficit awareness;judgement;self-monitoring/correction;home management activities;WFL;other (see comments)  -        Cognition, Comment Mild increased effort and error x1 with tass r/t decreased attention to detail. Pt feels this to be baseline. Likely more related to new learning skills. Anticipate no difficulties with familiar home activities/responsibilities, especially as pt with good awareness and insight. Did discuss with pt to notify PCP if increased effort/difficulty upon return to home tasks and can then arrange for OP SLP services. Pt in agreemen with all.  -           Standardized Tests    Cognitive/Memory Tests MOCA: [place] Cognitive Assessment  -           MOCA: [employer] [place] Cognitive Assessment    MOCA Total Score 26/30  -        MOCA Total Score Indicative Of: Normal Cognitive Function  -        MOCA Comments Errors with 3 of 5 delayed word recall and 1 executive functioning/attention to detail task  -           SLP Evaluation Clinical Impressions    SLP Diagnosis functional cognitive-linguistic skills  -        SLC Criteria for Skilled Therapy Interventions Met no problems identified which require skilled intervention  -           Recommendations    Therapy Frequency (SLP SLC) evaluation only  -        Anticipated Discharge Disposition (SLP) No further SLP services warranted  -                  User Key  (r) = Recorded By, (t) = Taken By, (c) = Cosigned By      Initials Name Effective Dates     Delilah Faria MS CCC-SLP 02/03/23 -                         EDUCATION  The patient has been educated in the following areas:   Cognitive Impairment Communication Impairment.                    Time Calculation:    Time Calculation- SLP       Row Name 06/05/24 0919             Time Calculation- SLP    SLP Start Time 0825  -SM      SLP Received On 06/05/24  -         Untimed Charges    42788-RY Eval Speech and Production w/ Language Minutes 55  -SM         Total Minutes    Untimed Charges Total Minutes 55  -SM       Total Minutes 55  -SM                User Key  (r) = Recorded By, (t) = Taken By, (c) = Cosigned By      Initials Name Provider Type    Delilah Chase MS CCC-SLP Speech and Language Pathologist                    Therapy Charges for Today       Code Description Service Date Service Provider Modifiers Qty    [id number] HC ST EVAL SPEECH AND PROD W LANG  4 6/5/2024 Delilah Faria MS CCC-SLP GN 1                     SLP Discharge Summary  Anticipated Discharge Disposition (SLP): No further SLP services warranted    MS GERI ReyesSLP  6/5/2024

## 2024-06-05 NOTE — CONSULTS
Stroke Consult Note    Patient Name: Clara Gonzalez   MRN: [id number]  Age: 69 y.o.  Sex: female  : 1955    Primary Care Physician: Velia James APRN  Referring Physician:  Dr. Morrissey    TIME STROKE TEAM CALLED:  EST     TIME PATIENT SEEN:  EST    Handedness: Right  Race: White     Chief Complaint/Reason for Consultation: AMS, speech difficulty and generalized weakness    HPI: Ms. Gonzalez is a 69-year-old female with PMH of anxiety, GERD, HTN and breast CA.  She presents to [facility] ED with new onset of AMS, speech difficulty and generalized weakness.  EMS reports patient was at work sitting in her office when she became acutely weak. Coworkers notified EMS and patient was brought to [facility] ED for further evaluation of presenting symptoms.  Ms. Gonzalez had a similar episode last year that required admission (2023-2023) for unresponsive episode.  During that admission stroke workup was unremarkable and syncope was related to dehydration, orthostasis and COVID infection.    Initial NIH 11.  Blood pressure 166/88.  On exam patient is able to answer questions appropriately and follow one-step commands.  Denies any sensory deficits at this time.  Patient complains of a mild headache with nausea.  No falls or recent trauma.  Slight left facial droop with mild expressive aphasia and mild dysarthria noted during conversation.  Strength in all extremities 3/5.  Non-smoker and no EtOH use.  Takes prescribed medications routinely.    Last Known Normal Date/Time: 2024 at 1900 EST     Review of Systems   Constitutional:  Negative for fever.   HENT:  Negative for trouble swallowing and voice change.    Eyes:  Negative for visual disturbance.   Respiratory:  Negative for shortness of breath.    Cardiovascular:  Negative for chest pain.   Gastrointestinal:  Positive for nausea. Negative for abdominal pain.   Neurological:  Positive for facial asymmetry, speech difficulty, weakness and headaches.  Negative for dizziness, light-headedness and numbness.   Psychiatric/Behavioral:  Negative for confusion.       Past Medical History:   Diagnosis Date    Anxiety     Breast cancer     Breast injury 01/19/2023    right hematoma    Essential hypertension 07/02/2021    GERD without esophagitis 07/16/2019     Past Surgical History:   Procedure Laterality Date    BREAST CYST ASPIRATION Left 2005    BREAST LUMPECTOMY      CHOLECYSTECTOMY      COLONOSCOPY      HYSTERECTOMY      OOPHORECTOMY       Family History   Problem Relation Age of Onset    Breast cancer Mother 80    Ovarian cancer Neg Hx     Colon cancer Neg Hx     Colon polyps Neg Hx     Esophageal cancer Neg Hx      Social History     Socioeconomic History    Marital status:    Tobacco Use    Smoking status: Never    Smokeless tobacco: Never   Vaping Use    Vaping status: Never Used   Substance and Sexual Activity    Alcohol use: Never    Drug use: Never    Sexual activity: Defer     No Known Allergies  Prior to Admission medications    Medication Sig Start Date End Date Taking? Authorizing Provider   escitalopram (LEXAPRO) 10 MG tablet Take 1 tablet by mouth Daily. 4/8/24 10/5/24  Yanira Gutierrez MD   hydrOXYzine (ATARAX) 10 MG tablet Take 1-2 mg by mouth 3 (Three) Times a Day As Needed for Itching. Take 1 to 2 tablets three times a day as needed    Yanira Gutierrez MD   metoprolol succinate XL (TOPROL-XL) 50 MG 24 hr tablet Take 1 tablet by mouth Daily.    Yanira Gutierrez MD   ondansetron ODT (ZOFRAN-ODT) 4 MG disintegrating tablet Place 1 tablet on the tongue 4 (Four) Times a Day As Needed for Nausea or Vomiting. 10/20/22   Sri Weston APRN   tamoxifen (NOLVADEX) 10 MG tablet Take 0.5 tablets by mouth Daily. 2/14/24   China Masters MD   traZODone (DESYREL) 50 MG tablet Take 1/2 - 1 tablet at bedtime as needed for sleep 5/15/24   Lyubov Don APRN            Neurological Exam  Mental Status  Awake, alert and oriented to  person, place and time. Oriented to person, place and time. Oriented to person, place, and time. Mild dysarthria present. Expressive aphasia present.    Cranial Nerves  CN II: Visual fields full to confrontation.  CN III, IV, VI: Extraocular movements intact bilaterally. Pupils equal round and reactive to light bilaterally.  CN V: Facial sensation is normal.  CN VII:  Left: There is central facial weakness.  CN VIII: Hearing intact.  CN XI:  Right: Sternocleidomastoid strength is weak. Trapezius strength is weak.  Left: Sternocleidomastoid strength is weak. Trapezius strength is weak.  CN XII: Tongue midline without atrophy or fasciculations.    Motor  Normal muscle bulk throughout. Normal muscle tone. Strength is 5/5 in all four extremities except as noted.  Strength in all extremities 3/5..    Sensory  Light touch is normal in upper and lower extremities.     Coordination    Unable to assess.    Gait    Unable to assess.      Physical Exam  Constitutional:       General: She is not in acute distress.     Appearance: Normal appearance.   HENT:      Head: Normocephalic and atraumatic.      Nose: Nose normal.      Mouth/Throat:      Mouth: Mucous membranes are dry.   Eyes:      Extraocular Movements: Extraocular movements intact.      Pupils: Pupils are equal, round, and reactive to light.   Cardiovascular:      Pulses: Normal pulses.   Pulmonary:      Effort: Pulmonary effort is normal.   Abdominal:      General: Abdomen is flat.   Musculoskeletal:         General: Normal range of motion.      Cervical back: Normal range of motion.   Skin:     General: Skin is warm and dry.   Neurological:      Mental Status: She is oriented to person, place, and time.      Cranial Nerves: Cranial nerve deficit and dysarthria present.      Sensory: No sensory deficit.      Motor: Weakness present.   Psychiatric:         Speech: Speech is delayed.         Behavior: Behavior is slowed.         Acute Stroke Data    Thrombolytic  Inclusion / Exclusion Criteria    Time: 20:47 EDT  Person Administering Scale: MEI Garcia    Inclusion Criteria  [x]   18 years of age or greater   [x]   Onset of symptoms < 4.5 hours before beginning treatment (stroke onset = time patient was last seen well or without symptoms).   []   Diagnosis of acute ischemic stroke causing measurable disabling deficit (Complete Hemianopia, Any Aphasia, Visual or Sensory Extinction, Any weakness limiting sustained effort against gravity)   []   Any remaining deficit considered potentially disabling in view of patient and practitioner   Exclusion criteria (Do not proceed with Alteplase if any are checked under exclusion criteria)  []   Onset unknown or GREATER than 4.5 hours   []   ICH on CT/MRI   []   CT demonstrates hypodensity representing acute or subacute infarct   []   Significant head trauma or prior stroke in the previous 3 months   []   Symptoms suggestive of subarachnoid hemorrhage   []   History of un-ruptured intracranial aneurysm GREATER than 10 mm   []   Recent intracranial or intraspinal surgery within the last 3 months   []   Arterial puncture at a non-compressible site in the previous 7 days   []   Active internal bleeding   []   Acute bleeding tendency   []   Platelet count LESS than 100,000 for known hematological diseases such as leukemia, thrombocytopenia or chronic cirrhosis   []   Current use of anticoagulant with INR GREATER than 1.7 or PT GREATER than 15 seconds, aPTT GREATER than 40 seconds   []   Heparin received within 48 hours, resulting in abnormally elevated aPTT GREATER than upper limit of normal   []   Current use of direct thrombin inhibitors or direct factor Xa inhibitors in the past 48 hours   []   Elevated blood pressure refractory to treatment (systolic GREATER than 185 mm/Hg or diastolic  GREATER than 110 mm/Hg   []   Suspected infective endocarditis and aortic arch dissection   []   Current use of therapeutic treatment dose of  low-molecular-weight heparin (LMWH) within the previous 24 hours   []   Structural GI malignancy or bleed   Relative exclusion for all patients  []   Only minor non-disabling symptoms   []   Pregnancy   []   Seizure at onset with postictal residual neurological impairments   []   Major surgery or previous trauma within past 14 days   []   History of previous spontaneous ICH, intracranial neoplasm, or AV malformation   []   Postpartum (within previous 14 days)   []   Recent GI or urinary tract hemorrhage (within previous 21 days)   []   Recent acute MI (within previous 3 months)   []   History of un-ruptured intracranial aneurysm LESS than 10 mm   []   History of ruptured intracranial aneurysm   []   Blood glucose LESS than 50 mg/dL (2.7 mmol/L)   []   Dural puncture within the last 7 days   []   Known GREATER than 10 cerebral microbleeds   Additional exclusions for patients with symptoms onset between 3 and 4.5 hours.  []   Age > 80.   []   On any anticoagulants regardless of INR  >>> Warfarin (Coumadin), Heparin, Enoxaparin (Lovenox), fondaparinux (Arixtra), bivalirudin (Angiomax), Argatroban, dabigatran (Pradaxa), rivaroxaban (Xarelto), or apixaban (Eliquis)   []   Severe stroke (NIHSS > 25).   []   History of BOTH diabetes and previous ischemic stroke.   []   The risks and benefits have been discussed with the patient or family related to the administration of IV thrombolytic therapy for stroke symptoms.   []   I have discussed and reviewed the patient's case and imaging with the attending prior to IV thrombolytic therapy.   NA Time IV thrombolytic administered   Presenting symptoms are rapidly improving.  Patient declines to receive IV thrombotic therapy at this time.    Hospital Meds:  Scheduled-    Infusions-     PRNs-   sodium chloride    Functional Status Prior to Current Stroke/Jose Rafael Score:   MODIFIED JOSE RAFAEL SCALE (to be assessed for each patient having history of stroke) []Stroke history but not  assessed  [x]0: No symptoms at all  []1: No significant disability despite symptoms  []2: Slight disability  []3: Moderate disability  []4: Moderately severe disability  []5: Severe disability  []6: Death        NIH Stroke Scale  Time: 20:47 EDT  Person Administering Scale: MEI Garcia  Interval: baseline  1a. Level of Consciousness: 0-->Alert, keenly responsive  1b. LOC Questions: 0-->Answers both questions correctly  1c. LOC Commands: 0-->Performs both tasks correctly  2. Best Gaze: 0-->Normal  3. Visual: 0-->No visual loss  4. Facial Palsy: 1-->Minor paralysis (flattened nasolabial fold, asymmetry on smiling)  5a. Motor Arm, Left: 2-->Some effort against gravity, limb cannot get to or maintain (if cued) 90 (or 45) degrees, drifts down to bed, but has some effort against gravity  5b. Motor Arm, Right: 2-->Some effort against gravity, limb cannot get to or maintain (if cued) 90 (or 45) degrees, drifts down to bed, but has some effort against gravity  6a. Motor Leg, Left: 2-->Some effort against gravity, leg falls to bed by 5 secs, but has some effort against gravity  6b. Motor Leg, Right: 2-->Some effort against gravity, leg falls to bed by 5 secs, but has some effort against gravity  7. Limb Ataxia: 0-->Absent  8. Sensory: 0-->Normal, no sensory loss  9. Best Language: 1-->Mild-to-moderate aphasia, some obvious loss of fluency or facility of comprehension, without significant limitation on ideas expressed or form of expression. Reduction of speech and/or comprehension, however, makes conversation. . . (see row details)  10. Dysarthria: 1-->Mild-to-moderate dysarthria, patient slurs at least some words and, at worst, can be understood with some difficulty  11. Extinction and Inattention (formerly Neglect): 0-->No abnormality    Total (NIH Stroke Scale): 11     Results Reviewed:  I have personally reviewed current lab, radiology, and data and agree with results.    Results for orders placed during the  hospital encounter of 07/02/21    Adult Transthoracic Echo Complete W/ Cont if Necessary Per Protocol    Interpretation Summary  · Left ventricular ejection fraction appears to be 56 - 60%. Left ventricular systolic function is normal.  · Mild to moderate aortic valve regurgitation is present.     WBC   Date Value Ref Range Status   06/04/2024 6.67 3.40 - 10.80 10*3/mm3 Final     RBC   Date Value Ref Range Status   06/04/2024 4.38 3.77 - 5.28 10*6/mm3 Final     Hemoglobin   Date Value Ref Range Status   06/04/2024 13.3 12.0 - 17.0 g/dL Final     Comment:     Serial Number: [id number]:  [id number]   06/04/2024 13.8 12.0 - 15.9 g/dL Final     Hematocrit   Date Value Ref Range Status   06/04/2024 39 38 - 51 % Final   06/04/2024 42.0 34.0 - 46.6 % Final     MCV   Date Value Ref Range Status   06/04/2024 95.9 79.0 - 97.0 fL Final     MCH   Date Value Ref Range Status   06/04/2024 31.5 26.6 - 33.0 pg Final     MCHC   Date Value Ref Range Status   06/04/2024 32.9 31.5 - 35.7 g/dL Final     RDW   Date Value Ref Range Status   06/04/2024 12.9 12.3 - 15.4 % Final     RDW-SD   Date Value Ref Range Status   06/04/2024 45.9 37.0 - 54.0 fl Final     MPV   Date Value Ref Range Status   06/04/2024 9.3 6.0 - 12.0 fL Final     Platelets   Date Value Ref Range Status   06/04/2024 269 140 - 450 10*3/mm3 Final     Neutrophil %   Date Value Ref Range Status   06/04/2024 63.2 42.7 - 76.0 % Final     Lymphocyte %   Date Value Ref Range Status   06/04/2024 23.1 19.6 - 45.3 % Final     Monocyte %   Date Value Ref Range Status   06/04/2024 8.7 5.0 - 12.0 % Final     Eosinophil %   Date Value Ref Range Status   06/04/2024 3.4 0.3 - 6.2 % Final     Basophil %   Date Value Ref Range Status   06/04/2024 1.5 0.0 - 1.5 % Final     Immature Grans %   Date Value Ref Range Status   06/04/2024 0.1 0.0 - 0.5 % Final     Neutrophils, Absolute   Date Value Ref Range Status   06/04/2024 4.21 1.70 - 7.00 10*3/mm3 Final     Lymphocytes, Absolute   Date Value  Ref Range Status   06/04/2024 1.54 0.70 - 3.10 10*3/mm3 Final     Monocytes, Absolute   Date Value Ref Range Status   06/04/2024 0.58 0.10 - 0.90 10*3/mm3 Final     Eosinophils, Absolute   Date Value Ref Range Status   06/04/2024 0.23 0.00 - 0.40 10*3/mm3 Final     Basophils, Absolute   Date Value Ref Range Status   06/04/2024 0.10 0.00 - 0.20 10*3/mm3 Final     Immature Grans, Absolute   Date Value Ref Range Status   06/04/2024 0.01 0.00 - 0.05 10*3/mm3 Final     nRBC   Date Value Ref Range Status   06/04/2024 0.0 0.0 - 0.2 /100 WBC Final      Lab Results   Component Value Date    GLUCOSE 92 01/31/2024    BUN 12 01/31/2024    CREATININE 1.00 06/04/2024    EGFR 61.5 06/04/2024    BCR 16.4 01/31/2024    K 4.7 01/31/2024    CO2 28.0 01/31/2024    CALCIUM 9.7 01/31/2024    ALBUMIN 4.7 01/31/2024    BILITOT 0.4 01/31/2024    AST 21 06/04/2024    ALT 20 06/04/2024    XR Chest 1 View    Result Date: 6/4/2024  Impression: No active cardiopulmonary disease Electronically Signed: Brett Fernández  6/4/2024 10:29 PM EDT  Workstation ID: [id number]    CT Angiogram Head w AI Analysis of LVO    Result Date: 6/4/2024  Impression: No core infarct or ischemic tissue at risk. No large vessel occlusion or flow-limiting stenosis. Electronically Signed: Dangelo Ann MD  6/4/2024 9:32 PM EDT  Workstation ID: [id number]    CT Angiogram Neck    Result Date: 6/4/2024  Impression: No core infarct or ischemic tissue at risk. No large vessel occlusion or flow-limiting stenosis. Electronically Signed: Dangelo Ann MD  6/4/2024 9:32 PM EDT  Workstation ID: [id number]    CT CEREBRAL PERFUSION WITH & WITHOUT CONTRAST    Result Date: 6/4/2024  Impression: No core infarct or ischemic tissue at risk. No large vessel occlusion or flow-limiting stenosis. Electronically Signed: Daneglo Ann MD  6/4/2024 9:32 PM EDT  Workstation ID: [id number]    CT Head Without Contrast Stroke Protocol    Result Date: 6/4/2024  Impression: No evidence of acute intracranial  abnormality. Electronically Signed: Dangelo Ann MD  6/4/2024 8:45 PM EDT  Workstation ID: [id number]      Assessment/Plan:  Ms. Gonzalez is a 69-year-old female with PMH of anxiety, GERD, HTN and breast CA.  She presents to [facility] ED with new onset of AMS, speech difficulty and generalized weakness.  EMS reports patient was at work sitting in her office when she became acutely weak. Coworkers notified EMS and patient was brought to [facility] ED for further evaluation of presenting symptoms.  Ms. Gonzalez had a similar episode last year that required admission (4/9/2023-4/11/2023) for unresponsive episode.  During that admission stroke workup was unremarkable and syncope was related to dehydration, orthostasis and COVID infection. Patient declined to receive IV thrombolytic therapy medications due to symptoms rapidly improving.  Patient is not a candidate for endovascular therapy due to no LVO on CT scans.    Antiplatelet PTA: None  Anticoagulant PTA: None        AMS, speech difficulty and generalized weakness  Differentials to include TIA, CVA, syncopal episode, metabolic encephalopathy  Initiate TIA/CVA without IV thrombolytic therapy order set  N.p.o. until bedside dysphagia screening complete by RN  Activity as tolerated  Aspirin 81 mg p.o. daily  Atorvastatin 80 mg p.o. nightly  MRI brain without contrast on 6/5 at 1000  TTE routine  A1c, lipid panel routine  Blood pressure goals, SBP less than 200  Diabetes educator to see if appropriate  PT/OT/SLP eval and treat  Case management to follow      Plan of care discussed with Dr. Morrissey, Dr. Becker, patient, family at bedside and primary RN.  Stroke neurology will continue to follow.  Thank you for this consult.  Call with any questions or concerns.    Omi Mars, MEI  June 4, 2024  20:47 EDT

## 2024-06-05 NOTE — PROGRESS NOTES
[facility] Medicine Services  PROGRESS NOTE    Patient Name: Clara Gonzalez  : 1955  MRN: [id number]    Date of Admission: 2024  Primary Care Physician: Velia James APRN    Subjective   Subjective     CC: Spell    HPI: Up in chair. Back to normal. No dizziness. Mild HA. No f/c. No n/v .      Objective   Objective     Vital Signs:   Temp:  [98.1 °F (36.7 °C)-98.4 °F (36.9 °C)] 98.1 °F (36.7 °C)  Heart Rate:  [63-77] 75  Resp:  [18-20] 18  BP: (127-166)/(73-88) 135/77     Physical Exam:  NAD, alert and oriented  OP clear, MMM  Neck supple  No LAD  RRR  CTAB  +BS, soft  LONGORIA  Normal affect  No obvious neurologic deficits grossly    Results Reviewed:  LAB RESULTS:      Lab 24   WBC  --  6.67   HEMOGLOBIN  --  13.8   HEMOGLOBIN, POC  --  13.3   HEMATOCRIT  --  42.0   HEMATOCRIT POC  --  39   PLATELETS  --  269   NEUTROS ABS  --  4.21   IMMATURE GRANS (ABS)  --  0.01   LYMPHS ABS  --  1.54   MONOS ABS  --  0.58   EOS ABS  --  0.23   MCV  --  95.9   PROCALCITONIN  --  0.03   LACTATE 1.1  --    APTT 29.0  --          Lab 24   CREATININE 1.00   EGFR 61.5   MAGNESIUM 2.1   TSH 6.950*         Lab 24   ALT (SGPT) 20   AST (SGOT) 21         Lab 24   HSTROP T <6 <6         Lab 24   CHOLESTEROL 182   LDL CHOL 108*   HDL CHOL 57   TRIGLYCERIDES 95             Brief Urine Lab Results  (Last result in the past 365 days)        Color   Clarity   Blood   Leuk Est   Nitrite   Protein   CREAT   Urine HCG        24 Yellow   Clear   Negative   Moderate (2+)   Negative   Negative                   Microbiology Results Abnormal       Procedure Component Value - Date/Time    COVID PRE-OP / PRE-PROCEDURE SCREENING ORDER (NO ISOLATION) - Swab, Nasal Cavity [[id number]]  (Normal) Collected: 24    Lab Status: Final result Specimen: Swab from Nasal Cavity Updated: 240     Narrative:      The following orders were created for panel order COVID PRE-OP / PRE-PROCEDURE SCREENING ORDER (NO ISOLATION) - Swab, Nasal Cavity.  Procedure                               Abnormality         Status                     ---------                               -----------         ------                     Respiratory Panel PCR w/...[[id number]]  Normal              Final result                 Please view results for these tests on the individual orders.    Respiratory Panel PCR w/COVID-19(SARS-CoV-2) OLEG/JARET/KRISTIAN/PAD/COR/MELANY In-House, NP Swab in UTM/VTM, 2 HR TAT - Swab, Nasopharynx [[id number]]  (Normal) Collected: 06/04/24 2137    Lab Status: Final result Specimen: Swab from Nasopharynx Updated: 06/04/24 2235     ADENOVIRUS, PCR Not Detected     Coronavirus 229E Not Detected     Coronavirus HKU1 Not Detected     Coronavirus NL63 Not Detected     Coronavirus OC43 Not Detected     COVID19 Not Detected     Human Metapneumovirus Not Detected     Human Rhinovirus/Enterovirus Not Detected     Influenza A PCR Not Detected     Influenza B PCR Not Detected     Parainfluenza Virus 1 Not Detected     Parainfluenza Virus 2 Not Detected     Parainfluenza Virus 3 Not Detected     Parainfluenza Virus 4 Not Detected     RSV, PCR Not Detected     Bordetella pertussis pcr Not Detected     Bordetella parapertussis PCR Not Detected     Chlamydophila pneumoniae PCR Not Detected     Mycoplasma pneumo by PCR Not Detected    Narrative:      In the setting of a positive respiratory panel with a viral infection PLUS a negative procalcitonin without other underlying concern for bacterial infection, consider observing off antibiotics or discontinuation of antibiotics and continue supportive care. If the respiratory panel is positive for atypical bacterial infection (Bordetella pertussis, Chlamydophila pneumoniae, or Mycoplasma pneumoniae), consider antibiotic de-escalation to target atypical bacterial infection.            XR  Chest 1 View    Result Date: 6/4/2024  XR CHEST 1 VW Date of Exam: 6/4/2024 9:58 PM EDT Indication: Acute Stroke Protocol (onset < 12 hrs) Comparison: 10/20/2022 Findings: Heart size and pulmonary vasculature are within normal limits. Lungs clear. Costophrenic angle sharp     Impression: Impression: No active cardiopulmonary disease Electronically Signed: Brett Fernández  6/4/2024 10:29 PM EDT  Workstation ID: [id number]    CT Angiogram Head w AI Analysis of LVO    Result Date: 6/4/2024  CT CEREBRAL PERFUSION W WO CONTRAST, CT ANGIOGRAM NECK, CT ANGIOGRAM HEAD W AI ANALYSIS OF LVO Date of Exam: 6/4/2024 8:24 PM EDT Indication: Neuro Deficit, acute, Stroke suspected, Neuro deficit, acute stroke suspected. Comparison: Concurrently performed noncontrast head CT. Technique: Axial CT images of the brain were obtained prior to and after the administration of 120 mL Isovue-370. CT Perfusion protocol was utilized. Automated post processing was performed by RAPID software and submitted to PACS for interpretation.  CTA  of the head and neck were performed after the administration of iodinated contrast.  Reconstructed coronal and sagittal images were also obtained. A 3-D volume rendered image was created for interpretation. Automated exposure control and iterative reconstruction methods were used. Findings: CT Perfusion: CBF (<30%) volume: 0 mL Tmax (>6.0s) volume: 0 mL Mismatch volume: 0 mL Mismatch ratio: None No evidence of core infarct. No evidence of ischemic tissue at risk. Vascular findings: Common carotid arteries are patent bilaterally. The carotid artery bifurcations demonstrate no significant stenosis by NASCET criteria bilaterally. The cervical portions of the internal carotid arteries are patent bilaterally. The intracranial portions of the bilateral internal carotid arteries are patent. The middle cerebral arteries are patent bilaterally. The anterior cerebral arteries are patent bilaterally. The vertebral arteries  are patent bilaterally. The basilar artery is patent. Fetal origin of the posterior cerebral arteries bilaterally, which are patent. No evidence of dissection or aneurysm. Nonvascular findings: Lung apices are grossly clear. Glandular tissue is symmetric. Aerodigestive structures appear within normal limits. No suspicious lymphadenopathy. No acute or suspicious osseous lesions.     Impression: Impression: No core infarct or ischemic tissue at risk. No large vessel occlusion or flow-limiting stenosis. Electronically Signed: Dangelo Ann MD  6/4/2024 9:32 PM EDT  Workstation ID: [id number]    CT Angiogram Neck    Result Date: 6/4/2024  CT CEREBRAL PERFUSION W WO CONTRAST, CT ANGIOGRAM NECK, CT ANGIOGRAM HEAD W AI ANALYSIS OF LVO Date of Exam: 6/4/2024 8:24 PM EDT Indication: Neuro Deficit, acute, Stroke suspected, Neuro deficit, acute stroke suspected. Comparison: Concurrently performed noncontrast head CT. Technique: Axial CT images of the brain were obtained prior to and after the administration of 120 mL Isovue-370. CT Perfusion protocol was utilized. Automated post processing was performed by RAPID software and submitted to PACS for interpretation.  CTA  of the head and neck were performed after the administration of iodinated contrast.  Reconstructed coronal and sagittal images were also obtained. A 3-D volume rendered image was created for interpretation. Automated exposure control and iterative reconstruction methods were used. Findings: CT Perfusion: CBF (<30%) volume: 0 mL Tmax (>6.0s) volume: 0 mL Mismatch volume: 0 mL Mismatch ratio: None No evidence of core infarct. No evidence of ischemic tissue at risk. Vascular findings: Common carotid arteries are patent bilaterally. The carotid artery bifurcations demonstrate no significant stenosis by NASCET criteria bilaterally. The cervical portions of the internal carotid arteries are patent bilaterally. The intracranial portions of the bilateral internal carotid  arteries are patent. The middle cerebral arteries are patent bilaterally. The anterior cerebral arteries are patent bilaterally. The vertebral arteries are patent bilaterally. The basilar artery is patent. Fetal origin of the posterior cerebral arteries bilaterally, which are patent. No evidence of dissection or aneurysm. Nonvascular findings: Lung apices are grossly clear. Glandular tissue is symmetric. Aerodigestive structures appear within normal limits. No suspicious lymphadenopathy. No acute or suspicious osseous lesions.     Impression: Impression: No core infarct or ischemic tissue at risk. No large vessel occlusion or flow-limiting stenosis. Electronically Signed: Dangelo Ann MD  6/4/2024 9:32 PM EDT  Workstation ID: [id number]    CT CEREBRAL PERFUSION WITH & WITHOUT CONTRAST    Result Date: 6/4/2024  CT CEREBRAL PERFUSION W WO CONTRAST, CT ANGIOGRAM NECK, CT ANGIOGRAM HEAD W AI ANALYSIS OF LVO Date of Exam: 6/4/2024 8:24 PM EDT Indication: Neuro Deficit, acute, Stroke suspected, Neuro deficit, acute stroke suspected. Comparison: Concurrently performed noncontrast head CT. Technique: Axial CT images of the brain were obtained prior to and after the administration of 120 mL Isovue-370. CT Perfusion protocol was utilized. Automated post processing was performed by RAPID software and submitted to PACS for interpretation.  CTA  of the head and neck were performed after the administration of iodinated contrast.  Reconstructed coronal and sagittal images were also obtained. A 3-D volume rendered image was created for interpretation. Automated exposure control and iterative reconstruction methods were used. Findings: CT Perfusion: CBF (<30%) volume: 0 mL Tmax (>6.0s) volume: 0 mL Mismatch volume: 0 mL Mismatch ratio: None No evidence of core infarct. No evidence of ischemic tissue at risk. Vascular findings: Common carotid arteries are patent bilaterally. The carotid artery bifurcations demonstrate no significant  stenosis by NASCET criteria bilaterally. The cervical portions of the internal carotid arteries are patent bilaterally. The intracranial portions of the bilateral internal carotid arteries are patent. The middle cerebral arteries are patent bilaterally. The anterior cerebral arteries are patent bilaterally. The vertebral arteries are patent bilaterally. The basilar artery is patent. Fetal origin of the posterior cerebral arteries bilaterally, which are patent. No evidence of dissection or aneurysm. Nonvascular findings: Lung apices are grossly clear. Glandular tissue is symmetric. Aerodigestive structures appear within normal limits. No suspicious lymphadenopathy. No acute or suspicious osseous lesions.     Impression: Impression: No core infarct or ischemic tissue at risk. No large vessel occlusion or flow-limiting stenosis. Electronically Signed: Dangelo Ann MD  6/4/2024 9:32 PM EDT  Workstation ID: [id number]    CT Head Without Contrast Stroke Protocol    Result Date: 6/4/2024  CT HEAD WO CONTRAST STROKE PROTOCOL Date of Exam: 6/4/2024 8:21 PM EDT Indication: Neuro deficit, acute, stroke suspected Neuro Deficit, acute, Stroke suspected. Comparison: Head CT 9/4/2023. Technique: Axial CT images were obtained of the head without contrast administration.  Reconstructed coronal images were also obtained. Automated exposure control and iterative construction methods were used. Scan Time: 20:21 Results discussed with the stroke team via telephone at 20:28. Findings: No evidence of acute intracranial hemorrhage or mass effect. No extra-axial collection. The gray white matter differentiation is preserved. Ventricles and sulci are symmetric. The mastoid air cells and paranasal sinuses are well aerated. Globes and extraocular muscles are unremarkable. No acute or suspicious osseous abnormality. Soft tissues within normal limits.     Impression: Impression: No evidence of acute intracranial abnormality. Electronically  Signed: Dangelo Ann MD  6/4/2024 8:45 PM EDT  Workstation ID: [id number]     Results for orders placed during the hospital encounter of 07/02/21    Adult Transthoracic Echo Complete W/ Cont if Necessary Per Protocol    Interpretation Summary  · Left ventricular ejection fraction appears to be 56 - 60%. Left ventricular systolic function is normal.  · Mild to moderate aortic valve regurgitation is present.      Current medications:  Scheduled Meds:aspirin, 81 mg, Oral, Daily   Or  aspirin, 300 mg, Rectal, Daily  atorvastatin, 80 mg, Oral, Nightly  escitalopram, 10 mg, Oral, Daily  metoprolol succinate XL, 50 mg, Oral, Daily  sodium chloride, 10 mL, Intravenous, Q12H  tamoxifen, 5 mg, Oral, Daily      Continuous Infusions:   PRN Meds:.  senna-docusate sodium **AND** polyethylene glycol **AND** bisacodyl **AND** bisacodyl    nitroglycerin    sodium chloride    sodium chloride    Assessment & Plan   Assessment & Plan     Active Hospital Problems    Diagnosis  POA    **TIA (transient ischemic attack) [G45.9]  Yes    Malignant neoplasm of upper-outer quadrant of right breast in female, estrogen receptor positive [C50.411, Z17.0]  Not Applicable    Essential hypertension [I10]  Yes      Resolved Hospital Problems   No resolved problems to display.        Brief Hospital Course to date:  Clara Gonzalez is a 69 y.o. female with history of breast cancer, HTN, GERD here with transient aphasia/decreased responsiveness for about 30 minutes. Had severe HA prior to onset. Denies preceding palpitations/near syncope.    Aphasia  Decreased responsiveness  HA  -MRI pending  -ECHO pending  -on ASA/statin/plavix    HTN  -monitor BP        Expected Discharge Location and Transportation: Home  Expected Discharge   Expected Discharge Date: 6/7/2024; Expected Discharge Time:      DVT prophylaxis:  Mechanical DVT prophylaxis orders are present.         AM-PAC 6 Clicks Score (PT): 24 (06/05/24 9230)    CODE STATUS:   Code Status and Medical  Interventions:   Ordered at: 06/05/24 0251     Code Status (Patient has no pulse and is not breathing):    CPR (Attempt to Resuscitate)     Medical Interventions (Patient has pulse or is breathing):    Full Support       Nadir Stafford MD  06/05/24

## 2024-06-05 NOTE — H&P
[facility] Medicine Services  HISTORY AND PHYSICAL    Patient Name: Clara Gonzalez  : 1955  MRN: [id number]  Primary Care Physician: Velia James APRN  Date of admission: 2024      Subjective   Subjective     Chief Complaint:   Speech change    HPI:  Clara Gonzalez is a 69 y.o. female  with hx of breast CA who just completed radiation, HTN, GERD who presents now after she was at work in her usual state of health talking to a coworker and then felt acutely as if she was going to pass out.  Pt states she did not lose consciousness but had a 30 minute period of time where it was difficult for her to respond and answer questions.  States she could hear ppl talking to her but could not respond.  Had a headache earlier in the day but not at the time of sx onset  no focal weakness but felt weak all over.  No chest pain or palpitations or shortness of breath.  No f/c.  Felt normal yesterday.  Had similar episode one year ago but had complete LOC - was evaluated here with stroke workup and all negative.  No loss of chuck/bl control.       Personal History     Past Medical History:   Diagnosis Date    Anxiety     Breast cancer     Breast injury 2023    right hematoma    Essential hypertension 2021    GERD without esophagitis 2019         Oncology Problem List:  Malignant neoplasm of upper-outer quadrant of right breast in female,   estrogen receptor positive (2024; Status: Active)    Oncology/Hematology History   Malignant neoplasm of upper-outer quadrant of right breast in female, estrogen receptor positive   2024 Cancer Staged    Staging form: Breast, AJCC 8th Edition  - Clinical stage from 2024: Stage 0 (cTis (DCIS), cN0, cM0, G2) - Signed by Fortino Durant MD on 2024 Initial Diagnosis    Malignant neoplasm of upper-outer quadrant of right breast in female, estrogen receptor positive     2024 - 2024 Radiation     Radiation OncologyTreatment Course:  Clara Gonzalez received 4005 cGy in 15 fractions to right breast and 1000 cGy in 5 fractions to tumor bed via External Beam Radiation - EBRT.       Past Surgical History:   Procedure Laterality Date    BREAST CYST ASPIRATION Left 2005    BREAST LUMPECTOMY      CHOLECYSTECTOMY      COLONOSCOPY      HYSTERECTOMY      OOPHORECTOMY         Family History: family history includes Breast cancer (age of onset: 80) in her mother.     Social History:  reports that she has never smoked. She has never used smokeless tobacco. She reports that she does not drink alcohol and does not use drugs.  Social History     Social History Narrative    Not on file       Medications:  Available home medication information reviewed.  escitalopram, hydrOXYzine, metoprolol succinate XL, ondansetron ODT, tamoxifen, and traZODone    No Known Allergies    Objective   Objective     Vital Signs:   Temp:  [98.4 °F (36.9 °C)] 98.4 °F (36.9 °C)  Heart Rate:  [63-75] 69  Resp:  [20] 20  BP: (127-166)/(73-88) 135/73  Total (NIH Stroke Scale): 0    Physical Exam    Gen; alert, oriented, nad  Heent; perrla, eomi, mmm  Cv; rrr, no mrg  L; ctab, no wheeze/crackles  Abd; soft, +bs, ntnd, no r/g  Ext; no cce, palpable pulses  Skin; cdi, warm  Neuro; 4+/5 motor bue, 4/5 lle, otherwise intact; no facial droop noted; cn's intact grossly  Psych; mood and affect appropriate    Result Review:  I have personally reviewed the results from the time of this admission to 6/5/2024 02:44 EDT and agree with these findings:  [x]  Laboratory list / accordion  [x]  Microbiology  [x]  Radiology  [x]  EKG/Telemetry   []  Cardiology/Vascular   []  Pathology  []  Old records  []  Other:  Most notable findings include:        LAB RESULTS:      Lab 06/04/24 2048 06/04/24 2044   WBC  --  6.67   HEMOGLOBIN  --  13.8   HEMOGLOBIN, POC  --  13.3   HEMATOCRIT  --  42.0   HEMATOCRIT POC  --  39   PLATELETS  --  269   NEUTROS ABS  --  4.21   IMMATURE  GRANS (ABS)  --  0.01   LYMPHS ABS  --  1.54   MONOS ABS  --  0.58   EOS ABS  --  0.23   MCV  --  95.9   PROCALCITONIN  --  0.03   LACTATE 1.1  --    APTT 29.0  --          Lab 06/04/24 2044   CREATININE 1.00   EGFR 61.5   MAGNESIUM 2.1   TSH 6.950*         Lab 06/04/24 2044   ALT (SGPT) 20   AST (SGOT) 21         Lab 06/04/24 2305 06/04/24 2044   HSTROP T <6 <6         Lab 06/04/24 2305   CHOLESTEROL 182   LDL CHOL 108*   HDL CHOL 57   TRIGLYCERIDES 95             UA          10/16/2023    17:57 6/4/2024    21:41   Urinalysis   Squamous Epithelial Cells, UA 3-6  0-2    Specific Gravity, UA 1.024  1.027    Ketones, UA Trace  Negative    Blood, UA Small (1+)  Negative    Leukocytes, UA Moderate (2+)  Moderate (2+)    Nitrite, UA Negative  Negative    RBC, UA 3-5  0-2    WBC, UA 21-50  11-20    Bacteria, UA 1+  None Seen        Microbiology Results (last 10 days)       Procedure Component Value - Date/Time    COVID PRE-OP / PRE-PROCEDURE SCREENING ORDER (NO ISOLATION) - Swab, Nasal Cavity [[id number]]  (Normal) Collected: 06/04/24 2137    Lab Status: Final result Specimen: Swab from Nasal Cavity Updated: 06/04/24 2235    Narrative:      The following orders were created for panel order COVID PRE-OP / PRE-PROCEDURE SCREENING ORDER (NO ISOLATION) - Swab, Nasal Cavity.  Procedure                               Abnormality         Status                     ---------                               -----------         ------                     Respiratory Panel PCR w/...[[id number]]  Normal              Final result                 Please view results for these tests on the individual orders.    Respiratory Panel PCR w/COVID-19(SARS-CoV-2) OLEG/JARET/KRISTIAN/PAD/COR/MELANY In-House, NP Swab in UTM/VTM, 2 HR TAT - Swab, Nasopharynx [[id number]]  (Normal) Collected: 06/04/24 2137    Lab Status: Final result Specimen: Swab from Nasopharynx Updated: 06/04/24 2235     ADENOVIRUS, PCR Not Detected     Coronavirus 229E Not Detected      Coronavirus HKU1 Not Detected     Coronavirus NL63 Not Detected     Coronavirus OC43 Not Detected     COVID19 Not Detected     Human Metapneumovirus Not Detected     Human Rhinovirus/Enterovirus Not Detected     Influenza A PCR Not Detected     Influenza B PCR Not Detected     Parainfluenza Virus 1 Not Detected     Parainfluenza Virus 2 Not Detected     Parainfluenza Virus 3 Not Detected     Parainfluenza Virus 4 Not Detected     RSV, PCR Not Detected     Bordetella pertussis pcr Not Detected     Bordetella parapertussis PCR Not Detected     Chlamydophila pneumoniae PCR Not Detected     Mycoplasma pneumo by PCR Not Detected    Narrative:      In the setting of a positive respiratory panel with a viral infection PLUS a negative procalcitonin without other underlying concern for bacterial infection, consider observing off antibiotics or discontinuation of antibiotics and continue supportive care. If the respiratory panel is positive for atypical bacterial infection (Bordetella pertussis, Chlamydophila pneumoniae, or Mycoplasma pneumoniae), consider antibiotic de-escalation to target atypical bacterial infection.            XR Chest 1 View    Result Date: 6/4/2024  XR CHEST 1 VW Date of Exam: 6/4/2024 9:58 PM EDT Indication: Acute Stroke Protocol (onset < 12 hrs) Comparison: 10/20/2022 Findings: Heart size and pulmonary vasculature are within normal limits. Lungs clear. Costophrenic angle sharp     Impression: Impression: No active cardiopulmonary disease Electronically Signed: Brett Fernández  6/4/2024 10:29 PM EDT  Workstation ID: [id number]    CT Angiogram Head w AI Analysis of LVO    Result Date: 6/4/2024  CT CEREBRAL PERFUSION W WO CONTRAST, CT ANGIOGRAM NECK, CT ANGIOGRAM HEAD W AI ANALYSIS OF LVO Date of Exam: 6/4/2024 8:24 PM EDT Indication: Neuro Deficit, acute, Stroke suspected, Neuro deficit, acute stroke suspected. Comparison: Concurrently performed noncontrast head CT. Technique: Axial CT images of the brain  were obtained prior to and after the administration of 120 mL Isovue-370. CT Perfusion protocol was utilized. Automated post processing was performed by RAPID software and submitted to PACS for interpretation.  CTA  of the head and neck were performed after the administration of iodinated contrast.  Reconstructed coronal and sagittal images were also obtained. A 3-D volume rendered image was created for interpretation. Automated exposure control and iterative reconstruction methods were used. Findings: CT Perfusion: CBF (<30%) volume: 0 mL Tmax (>6.0s) volume: 0 mL Mismatch volume: 0 mL Mismatch ratio: None No evidence of core infarct. No evidence of ischemic tissue at risk. Vascular findings: Common carotid arteries are patent bilaterally. The carotid artery bifurcations demonstrate no significant stenosis by NASCET criteria bilaterally. The cervical portions of the internal carotid arteries are patent bilaterally. The intracranial portions of the bilateral internal carotid arteries are patent. The middle cerebral arteries are patent bilaterally. The anterior cerebral arteries are patent bilaterally. The vertebral arteries are patent bilaterally. The basilar artery is patent. Fetal origin of the posterior cerebral arteries bilaterally, which are patent. No evidence of dissection or aneurysm. Nonvascular findings: Lung apices are grossly clear. Glandular tissue is symmetric. Aerodigestive structures appear within normal limits. No suspicious lymphadenopathy. No acute or suspicious osseous lesions.     Impression: Impression: No core infarct or ischemic tissue at risk. No large vessel occlusion or flow-limiting stenosis. Electronically Signed: Dangelo Ann MD  6/4/2024 9:32 PM EDT  Workstation ID: [id number]    CT Angiogram Neck    Result Date: 6/4/2024  CT CEREBRAL PERFUSION W WO CONTRAST, CT ANGIOGRAM NECK, CT ANGIOGRAM HEAD W AI ANALYSIS OF LVO Date of Exam: 6/4/2024 8:24 PM EDT Indication: Neuro Deficit, acute,  Stroke suspected, Neuro deficit, acute stroke suspected. Comparison: Concurrently performed noncontrast head CT. Technique: Axial CT images of the brain were obtained prior to and after the administration of 120 mL Isovue-370. CT Perfusion protocol was utilized. Automated post processing was performed by RAPID software and submitted to PACS for interpretation.  CTA  of the head and neck were performed after the administration of iodinated contrast.  Reconstructed coronal and sagittal images were also obtained. A 3-D volume rendered image was created for interpretation. Automated exposure control and iterative reconstruction methods were used. Findings: CT Perfusion: CBF (<30%) volume: 0 mL Tmax (>6.0s) volume: 0 mL Mismatch volume: 0 mL Mismatch ratio: None No evidence of core infarct. No evidence of ischemic tissue at risk. Vascular findings: Common carotid arteries are patent bilaterally. The carotid artery bifurcations demonstrate no significant stenosis by NASCET criteria bilaterally. The cervical portions of the internal carotid arteries are patent bilaterally. The intracranial portions of the bilateral internal carotid arteries are patent. The middle cerebral arteries are patent bilaterally. The anterior cerebral arteries are patent bilaterally. The vertebral arteries are patent bilaterally. The basilar artery is patent. Fetal origin of the posterior cerebral arteries bilaterally, which are patent. No evidence of dissection or aneurysm. Nonvascular findings: Lung apices are grossly clear. Glandular tissue is symmetric. Aerodigestive structures appear within normal limits. No suspicious lymphadenopathy. No acute or suspicious osseous lesions.     Impression: Impression: No core infarct or ischemic tissue at risk. No large vessel occlusion or flow-limiting stenosis. Electronically Signed: Dangelo Ann MD  6/4/2024 9:32 PM EDT  Workstation ID: [id number]    CT CEREBRAL PERFUSION WITH & WITHOUT CONTRAST    Result  Date: 6/4/2024  CT CEREBRAL PERFUSION W WO CONTRAST, CT ANGIOGRAM NECK, CT ANGIOGRAM HEAD W AI ANALYSIS OF LVO Date of Exam: 6/4/2024 8:24 PM EDT Indication: Neuro Deficit, acute, Stroke suspected, Neuro deficit, acute stroke suspected. Comparison: Concurrently performed noncontrast head CT. Technique: Axial CT images of the brain were obtained prior to and after the administration of 120 mL Isovue-370. CT Perfusion protocol was utilized. Automated post processing was performed by RAPID software and submitted to PACS for interpretation.  CTA  of the head and neck were performed after the administration of iodinated contrast.  Reconstructed coronal and sagittal images were also obtained. A 3-D volume rendered image was created for interpretation. Automated exposure control and iterative reconstruction methods were used. Findings: CT Perfusion: CBF (<30%) volume: 0 mL Tmax (>6.0s) volume: 0 mL Mismatch volume: 0 mL Mismatch ratio: None No evidence of core infarct. No evidence of ischemic tissue at risk. Vascular findings: Common carotid arteries are patent bilaterally. The carotid artery bifurcations demonstrate no significant stenosis by NASCET criteria bilaterally. The cervical portions of the internal carotid arteries are patent bilaterally. The intracranial portions of the bilateral internal carotid arteries are patent. The middle cerebral arteries are patent bilaterally. The anterior cerebral arteries are patent bilaterally. The vertebral arteries are patent bilaterally. The basilar artery is patent. Fetal origin of the posterior cerebral arteries bilaterally, which are patent. No evidence of dissection or aneurysm. Nonvascular findings: Lung apices are grossly clear. Glandular tissue is symmetric. Aerodigestive structures appear within normal limits. No suspicious lymphadenopathy. No acute or suspicious osseous lesions.     Impression: Impression: No core infarct or ischemic tissue at risk. No large vessel  occlusion or flow-limiting stenosis. Electronically Signed: Dangelo Ann MD  6/4/2024 9:32 PM EDT  Workstation ID: [id number]    CT Head Without Contrast Stroke Protocol    Result Date: 6/4/2024  CT HEAD WO CONTRAST STROKE PROTOCOL Date of Exam: 6/4/2024 8:21 PM EDT Indication: Neuro deficit, acute, stroke suspected Neuro Deficit, acute, Stroke suspected. Comparison: Head CT 9/4/2023. Technique: Axial CT images were obtained of the head without contrast administration.  Reconstructed coronal images were also obtained. Automated exposure control and iterative construction methods were used. Scan Time: 20:21 Results discussed with the stroke team via telephone at 20:28. Findings: No evidence of acute intracranial hemorrhage or mass effect. No extra-axial collection. The gray white matter differentiation is preserved. Ventricles and sulci are symmetric. The mastoid air cells and paranasal sinuses are well aerated. Globes and extraocular muscles are unremarkable. No acute or suspicious osseous abnormality. Soft tissues within normal limits.     Impression: Impression: No evidence of acute intracranial abnormality. Electronically Signed: Dangelo Ann MD  6/4/2024 8:45 PM EDT  Workstation ID: [id number]     Results for orders placed during the hospital encounter of 07/02/21    Adult Transthoracic Echo Complete W/ Cont if Necessary Per Protocol    Interpretation Summary  · Left ventricular ejection fraction appears to be 56 - 60%. Left ventricular systolic function is normal.  · Mild to moderate aortic valve regurgitation is present.      Assessment & Plan   Assessment & Plan       TIA (transient ischemic attack)    Essential hypertension    Malignant neoplasm of upper-outer quadrant of right breast in female, estrogen receptor positive    70 y/o female w/ hx of HTN, recently treated breast CA who presents with   ?TIA verses less likely seizure/arrhythmia  -stroke eval negative; mri pending, echo in a.m.  -mgmt per stroke  team  -could consider holter monitor at d/c pending remainder of eval  2. HTN  -permissive htn w/ goal sbp<200                  DVT prophylaxis:  Mechanical DVT prophylaxis orders are present.          CODE STATUS:    There are no questions and answers to display.       Expected Discharge   Expected discharge date/ time has not been documented.     Lashonda Morgan MD  06/05/24  Electronically signed by Lashonda Morgan MD, 06/05/24, 2:48 AM EDT.

## 2024-06-05 NOTE — CONSULTS
Order noted for diabetes education per stroke protocol. Latest A1c 5.2%. No history of diabetes noted per chart review. Does not qualify for OP diabetes/stroke class.

## 2024-06-05 NOTE — PLAN OF CARE
Goal Outcome Evaluation:  Plan of Care Reviewed With: patient        Progress: no change  Outcome Evaluation: Pt presents at baseline for ADL completion with symmetrical BUE strength and coordination/sensation intact. OT signing off, please reconsult if needed. Rec d/c to home when medically appropriate.

## 2024-06-05 NOTE — THERAPY EVALUATION
Patient Name: Clara Gonzalez  : 1955    MRN: [id number]                              Today's Date: 2024       Admit Date: 2024    Visit Dx:     ICD-10-CM ICD-9-CM   1. Syncope and collapse  R55 780.2   2. Aphasia  R47.01 784.3     Patient Active Problem List   Diagnosis    Chest pain    anxiety and depression    Essential hypertension    Bereavement    GERD without esophagitis    Abnormal mammogram    Acute thoracic back pain    Allergic rhinitis    Perioral dermatitis    Tendinitis of left shoulder    Elevated blood sugar    Unresponsive episode    Malignant neoplasm of upper-outer quadrant of right breast in female, estrogen receptor positive    TIA (transient ischemic attack)     Past Medical History:   Diagnosis Date    Anxiety     Breast cancer     Breast injury 2023    right hematoma    Essential hypertension 2021    GERD without esophagitis 2019     Past Surgical History:   Procedure Laterality Date    BREAST CYST ASPIRATION Left 2005    BREAST LUMPECTOMY      CHOLECYSTECTOMY      COLONOSCOPY      HYSTERECTOMY      OOPHORECTOMY        General Information       Row Name 24 0907          OT Time and Intention    Document Type discharge evaluation/summary  -CS     Mode of Treatment occupational therapy  -CS       Row Name 24 0907          General Information    Patient Profile Reviewed yes  -CS     Prior Level of Function independent:;all household mobility;ADL's;home management;driving  reitred, cares for small great-grandchildren during the day with family in and out (lives alone). No AD/DME reported at baseline for ADL completion and related mobility  -CS     Existing Precautions/Restrictions no known precautions/restrictions  -CS     Barriers to Rehab none identified  -CS       Row Name 24 0907          Living Environment    People in Home alone  -CS       Row Name 24 0907          Home Main Entrance    Number of Stairs, Main Entrance one  -CS        Row Name 06/05/24 0907          Stairs Within Home, Primary    Number of Stairs, Within Home, Primary none  -CS       Row Name 06/05/24 0907          Cognition    Orientation Status (Cognition) oriented x 4  -CS       Row Name 06/05/24 0907          Safety Issues, Functional Mobility    Comment, Safety Issues/Impairments (Mobility) none identified  -CS               User Key  (r) = Recorded By, (t) = Taken By, (c) = Cosigned By      Initials Name Provider Type    CS Frantz Delgado OT Occupational Therapist                     Mobility/ADL's       Row Name 06/05/24 0938          Bed Mobility    Bed Mobility supine-sit;scooting/bridging  -CS     Scooting/Bridging Waldo (Bed Mobility) independent  -CS     Supine-Sit Waldo (Bed Mobility) modified independence  -CS     Assistive Device (Bed Mobility) head of bed elevated  -CS     Comment, (Bed Mobility) appropriate sequencing intact, no dizziness reported  -CS       Row Name 06/05/24 0938          Transfers    Transfers sit-stand transfer;bed-chair transfer  -CS       Row Name 06/05/24 0938          Bed-Chair Transfer    Bed-Chair Waldo (Transfers) independent  -CS       Row Name 06/05/24 0938          Sit-Stand Transfer    Sit-Stand Waldo (Transfers) independent  -CS       Row Name 06/05/24 0938          Functional Mobility    Functional Mobility- Comment defer to PT for assessment specifics, no AD or LOB during in-room mobility  -CS     Patient was able to Ambulate yes  -CS       Row Name 06/05/24 0938          Activities of Daily Living    BADL Assessment/Intervention lower body dressing;grooming;toileting;feeding  -CS       Row Name 06/05/24 0938          Lower Body Dressing Assessment/Training    Waldo Level (Lower Body Dressing) don;socks;independent  -CS     Position (Lower Body Dressing) edge of bed sitting  -CS       Row Name 06/05/24 0938          Grooming Assessment/Training    Waldo Level (Grooming) hair care,  combing/brushing;oral care regimen;wash face, hands;independent  -CS     Position (Grooming) sink side;unsupported standing  -       Row Name 06/05/24 0976          Toileting Assessment/Training    Ira Level (Toileting) adjust/manage clothing;change pad/brief;perform perineal hygiene;independent  -CS     Position (Toileting) unsupported sitting;unsupported standing  -       Row Name 06/05/24 0902          Self-Feeding Assessment/Training    Ira Level (Feeding) feeding skills;liquids to mouth;prepare tray/open items;scoop food and bring to mouth;independent  -CS     Position (Self-Feeding) supported sitting  -CS               User Key  (r) = Recorded By, (t) = Taken By, (c) = Cosigned By      Initials Name Provider Type     Frantz Delgado, OT Occupational Therapist                   Obj/Interventions       Row Name 06/05/24 0950          Sensory Assessment (Somatosensory)    Sensory Assessment (Somatosensory) UE sensation intact;bilateral UE  -CS     Bilateral UE Sensory Assessment general sensation;light touch awareness;light touch localization;proprioception;intact  -       Row Name 06/05/24 0950          Vision Assessment/Intervention    Visual Impairment/Limitations WFL;corrective lenses full-time  -       Row Name 06/05/24 0950          Range of Motion Comprehensive    General Range of Motion no range of motion deficits identified  -       Row Name 06/05/24 0950          Strength Comprehensive (MMT)    General Manual Muscle Testing (MMT) Assessment no strength deficits identified  -     Comment, General Manual Muscle Testing (MMT) Assessment 5/5, symmetrical  -       Row Name 06/05/24 0935          Balance    Balance Assessment sitting static balance;sitting dynamic balance;standing static balance;standing dynamic balance  -CS     Static Sitting Balance independent  -CS     Dynamic Sitting Balance independent  -CS     Position, Sitting Balance unsupported;sitting edge of bed   -CS     Static Standing Balance independent  -CS     Dynamic Standing Balance standby assist  -CS     Position/Device Used, Standing Balance unsupported  -CS     Balance Interventions sitting;standing;sit to stand;occupation based/functional task  -CS     Comment, Balance no overt LOB during ADL completion  -CS               User Key  (r) = Recorded By, (t) = Taken By, (c) = Cosigned By      Initials Name Provider Type    CS Frantz Delgado, OT Occupational Therapist                   Goals/Plan    No documentation.                  Clinical Impression       Row Name 06/05/24 0951          Pain Assessment    Pretreatment Pain Rating 0/10 - no pain  -CS     Posttreatment Pain Rating 0/10 - no pain  -CS       Row Name 06/05/24 0951          Plan of Care Review    Plan of Care Reviewed With patient  -CS     Progress no change  -CS     Outcome Evaluation Pt presents at baseline for ADL completion with symmetrical BUE strength and coordination/sensation intact. OT signing off, please reconsult if needed. Rec d/c to home when medically appropriate.  -CS       Row Name 06/05/24 0951          Therapy Assessment/Plan (OT)    Criteria for Skilled Therapeutic Interventions Met (OT) no  -CS     Therapy Frequency (OT) evaluation only  -CS       Row Name 06/05/24 0951          Therapy Plan Review/Discharge Plan (OT)    Anticipated Discharge Disposition (OT) home  -CS       Row Name 06/05/24 0951          Vital Signs    Pre Systolic BP Rehab 135  RN cleared for eval  -CS     Pre Treatment Diastolic BP 77  -CS     Post Systolic BP Rehab 137  -CS     Post Treatment Diastolic BP 83  -CS     O2 Delivery Pre Treatment room air  -CS     O2 Delivery Intra Treatment room air  -CS     O2 Delivery Post Treatment room air  -CS     Pre Patient Position Supine  -CS     Intra Patient Position Standing  -CS     Post Patient Position Sitting  -CS       Row Name 06/05/24 0951          Positioning and Restraints    Pre-Treatment Position in bed   -CS     Post Treatment Position chair  -CS     In Chair notified nsg;reclined;sitting;call light within reach;encouraged to call for assist;exit alarm on;legs elevated  -CS               User Key  (r) = Recorded By, (t) = Taken By, (c) = Cosigned By      Initials Name Provider Type    Frantz Kim, OT Occupational Therapist                   Outcome Measures       Row Name 06/05/24 0956          How much help from another is currently needed...    Putting on and taking off regular lower body clothing? 4  -CS     Bathing (including washing, rinsing, and drying) 4  -CS     Toileting (which includes using toilet bed pan or urinal) 4  -CS     Putting on and taking off regular upper body clothing 4  -CS     Taking care of personal grooming (such as brushing teeth) 4  -CS     Eating meals 4  -CS     AM-PAC 6 Clicks Score (OT) 24  -CS       Row Name 06/05/24 0213          How much help from another person do you currently need...    Turning from your back to your side while in flat bed without using bedrails? 4  -BS     Moving from lying on back to sitting on the side of a flat bed without bedrails? 4  -BS     Moving to and from a bed to a chair (including a wheelchair)? 4  -BS     Standing up from a chair using your arms (e.g., wheelchair, bedside chair)? 4  -BS     Climbing 3-5 steps with a railing? 4  -BS     To walk in hospital room? 4  -BS     AM-PAC 6 Clicks Score (PT) 24  -BS     Highest Level of Mobility Goal 8 --> Walked 250 feet or more  -BS       Row Name 06/05/24 0956          Modified [place] Scale    Modified Jose Rafael Scale 0 - No Symptoms at all.  -CS       Row Name 06/05/24 0956          Functional Assessment    Outcome Measure Options AM-PAC 6 Clicks Daily Activity (OT);Modified Jose Rafael  -CS               User Key  (r) = Recorded By, (t) = Taken By, (c) = Cosigned By      Initials Name Provider Type    Suki Jansen RN Registered Nurse    Frantz Kim, RYAN Occupational Therapist                     Occupational Therapy Education       Title: PT OT SLP Therapies (Done)       Topic: Occupational Therapy (Done)       Point: Precautions (Done)       Description:   Instruct learner(s) on prescribed precautions during self-care and functional transfers.                  Learning Progress Summary             Patient Acceptance, D,E, VU,DU by  at 6/5/2024 0958    Acceptance, E,D, NR by  at 6/5/2024 0957                                         User Key       Initials Effective Dates Name Provider Type Discipline     06/16/21 -  Frantz Delgado OT Occupational Therapist OT                  OT Recommendation and Plan  Therapy Frequency (OT): evaluation only  Plan of Care Review  Plan of Care Reviewed With: patient  Progress: no change  Outcome Evaluation: Pt presents at baseline for ADL completion with symmetrical BUE strength and coordination/sensation intact. OT signing off, please reconsult if needed. Rec d/c to home when medically appropriate.     Time Calculation:   Evaluation Complexity (OT)  Review Occupational Profile/Medical/Therapy History Complexity: brief/low complexity  Assessment, Occupational Performance/Identification of Deficit Complexity: 1-3 performance deficits  Clinical Decision Making Complexity (OT): problem focused assessment/low complexity  Overall Complexity of Evaluation (OT): low complexity     Time Calculation- OT       Row Name 06/05/24 0958             Time Calculation- OT    OT Start Time 0736  -CS      OT Received On 06/05/24  -CS         Untimed Charges    OT Eval/Re-eval Minutes 47  -CS         Total Minutes    Untimed Charges Total Minutes 47  -CS       Total Minutes 47  -CS                User Key  (r) = Recorded By, (t) = Taken By, (c) = Cosigned By      Initials Name Provider Type     Frantz Delgado OT Occupational Therapist                  Therapy Charges for Today       Code Description Service Date Service Provider Modifiers Qty    [id number] HC OT EVAL  LOW COMPLEXITY 4 6/5/2024 Frantz Delgado, OT GO 1                 Frantz Delgado, OT  6/5/2024

## 2024-06-06 ENCOUNTER — READMISSION MANAGEMENT (OUTPATIENT)
Dept: CALL CENTER | Facility: HOSPITAL | Age: 69
End: 2024-06-06
Payer: COMMERCIAL

## 2024-06-06 VITALS
TEMPERATURE: 98.2 F | HEIGHT: 59 IN | OXYGEN SATURATION: 93 % | DIASTOLIC BLOOD PRESSURE: 85 MMHG | RESPIRATION RATE: 16 BRPM | WEIGHT: 140 LBS | HEART RATE: 59 BPM | SYSTOLIC BLOOD PRESSURE: 152 MMHG | BODY MASS INDEX: 28.22 KG/M2

## 2024-06-06 PROCEDURE — 99239 HOSP IP/OBS DSCHRG MGMT >30: CPT | Performed by: HOSPITALIST

## 2024-06-06 PROCEDURE — G0378 HOSPITAL OBSERVATION PER HR: HCPCS

## 2024-06-06 PROCEDURE — 99214 OFFICE O/P EST MOD 30 MIN: CPT | Performed by: STUDENT IN AN ORGANIZED HEALTH CARE EDUCATION/TRAINING PROGRAM

## 2024-06-06 RX ORDER — ATORVASTATIN CALCIUM 80 MG/1
80 TABLET, FILM COATED ORAL NIGHTLY
Qty: 90 TABLET | Refills: 0 | Status: SHIPPED | OUTPATIENT
Start: 2024-06-06

## 2024-06-06 RX ORDER — CLOPIDOGREL BISULFATE 75 MG/1
75 TABLET ORAL DAILY
Status: DISCONTINUED | OUTPATIENT
Start: 2024-06-06 | End: 2024-06-06 | Stop reason: HOSPADM

## 2024-06-06 RX ORDER — ASPIRIN 81 MG/1
81 TABLET, CHEWABLE ORAL DAILY
Qty: 21 TABLET | Refills: 0 | Status: SHIPPED | OUTPATIENT
Start: 2024-06-06 | End: 2024-06-27

## 2024-06-06 RX ORDER — CLOPIDOGREL BISULFATE 75 MG/1
75 TABLET ORAL DAILY
Qty: 30 TABLET | Refills: 3 | Status: SHIPPED | OUTPATIENT
Start: 2024-06-06

## 2024-06-06 RX ADMIN — ASPIRIN 81 MG: 81 TABLET, CHEWABLE ORAL at 09:55

## 2024-06-06 RX ADMIN — METOPROLOL SUCCINATE 50 MG: 50 TABLET, EXTENDED RELEASE ORAL at 09:55

## 2024-06-06 RX ADMIN — CLOPIDOGREL BISULFATE 75 MG: 75 TABLET ORAL at 15:14

## 2024-06-06 RX ADMIN — ESCITALOPRAM OXALATE 10 MG: 10 TABLET ORAL at 09:55

## 2024-06-06 NOTE — DISCHARGE SUMMARY
[facility] Medicine Services  DISCHARGE SUMMARY    Patient Name: Clara Gonzalez  : 1955  MRN: [id number]    Date of Admission: 2024  8:19 PM  Date of Discharge:  2024  Primary Care Physician: Velia James APRN    Consults       Date and Time Order Name Status Description    2024  8:21 PM Inpatient Neurology Consult Stroke Completed             Hospital Course     Presenting Problem: TIA    Active Hospital Problems    Diagnosis  POA    **TIA (transient ischemic attack) [G45.9]  Yes    Malignant neoplasm of upper-outer quadrant of right breast in female, estrogen receptor positive [C50.411, Z17.0]  Not Applicable    Essential hypertension [I10]  Yes      Resolved Hospital Problems   No resolved problems to display.          Hospital Course:  Clara Gonzalez is a 69 y.o. female with history of breast cancer, HTN, GERD here with transient aphasia/decreased responsiveness for about 30 minutes. Had severe HA prior to onset. Denies preceding palpitations/near syncope. MRI demonstrated no acute ischemia. ECHO demonstrated a normal saline test. EF was normal. EKG was NSR. The stroke team recommended treatment for TIA with DAPT x 21 days, with ASA/plavix, de-escalating to plavix monotherapy and statin therapy with stroke neurology follow up in 1 month.       Discharge Follow Up Recommendations for outpatient labs/diagnostics:  PCP 1 week  Stroke neurology 1 month    Day of Discharge     HPI:  In bed. Back to baseline. No additional spells. Denies palpitations    Review of Systems  As above    Vital Signs:   Temp:  [98 °F (36.7 °C)-98.3 °F (36.8 °C)] 98 °F (36.7 °C)  Heart Rate:  [53-76] 53  Resp:  [16-19] 16  BP: (126-170)/(71-89) 133/71      Physical Exam:  NAD, alert and oriented  OP clear, dry MM  Neck supple  No LAD  RRR  CTAB  +BS, soft  LONGORIA  Normal affect  No rashes    Pertinent  and/or Most Recent Results     LAB RESULTS:      Lab 24    WBC  --  6.67   HEMOGLOBIN  --  13.8   HEMOGLOBIN, POC  --  13.3   HEMATOCRIT  --  42.0   HEMATOCRIT POC  --  39   PLATELETS  --  269   NEUTROS ABS  --  4.21   IMMATURE GRANS (ABS)  --  0.01   LYMPHS ABS  --  1.54   MONOS ABS  --  0.58   EOS ABS  --  0.23   MCV  --  95.9   PROCALCITONIN  --  0.03   LACTATE 1.1  --    APTT 29.0  --          Lab 06/04/24 2044 06/04/24 2042   CREATININE 1.00 1.00   EGFR 61.5  --    MAGNESIUM 2.1  --    TSH 6.950*  --          Lab 06/04/24 2044   ALT (SGPT) 20   AST (SGOT) 21         Lab 06/04/24 2305 06/04/24 2044   HSTROP T <6 <6         Lab 06/04/24 2305   CHOLESTEROL 182   LDL CHOL 108*   HDL CHOL 57   TRIGLYCERIDES 95             Brief Urine Lab Results  (Last result in the past 365 days)        Color   Clarity   Blood   Leuk Est   Nitrite   Protein   CREAT   Urine HCG        06/04/24 2141 Yellow   Clear   Negative   Moderate (2+)   Negative   Negative                 Microbiology Results (last 10 days)       Procedure Component Value - Date/Time    COVID PRE-OP / PRE-PROCEDURE SCREENING ORDER (NO ISOLATION) - Swab, Nasal Cavity [[id number]]  (Normal) Collected: 06/04/24 2137    Lab Status: Final result Specimen: Swab from Nasal Cavity Updated: 06/04/24 2235    Narrative:      The following orders were created for panel order COVID PRE-OP / PRE-PROCEDURE SCREENING ORDER (NO ISOLATION) - Swab, Nasal Cavity.  Procedure                               Abnormality         Status                     ---------                               -----------         ------                     Respiratory Panel PCR w/...[[id number]]  Normal              Final result                 Please view results for these tests on the individual orders.    Respiratory Panel PCR w/COVID-19(SARS-CoV-2) OLEG/JARET/KRISTIAN/PAD/COR/MELANY In-House, NP Swab in UTM/VTM, 2 HR TAT - Swab, Nasopharynx [[id number]]  (Normal) Collected: 06/04/24 2137    Lab Status: Final result Specimen: Swab from Nasopharynx Updated:  06/04/24 2235     ADENOVIRUS, PCR Not Detected     Coronavirus 229E Not Detected     Coronavirus HKU1 Not Detected     Coronavirus NL63 Not Detected     Coronavirus OC43 Not Detected     COVID19 Not Detected     Human Metapneumovirus Not Detected     Human Rhinovirus/Enterovirus Not Detected     Influenza A PCR Not Detected     Influenza B PCR Not Detected     Parainfluenza Virus 1 Not Detected     Parainfluenza Virus 2 Not Detected     Parainfluenza Virus 3 Not Detected     Parainfluenza Virus 4 Not Detected     RSV, PCR Not Detected     Bordetella pertussis pcr Not Detected     Bordetella parapertussis PCR Not Detected     Chlamydophila pneumoniae PCR Not Detected     Mycoplasma pneumo by PCR Not Detected    Narrative:      In the setting of a positive respiratory panel with a viral infection PLUS a negative procalcitonin without other underlying concern for bacterial infection, consider observing off antibiotics or discontinuation of antibiotics and continue supportive care. If the respiratory panel is positive for atypical bacterial infection (Bordetella pertussis, Chlamydophila pneumoniae, or Mycoplasma pneumoniae), consider antibiotic de-escalation to target atypical bacterial infection.            MRI Brain Without Contrast    Result Date: 6/5/2024  MRI BRAIN WO CONTRAST Date of Exam: 6/5/2024 7:10 PM EDT Indication: Stroke, follow up AMS.  Comparison: CT brain dated 6/4/2024 Technique:  Routine multiplanar/multisequence sequence images of the brain were obtained without contrast administration. Findings: The ventricles are normal in size and midline. Mild age-appropriate involutional changes. Few bilateral periventricular T2/FLAIR white matter hyperintensities There is no diffusion restriction to suggest acute infarct. There is no evidence of acute or chronic intracranial hemorrhage. No mass effect or midline shift. No abnormal extra-axial collections. The major vascular flow voids appear intact. The basal  ganglia, brainstem and cerebellum appear within normal limits. Calvarial and superficial soft tissue signal is within normal limits. Orbits appear unremarkable. The paranasal sinuses and the mastoid air cells appear well aerated. Midline structures are intact.     Impression: Few bilateral periventricular and subcortical T2/FLAIR white matter hyperintensities compatible with mild chronic microvascular ischemic changes No evidence of acute intracranial abnormality Electronically Signed: Carter Quevedo MD  6/5/2024 11:10 PM EDT  Workstation ID: [id number]    Adult Transthoracic Echo Complete W/ Cont if Necessary Per Protocol (With Agitated Saline)    Result Date: 6/5/2024    Left ventricular systolic function is normal. Left ventricular ejection fraction appears to be 56 - 60%.   Left ventricular wall thickness is consistent with mild concentric hypertrophy.   Left ventricular diastolic function is consistent with (grade Ia w/high LAP) impaired relaxation.   The right ventricular cavity is borderline dilated.   Saline test results are negative.   Moderate aortic valve regurgitation is present.   Mild tricuspid valve regurgitation is present.     XR Chest 1 View    Result Date: 6/4/2024  XR CHEST 1 VW Date of Exam: 6/4/2024 9:58 PM EDT Indication: Acute Stroke Protocol (onset < 12 hrs) Comparison: 10/20/2022 Findings: Heart size and pulmonary vasculature are within normal limits. Lungs clear. Costophrenic angle sharp     Impression: No active cardiopulmonary disease Electronically Signed: Brett Fernández  6/4/2024 10:29 PM EDT  Workstation ID: [id number]    CT Angiogram Head w AI Analysis of LVO    Result Date: 6/4/2024  CT CEREBRAL PERFUSION W WO CONTRAST, CT ANGIOGRAM NECK, CT ANGIOGRAM HEAD W AI ANALYSIS OF LVO Date of Exam: 6/4/2024 8:24 PM EDT Indication: Neuro Deficit, acute, Stroke suspected, Neuro deficit, acute stroke suspected. Comparison: Concurrently performed noncontrast head CT. Technique: Axial CT images of  the brain were obtained prior to and after the administration of 120 mL Isovue-370. CT Perfusion protocol was utilized. Automated post processing was performed by RAPID software and submitted to PACS for interpretation.  CTA  of the head and neck were performed after the administration of iodinated contrast.  Reconstructed coronal and sagittal images were also obtained. A 3-D volume rendered image was created for interpretation. Automated exposure control and iterative reconstruction methods were used. Findings: CT Perfusion: CBF (<30%) volume: 0 mL Tmax (>6.0s) volume: 0 mL Mismatch volume: 0 mL Mismatch ratio: None No evidence of core infarct. No evidence of ischemic tissue at risk. Vascular findings: Common carotid arteries are patent bilaterally. The carotid artery bifurcations demonstrate no significant stenosis by NASCET criteria bilaterally. The cervical portions of the internal carotid arteries are patent bilaterally. The intracranial portions of the bilateral internal carotid arteries are patent. The middle cerebral arteries are patent bilaterally. The anterior cerebral arteries are patent bilaterally. The vertebral arteries are patent bilaterally. The basilar artery is patent. Fetal origin of the posterior cerebral arteries bilaterally, which are patent. No evidence of dissection or aneurysm. Nonvascular findings: Lung apices are grossly clear. Glandular tissue is symmetric. Aerodigestive structures appear within normal limits. No suspicious lymphadenopathy. No acute or suspicious osseous lesions.     Impression: No core infarct or ischemic tissue at risk. No large vessel occlusion or flow-limiting stenosis. Electronically Signed: Dangelo Ann MD  6/4/2024 9:32 PM EDT  Workstation ID: [id number]    CT Angiogram Neck    Result Date: 6/4/2024  CT CEREBRAL PERFUSION W WO CONTRAST, CT ANGIOGRAM NECK, CT ANGIOGRAM HEAD W AI ANALYSIS OF LVO Date of Exam: 6/4/2024 8:24 PM EDT Indication: Neuro Deficit, acute,  Stroke suspected, Neuro deficit, acute stroke suspected. Comparison: Concurrently performed noncontrast head CT. Technique: Axial CT images of the brain were obtained prior to and after the administration of 120 mL Isovue-370. CT Perfusion protocol was utilized. Automated post processing was performed by RAPID software and submitted to PACS for interpretation.  CTA  of the head and neck were performed after the administration of iodinated contrast.  Reconstructed coronal and sagittal images were also obtained. A 3-D volume rendered image was created for interpretation. Automated exposure control and iterative reconstruction methods were used. Findings: CT Perfusion: CBF (<30%) volume: 0 mL Tmax (>6.0s) volume: 0 mL Mismatch volume: 0 mL Mismatch ratio: None No evidence of core infarct. No evidence of ischemic tissue at risk. Vascular findings: Common carotid arteries are patent bilaterally. The carotid artery bifurcations demonstrate no significant stenosis by NASCET criteria bilaterally. The cervical portions of the internal carotid arteries are patent bilaterally. The intracranial portions of the bilateral internal carotid arteries are patent. The middle cerebral arteries are patent bilaterally. The anterior cerebral arteries are patent bilaterally. The vertebral arteries are patent bilaterally. The basilar artery is patent. Fetal origin of the posterior cerebral arteries bilaterally, which are patent. No evidence of dissection or aneurysm. Nonvascular findings: Lung apices are grossly clear. Glandular tissue is symmetric. Aerodigestive structures appear within normal limits. No suspicious lymphadenopathy. No acute or suspicious osseous lesions.     Impression: No core infarct or ischemic tissue at risk. No large vessel occlusion or flow-limiting stenosis. Electronically Signed: Dangelo Ann MD  6/4/2024 9:32 PM EDT  Workstation ID: [id number]    CT CEREBRAL PERFUSION WITH & WITHOUT CONTRAST    Result Date:  6/4/2024  CT CEREBRAL PERFUSION W WO CONTRAST, CT ANGIOGRAM NECK, CT ANGIOGRAM HEAD W AI ANALYSIS OF LVO Date of Exam: 6/4/2024 8:24 PM EDT Indication: Neuro Deficit, acute, Stroke suspected, Neuro deficit, acute stroke suspected. Comparison: Concurrently performed noncontrast head CT. Technique: Axial CT images of the brain were obtained prior to and after the administration of 120 mL Isovue-370. CT Perfusion protocol was utilized. Automated post processing was performed by RAPID software and submitted to PACS for interpretation.  CTA  of the head and neck were performed after the administration of iodinated contrast.  Reconstructed coronal and sagittal images were also obtained. A 3-D volume rendered image was created for interpretation. Automated exposure control and iterative reconstruction methods were used. Findings: CT Perfusion: CBF (<30%) volume: 0 mL Tmax (>6.0s) volume: 0 mL Mismatch volume: 0 mL Mismatch ratio: None No evidence of core infarct. No evidence of ischemic tissue at risk. Vascular findings: Common carotid arteries are patent bilaterally. The carotid artery bifurcations demonstrate no significant stenosis by NASCET criteria bilaterally. The cervical portions of the internal carotid arteries are patent bilaterally. The intracranial portions of the bilateral internal carotid arteries are patent. The middle cerebral arteries are patent bilaterally. The anterior cerebral arteries are patent bilaterally. The vertebral arteries are patent bilaterally. The basilar artery is patent. Fetal origin of the posterior cerebral arteries bilaterally, which are patent. No evidence of dissection or aneurysm. Nonvascular findings: Lung apices are grossly clear. Glandular tissue is symmetric. Aerodigestive structures appear within normal limits. No suspicious lymphadenopathy. No acute or suspicious osseous lesions.     Impression: No core infarct or ischemic tissue at risk. No large vessel occlusion or  flow-limiting stenosis. Electronically Signed: Dangelo Ann MD  6/4/2024 9:32 PM EDT  Workstation ID: [id number]    CT Head Without Contrast Stroke Protocol    Result Date: 6/4/2024  CT HEAD WO CONTRAST STROKE PROTOCOL Date of Exam: 6/4/2024 8:21 PM EDT Indication: Neuro deficit, acute, stroke suspected Neuro Deficit, acute, Stroke suspected. Comparison: Head CT 9/4/2023. Technique: Axial CT images were obtained of the head without contrast administration.  Reconstructed coronal images were also obtained. Automated exposure control and iterative construction methods were used. Scan Time: 20:21 Results discussed with the stroke team via telephone at 20:28. Findings: No evidence of acute intracranial hemorrhage or mass effect. No extra-axial collection. The gray white matter differentiation is preserved. Ventricles and sulci are symmetric. The mastoid air cells and paranasal sinuses are well aerated. Globes and extraocular muscles are unremarkable. No acute or suspicious osseous abnormality. Soft tissues within normal limits.     Impression: No evidence of acute intracranial abnormality. Electronically Signed: Dangelo Ann MD  6/4/2024 8:45 PM EDT  Workstation ID: [id number]             Results for orders placed during the hospital encounter of 06/04/24    Adult Transthoracic Echo Complete W/ Cont if Necessary Per Protocol (With Agitated Saline)    Interpretation Summary    Left ventricular systolic function is normal. Left ventricular ejection fraction appears to be 56 - 60%.    Left ventricular wall thickness is consistent with mild concentric hypertrophy.    Left ventricular diastolic function is consistent with (grade Ia w/high LAP) impaired relaxation.    The right ventricular cavity is borderline dilated.    Saline test results are negative.    Moderate aortic valve regurgitation is present.    Mild tricuspid valve regurgitation is present.      Plan for Follow-up of Pending Labs/Results: Reviewed    Discharge  Details        Discharge Medications        New Medications        Instructions Start Date   aspirin 81 MG chewable tablet   81 mg, Oral, Daily      atorvastatin 80 MG tablet  Commonly known as: LIPITOR   80 mg, Oral, Nightly             Continue These Medications        Instructions Start Date   escitalopram 10 MG tablet  Commonly known as: LEXAPRO   10 mg, Oral, Daily      hydrOXYzine 10 MG tablet  Commonly known as: ATARAX   1-2 mg, Oral, 3 Times Daily PRN, Take 1 to 2 tablets three times a day as needed      metoprolol succinate XL 50 MG 24 hr tablet  Commonly known as: TOPROL-XL   50 mg, Oral, Daily      ondansetron ODT 4 MG disintegrating tablet  Commonly known as: ZOFRAN-ODT   4 mg, Translingual, 4 Times Daily PRN      tamoxifen 10 MG tablet  Commonly known as: NOLVADEX   5 mg, Oral, Daily      traZODone 50 MG tablet  Commonly known as: DESYREL   Take 1/2 - 1 tablet at bedtime as needed for sleep               No Known Allergies      Discharge Disposition:  Home or Self Care    Diet:  Hospital:  Diet Order   Procedures    Diet: Cardiac; Healthy Heart (2-3 Na+); Texture: Regular (IDDSI 7); Fluid Consistency: Thin (IDDSI 0)            Activity:      Restrictions or Other Recommendations:         CODE STATUS:    Code Status and Medical Interventions:   Ordered at: 06/05/24 0251     Code Status (Patient has no pulse and is not breathing):    CPR (Attempt to Resuscitate)     Medical Interventions (Patient has pulse or is breathing):    Full Support       Future Appointments   Date Time Provider Department Center   6/18/2024 11:00 AM Phuong Schmidt APRN [facility]   6/18/2024 11:30 AM Shawanda Carrizales APRN [facility] None       Additional Instructions for the Follow-ups that You Need to Schedule       Discharge Follow-up with PCP   As directed       Currently Documented PCP:    Velia James APRN    PCP Phone Number:    [phone]     Follow Up Details: 1 week        Discharge Follow-up  with Specified Provider: Stroke neurology 1 month   As directed      To: Stroke neurology 1 month                      Nadir Stafford MD  06/06/24      Time Spent on Discharge:  I spent  40  minutes on this discharge activity which included: face-to-face encounter with the patient, reviewing the data in the system, coordination of the care with the nursing staff as well as consultants, documentation, and entering orders.

## 2024-06-06 NOTE — CASE MANAGEMENT/SOCIAL WORK
Discharge Planning Assessment  [facility]     Patient Name: Clara Gonzalez  MRN: [id number]  Today's Date: 6/6/2024    Admit Date: 6/4/2024    Plan: Home   Discharge Needs Assessment    No documentation.                  Discharge Plan       Row Name 06/06/24 1102       Plan    Plan Home    Patient/Family in Agreement with Plan yes    Plan Comments Spoke with Ms. Gonzalez and Son at the bedside. Therapy is recommending OP PT. Discussed with patient and Son and they are agreeable to [facility] in [place]. CM spoke with Zuleima with [facility] and gave referral for OP PT. Patient will be contacted by facility with an appointment at discharge. Family denies any other discharge needs. CM will continue to follow.    Final Discharge Disposition Code 01 - home or self-care                  Continued Care and Services - Admitted Since 6/4/2024       Therapy Coordination complete.      Service Provider Request Status Selected Services Address Phone Fax Patient Preferred    [facility]  Selected Outpatient Physical Therapy [address] [place] [phone] --                                 Priscila Mckenna RN

## 2024-06-06 NOTE — PROGRESS NOTES
Stroke Progress Note       Chief Complaint:  speech difficulty    Subjective    Subjective     Subjective:  No acute events overnight     Review of Systems   Constitutional: No fatigue  HENT: Negative for nosebleeds and rhinorrhea.    Eyes: Negative for redness.   Respiratory: Negative for cough.    Gastrointestinal: Negative for anal bleeding.   Endocrine: Negative for polydipsia.   Genitourinary: Negative for enuresis and urgency.   Musculoskeletal: Negative for joint swelling.   Neurological: Negative for tremors.   Psychiatric/Behavioral: Negative for hallucinations.      Objective      Temp:  [98 °F (36.7 °C)-98.2 °F (36.8 °C)] 98.2 °F (36.8 °C)  Heart Rate:  [53-63] 59  Resp:  [16-19] 16  BP: (126-162)/(71-89) 152/85        GEN: NAD, pleasant, cooperative  Eyes-show anicteric sclera, moist conjunctiva with no lid lag, no redness  Neck-trachea midline.  There is no thyromegaly.  ENMT-oropharynx clear with moist mucous membranes and good dentition.  Skin-no rash, lesions or ulcers.  Cardiovascular exam-no pedal edema, regular rate and rhythm.  CHEST: No signs of resp distress, on room air  Abdomen-no abdominal distention, nontender.  Psychiatric exam-alert oriented x3 with intact judgment and insight      NEURO    MENTAL STATUS: AAOx3, memory intact, fund of knowledge appropriate    LANG/SPEECH: Naming and repetition intact, fluent, follows 3-step commands    CRANIAL NERVES:      II: Pupils equal and reactive, no RAPD, no VF deficits, fundus(not done)      III, IV, VI: EOM intact, no gaze preference or deviation, no nystagmus.      V: normal sensation in V1, V2, and V3 segments bilaterally      VII: no asymmetry, no nasolabial fold flattening      VIII: normal hearing to speech      IX, X: normal palatal elevation, no uvular deviation      XI: 5/5 head turn and 5/5 shoulder shrug bilaterally      XII: midline tongue protrusion    MOTOR:  Normal tone throughout  5/5 muscle power in Rt shoulder  abductors/adductors, elbow flexors/extensors, wrist flexors/extensors, finger abductors/adductors.  5/5 in Rt hip flexors/extensors, knee flexors/extensors, ankle dorsiflexors and plantar flexors.    5/5 muscle power in Lt shoulder abductors/adductors, elbow flexors/extensors, wrist flexors/extensors, finger abductors/adductors.  5/5 in Lt hip flexors/extensors, knee flexors/extensors, ankle dorsiflexors and plantea flexors.    REFLEXES:  no Rocha's, no clonus    SENSORY:    Normal to light touch all throughout     COORDINATION: Normal finger to nose and heel to shin, no tremor, no dysmetria    STATION: Not assessed due to patient condition    GAIT: Not assessed due to patient condition       Results Review:    I reviewed the patient's new clinical results.    Lab Results (last 24 hours)       ** No results found for the last 24 hours. **          MRI Brain Without Contrast    Result Date: 6/5/2024  Impression: Few bilateral periventricular and subcortical T2/FLAIR white matter hyperintensities compatible with mild chronic microvascular ischemic changes No evidence of acute intracranial abnormality Electronically Signed: Carter Quevedo MD  6/5/2024 11:10 PM EDT  Workstation ID: [id number]    XR Chest 1 View    Result Date: 6/4/2024  Impression: No active cardiopulmonary disease Electronically Signed: Brett Fernández  6/4/2024 10:29 PM EDT  Workstation ID: [id number]    CT Angiogram Head w AI Analysis of LVO    Result Date: 6/4/2024  Impression: No core infarct or ischemic tissue at risk. No large vessel occlusion or flow-limiting stenosis. Electronically Signed: Dangelo Ann MD  6/4/2024 9:32 PM EDT  Workstation ID: [id number]    CT Angiogram Neck    Result Date: 6/4/2024  Impression: No core infarct or ischemic tissue at risk. No large vessel occlusion or flow-limiting stenosis. Electronically Signed: Dangelo Ann MD  6/4/2024 9:32 PM EDT  Workstation ID: [id number]    CT CEREBRAL PERFUSION WITH & WITHOUT  CONTRAST    Result Date: 6/4/2024  Impression: No core infarct or ischemic tissue at risk. No large vessel occlusion or flow-limiting stenosis. Electronically Signed: Dangelo Ann MD  6/4/2024 9:32 PM EDT  Workstation ID: [id number]    CT Head Without Contrast Stroke Protocol    Result Date: 6/4/2024  Impression: No evidence of acute intracranial abnormality. Electronically Signed: Dangelo Ann MD  6/4/2024 8:45 PM EDT  Workstation ID: [id number]   Results for orders placed during the hospital encounter of 06/04/24    Adult Transthoracic Echo Complete W/ Cont if Necessary Per Protocol (With Agitated Saline)    Interpretation Summary    Left ventricular systolic function is normal. Left ventricular ejection fraction appears to be 56 - 60%.    Left ventricular wall thickness is consistent with mild concentric hypertrophy.    Left ventricular diastolic function is consistent with (grade Ia w/high LAP) impaired relaxation.    The right ventricular cavity is borderline dilated.    Saline test results are negative.    Moderate aortic valve regurgitation is present.    Mild tricuspid valve regurgitation is present.            Assessment/Plan     Assessment/Plan:  Ms. Gonzalez, 69, with anxiety, GERD, HTN, and breast CA, presented to [facility] ED with new AMS, speech difficulty, and generalized weakness. She became acutely weak at work, and EMS brought her in. She had a similar episode last year requiring admission, where a stroke workup was unremarkable, and syncope was attributed to dehydration, orthostasis, and COVID infection. She declined IV thrombolytic therapy due to rapid symptom improvement.    Likely Transient ischemic attack   -DAPT for 21 days followed by Plavix 75 daily    Hypertension- normal blood pressure goals     Hyperlipidemia-Lipitor 80    MRI brain - no acute infarct   TTE [place]    Stroke clinic follow up in one month.   Cam ne discharged from stroke stand point.         Erick Flores MD  06/06/24  14:25  EDT

## 2024-06-07 LAB
QT INTERVAL: 410 MS
QT INTERVAL: 432 MS
QTC INTERVAL: 452 MS
QTC INTERVAL: 455 MS

## 2024-06-07 NOTE — OUTREACH NOTE
Prep Survey      Flowsheet Row Responses   [facility] facility patient discharged from? [place]   Is LACE score < 7 ? Yes   Eligibility Readm Mgmt   Discharge diagnosis TIA (transient ischemic attack)   Does the patient have one of the following disease processes/diagnoses(primary or secondary)? Stroke   Does the patient have Home health ordered? No   Is there a DME ordered? No   Prep survey completed? Yes            Quiana MALONEY - Registered Nurse

## 2024-06-10 ENCOUNTER — READMISSION MANAGEMENT (OUTPATIENT)
Dept: CALL CENTER | Facility: HOSPITAL | Age: 69
End: 2024-06-10
Payer: COMMERCIAL

## 2024-06-10 NOTE — OUTREACH NOTE
Stroke Week 1 Survey      Flowsheet Row Responses   [facility] facility patient discharged from? [place]   Does the patient have one of the following disease processes/diagnoses(primary or secondary)? Stroke   Week 1 attempt successful? No   Unsuccessful attempts Attempt 1  [attempted all numbers. Patient has appt for Stroke Clinic on 7/5/24.]            Aleah MUNGUIA - Registered Nurse

## 2024-06-11 ENCOUNTER — READMISSION MANAGEMENT (OUTPATIENT)
Dept: CALL CENTER | Facility: HOSPITAL | Age: 69
End: 2024-06-11
Payer: COMMERCIAL

## 2024-06-11 NOTE — OUTREACH NOTE
Stroke Week 1 Survey      Flowsheet Row Responses   [facility] facility patient discharged from? [place]   Does the patient have one of the following disease processes/diagnoses(primary or secondary)? Stroke   Week 1 attempt successful? No   Unsuccessful attempts Attempt 2            Leonela MALONEY - Licensed Nurse

## 2024-06-12 ENCOUNTER — READMISSION MANAGEMENT (OUTPATIENT)
Dept: CALL CENTER | Facility: HOSPITAL | Age: 69
End: 2024-06-12
Payer: COMMERCIAL

## 2024-06-12 NOTE — OUTREACH NOTE
Stroke Week 1 Survey      Flowsheet Row Responses   [facility] patient discharged from? [place]   Does the patient have one of the following disease processes/diagnoses(primary or secondary)? Stroke   Week 1 attempt successful? Yes   Call start time 1123   Call end time 1133   Discharge diagnosis TIA (transient ischemic attack)   Person spoke with today (if not patient) and relationship Patient   Meds reviewed with patient/caregiver? Yes   Does the patient have all medications ordered at discharge? Yes   Is the patient taking all medications as directed (includes completed medication regime)? Yes   Does the patient have a primary care provider?  Yes   Does the patient have an appointment with their PCP within 7 days of discharge? Yes   Comments regarding PCP Go to Velia James  Thursday Jun 13, 2024 920am   Has the patient kept scheduled appointments due by today? N/A   The Stroke Clinic at [facility] requests you follow up with them within 30 days for important follow up care. Please call [phone] to schedule this appointment. Thank you. Yes  [Appt 7/5  1030am]   Has home health visited the patient within 72 hours of discharge? N/A   Psychosocial issues? No   Does the patient require any assistance with activities of daily living such as eating, bathing, dressing, walking, etc.? No   Does the patient have any residual symptoms from stroke/TIA? No   Does the patient understand the diet ordered at discharge? Yes   Did the patient receive a copy of their discharge instructions? Yes   Nursing interventions Reviewed instructions with patient   What is the patient's perception of their health status since discharge? Returned to baseline/stable   Nursing interventions Nurse provided patient education   Is the patient/caregiver able to teach back the risk factors for a stroke? High blood pressure-goal below 120/80, High Cholesterol, Sleep apnea, History of TIAs  [Patient reports that she snores  and will discuss sleep apnea with her PCP tomorrow.]   Is the patient/caregiver able to teach back signs and symptoms related to disease process for when to call PCP? Yes   Is the patient/caregiver able to teach back signs and symptoms related to disease process for when to call 911? Yes   If the patient is a current smoker, are they able to teach back resources for cessation? Not a smoker   Is the patient/caregiver able to teach back the hierarchy of who to call/visit for symptoms/problems? PCP, Specialist, Home health nurse, Urgent Care, ED, 911 Yes   Is the patient able to teach back FAST for Stroke? B alance: Watch for sudden loss of balance, E yes: Check for vision loss, F ace: Look for an uneven smile, A rm: Check if one arm is weak, S peech: Listen for slurred speech, T ezra: Call 9-1-1 right away   Week 1 call completed? Yes   Is the patient interested in additional calls from an ambulatory ? No   Would this patient benefit from a Referral to [facility] Social Work? No   Call end time 1133            JONG KHALIL - Registered Nurse

## 2024-06-18 ENCOUNTER — HOSPITAL ENCOUNTER (OUTPATIENT)
Dept: RADIATION ONCOLOGY | Facility: HOSPITAL | Age: 69
Setting detail: RADIATION/ONCOLOGY SERIES
Discharge: HOME | End: 2024-06-18
Payer: MEDICARE

## 2024-06-18 ENCOUNTER — OFFICE VISIT (OUTPATIENT)
Dept: RADIATION ONCOLOGY | Facility: HOSPITAL | Age: 69
End: 2024-06-18
Payer: MEDICARE

## 2024-06-18 ENCOUNTER — OFFICE VISIT (OUTPATIENT)
Dept: ONCOLOGY | Facility: CLINIC | Age: 69
End: 2024-06-18
Payer: MEDICARE

## 2024-06-18 VITALS
SYSTOLIC BLOOD PRESSURE: 125 MMHG | BODY MASS INDEX: 28.06 KG/M2 | OXYGEN SATURATION: 98 % | WEIGHT: 139.2 LBS | HEIGHT: 59 IN | DIASTOLIC BLOOD PRESSURE: 80 MMHG | RESPIRATION RATE: 18 BRPM | HEART RATE: 64 BPM | TEMPERATURE: 98.6 F

## 2024-06-18 VITALS
BODY MASS INDEX: 27.62 KG/M2 | RESPIRATION RATE: 16 BRPM | HEIGHT: 59 IN | OXYGEN SATURATION: 98 % | SYSTOLIC BLOOD PRESSURE: 143 MMHG | HEART RATE: 60 BPM | WEIGHT: 137 LBS | TEMPERATURE: 97.5 F | DIASTOLIC BLOOD PRESSURE: 76 MMHG

## 2024-06-18 DIAGNOSIS — R92.8 ABNORMAL MAMMOGRAM: ICD-10-CM

## 2024-06-18 DIAGNOSIS — C50.411 MALIGNANT NEOPLASM OF UPPER-OUTER QUADRANT OF RIGHT BREAST IN FEMALE, ESTROGEN RECEPTOR POSITIVE: Primary | ICD-10-CM

## 2024-06-18 DIAGNOSIS — Z17.0 MALIGNANT NEOPLASM OF UPPER-OUTER QUADRANT OF RIGHT BREAST IN FEMALE, ESTROGEN RECEPTOR POSITIVE: Primary | ICD-10-CM

## 2024-06-18 PROCEDURE — 1160F RVW MEDS BY RX/DR IN RCRD: CPT | Performed by: NURSE PRACTITIONER

## 2024-06-18 PROCEDURE — 1159F MED LIST DOCD IN RCRD: CPT | Performed by: NURSE PRACTITIONER

## 2024-06-18 PROCEDURE — 1126F AMNT PAIN NOTED NONE PRSNT: CPT | Performed by: NURSE PRACTITIONER

## 2024-06-18 PROCEDURE — 99215 OFFICE O/P EST HI 40 MIN: CPT | Performed by: NURSE PRACTITIONER

## 2024-06-18 PROCEDURE — 3077F SYST BP >= 140 MM HG: CPT | Performed by: NURSE PRACTITIONER

## 2024-06-18 PROCEDURE — G0463 HOSPITAL OUTPT CLINIC VISIT: HCPCS

## 2024-06-18 PROCEDURE — 3078F DIAST BP <80 MM HG: CPT | Performed by: NURSE PRACTITIONER

## 2024-06-18 NOTE — PROGRESS NOTES
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 Pathology showed a 12 mm focus of intermediate grade with comedonecrosis.  Margins negative  Completed adjuvant radiation 5/22/2024.  GERd  Hypertension  anxiety    Subjective     CHIEF COMPLAINT: breast cancer     HISTORY OF PRESENT ILLNESS:   Clara Gonzalez returns for follow-up.   She completed adjuvant radiation 5/22/2024.  She has not started tamoxifen yet.    She was hospitalized from  4-6 6 for a TIA.  Her symptoms from the TIA resolved within 1 hour.  MRI demonstrated no acute ischemia.  EF was normal. EKG was NSR. The stroke team recommended treatment for TIA with DAPT x 21 days, with ASA/plavix, de-escalating to plavix monotherapy and statin therapy with stroke neurology follow up in 1 month.             Objective      /76 Comment: LUE  Pulse 60   Temp 97.5 °F (36.4 °C) (Infrared)   Resp 16   Ht 149.9 cm (59\")   Wt 62.1 kg (137 lb)   SpO2 98% Comment: RA  BMI 27.67 kg/m²   Vitals:    06/18/24 1058   PainSc: 0-No pain               ECOG score: 0             General: well appearing female in no acute distress  Neuro: alert and oriented  HEENT: sclera anicteric, oropharynx clear  Lymphatics: no cervical, supraclavicular, or axillary adenopathy  Cardiovascular: regular rate and rhythm, no murmurs  Lungs: clear to auscultation bilaterally  Abdomen: soft, nontender, nondistended.  No palpable organomegaly  Extremeties: no lower extremity edema  Skin: no rashes, lesions, bruising, or petechiae  Psych: mood and affect appropriate    I have reexamined the patient and the results are consistent with the previously documented exam. Phuong Schmidt, MEI        RECENT LABS:  Lab Results   Component Value Date    WBC 6.67 06/04/2024    HGB 13.3 06/04/2024    HGB 13.8 06/04/2024    HCT 39 06/04/2024    HCT 42.0 06/04/2024    MCV 95.9 06/04/2024     06/04/2024       Lab Results   Component Value Date "    GLUCOSE 92 01/31/2024    BUN 12 01/31/2024    CREATININE 1.00 06/04/2024    EGFRIFNONA 85 07/24/2021    EGFRIFAFRI >60 06/29/2021    BCR 16.4 01/31/2024    K 4.7 01/31/2024    CO2 28.0 01/31/2024    CALCIUM 9.7 01/31/2024    ALBUMIN 4.7 01/31/2024    AST 21 06/04/2024    ALT 20 06/04/2024       MRI Brain Without Contrast  Narrative: MRI BRAIN WO CONTRAST    Date of Exam: 6/5/2024 7:10 PM EDT    Indication: Stroke, follow up  AMS.     Comparison: CT brain dated 6/4/2024    Technique:  Routine multiplanar/multisequence sequence images of the brain were obtained without contrast administration.    Findings: The ventricles are normal in size and midline. Mild age-appropriate involutional changes. Few bilateral periventricular T2/FLAIR white matter hyperintensities  There is no diffusion restriction to suggest acute infarct. There is no evidence of acute or chronic intracranial hemorrhage. No mass effect or midline shift. No abnormal extra-axial collections. The major vascular flow voids appear intact. The basal   ganglia, brainstem and cerebellum appear within normal limits. Calvarial and superficial soft tissue signal is within normal limits. Orbits appear unremarkable. The paranasal sinuses and the mastoid air cells appear well aerated. Midline structures are   intact.   Impression: Impression:  Few bilateral periventricular and subcortical T2/FLAIR white matter hyperintensities compatible with mild chronic microvascular ischemic changes    No evidence of acute intracranial abnormality    Electronically Signed: Carter Quevedo MD    6/5/2024 11:10 PM EDT    Workstation ID: [id number]  Adult Transthoracic Echo Complete W/ Cont if Necessary Per Protocol (With Agitated Saline)    Left ventricular systolic function is normal. Left ventricular ejection   fraction appears to be 56 - 60%.    Left ventricular wall thickness is consistent with mild concentric   hypertrophy.    Left ventricular diastolic function is consistent  with (grade Ia w/high   LAP) impaired relaxation.    The right ventricular cavity is borderline dilated.    Saline test results are negative.    Moderate aortic valve regurgitation is present.    Mild tricuspid valve regurgitation is present.            ASSESSMENT AND PLAN:     Clara Gonzalez is a 69 y.o. female with DCIS of the right breast.     She completed adjuvant radiotherapy on 5/22/2024.  She has not started tamoxifen at this time.  Since she was recently admitted to the hospital with a TIA I will have her continue to hold the tamoxifen.  I will discuss the situation with Dr. China Masters next week when she returns from vacation and contact patient with updated plan of care.    TIA: Hospitalized 6/4/2024-6/6/2024.  TIA symptoms resolved within an hour.  MRI demonstrated no acute ischemia. ECHO demonstrated a normal saline test. EF was normal. EKG was NSR. The stroke team recommended treatment for TIA with DAPT x 21 days, with ASA/plavix, de-escalating to plavix monotherapy and statin therapy with stroke neurology follow up in 1 month.     Order placed for bilateral diagnostic mammogram due in 6 months.    I will contact patient next week to discuss plan of care and follow-up appointment.              I spent 40 minutes caring for Clara on this date of service. This time includes time spent by me in the following activities: preparing for the visit, reviewing tests, performing a medically appropriate examination and/or evaluation, counseling and educating the patient/family/caregiver, documenting information in the medical record, ordering test(s), and obtaining a separately obtained history          Phuong Schmidt APRN  [facility]    6/18/2024          CC:

## 2024-06-18 NOTE — PROGRESS NOTES
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 Lilibeth Hernandez on 2024.  Final pathology revealed residual 1.2 cm area of DCIS, intermediate grade, hormone receptor positive. The superior margin was negative by 1 mm and the posterior margin by 3 mm. All other margins were widely negative.     She completed adjuvant radiation under the care of Dr. Fortino Durant, receiving 40 GY/15 fractions to the right breast with an additional 10 GY/5 fraction lumpectomy cavity boost.  She completed on 2024 and is here for 1 month follow-up.    She reports towards the end and post radiation she developed fatigue and mild erythema of the right breast.  She applied CeraVe a moisturizing lotion to the right breast.  She does report she is still able to palpate a right breast seroma that Dr. Lilibeth Hernandez has had to aspirate before.    She did present to [facility] ER on [facility]24 and was diagnosed with a TIA.  She was started on Eliquis.  She has not yet started tamoxifen because of this.  She saw medical oncology APRN today to discuss plan for tamoxifen.  APRN will discuss with Dr. Masters if the patient should start tamoxifen based on her recent TIA.    Patient reports she is now otherwise doing well.  No further health concerns.  Reports she is able to do everything she needs to do    MEDICATIONS: Medication reconciliation for the patient was reviewed and confirmed in the  "electronic medical record.    Review of Systems:   Review of Systems   Constitutional:  Positive for fatigue. Negative for appetite change, chills and fever.   HENT:  Negative.     Respiratory: Negative.     Cardiovascular: Negative.    Gastrointestinal: Negative.    Endocrine: Negative.    Genitourinary: Negative.     Musculoskeletal: Negative.    Skin: Negative.    Neurological: Negative.    Hematological: Negative.    Psychiatric/Behavioral: Negative.         Physical Exam:   Physical Exam  Constitutional:       Appearance: Normal appearance.   HENT:      Head: Normocephalic.   Cardiovascular:      Rate and Rhythm: Normal rate.   Pulmonary:      Effort: Pulmonary effort is normal.   Chest:   Breasts:     Right: Skin change present.      Left: Normal.      Comments: Hyperpigmentation of the right breast skin, areola, and nipple.  Residual dead/dry skin on the right breast.  Seroma palpated right upper outer quadrant at surgical site.  No other masses noted.    Musculoskeletal:      Cervical back: Normal range of motion.   Lymphadenopathy:      Upper Body:      Right upper body: No supraclavicular, axillary or pectoral adenopathy.   Skin:     General: Skin is warm and dry.   Neurological:      General: No focal deficit present.      Mental Status: She is alert and oriented to person, place, and time.   Psychiatric:         Mood and Affect: Mood normal.         VITAL SIGNS:   Vitals:    06/18/24 1135   BP: 125/80   Pulse: 64   Resp: 18   Temp: 98.6 °F (37 °C)   TempSrc: Skin   SpO2: 98%   Weight: 63.1 kg (139 lb 3.2 oz)   Height: 149.9 cm (59\")   PainSc: 0-No pain                 KPS %:  90    The following portions of the patient's history were reviewed and updated as appropriate: allergies, current medications, past family history, past medical history, past social history, past surgical history and problem list.            IMPRESSION:   69-year-old female diagnosed with hormone positive intermediate grade ductal " carcinoma in situ of the right breast.    Following right breast lumpectomy, she underwent adjuvant radiotherapy on the right as part of her breast conserving treatment, completing 4 weeks ago.  She tolerated treatment well.  She developed the anticipated grade 1 fatigue and grade 1 radiation dermatitis which have appropriately subsided.  Clinical exam of the right breast today is benign.  We discussed the role of local breast/scar massage, as well as stretching and range of motion exercises of the right upper extremity to minimize the risk of treatment related fibrosis or lymphedema.  Recommend the use of topical Vitamin E.  Recommend maintaining a healthy lifestyle with a well balanced diet, calcium and vitamin D for osteoporosis prevention, and exercise as well as continue with recommended health and cancer screening guidelines.  The patient i has not started adjuvant endocrine therapy with tamoxifen due to her recent TIA.  Medical oncology APRN will discuss case with Dr. Masters and contact patient whether is appropriate or not for her to begin. Medical oncology did order follow-up bilateral mammogram to be completed in 6 months.  We discussed follow-up intervals, including biannual diagnostic mammograms to alternate between the right and bilateral breasts, biannual clinical breast exams, and monthly self breast exams.      RECOMMENDATIONS: Continue follow-up with medical oncology.  They will contact her on how to proceed with adjuvant endocrine therapy, tamoxifen.    She will return for an office visit in 1 year.      I spent a total of 30 minutes on todays visit, with more than 20 minutes in direct face to face communication, and the remainder of the time spent in reviewing the relevant history, records, available imaging, and for documentation.             Shawanda Carrizales, MEI    Errors in dictation may reflect use of voice recognition software and not all errors in transcription may have been detected  prior to signing.

## 2024-06-24 ENCOUNTER — TELEPHONE (OUTPATIENT)
Dept: NEUROLOGY | Facility: CLINIC | Age: 69
End: 2024-06-24
Payer: COMMERCIAL

## 2024-06-26 ENCOUNTER — TELEPHONE (OUTPATIENT)
Dept: ONCOLOGY | Facility: CLINIC | Age: 69
End: 2024-06-26
Payer: COMMERCIAL

## 2024-06-26 NOTE — TELEPHONE ENCOUNTER
Reviewed with Dr. China Masters patient recent TIA on 6/4/2024.  Dr. Masters's recommendation is for patient to stop the tamoxifen.  It is reasonable to not take endocrine therapy related to the DCIS diagnosis.  Patient is in agreement to current plan.

## 2024-07-01 ENCOUNTER — OFFICE VISIT (OUTPATIENT)
Dept: NEUROLOGY | Facility: CLINIC | Age: 69
End: 2024-07-01
Payer: MEDICARE

## 2024-07-01 VITALS
WEIGHT: 139 LBS | HEIGHT: 59 IN | BODY MASS INDEX: 28.02 KG/M2 | TEMPERATURE: 98.5 F | DIASTOLIC BLOOD PRESSURE: 84 MMHG | OXYGEN SATURATION: 98 % | HEART RATE: 76 BPM | SYSTOLIC BLOOD PRESSURE: 156 MMHG

## 2024-07-01 DIAGNOSIS — G45.9 TIA (TRANSIENT ISCHEMIC ATTACK): Primary | ICD-10-CM

## 2024-07-01 PROCEDURE — 3079F DIAST BP 80-89 MM HG: CPT

## 2024-07-01 PROCEDURE — 3077F SYST BP >= 140 MM HG: CPT

## 2024-07-01 PROCEDURE — 99214 OFFICE O/P EST MOD 30 MIN: CPT

## 2024-07-01 NOTE — PROGRESS NOTES
New Patient Office Visit      Encounter Date: 2024   Patient Name: Clara Gonzalez  : 1955   MRN: [id number]   PCP: Velia James APRN    Referring Provider: No ref. provider found     Chief Complaint:  Transient Ischemic Attack       History of Present Illness: Clara Gonzalez is a 69 y.o. female with known medical diagnoses of anxiety, GERD, HTN and breast CA who presented to [facility] ED on 24 with new onset of AMS, speech difficulty and generalized weakness which began while patient was at work. She had a similar episode last year requiring admission, where a stroke workup was unremarkable, and syncope was attributed to dehydration, orthostasis, and COVID infection. Patients symptoms resolved approximately 1 hour after starting. MRI showed no evidence of acute infarct.  CTA head and neck without evidence of significant arthrosclerotic disease.  TTE negative for PFO.  Patient was discharged home on 24 on DAPT x 21 days followed by Plavix 75 mg daily and started on atorvastatin 80 mg nightly.     Clinic visit 2024: Patient resents today in clinic for her one month follow-up post possible TIA. She has no new complaints of visual changes, weakness, numbness, dizziness, or speech difficulty and symptoms prompting ED visit on  remain resolved without any new recurrence. She states that she has been under increased stress over the past since her husbands death 3 years ago. Most recently, her PCP Velia HURLEY, has changed her medication from hydroxyzine to Lexapro to adjust for increased stress and anxiety. She does not currently see behavioral health, but plans follow with her PCP soon to also seek counciling recommendations. She has a Hx of breast cancer and was taking tamoxifen, but following the recent TIA, her oncologist recommended stopping this medication as it can increase risk of blood clots. Mrs Gonzalez tolerated DAPT well for 21 days and is also continuing her  Lipitor 80 mg nightly without issues. She will continue Plavix 75 mg daily. She has been going to Physical Therapy 2 times per week, but does not admit to any increased weakness. She states that here PCP thought continuing PT would be beneficial as a means to stay physically active and get out of her house absent of any new or ongoing weakness since TIA. Reviewed signs and symptoms of stroke with patient and advised her to seek emergency treatment with any new stroke symptoms. She is in agreement to follow up in 3 months.      Stroke Risk Factors: hyperlipidemia and hypertension      Subjective      Past Medical History:   Past Medical History:   Diagnosis Date    Anxiety     Breast cancer     Breast injury 01/19/2023    right hematoma    Essential hypertension 07/02/2021    GERD without esophagitis 07/16/2019    TIA (transient ischemic attack) 06/04/2024       Past Surgical History:   Past Surgical History:   Procedure Laterality Date    BREAST CYST ASPIRATION Left 2005    BREAST LUMPECTOMY      CHOLECYSTECTOMY      COLONOSCOPY      HYSTERECTOMY      OOPHORECTOMY         Family History:   Family History   Problem Relation Age of Onset    Breast cancer Mother 80    Ovarian cancer Neg Hx     Colon cancer Neg Hx     Colon polyps Neg Hx     Esophageal cancer Neg Hx        Social History:   Social History     Socioeconomic History    Marital status:    Tobacco Use    Smoking status: Never    Smokeless tobacco: Never   Vaping Use    Vaping status: Never Used   Substance and Sexual Activity    Alcohol use: Never    Drug use: Never    Sexual activity: Defer       Medications:     Current Outpatient Medications:     atorvastatin (LIPITOR) 80 MG tablet, Take 1 tablet by mouth Every Night., Disp: 90 tablet, Rfl: 0    clopidogrel (PLAVIX) 75 MG tablet, Take 1 tablet by mouth Daily., Disp: 30 tablet, Rfl: 3    escitalopram (LEXAPRO) 10 MG tablet, Take 1 tablet by mouth Daily., Disp: , Rfl:     hydrOXYzine (ATARAX) 10 MG  tablet, Take 1-2 mg by mouth 3 (Three) Times a Day As Needed for Itching. Take 1 to 2 tablets three times a day as needed, Disp: , Rfl:     metoprolol succinate XL (TOPROL-XL) 50 MG 24 hr tablet, Take 1 tablet by mouth Daily., Disp: , Rfl:     tamoxifen (NOLVADEX) 10 MG tablet, Take 0.5 tablets by mouth Daily., Disp: 15 tablet, Rfl: 11    traZODone (DESYREL) 50 MG tablet, Take 1/2 - 1 tablet at bedtime as needed for sleep, Disp: 90 tablet, Rfl: 1    Allergies:   No Known Allergies    Objective     Physical Exam:  Vital Signs: There were no vitals filed for this visit.  There is no height or weight on file to calculate BMI.     Physical Exam  Constitutional:       General: She is not in acute distress.     Appearance: She is not ill-appearing.   HENT:      Head: Normocephalic and atraumatic.   Eyes:      General: Lids are normal.      Extraocular Movements: Extraocular movements intact.      Pupils: Pupils are equal, round, and reactive to light.   Cardiovascular:      Rate and Rhythm: Normal rate.   Pulmonary:      Effort: Pulmonary effort is normal. No respiratory distress.   Musculoskeletal:      Right lower leg: No edema.      Left lower leg: No edema.   Skin:     General: Skin is warm.   Neurological:      Mental Status: She is oriented to person, place, and time. Mental status is at baseline.      Motor: Motor strength is normal.  Psychiatric:         Mood and Affect: Mood normal.         Speech: Speech normal.       Neurological Exam  Mental Status  Awake, alert and oriented to person, place and time. Oriented to person, place and time. Oriented to person, place, and time. Speech is normal. Language is fluent with no aphasia.    Cranial Nerves  CN II: Visual fields full to confrontation.  CN III, IV, VI: Extraocular movements intact bilaterally. Normal lids and orbits bilaterally. Pupils equal round and reactive to light bilaterally.  CN V: Facial sensation is normal.  CN VII: Full and symmetric facial  movement.  CN XI: Shoulder shrug strength is normal.  CN XII: Tongue midline without atrophy or fasciculations.    Motor  Normal muscle bulk throughout. No fasciculations present. Normal muscle tone. Strength is 5/5 throughout all four extremities.    Sensory  Sensation is intact to light touch, pinprick, vibration and proprioception in all four extremities.    Coordination  Right: Finger-to-nose normal. Heel-to-shin normal.Left: Finger-to-nose normal. Heel-to-shin normal.    Gait  Casual gait is normal including stance, stride, and arm swing.       NIH Stroke Scale    1a  Level of consciousness: 0=alert; keenly responsive   1b. LOC questions:  0=Answers both questions correctly    1c. LOC commands: 0=Performs both tasks correctly   2.  Best Gaze: 0=normal   3. Visual: 0=No visual loss   4. Facial Palsy: 0=Normal symmetric movement   5a. Motor left arm: 0=No drift, limb holds 90 (or 45) degrees for full 10 seconds   5b.  Motor right arm: 0=No drift, limb holds 90 (or 45) degrees for full 10 seconds   6a. Motor left le=No drift, limb holds 90 (or 45) degrees for full 10 seconds   6b  Motor right le=No drift, limb holds 90 (or 45) degrees for full 10 seconds   7. Limb Ataxia: 0=Absent   8.  Sensory: 0=Normal; no sensory loss   9. Best Language:  0=No aphasia, normal   10. Dysarthria: 0=Normal   11. Extinction and Inattention: 0=No abnormality    Total:   0         Modified Jose Rafael Score: 0        0  No Symptoms    1 No significant disability. Able to carry out all usual activities, despite some symptoms.    2 Slight disability. Able to look after own affairs without assistance, but unable to carry out all previous activities.    3 Moderate disability. Requires some help, but able to walk unassisted.    4 Moderately severe disability. Unable to attend to own bodily needs without assistance, and unable to walk unassisted.    5 Severe disability. Requires constant nursing care and attention, bedridden,  "incontinent.    6 Dead      PHQ-9 Depression Screening  Little interest or pleasure in doing things? 0-->not at all   Feeling down, depressed, or hopeless? 0-->not at all   Trouble falling or staying asleep, or sleeping too much?     Feeling tired or having little energy?     Poor appetite or overeating?     Feeling bad about yourself - or that you are a failure or have let yourself or your family down?     Trouble concentrating on things, such as reading the newspaper or watching television?     Moving or speaking so slowly that other people could have noticed? Or the opposite - being so fidgety or restless that you have been moving around a lot more than usual?     Thoughts that you would be better off dead, or of hurting yourself in some way?     PHQ-9 Total Score 0   If you checked off any problems, how difficult have these problems made it for you to do your work, take care of things at home, or get along with other people?          STOP-Bang Questionnaire :    STOP-Bang Score  Have you been diagnosed with Sleep Apnea?: no  Snoring?: yes  Tired?: no  Observed?: yes  Pressure?: yes  Stop Score: 3  Body Mass Index more than 35 kg/m2?: no  Age older than 50 year old?: yes  Neck large? \">17\"/43cm-M, >16\"/41cm-F: no  Gender=Male?: no  Total Stop-Bang Score: 4       STEADI Fall Risk Clinician Key Questions   Have you fallen in the past year?: No  Do you feel unsteady with walking?: No  Are you worried about falling?: No           Imaging Reviewed:     MRI Brain Without Contrast     Result Date: 6/5/2024  Impression: Few bilateral periventricular and subcortical T2/FLAIR white matter hyperintensities compatible with mild chronic microvascular ischemic changes No evidence of acute intracranial abnormality Electronically Signed: Carter Quevedo MD  6/5/2024 11:10 PM EDT  Workstation ID: [id number]    CT Angiogram Head w AI Analysis of LVO     Result Date: 6/4/2024  Impression: No core infarct or ischemic tissue at risk. " No large vessel occlusion or flow-limiting stenosis. Electronically Signed: Dangelo Ann MD  6/4/2024 9:32 PM EDT  Workstation ID: [id number]     CT Angiogram Neck     Result Date: 6/4/2024  Impression: No core infarct or ischemic tissue at risk. No large vessel occlusion or flow-limiting stenosis. Electronically Signed: Dangelo Ann MD  6/4/2024 9:32 PM EDT  Workstation ID: [id number]     CT CEREBRAL PERFUSION WITH & WITHOUT CONTRAST     Result Date: 6/4/2024  Impression: No core infarct or ischemic tissue at risk. No large vessel occlusion or flow-limiting stenosis. Electronically Signed: Dangelo Ann MD  6/4/2024 9:32 PM EDT  Workstation ID: [id number]     CT Head Without Contrast Stroke Protocol     Result Date: 6/4/2024  Impression: No evidence of acute intracranial abnormality. Electronically Signed: Dangelo Ann MD  6/4/2024 8:45 PM EDT  Workstation ID: [id number]   Results for orders placed during the hospital encounter of 06/04/24     Adult Transthoracic Echo Complete W/ Cont if Necessary Per Protocol (With Agitated Saline)     Interpretation Summary    Left ventricular systolic function is normal. Left ventricular ejection fraction appears to be 56 - 60%.    Left ventricular wall thickness is consistent with mild concentric hypertrophy.    Left ventricular diastolic function is consistent with (grade Ia w/high LAP) impaired relaxation.    The right ventricular cavity is borderline dilated.    Saline test results are negative.    Moderate aortic valve regurgitation is present.    Mild tricuspid valve regurgitation is present.     Laboratory Results:   CBC w/diff          1/31/2024    12:09 4/8/2024    09:25 6/4/2024    20:44   CBC w/Diff   WBC 6.63  5.76     6.67    RBC 4.25  4.21     4.38    Hemoglobin 13.4  13.5     13.3     13.8    Hematocrit 41.8  41.1     39     42.0    MCV 98.4  98     95.9    MCH 31.5  32.1     31.5    MCHC 32.1  32.8     32.9    RDW 13.2  12.7     12.9    Platelets 292  308     269     Neutrophil Rel %  56.0     63.2    Immature Granulocyte Rel %  0.0     0.1    Lymphocyte Rel %  31.0     23.1    Monocyte Rel %  8.0     8.7    Eosinophil Rel %  4.0     3.4    Basophil Rel %  1.0     1.5      A1C 5.2    AST/ALT 21/20       Assessment / Plan      Assessment/Plan:   #Transient ischemic attack   #Hyperlipidemia  #Hypertension  -Initially was discharged on dual antiplatelet therapy with aspirin 81 mg daily and Plavix 75 mg  -will Stop Aspirin and continue Plavix 75 mg monotherapy  -Continue Lipitor 80 mg nightly.  , goal less than 70.  -Normal BP goals, management per primary care provider.  Today's /84  -Reviewed signs and symptoms of stroke and when to call 911 or return to the ER  -Follow-up in the stroke clinic in approximately 3 months    Discussed the importance of medication compliance and lifestyle modifications (adequate blood pressure control, adequate control of hyperlipidemia, adequate glycemic control, increase physical activity, and healthy diet) to help reduce the risk of future cerebrovascular events.  Also discussed the signs symptoms that would warrant the patient return back to the emergency department including unilateral weakness, unilateral numbness, visual disturbances, loss of balance, speech difficulties, and/or a sudden severe headache.     Follow Up:   Follow up with Neuro Stroke in 3 months    Alek Avitia PA-C  [facility]

## 2024-07-08 ENCOUNTER — APPOINTMENT (OUTPATIENT)
Dept: GENERAL RADIOLOGY | Facility: HOSPITAL | Age: 69
End: 2024-07-08
Payer: MEDICARE

## 2024-07-08 ENCOUNTER — APPOINTMENT (OUTPATIENT)
Dept: CT IMAGING | Facility: HOSPITAL | Age: 69
End: 2024-07-08
Payer: COMMERCIAL

## 2024-07-08 ENCOUNTER — HOSPITAL ENCOUNTER (OUTPATIENT)
Facility: HOSPITAL | Age: 69
Setting detail: OBSERVATION
Discharge: HOME | End: 2024-07-10
Attending: EMERGENCY MEDICINE | Admitting: FAMILY MEDICINE
Payer: MEDICARE

## 2024-07-08 ENCOUNTER — APPOINTMENT (OUTPATIENT)
Dept: CT IMAGING | Facility: HOSPITAL | Age: 69
End: 2024-07-08
Payer: MEDICARE

## 2024-07-08 DIAGNOSIS — R53.1 GENERALIZED WEAKNESS: Primary | ICD-10-CM

## 2024-07-08 DIAGNOSIS — I63.9 CEREBROVASCULAR ACCIDENT (CVA), UNSPECIFIED MECHANISM: ICD-10-CM

## 2024-07-08 DIAGNOSIS — G45.9 TIA (TRANSIENT ISCHEMIC ATTACK): ICD-10-CM

## 2024-07-08 LAB
ALBUMIN SERPL-MCNC: 4.5 G/DL (ref 3.5–5.2)
ALBUMIN/GLOB SERPL: 1.5 G/DL
ALP SERPL-CCNC: 139 U/L (ref 39–117)
ALT SERPL W P-5'-P-CCNC: 19 U/L (ref 1–33)
ANION GAP SERPL CALCULATED.3IONS-SCNC: 12 MMOL/L (ref 5–15)
APTT PPP: 29.1 SECONDS (ref 22–39)
AST SERPL-CCNC: 26 U/L (ref 1–32)
BACTERIA UR QL AUTO: NORMAL /HPF
BASOPHILS # BLD AUTO: 0.08 10*3/MM3 (ref 0–0.2)
BASOPHILS NFR BLD AUTO: 1.3 % (ref 0–1.5)
BILIRUB SERPL-MCNC: 0.8 MG/DL (ref 0–1.2)
BILIRUB UR QL STRIP: NEGATIVE
BUN BLDA-MCNC: 13 MG/DL (ref 8–26)
BUN SERPL-MCNC: 12 MG/DL (ref 8–23)
BUN/CREAT SERPL: 14 (ref 7–25)
CA-I BLDA-SCNC: 1.21 MMOL/L (ref 1.2–1.32)
CALCIUM SPEC-SCNC: 10 MG/DL (ref 8.6–10.5)
CHLORIDE BLDA-SCNC: 103 MMOL/L (ref 98–109)
CHLORIDE SERPL-SCNC: 102 MMOL/L (ref 98–107)
CLARITY UR: CLEAR
CO2 BLDA-SCNC: 24 MMOL/L (ref 24–29)
CO2 SERPL-SCNC: 24 MMOL/L (ref 22–29)
COLOR UR: YELLOW
CREAT BLDA-MCNC: 0.9 MG/DL (ref 0.6–1.3)
CREAT SERPL-MCNC: 0.86 MG/DL (ref 0.57–1)
DEPRECATED RDW RBC AUTO: 47.6 FL (ref 37–54)
EGFRCR SERPLBLD CKD-EPI 2021: 69.8 ML/MIN/1.73
EGFRCR SERPLBLD CKD-EPI 2021: 73.7 ML/MIN/1.73
EOSINOPHIL # BLD AUTO: 0.14 10*3/MM3 (ref 0–0.4)
EOSINOPHIL NFR BLD AUTO: 2.3 % (ref 0.3–6.2)
ERYTHROCYTE [DISTWIDTH] IN BLOOD BY AUTOMATED COUNT: 13.6 % (ref 12.3–15.4)
GLOBULIN UR ELPH-MCNC: 3.1 GM/DL
GLUCOSE BLDC GLUCOMTR-MCNC: 105 MG/DL (ref 70–130)
GLUCOSE BLDC GLUCOMTR-MCNC: 134 MG/DL (ref 70–130)
GLUCOSE SERPL-MCNC: 137 MG/DL (ref 65–99)
GLUCOSE UR STRIP-MCNC: NEGATIVE MG/DL
HCT VFR BLD AUTO: 40.9 % (ref 34–46.6)
HCT VFR BLDA CALC: 41 % (ref 38–51)
HGB BLD-MCNC: 13.8 G/DL (ref 12–15.9)
HGB BLDA-MCNC: 13.9 G/DL (ref 12–17)
HGB UR QL STRIP.AUTO: NEGATIVE
HOLD SPECIMEN: NORMAL
HYALINE CASTS UR QL AUTO: NORMAL /LPF
IMM GRANULOCYTES # BLD AUTO: 0.01 10*3/MM3 (ref 0–0.05)
IMM GRANULOCYTES NFR BLD AUTO: 0.2 % (ref 0–0.5)
INR PPP: 1.1 (ref 0.8–1.2)
KETONES UR QL STRIP: NEGATIVE
LEUKOCYTE ESTERASE UR QL STRIP.AUTO: ABNORMAL
LYMPHOCYTES # BLD AUTO: 1.3 10*3/MM3 (ref 0.7–3.1)
LYMPHOCYTES NFR BLD AUTO: 20.9 % (ref 19.6–45.3)
MCH RBC QN AUTO: 32.2 PG (ref 26.6–33)
MCHC RBC AUTO-ENTMCNC: 33.7 G/DL (ref 31.5–35.7)
MCV RBC AUTO: 95.6 FL (ref 79–97)
MONOCYTES # BLD AUTO: 0.57 10*3/MM3 (ref 0.1–0.9)
MONOCYTES NFR BLD AUTO: 9.2 % (ref 5–12)
NEUTROPHILS NFR BLD AUTO: 4.12 10*3/MM3 (ref 1.7–7)
NEUTROPHILS NFR BLD AUTO: 66.1 % (ref 42.7–76)
NITRITE UR QL STRIP: NEGATIVE
NRBC BLD AUTO-RTO: 0 /100 WBC (ref 0–0.2)
PA ADP PRP-ACNC: 31 PRU
PH UR STRIP.AUTO: 8 [PH] (ref 5–8)
PLATELET # BLD AUTO: 244 10*3/MM3 (ref 140–450)
PMV BLD AUTO: 9.4 FL (ref 6–12)
POTASSIUM BLDA-SCNC: 4.3 MMOL/L (ref 3.5–4.9)
POTASSIUM SERPL-SCNC: 4.3 MMOL/L (ref 3.5–5.2)
PROT SERPL-MCNC: 7.6 G/DL (ref 6–8.5)
PROT UR QL STRIP: NEGATIVE
PROTHROMBIN TIME: 13.5 SECONDS (ref 12.8–15.2)
QT INTERVAL: 430 MS
QTC INTERVAL: 450 MS
RBC # BLD AUTO: 4.28 10*6/MM3 (ref 3.77–5.28)
RBC # UR STRIP: NORMAL /HPF
REF LAB TEST METHOD: NORMAL
SODIUM BLD-SCNC: 139 MMOL/L (ref 138–146)
SODIUM SERPL-SCNC: 138 MMOL/L (ref 136–145)
SP GR UR STRIP: 1.01 (ref 1–1.03)
SQUAMOUS #/AREA URNS HPF: NORMAL /HPF
TROPONIN T SERPL HS-MCNC: <6 NG/L
UROBILINOGEN UR QL STRIP: ABNORMAL
WBC # UR STRIP: NORMAL /HPF
WBC NRBC COR # BLD AUTO: 6.22 10*3/MM3 (ref 3.4–10.8)
WHOLE BLOOD HOLD COAG: NORMAL
WHOLE BLOOD HOLD SPECIMEN: NORMAL

## 2024-07-08 PROCEDURE — 25510000001 IOPAMIDOL PER 1 ML: Performed by: EMERGENCY MEDICINE

## 2024-07-08 PROCEDURE — 85610 PROTHROMBIN TIME: CPT | Performed by: EMERGENCY MEDICINE

## 2024-07-08 PROCEDURE — G0378 HOSPITAL OBSERVATION PER HR: HCPCS

## 2024-07-08 PROCEDURE — 85025 COMPLETE CBC W/AUTO DIFF WBC: CPT | Performed by: EMERGENCY MEDICINE

## 2024-07-08 PROCEDURE — 84484 ASSAY OF TROPONIN QUANT: CPT | Performed by: EMERGENCY MEDICINE

## 2024-07-08 PROCEDURE — 80047 BASIC METABLC PNL IONIZED CA: CPT | Performed by: EMERGENCY MEDICINE

## 2024-07-08 PROCEDURE — 70498 CT ANGIOGRAPHY NECK: CPT

## 2024-07-08 PROCEDURE — 99214 OFFICE O/P EST MOD 30 MIN: CPT | Performed by: NURSE PRACTITIONER

## 2024-07-08 PROCEDURE — 80053 COMPREHEN METABOLIC PANEL: CPT | Performed by: EMERGENCY MEDICINE

## 2024-07-08 PROCEDURE — 93005 ELECTROCARDIOGRAM TRACING: CPT | Performed by: EMERGENCY MEDICINE

## 2024-07-08 PROCEDURE — 99291 CRITICAL CARE FIRST HOUR: CPT

## 2024-07-08 PROCEDURE — 0042T HC CT CEREBRAL PERFUSION W/WO CONTRAST: CPT

## 2024-07-08 PROCEDURE — 92523 SPEECH SOUND LANG COMPREHEN: CPT

## 2024-07-08 PROCEDURE — 85730 THROMBOPLASTIN TIME PARTIAL: CPT | Performed by: EMERGENCY MEDICINE

## 2024-07-08 PROCEDURE — 99222 1ST HOSP IP/OBS MODERATE 55: CPT | Performed by: STUDENT IN AN ORGANIZED HEALTH CARE EDUCATION/TRAINING PROGRAM

## 2024-07-08 PROCEDURE — 81001 URINALYSIS AUTO W/SCOPE: CPT | Performed by: EMERGENCY MEDICINE

## 2024-07-08 PROCEDURE — 70450 CT HEAD/BRAIN W/O DYE: CPT

## 2024-07-08 PROCEDURE — 85014 HEMATOCRIT: CPT | Performed by: EMERGENCY MEDICINE

## 2024-07-08 PROCEDURE — 85576 BLOOD PLATELET AGGREGATION: CPT | Performed by: NURSE PRACTITIONER

## 2024-07-08 PROCEDURE — 82948 REAGENT STRIP/BLOOD GLUCOSE: CPT

## 2024-07-08 PROCEDURE — 70496 CT ANGIOGRAPHY HEAD: CPT

## 2024-07-08 PROCEDURE — 71045 X-RAY EXAM CHEST 1 VIEW: CPT

## 2024-07-08 RX ORDER — SODIUM CHLORIDE 0.9 % (FLUSH) 0.9 %
10 SYRINGE (ML) INJECTION AS NEEDED
Status: DISCONTINUED | OUTPATIENT
Start: 2024-07-08 | End: 2024-07-09

## 2024-07-08 RX ORDER — ATORVASTATIN CALCIUM 40 MG/1
80 TABLET, FILM COATED ORAL NIGHTLY
Status: DISCONTINUED | OUTPATIENT
Start: 2024-07-08 | End: 2024-07-10 | Stop reason: HOSPADM

## 2024-07-08 RX ORDER — AMOXICILLIN 250 MG
2 CAPSULE ORAL 2 TIMES DAILY PRN
Status: DISCONTINUED | OUTPATIENT
Start: 2024-07-08 | End: 2024-07-10 | Stop reason: HOSPADM

## 2024-07-08 RX ORDER — HYDROXYZINE HYDROCHLORIDE 25 MG/1
25 TABLET, FILM COATED ORAL 3 TIMES DAILY PRN
Status: DISCONTINUED | OUTPATIENT
Start: 2024-07-08 | End: 2024-07-10 | Stop reason: HOSPADM

## 2024-07-08 RX ORDER — SODIUM CHLORIDE 0.9 % (FLUSH) 0.9 %
10 SYRINGE (ML) INJECTION EVERY 12 HOURS SCHEDULED
Status: DISCONTINUED | OUTPATIENT
Start: 2024-07-08 | End: 2024-07-09

## 2024-07-08 RX ORDER — SODIUM CHLORIDE 9 MG/ML
40 INJECTION, SOLUTION INTRAVENOUS AS NEEDED
Status: DISCONTINUED | OUTPATIENT
Start: 2024-07-08 | End: 2024-07-10 | Stop reason: HOSPADM

## 2024-07-08 RX ORDER — ASPIRIN 81 MG/1
81 TABLET, CHEWABLE ORAL DAILY
Status: DISCONTINUED | OUTPATIENT
Start: 2024-07-08 | End: 2024-07-10 | Stop reason: HOSPADM

## 2024-07-08 RX ORDER — SODIUM CHLORIDE 9 MG/ML
40 INJECTION, SOLUTION INTRAVENOUS AS NEEDED
Status: DISCONTINUED | OUTPATIENT
Start: 2024-07-08 | End: 2024-07-09

## 2024-07-08 RX ORDER — SODIUM CHLORIDE 0.9 % (FLUSH) 0.9 %
10 SYRINGE (ML) INJECTION EVERY 12 HOURS SCHEDULED
Status: DISCONTINUED | OUTPATIENT
Start: 2024-07-08 | End: 2024-07-10 | Stop reason: HOSPADM

## 2024-07-08 RX ORDER — SODIUM CHLORIDE 0.9 % (FLUSH) 0.9 %
10 SYRINGE (ML) INJECTION AS NEEDED
Status: DISCONTINUED | OUTPATIENT
Start: 2024-07-08 | End: 2024-07-10 | Stop reason: HOSPADM

## 2024-07-08 RX ORDER — ESCITALOPRAM OXALATE 20 MG/1
20 TABLET ORAL DAILY
Status: DISCONTINUED | OUTPATIENT
Start: 2024-07-08 | End: 2024-07-10 | Stop reason: HOSPADM

## 2024-07-08 RX ORDER — POLYETHYLENE GLYCOL 3350 17 G/17G
17 POWDER, FOR SOLUTION ORAL DAILY PRN
Status: DISCONTINUED | OUTPATIENT
Start: 2024-07-08 | End: 2024-07-10 | Stop reason: HOSPADM

## 2024-07-08 RX ORDER — BISACODYL 5 MG/1
5 TABLET, DELAYED RELEASE ORAL DAILY PRN
Status: DISCONTINUED | OUTPATIENT
Start: 2024-07-08 | End: 2024-07-10 | Stop reason: HOSPADM

## 2024-07-08 RX ORDER — ASPIRIN 300 MG/1
300 SUPPOSITORY RECTAL DAILY
Status: DISCONTINUED | OUTPATIENT
Start: 2024-07-08 | End: 2024-07-10 | Stop reason: HOSPADM

## 2024-07-08 RX ORDER — CLOPIDOGREL BISULFATE 75 MG/1
75 TABLET ORAL DAILY
Status: DISCONTINUED | OUTPATIENT
Start: 2024-07-08 | End: 2024-07-10 | Stop reason: HOSPADM

## 2024-07-08 RX ORDER — BISACODYL 10 MG
10 SUPPOSITORY, RECTAL RECTAL DAILY PRN
Status: DISCONTINUED | OUTPATIENT
Start: 2024-07-08 | End: 2024-07-10 | Stop reason: HOSPADM

## 2024-07-08 RX ADMIN — IOPAMIDOL 115 ML: 755 INJECTION, SOLUTION INTRAVENOUS at 11:42

## 2024-07-08 RX ADMIN — CLOPIDOGREL BISULFATE 75 MG: 75 TABLET ORAL at 13:37

## 2024-07-08 RX ADMIN — ESCITALOPRAM OXALATE 20 MG: 20 TABLET ORAL at 14:53

## 2024-07-08 RX ADMIN — ATORVASTATIN CALCIUM 80 MG: 40 TABLET, FILM COATED ORAL at 20:49

## 2024-07-08 RX ADMIN — ASPIRIN 81 MG: 81 TABLET, CHEWABLE ORAL at 13:37

## 2024-07-08 RX ADMIN — Medication 10 ML: at 20:50

## 2024-07-08 NOTE — ED PROVIDER NOTES
[facility]    EMERGENCY DEPARTMENT ENCOUNTER      Pt Name: Clara Gonzalez  MRN: [id number]  YOB: 1955  Date of evaluation: 7/8/2024  Provider: Loi Sarmiento DO    CHIEF COMPLAINT       Chief Complaint   Patient presents with    Stroke         HISTORY OF PRESENT ILLNESS  (Location/Symptom, Timing/Onset, Context/Setting, Quality, Duration, Modifying Factors, Severity.)   Clara Gonzalez is a 69 y.o. female who presents to the emergency department for evaluation via [facility] with a concern for weakness, right-sided headache, bilateral upper and lower extremity weakness with concern for possible stroke.  Recent TIA about 5 weeks ago with assessment in the emergency department.  The patient has been following with the stroke team, she has had a previous workup without any significant large vessel occlusion, is been compliant with her medications including aspirin and Plavix.  She has recurrent right-sided headache, generalized weakness.  She has been able to ambulate and walk 4 miles the other day, feels she may be dehydrated but today was she is unable to get up and get around on her own, feels weak in her arms and legs bilaterally.  And again has recurrent right-sided headache.  No seizure activity, no fall, injury or head trauma.  Has been taking medications as prescribed.  She denies any other acute systemic complaints.      Nursing notes were reviewed.      PAST MEDICAL HISTORY     Past Medical History:   Diagnosis Date    Anxiety     Breast cancer     Breast injury 01/19/2023    right hematoma    Essential hypertension 07/02/2021    GERD without esophagitis 07/16/2019    TIA (transient ischemic attack) 06/04/2024         SURGICAL HISTORY       Past Surgical History:   Procedure Laterality Date    BREAST CYST ASPIRATION Left 2005    BREAST LUMPECTOMY      CHOLECYSTECTOMY      COLONOSCOPY      HYSTERECTOMY      OOPHORECTOMY           CURRENT MEDICATIONS       Current Facility-Administered  Medications:     aspirin chewable tablet 81 mg, 81 mg, Oral, Daily **OR** aspirin suppository 300 mg, 300 mg, Rectal, Daily, Violette Vanegas APRN    clopidogrel (PLAVIX) tablet 75 mg, 75 mg, Oral, Daily, Violette Vanegas APRN    sodium chloride 0.9 % flush 10 mL, 10 mL, Intravenous, PRN, Loi Sarmiento, DO    Current Outpatient Medications:     atorvastatin (LIPITOR) 80 MG tablet, Take 1 tablet by mouth Every Night., Disp: 90 tablet, Rfl: 0    clopidogrel (PLAVIX) 75 MG tablet, Take 1 tablet by mouth Daily., Disp: 30 tablet, Rfl: 3    escitalopram (LEXAPRO) 20 MG tablet, Take 1 tablet by mouth Daily., Disp: , Rfl:     hydrOXYzine (ATARAX) 10 MG tablet, Take 1-2 mg by mouth 3 (Three) Times a Day As Needed for Anxiety. Take 1 to 2 tablets three times a day as needed, Disp: , Rfl:     metoprolol succinate XL (TOPROL-XL) 50 MG 24 hr tablet, Take 1 tablet by mouth Daily., Disp: , Rfl:     tamoxifen (NOLVADEX) 10 MG tablet, Take 0.5 tablets by mouth Daily. (Patient not taking: Reported on 7/1/2024), Disp: 15 tablet, Rfl: 11    traZODone (DESYREL) 50 MG tablet, Take 1/2 - 1 tablet at bedtime as needed for sleep (Patient not taking: Reported on 7/1/2024), Disp: 90 tablet, Rfl: 1    ALLERGIES     Patient has no known allergies.    FAMILY HISTORY       Family History   Problem Relation Age of Onset    Breast cancer Mother 80    Ovarian cancer Neg Hx     Colon cancer Neg Hx     Colon polyps Neg Hx     Esophageal cancer Neg Hx           SOCIAL HISTORY       Social History     Socioeconomic History    Marital status:    Tobacco Use    Smoking status: Never     Passive exposure: Never    Smokeless tobacco: Never   Vaping Use    Vaping status: Never Used   Substance and Sexual Activity    Alcohol use: Never    Drug use: Never    Sexual activity: Defer         PHYSICAL EXAM    (up to 7 for level 4, 8 or more for level 5)     Vitals:    07/08/24 1155 07/08/24 1158 07/08/24 1203 07/08/24 1211   BP: 163/79     "  Pulse:  67 64    Resp:    16   Temp:    98 °F (36.7 °C)   SpO2:  100% 98%    Weight:    63.5 kg (140 lb)   Height:    152.4 cm (60\")       Physical Exam  General : Patient is awake, alert, oriented, in no acute distress, nontoxic appearing  HEENT: Pupils are equally round, EOMI, conjunctivae clear  Neck: Neck is supple, full range of motion, trachea midline  Cardiac: Heart regular rate, rhythm, no murmurs, rubs, or gallops  Lungs: Lungs are clear to auscultation, there is no wheezing, rhonchi, or rales. There is no use of accessory muscles  Chest wall: There is no tenderness to palpation over the chest wall or over ribs  Abdomen: Abdomen is soft, nontender, nondistended. There are no firm or pulsatile masses, no rebound rigidity or guarding  Musculoskeletal: Generalized weakness is noted in bilateral upper and lower extremities, she is able to hold against gravity but not for complete 10 seconds, there is no flaccid paralysis or significant unilateral weakness.  Neuro: Patient awake alert answering questions appropriately, has generalized weakness in her upper and lower extremities bilaterally, no flaccid paralysis or signs of large vessel occlusion.  No facial droop, no slurred speech. Refer to stroke team for NIH scores.  Dermatology: Skin is warm and dry        DIAGNOSTIC RESULTS     EKG:  All EKGs are interpreted by the Emergency Department Physician who either signs or Co-signs this chart in the absence of a cardiologist.    ECG 12 Lead ED Triage Standing Order; Acute Stroke (Onset <24 hrs)   Preliminary Result   Test Reason : ED Triage Standing Order~   Blood Pressure :   */*   mmHG   Vent. Rate :  66 BPM     Atrial Rate :  66 BPM      P-R Int : 136 ms          QRS Dur :  80 ms       QT Int : 430 ms       P-R-T Axes :  31   6  -7 degrees      QTc Int : 450 ms      Normal sinus rhythm   ST & T wave abnormality, consider anterior ischemia   Abnormal ECG   When compared with ECG of 04-JUN-2024 22:55,   T wave " inversion now evident in Anterior leads      Referred By: MAYELA           Confirmed By:           RADIOLOGY:     [x] Radiologist's Report Reviewed:  XR Chest 1 View   Final Result   Impression:   No acute cardiopulmonary findings.         Electronically Signed: Dangelo Ann MD     7/8/2024 12:19 PM EDT     Workstation ID: [id number]      CT Angiogram Head w AI Analysis of LVO   Final Result   1.No acute abnormality identified within the large arteries of the head or neck.   2.No significant stenosis of the bilateral internal carotid arteries.   3.CT perfusion study demonstrates no territorial ischemia or core infarct.            Electronically Signed: Nic Maharaj MD     7/8/2024 12:03 PM EDT     Workstation ID: [id number]      CT Angiogram Neck   Final Result   1.No acute abnormality identified within the large arteries of the head or neck.   2.No significant stenosis of the bilateral internal carotid arteries.   3.CT perfusion study demonstrates no territorial ischemia or core infarct.            Electronically Signed: Nic Maharaj MD     7/8/2024 12:03 PM EDT     Workstation ID: [id number]      CT CEREBRAL PERFUSION WITH & WITHOUT CONTRAST   Final Result   1.No acute abnormality identified within the large arteries of the head or neck.   2.No significant stenosis of the bilateral internal carotid arteries.   3.CT perfusion study demonstrates no territorial ischemia or core infarct.            Electronically Signed: Nic Maharaj MD     7/8/2024 12:03 PM EDT     Workstation ID: [id number]      CT Head Without Contrast Stroke Protocol   Final Result   Impression:   No evidence of acute intracranial disease.            Electronically Signed: Nadir Wylie MD     7/8/2024 11:56 AM EDT     Workstation ID: [id number]          I ordered and independently reviewed the above noted radiographic studies.      I viewed images of CT head which showed no acute intracranial normality, no signs of hemorrhage per my independent  interpretation.    See radiologist's dictation for official interpretation.      ED BEDSIDE ULTRASOUND:   Performed by ED Physician - none    LABS:    I have reviewed and interpreted all of the currently available lab results from this visit (if applicable):  Results for orders placed or performed during the hospital encounter of 07/08/24   Single High Sensitivity Troponin T    Specimen: Blood   Result Value Ref Range    HS Troponin T <6 <14 ng/L   aPTT    Specimen: Blood   Result Value Ref Range    PTT 29.1 22.0 - 39.0 seconds   CBC Auto Differential    Specimen: Blood   Result Value Ref Range    WBC 6.22 3.40 - 10.80 10*3/mm3    RBC 4.28 3.77 - 5.28 10*6/mm3    Hemoglobin 13.8 12.0 - 15.9 g/dL    Hematocrit 40.9 34.0 - 46.6 %    MCV 95.6 79.0 - 97.0 fL    MCH 32.2 26.6 - 33.0 pg    MCHC 33.7 31.5 - 35.7 g/dL    RDW 13.6 12.3 - 15.4 %    RDW-SD 47.6 37.0 - 54.0 fl    MPV 9.4 6.0 - 12.0 fL    Platelets 244 140 - 450 10*3/mm3    Neutrophil % 66.1 42.7 - 76.0 %    Lymphocyte % 20.9 19.6 - 45.3 %    Monocyte % 9.2 5.0 - 12.0 %    Eosinophil % 2.3 0.3 - 6.2 %    Basophil % 1.3 0.0 - 1.5 %    Immature Grans % 0.2 0.0 - 0.5 %    Neutrophils, Absolute 4.12 1.70 - 7.00 10*3/mm3    Lymphocytes, Absolute 1.30 0.70 - 3.10 10*3/mm3    Monocytes, Absolute 0.57 0.10 - 0.90 10*3/mm3    Eosinophils, Absolute 0.14 0.00 - 0.40 10*3/mm3    Basophils, Absolute 0.08 0.00 - 0.20 10*3/mm3    Immature Grans, Absolute 0.01 0.00 - 0.05 10*3/mm3    nRBC 0.0 0.0 - 0.2 /100 WBC   Comprehensive Metabolic Panel    Specimen: Blood   Result Value Ref Range    Glucose 137 (H) 65 - 99 mg/dL    BUN 12 8 - 23 mg/dL    Creatinine 0.86 0.57 - 1.00 mg/dL    Sodium 138 136 - 145 mmol/L    Potassium 4.3 3.5 - 5.2 mmol/L    Chloride 102 98 - 107 mmol/L    CO2 24.0 22.0 - 29.0 mmol/L    Calcium 10.0 8.6 - 10.5 mg/dL    Total Protein 7.6 6.0 - 8.5 g/dL    Albumin 4.5 3.5 - 5.2 g/dL    ALT (SGPT) 19 1 - 33 U/L    AST (SGOT) 26 1 - 32 U/L    Alkaline Phosphatase  139 (H) 39 - 117 U/L    Total Bilirubin 0.8 0.0 - 1.2 mg/dL    Globulin 3.1 gm/dL    A/G Ratio 1.5 g/dL    BUN/Creatinine Ratio 14.0 7.0 - 25.0    Anion Gap 12.0 5.0 - 15.0 mmol/L    eGFR 73.7 >60.0 mL/min/1.73   Urinalysis With Microscopic If Indicated (No Culture) - Urine, Clean Catch    Specimen: Urine, Clean Catch   Result Value Ref Range    Color, UA Yellow Yellow, Straw    Appearance, UA Clear Clear    pH, UA 8.0 5.0 - 8.0    Specific Gravity, UA 1.015 1.001 - 1.030    Glucose, UA Negative Negative    Ketones, UA Negative Negative    Bilirubin, UA Negative Negative    Blood, UA Negative Negative    Protein, UA Negative Negative    Leuk Esterase, UA Small (1+) (A) Negative    Nitrite, UA Negative Negative    Urobilinogen, UA 0.2 E.U./dL 0.2 - 1.0 E.U./dL   Urinalysis, Microscopic Only - Urine, Clean Catch    Specimen: Urine, Clean Catch   Result Value Ref Range    RBC, UA 0-2 None Seen, 0-2 /HPF    WBC, UA 0-2 None Seen, 0-2 /HPF    Bacteria, UA None Seen None Seen, Trace /HPF    Squamous Epithelial Cells, UA 0-2 None Seen, 0-2 /HPF    Hyaline Casts, UA None Seen 0 - 6 /LPF    Methodology Automated Microscopy    POC CHEM 8    Specimen: Blood   Result Value Ref Range    Glucose 134 (H) 70 - 130 mg/dL    BUN 13 8 - 26 mg/dL    Creatinine 0.90 0.60 - 1.30 mg/dL    Sodium 139 138 - 146 mmol/L    POC Potassium 4.3 3.5 - 4.9 mmol/L    Chloride 103 98 - 109 mmol/L    Total CO2 24 24 - 29 mmol/L    Hemoglobin 13.9 12.0 - 17.0 g/dL    Hematocrit 41 38 - 51 %    Ionized Calcium 1.21 1.20 - 1.32 mmol/L    eGFR 69.8 >60.0 mL/min/1.73   POCT Protime/INR    Specimen: Blood   Result Value Ref Range    Protime 13.5 12.8 - 15.2 seconds    INR 1.1 0.8 - 1.2   ECG 12 Lead ED Triage Standing Order; Acute Stroke (Onset <24 hrs)   Result Value Ref Range    QT Interval 430 ms    QTC Interval 450 ms   Green Top (Gel)   Result Value Ref Range    Extra Tube Hold for add-ons.    Lavender Top   Result Value Ref Range    Extra Tube hold  for add-on    Gold Top - SST   Result Value Ref Range    Extra Tube Hold for add-ons.    Gray Top   Result Value Ref Range    Extra Tube Hold for add-ons.    Light Blue Top   Result Value Ref Range    Extra Tube Hold for add-ons.         If labs were ordered, I independently reviewed the results and considered them in treating the patient.      EMERGENCY DEPARTMENT COURSE and DIFFERENTIAL DIAGNOSIS/MDM:   Vitals:  AS OF 13:08 EDT    BP - 163/79  HR - 64  TEMP - 98 °F (36.7 °C)  O2 SATS - 98%      Orders placed during this visit:  Orders Placed This Encounter   Procedures    CT Head Without Contrast Stroke Protocol    CT Angiogram Head w AI Analysis of LVO    CT Angiogram Neck    CT CEREBRAL PERFUSION WITH & WITHOUT CONTRAST    XR Chest 1 View    [place] Draw    Single High Sensitivity Troponin T    aPTT    CBC Auto Differential    Comprehensive Metabolic Panel    Urinalysis With Microscopic If Indicated (No Culture) - Urine, Clean Catch    Urinalysis, Microscopic Only - Urine, Clean Catch    NPO Diet NPO Type: Strict NPO    Initiate Code Stroke    Perform NIH Stroke Scale    Measure Actual Weight    Head of Bed 30 Degrees or Less    Undress and Gown    Continuous Pulse Oximetry    Vital Signs    Neuro Checks    Notify Provider SBP <80 or >200    Notify Provider for SBP > 140 if Hemorrhagic Stroke    No Hypotonic Fluids    Nursing Dysphagia Screening (Complete Prior to Giving anything PO)    RN to Place Order SLP Consult (IF swallow screen failed) - Eval & Treat Choosing Reason of RN Dysphagia Screen Failed    Nursing Dysphagia Screening (Complete Prior to Giving Anything By Mouth)    Inpatient Neurology Consult Stroke    Oxygen Therapy- Nasal Cannula; Titrate 1-6 LPM Per SpO2; 90 - 95%    POC CHEM 8    POCT Protime/INR    ECG 12 Lead ED Triage Standing Order; Acute Stroke (Onset <24 hrs)    Insert Large-Bore Peripheral IV - Right AC Preferred    CBC & Differential    Green Top (Gel)    Lavender Top    Gold Top - SST     Gray Top    Light Blue Top       All labs have been independently reviewed by me.  All radiology studies have been reviewed by me and the radiologist dictating the report.  All EKG's have been independently viewed and interpreted by me.      Discussion below represents my analysis of pertinent findings related to patient's condition, differential diagnosis, treatment plan and final disposition.    Differential diagnosis:  The differential diagnosis associated with the patient's presentation includes: TIA/CVA, hypotension, hypoperfusion, generalized weakness, electrolyte normalities    Additional sources  Discussed/ obtained information from independent historians:   [] Spouse  [] Parent  [] Family member  [] Friend  [x] EMS, [place]   [] Other:    External (non-ED) record review:   [x] Inpatient record: Prior hospitalization with discharge on June 6 with TIA, started on DAPT for 21 days followed by Plavix 75.   [] Office record:   [] Outpatient record:   [] Prior Outpatient labs:   [] Prior Outpatient radiology:   [] Primary Care record:   [] Outside ED record:   [] Other:     Patient's care impacted by:   [] Diabetes  [x] Hypertension  [] CHF  [] Hyperlipidemia  [] Coronary Artery Disease   [] COPD   [] Cancer   [] Tobacco Abuse   [] Substance Abuse    [] Other:     Care significantly affected by Social Determinants of Health (housing and economic circumstances, unemployment)    [] Yes     [x] No   If yes, Patient's care significantly limited by Social Determinants of Health including:   [] Inadequate housing   [] Low income   [] Alcoholism and drug addiction in family   [] Problems related to primary support group   [] Unemployment   [] Problems related to employment   [] Other Social Determinants of Health:       MEDICATIONS ADMINISTERED IN ED:  Medications   sodium chloride 0.9 % flush 10 mL (has no administration in time range)   aspirin chewable tablet 81 mg (has no administration in time range)     Or    aspirin suppository 300 mg (has no administration in time range)   clopidogrel (PLAVIX) tablet 75 mg (has no administration in time range)   iopamidol (ISOVUE-370) 76 % injection 150 mL (115 mL Intravenous Given 7/8/24 1142)              69-year-old female history of breast cancer hypertension who had recent workup for TIA, weakness, severe headache prior to the onset of previous neurological assessments.  No acute ischemic issues on her prior MRI over 1 month ago.  Today has had weakness bilateral upper and lower extremities.  She denies any signs of large vessel occlusion.  Initial CT head without any acute abnormalities.  She is immediately seen by myself and stroke team in the CT scanner after EMS arrival.  Blood work and labs without any significant metabolic abnormalities, no signs of infectious etiology, urinalysis is stable.  Her angiography perfusion studies without any significant mismatch or signs of large vessel occlusion.  After further discussion with stroke team they do recommend given her weakness admission for neurology assessment and further workup.  Case discussed with hospitalist, Dr. Conway for admission.         PROCEDURES:  Procedures    CRITICAL CARE TIME    Total Critical Care time was 35 minutes, excluding separately reportable procedures.  Acute neurological deficit, upper and lower extremity weakness, requiring stroke assessment, multiple neurochecks, reevaluations, discussions with consultants. There was a high probability of clinically significant/life threatening deterioration in the patient's condition which required my urgent intervention.      FINAL IMPRESSION      1. Generalized weakness    2. TIA (transient ischemic attack)    3. Cerebrovascular accident (CVA), unspecified mechanism          DISPOSITION/PLAN     ED Disposition       ED Disposition   Decision to Admit    Condition   --    Comment   --                   Comment: Please note this report has been produced using speech  recognition software.      Loi Sarmiento DO  Attending Emergency Physician         Loi Sarmiento DO  07/08/24 [id number]

## 2024-07-08 NOTE — ED NOTES
" Clara Gonzalez    Nursing Report ED to Floor:  Mental status: AOX4  Ambulatory status: ASSIST X 1  Oxygen Therapy:  RA  Cardiac Rhythm: NSR  Admitted from: HOME  Safety Concerns:  FALL RISK  Social Issues: NA  ED Room #:  32    ED Nurse Phone Extension - [phone] or may call [phone].      HPI:   Chief Complaint   Patient presents with    Stroke       Past Medical History:  Past Medical History:   Diagnosis Date    Anxiety     Breast cancer     Breast injury 01/19/2023    right hematoma    Essential hypertension 07/02/2021    GERD without esophagitis 07/16/2019    TIA (transient ischemic attack) 06/04/2024        Past Surgical History:  Past Surgical History:   Procedure Laterality Date    BREAST CYST ASPIRATION Left 2005    BREAST LUMPECTOMY      CHOLECYSTECTOMY      COLONOSCOPY      HYSTERECTOMY      OOPHORECTOMY          Admitting Doctor:   Brianne Connolly MD    Consulting Provider(s):  Consults       Date and Time Order Name Status Description    7/8/2024 11:33 AM Inpatient Neurology Consult Stroke               Admitting Diagnosis:   The primary encounter diagnosis was Generalized weakness. Diagnoses of TIA (transient ischemic attack) and Cerebrovascular accident (CVA), unspecified mechanism were also pertinent to this visit.    Most Recent Vitals:   Vitals:    07/08/24 1155 07/08/24 1158 07/08/24 1203 07/08/24 1211   BP: 163/79      Pulse:  67 64    Resp:    16   Temp:    98 °F (36.7 °C)   SpO2:  100% 98%    Weight:    63.5 kg (140 lb)   Height:    152.4 cm (60\")       Active LDAs/IV Access:   Lines, Drains & Airways       Active LDAs       Name Placement date Placement time Site Days    Peripheral IV 07/08/24 1136 Left Antecubital 07/08/24  1136  Antecubital  less than 1    Peripheral IV 07/08/24 1140 Anterior;Right Forearm 07/08/24  1140  Forearm  less than 1                    Labs (abnormal labs have a star):   Labs Reviewed   COMPREHENSIVE METABOLIC PANEL - Abnormal; Notable for the following components:       " Result Value    Glucose 137 (*)     Alkaline Phosphatase 139 (*)     All other components within normal limits    Narrative:     GFR Normal >60  Chronic Kidney Disease <60  Kidney Failure <15     URINALYSIS W/ MICROSCOPIC IF INDICATED (NO CULTURE) - Abnormal; Notable for the following components:    Leuk Esterase, UA Small (1+) (*)     All other components within normal limits   POCT CHEM 8 - Abnormal; Notable for the following components:    Glucose 134 (*)     All other components within normal limits   SINGLE HS TROPONIN T - Normal    Narrative:     High Sensitive Troponin T Reference Range:  <14.0 ng/L- Negative Female for AMI  <22.0 ng/L- Negative Male for AMI  >=14 - Abnormal Female indicating possible myocardial injury.  >=22 - Abnormal Male indicating possible myocardial injury.   Clinicians would have to utilize clinical acumen, EKG, Troponin, and serial changes to determine if it is an Acute Myocardial Infarction or myocardial injury due to an underlying chronic condition.        APTT - Normal    Narrative:     PTT = The equivalent PTT values for the therapeutic range of heparin levels at 0.3 to 0.5 U/ml are 60 to 70 seconds.   CBC WITH AUTO DIFFERENTIAL - Normal   POCT PROTIME - INR - Normal   RAINBOW DRAW    Narrative:     The following orders were created for panel order [place] Draw.  Procedure                               Abnormality         Status                     ---------                               -----------         ------                     Green Top (Gel)[[id number]]                                  Final result               Lavender Top[[id number]]                                     Final result               Gold Top - SST[[id number]]                                   Final result               [place] Top[[id number]]                                         Final result               Light Blue Top[[id number]]                                   Final result                 Please view  results for these tests on the individual orders.   URINALYSIS, MICROSCOPIC ONLY   CBC AND DIFFERENTIAL    Narrative:     The following orders were created for panel order CBC & Differential.  Procedure                               Abnormality         Status                     ---------                               -----------         ------                     CBC Auto Differential[[id number]]        Normal              Final result                 Please view results for these tests on the individual orders.   GREEN TOP   LAVENDER TOP   GOLD TOP - SST   GRAY TOP   LIGHT BLUE TOP       Meds Given in ED:   Medications   sodium chloride 0.9 % flush 10 mL (has no administration in time range)   aspirin chewable tablet 81 mg (has no administration in time range)     Or   aspirin suppository 300 mg (has no administration in time range)   clopidogrel (PLAVIX) tablet 75 mg (has no administration in time range)   iopamidol (ISOVUE-370) 76 % injection 150 mL (115 mL Intravenous Given 7/8/24 1142)           Last NIH score:  Interval: baseline  1a. Level of Consciousness: 0-->Alert, keenly responsive  1b. LOC Questions: 0-->Answers both questions correctly  1c. LOC Commands: 0-->Performs both tasks correctly  2. Best Gaze: 0-->Normal  3. Visual: 0-->No visual loss  4. Facial Palsy: 0-->Normal symmetrical movements  5a. Motor Arm, Left: 0-->No drift, limb holds 90 (or 45) degrees for full 10 secs  5b. Motor Arm, Right: 0-->No drift, limb holds 90 (or 45) degrees for full 10 secs  6a. Motor Leg, Left: 0-->No drift, leg holds 30 degree position for full 5 secs  6b. Motor Leg, Right: 0-->No drift, leg holds 30 degree position for full 5 secs  7. Limb Ataxia: 0-->Absent  8. Sensory: 0-->Normal, no sensory loss  9. Best Language: 0-->No aphasia, normal  10. Dysarthria: 0-->Normal  11. Extinction and Inattention (formerly Neglect): 0-->No abnormality    Total (NIH Stroke Scale): 0     Dysphagia screening results:        [place]  Coma Scale:  No data recorded     CIWA:        Restraint Type:            Isolation Status:  No active isolations

## 2024-07-08 NOTE — CONSULTS
Stroke Consult Note    Patient Name: Clara Gonzalez   MRN: [id number]  Age: 69 y.o.  Sex: female  : 1955    Primary Care Physician: Velia James APRN  Referring Physician: Guillermina DAVILA STROKE TEAM CALLED: 1132 EST     TIME PATIENT SEEN: 1135 EST    Handedness: right   Race:      Chief Complaint/Reason for Consultation: AMS, right-sided headache, speech difficulty, generalized weakness, dizziness    HPI: Clara Gonzalez is a 69-year-old female with a PMH significant for HTN, syncope, TIA (5 weeks ago), anxiety, breast cancer, and GERD who presents to the [facility] ED via EMS with complaints of AMS, right-sided headache, speech difficulty, and generalized weakness that began this morning at approximately 10 AM and resolved shortly after ED arrival.  Of note, the patient was seen with similar symptoms in early .  Symptoms resolved shortly after presentation.  She was diagnosed with a syncopal episode and TIA.  Discharged on DAPT.  She was seen in the stroke clinic last week aspirin was discontinued.    /79.  NIH 4 initially for waxing and waning BUE/BLE drift.  She complained of dizziness with changes in position as well.  CT head negative. CTP with no evidence of LVO.  CTA H/N with no flow-limiting stenosis, occlusion, or aneurysm noted.  She will be admitted for further evaluation.    Last Known Normal Date/Time: 1000 EST     Review of Systems   Constitutional:  Negative for chills and fatigue.   HENT:  Negative for congestion and trouble swallowing.    Eyes:  Negative for photophobia and visual disturbance.   Respiratory:  Negative for cough and shortness of breath.    Cardiovascular:  Negative for chest pain and palpitations.   Gastrointestinal:  Negative for nausea and vomiting.   Neurological:  Positive for speech difficulty, weakness and headaches.      Past Medical History:   Diagnosis Date    Anxiety     Breast cancer     Breast injury 2023    right hematoma    Essential  hypertension 07/02/2021    GERD without esophagitis 07/16/2019    TIA (transient ischemic attack) 06/04/2024     Past Surgical History:   Procedure Laterality Date    BREAST CYST ASPIRATION Left 2005    BREAST LUMPECTOMY      CHOLECYSTECTOMY      COLONOSCOPY      HYSTERECTOMY      OOPHORECTOMY       Family History   Problem Relation Age of Onset    Breast cancer Mother 80    Ovarian cancer Neg Hx     Colon cancer Neg Hx     Colon polyps Neg Hx     Esophageal cancer Neg Hx      Social History     Socioeconomic History    Marital status:    Tobacco Use    Smoking status: Never     Passive exposure: Never    Smokeless tobacco: Never   Vaping Use    Vaping status: Never Used   Substance and Sexual Activity    Alcohol use: Never    Drug use: Never    Sexual activity: Defer     No Known Allergies  Prior to Admission medications    Medication Sig Start Date End Date Taking? Authorizing Provider   atorvastatin (LIPITOR) 80 MG tablet Take 1 tablet by mouth Every Night. 6/6/24   Nadir Stafford MD   clopidogrel (PLAVIX) 75 MG tablet Take 1 tablet by mouth Daily.  Patient not taking: Reported on 7/1/2024 6/6/24   Erick Flores MD   escitalopram (LEXAPRO) 20 MG tablet Take 1 tablet by mouth Daily. 4/8/24 10/5/24  Yanira Gutierrez MD   hydrOXYzine (ATARAX) 10 MG tablet Take 1-2 mg by mouth 3 (Three) Times a Day As Needed for Itching. Take 1 to 2 tablets three times a day as needed    Yanira Gutierrez MD   metoprolol succinate XL (TOPROL-XL) 50 MG 24 hr tablet Take 1 tablet by mouth Daily.    Yanira Gutierrez MD   tamoxifen (NOLVADEX) 10 MG tablet Take 0.5 tablets by mouth Daily.  Patient not taking: Reported on 7/1/2024 2/14/24   China Masters MD   traZODone (DESYREL) 50 MG tablet Take 1/2 - 1 tablet at bedtime as needed for sleep  Patient not taking: Reported on 7/1/2024 5/15/24   Lyubov Don APRN            Neurological Exam  Mental Status  Alert. Oriented to person, place, and  time. Speech is normal. Language is fluent with no aphasia.    Cranial Nerves  CN II: Visual acuity is normal. Visual fields full to confrontation.  CN III, IV, VI: Extraocular movements intact bilaterally. Pupils equal round and reactive to light bilaterally.  CN V: Facial sensation is normal.  CN VII: Full and symmetric facial movement.  CN IX, X: Palate elevates symmetrically  CN XI: Shoulder shrug strength is normal.  CN XII: Tongue midline without atrophy or fasciculations.    Motor   Strength is 5/5 throughout all four extremities.    Sensory  Light touch is normal in upper and lower extremities.     Coordination  Right: Finger-to-nose normal.Left: Finger-to-nose normal.      Physical Exam  Constitutional:       General: She is not in acute distress.  HENT:      Head: Normocephalic and atraumatic.   Eyes:      Extraocular Movements: Extraocular movements intact.      Pupils: Pupils are equal, round, and reactive to light.   Cardiovascular:      Rate and Rhythm: Normal rate and regular rhythm.   Pulmonary:      Effort: Pulmonary effort is normal. No respiratory distress.   Abdominal:      General: There is no distension.      Palpations: Abdomen is soft.   Musculoskeletal:      Cervical back: Normal range of motion and neck supple.   Neurological:      Mental Status: She is alert and oriented to person, place, and time.      Motor: Motor strength is normal.  Psychiatric:         Speech: Speech normal.         Acute Stroke Data    Thrombolytic Inclusion / Exclusion Criteria    Time: 11:34 EDT  Person Administering Scale: MEI Valdez    Inclusion Criteria  [x]   18 years of age or greater   []   Onset of symptoms < 4.5 hours before beginning treatment (stroke onset = time patient was last seen well or without symptoms).   []   Diagnosis of acute ischemic stroke causing measurable disabling deficit (Complete Hemianopia, Any Aphasia, Visual or Sensory Extinction, Any weakness limiting sustained effort  against gravity)   []   Any remaining deficit considered potentially disabling in view of patient and practitioner   Exclusion criteria (Do not proceed with Alteplase if any are checked under exclusion criteria)  []   Onset unknown or GREATER than 4.5 hours   []   ICH on CT/MRI   []   CT demonstrates hypodensity representing acute or subacute infarct   []   Significant head trauma or prior stroke in the previous 3 months   []   Symptoms suggestive of subarachnoid hemorrhage   []   History of un-ruptured intracranial aneurysm GREATER than 10 mm   []   Recent intracranial or intraspinal surgery within the last 3 months   []   Arterial puncture at a non-compressible site in the previous 7 days   []   Active internal bleeding   []   Acute bleeding tendency   []   Platelet count LESS than 100,000 for known hematological diseases such as leukemia, thrombocytopenia or chronic cirrhosis   []   Current use of anticoagulant with INR GREATER than 1.7 or PT GREATER than 15 seconds, aPTT GREATER than 40 seconds   []   Heparin received within 48 hours, resulting in abnormally elevated aPTT GREATER than upper limit of normal   []   Current use of direct thrombin inhibitors or direct factor Xa inhibitors in the past 48 hours   []   Elevated blood pressure refractory to treatment (systolic GREATER than 185 mm/Hg or diastolic  GREATER than 110 mm/Hg   []   Suspected infective endocarditis and aortic arch dissection   []   Current use of therapeutic treatment dose of low-molecular-weight heparin (LMWH) within the previous 24 hours   []   Structural GI malignancy or bleed   Relative exclusion for all patients  [x]   Only minor non-disabling symptoms   []   Pregnancy   []   Seizure at onset with postictal residual neurological impairments   []   Major surgery or previous trauma within past 14 days   []   History of previous spontaneous ICH, intracranial neoplasm, or AV malformation   []   Postpartum (within previous 14 days)   []    Recent GI or urinary tract hemorrhage (within previous 21 days)   []   Recent acute MI (within previous 3 months)   []   History of un-ruptured intracranial aneurysm LESS than 10 mm   []   History of ruptured intracranial aneurysm   []   Blood glucose LESS than 50 mg/dL (2.7 mmol/L)   []   Dural puncture within the last 7 days   []   Known GREATER than 10 cerebral microbleeds   Additional exclusions for patients with symptoms onset between 3 and 4.5 hours.  []   Age > 80.   []   On any anticoagulants regardless of INR  >>> Warfarin (Coumadin), Heparin, Enoxaparin (Lovenox), fondaparinux (Arixtra), bivalirudin (Angiomax), Argatroban, dabigatran (Pradaxa), rivaroxaban (Xarelto), or apixaban (Eliquis)   []   Severe stroke (NIHSS > 25).   []   History of BOTH diabetes and previous ischemic stroke.   []   The risks and benefits have been discussed with the patient or family related to the administration of IV thrombolytic therapy for stroke symptoms.   []   I have discussed and reviewed the patient's case and imaging with the attending prior to IV thrombolytic therapy.    Time IV thrombolytic administered       Hospital Meds:  Scheduled-    Infusions-     PRNs-   sodium chloride    Functional Status Prior to Current Stroke/Glades Score: 0    NIH Stroke Scale  Time: 11:34 EDT  Person Administering Scale: MEI Valdez    Interval: baseline  1a. Level of Consciousness: 0-->Alert, keenly responsive  1b. LOC Questions: 0-->Answers both questions correctly  1c. LOC Commands: 0-->Performs both tasks correctly  2. Best Gaze: 0-->Normal  3. Visual: 0-->No visual loss  4. Facial Palsy: 0-->Normal symmetrical movements  5a. Motor Arm, Left: 0-->No drift, limb holds 90 (or 45) degrees for full 10 secs  5b. Motor Arm, Right: 0-->No drift, limb holds 90 (or 45) degrees for full 10 secs  6a. Motor Leg, Left: 0-->No drift, leg holds 30 degree position for full 5 secs  6b. Motor Leg, Right: 0-->No drift, leg holds 30 degree  position for full 5 secs  7. Limb Ataxia: 0-->Absent  8. Sensory: 0-->Normal, no sensory loss  9. Best Language: 0-->No aphasia, normal  10. Dysarthria: 0-->Normal  11. Extinction and Inattention (formerly Neglect): 0-->No abnormality    Total (NIH Stroke Scale): 0      Results Reviewed:  I have personally reviewed current lab, radiology, and data   XR Chest 1 View    Result Date: 7/8/2024  Impression: No acute cardiopulmonary findings. Electronically Signed: Dangelo Ann MD  7/8/2024 12:19 PM EDT  Workstation ID: [id number]    CT Angiogram Head w AI Analysis of LVO    Result Date: 7/8/2024  1.No acute abnormality identified within the large arteries of the head or neck. 2.No significant stenosis of the bilateral internal carotid arteries. 3.CT perfusion study demonstrates no territorial ischemia or core infarct. Electronically Signed: Nic Maharaj MD  7/8/2024 12:03 PM EDT  Workstation ID: [id number]    CT Angiogram Neck    Result Date: 7/8/2024  1.No acute abnormality identified within the large arteries of the head or neck. 2.No significant stenosis of the bilateral internal carotid arteries. 3.CT perfusion study demonstrates no territorial ischemia or core infarct. Electronically Signed: Nic Maharaj MD  7/8/2024 12:03 PM EDT  Workstation ID: [id number]    CT CEREBRAL PERFUSION WITH & WITHOUT CONTRAST    Result Date: 7/8/2024  1.No acute abnormality identified within the large arteries of the head or neck. 2.No significant stenosis of the bilateral internal carotid arteries. 3.CT perfusion study demonstrates no territorial ischemia or core infarct. Electronically Signed: Nic Maharaj MD  7/8/2024 12:03 PM EDT  Workstation ID: [id number]    CT Head Without Contrast Stroke Protocol    Result Date: 7/8/2024  Impression: No evidence of acute intracranial disease. Electronically Signed: Nadir Wylie MD  7/8/2024 11:56 AM EDT  Workstation ID: [id number]   Lipid Panel          6/4/2024    23:05   Lipid Panel   Total  Cholesterol 182    Triglycerides 95    HDL Cholesterol 57    VLDL Cholesterol 17    LDL Cholesterol  108    LDL/HDL Ratio 1.86              Results for orders placed during the hospital encounter of 06/04/24    Adult Transthoracic Echo Complete W/ Cont if Necessary Per Protocol (With Agitated Saline)    Interpretation Summary    Left ventricular systolic function is normal. Left ventricular ejection fraction appears to be 56 - 60%.    Left ventricular wall thickness is consistent with mild concentric hypertrophy.    Left ventricular diastolic function is consistent with (grade Ia w/high LAP) impaired relaxation.    The right ventricular cavity is borderline dilated.    Saline test results are negative.    Moderate aortic valve regurgitation is present.    Mild tricuspid valve regurgitation is present.       Assessment/Plan:    69-year-old female with a PMH significant for HTN, syncope, TIA (5 weeks ago), anxiety, breast cancer, and GERD who presents to the [facility] ED via EMS with complaints of AMS, right-sided headache, speech difficulty, and generalized weakness that began this morning at approximately 10 AM and resolved shortly after ED arrival.  She is not a candidate for TNK due to resolution of symptoms.  She is not a candidate for neurointervention as no LVO was noted on advanced imaging.  She will be admitted for further evaluation.    Antiplatelet PTA: Plavix 75 mg daily  Anticoagulant PTA: None      Transient neurologic symptoms  -Possible TIA, stroke, complex migraine, syncopal episode, or functional in etiology  -Of note, the patient was seen with similar symptoms in early June.  Symptoms resolved shortly after presentation.  She was diagnosed with a syncopal episode and TIA.  Discharged on DAPT.  She was seen in the stroke clinic last week and aspirin was discontinued.  She reported increased stress.  She was previously on tamoxifen for her history of breast cancer.  It was DC'd by oncology secondary to  recent TIA.  -Start aspirin 81 mg daily  -Continue Plavix 75 mg daily  -Check P2 Y12  -Continue high-dose statin.   on 6/4/2024.  Goal less than 70 for secondary stroke prevention.  -MRI brain pending  -TTE 6/5/2024 with no LAE, negative saline test.  -Check hemoglobin A1c (5.2 in April)  -Activity as tolerated  -N.p.o.  -PT/OT/SLP  -Plan discussed with the patient, Dr. Sarmiento, and nursing staff.  Neurology will continue to follow.  Please call with any questions or concerns.    MEI Valdez, AGACNP-BC  July 8, 2024  11:34 EDT

## 2024-07-08 NOTE — THERAPY EVALUATION
Acute Care - Speech Language Pathology Initial Evaluation  [facility]  Cognitive-Communication Evaluation       Patient Name: Clara Gonzalez  : 1955  MRN: [id number]  Today's Date: 2024               Admit Date: 2024     Visit Dx:    ICD-10-CM ICD-9-CM   1. Generalized weakness  R53.1 780.79   2. TIA (transient ischemic attack)  G45.9 435.9   3. Cerebrovascular accident (CVA), unspecified mechanism  I63.9 434.91     Patient Active Problem List   Diagnosis    Chest pain    anxiety and depression    Essential hypertension    Bereavement    GERD without esophagitis    Abnormal mammogram    Acute thoracic back pain    Allergic rhinitis    Perioral dermatitis    Tendinitis of left shoulder    Elevated blood sugar    Unresponsive episode    Malignant neoplasm of upper-outer quadrant of right breast in female, estrogen receptor positive    TIA (transient ischemic attack)    Generalized weakness     Past Medical History:   Diagnosis Date    Anxiety     Breast cancer     Breast injury 2023    right hematoma    Essential hypertension 2021    GERD without esophagitis 2019    TIA (transient ischemic attack) 2024     Past Surgical History:   Procedure Laterality Date    BREAST CYST ASPIRATION Left 2005    BREAST LUMPECTOMY      CHOLECYSTECTOMY      COLONOSCOPY      HYSTERECTOMY      OOPHORECTOMY         SLP Recommendation and Plan  SLP Diagnosis: functional speech/language skills, functional cognitive-linguistic skills (24 1525)  SLP Diagnosis Comments: Patient with cognitive-linguisitic function GWFL. Family and patient do not report any changes from baseline. No SLP warranted (24 1525)           SLC Criteria for Skilled Therapy Interventions Met: no problems identified which require skilled intervention (24 1525)  Anticipated Discharge Disposition (SLP): No further SLP services warranted (24 1525)        Therapy Frequency (SLP SLC): evaluation only (24  1525)       Plan of Care Reviewed With: patient, family (07/08/24 1604)  Progress: improving (07/08/24 1604)      SLP EVALUATION (Last 72 Hours)       SLP SLC Evaluation       Row Name 07/08/24 1526                   General Information    Precautions/Limitations, Vision WFL;for purposes of eval  -GA        Precautions/Limitations, Hearing WFL;for purposes of eval  -GA        Prior Level of Function-Communication WFL  -GA        Plans/Goals Discussed with patient;family;agreed upon  -GA        Barriers to Rehab none identified  -GA        Patient's Goals for Discharge patient did not state  -GA        Family Goals for Discharge family did not state  -GA           Pain    Additional Documentation Pain Scale: FACES Pre/Post-Treatment (Group)  -GA           Pain Scale: FACES Pre/Post-Treatment    Pain: FACES Scale, Pretreatment 0-->no hurt  -GA        Posttreatment Pain Rating 0-->no hurt  -GA           Comprehension Assessment/Intervention    Comprehension Assessment/Intervention Auditory Comprehension  -GA           Auditory Comprehension Assessment/Intervention    Auditory Comprehension (Communication) WFL  -GA        Narrative Discourse WFL  -GA           Expression Assessment/Intervention    Expression Assessment/Intervention verbal expression;graphic expression  -GA           Verbal Expression Assessment/Intervention    Verbal Expression WFL  -GA        Conversational Discourse/Fluency WFL  -GA           Graphic Expression Assessment/Intervention    Graphic Expression WFL  -GA        Sentence Formulation WFL  -GA           Oral Musculature and Cranial Nerve Assessment    Oral Motor General Assessment WFL  -GA           Motor Speech Assessment/Intervention    Motor Speech Function WFL  -GA        Conversational Speech (Communication) WFL  -GA        Speech intelligibility 100%;in quiet environment;in connected speech;with unfamiliar listener  -GA           Cursory Voice Assessment/Intervention    Quality and  Resonance (Voice) WFL  -GA           Cognitive Assessment Intervention- SLP    Cognitive Function (Cognition) WFL  -GA        Orientation Status (Cognition) WFL  -GA        Memory (Cognitive) WFL  6/7 for serial 5 numbers  -GA        Attention (Cognitive) WFL  -GA        Thought Organization (Cognitive) WFL  named 25 concrete and 10 abstract items during divergent naming task  -GA        Reasoning (Cognitive) WFL  -GA        Problem Solving (Cognitive) WFL  -GA        Functional Math (Cognitive) WFL  -GA        Executive Function (Cognition) WFL  -GA        Pragmatics (Communication) WFL  -GA           SLP Evaluation Clinical Impressions    SLP Diagnosis functional speech/language skills;functional cognitive-linguistic skills  -GA        SLP Diagnosis Comments Patient with cognitive-linguisitic function GWFL. Family and patient do not report any changes from baseline. No SLP warranted  -GA        SLC Criteria for Skilled Therapy Interventions Met no problems identified which require skilled intervention  -GA           Recommendations    Therapy Frequency (SLP SLC) evaluation only  -GA        Anticipated Discharge Disposition (SLP) No further SLP services warranted  -GA                  User Key  (r) = Recorded By, (t) = Taken By, (c) = Cosigned By      Initials Name Effective Dates    GA Yesenia Aguilar, MS CCC-SLP 09/14/23 -                        EDUCATION  The patient has been educated in the following areas:     Cognitive Impairment Communication Impairment.    Time Calculation:      Time Calculation- SLP       Row Name 07/08/24 1606             Time Calculation- SLP    SLP Start Time 1525  -GA      SLP Received On 07/08/24  -GA         Untimed Charges    37549-FO Eval Speech and Production w/ Language Minutes 46  -GA         Total Minutes    Untimed Charges Total Minutes 46  -GA       Total Minutes 46  -GA                User Key  (r) = Recorded By, (t) = Taken By, (c) = Cosigned By      Initials Name Provider  Type    GA Yesenia Aguilar, MS CCC-SLP Speech and Language Pathologist                    Therapy Charges for Today       Code Description Service Date Service Provider Modifiers Qty    [id number] [facility] SPEECH AND PROD W LANG  3 7/8/2024 Yesenia Aguilar, MS CCC-SLP GN 1                       Yesenia Aguilar, MS BASS-SLP  7/8/2024

## 2024-07-08 NOTE — H&P
[facility] Medicine Services  HISTORY AND PHYSICAL    Patient Name: Clara Gonzalez  : 1955  MRN: [id number]  Primary Care Physician: Velia James APRN  Date of admission: 2024      Subjective   Subjective     Chief Complaint:  weakness    HPI:  Clara Gonzalez is a 69 y.o. female with a history of breast cancer, hypertension, recent TIA who is presenting with weakness.  She states that her weakness came on abruptly this morning without warning and lasted for about an hour and has now subsided.  She is recently diagnosed with a TIA 5 weeks ago and had similar presenting symptoms so this made her anxious and prompted her presentation to the ED.  She had noted that her blood pressure was high and she had also had a headache.  However both of these have now resolved.  She has not had any recent medication changes since the TIA diagnosis and has had no recent illnesses.  Denies chest pain, dyspnea and feels to be at baseline currently.      Personal History     Past Medical History:   Diagnosis Date    Anxiety     Breast cancer     Breast injury 2023    right hematoma    Essential hypertension 2021    GERD without esophagitis 2019    TIA (transient ischemic attack) 2024         Oncology Problem List:  Malignant neoplasm of upper-outer quadrant of right breast in female,   estrogen receptor positive (2024; Status: Active)    Oncology/Hematology History   Malignant neoplasm of upper-outer quadrant of right breast in female, estrogen receptor positive   2024 Cancer Staged    Staging form: Breast, AJCC 8th Edition  - Clinical stage from 2024: Stage 0 (cTis (DCIS), cN0, cM0, G2) - Signed by Fortino Durant MD on 2024 Initial Diagnosis    Malignant neoplasm of upper-outer quadrant of right breast in female, estrogen receptor positive     2024 - 2024 Radiation    Radiation OncologyTreatment Course:  Clara MALONEY  Carlos received 4005 cGy in 15 fractions to right breast and 1000 cGy in 5 fractions to tumor bed via External Beam Radiation - EBRT.       Past Surgical History:   Procedure Laterality Date    BREAST CYST ASPIRATION Left 2005    BREAST LUMPECTOMY      CHOLECYSTECTOMY      COLONOSCOPY      HYSTERECTOMY      OOPHORECTOMY         Family History: family history includes Breast cancer (age of onset: 80) in her mother.     Social History:  reports that she has never smoked. She has never been exposed to tobacco smoke. She has never used smokeless tobacco. She reports that she does not drink alcohol and does not use drugs.  Social History     Social History Narrative    Not on file       Medications:  Available home medication information reviewed.  atorvastatin, clopidogrel, escitalopram, hydrOXYzine, metoprolol succinate XL, tamoxifen, and traZODone    No Known Allergies    Objective   Objective     Vital Signs:   Temp:  [98 °F (36.7 °C)] 98 °F (36.7 °C)  Heart Rate:  [64-67] 64  Resp:  [16] 16  BP: (163)/(79) 163/79  Total (NIH Stroke Scale): 0    Physical Exam   Constitutional: No acute distress, awake, alert  HENT: NCAT, mucous membranes moist  Respiratory: Clear to auscultation bilaterally, respiratory effort normal   Cardiovascular: RRR, no murmurs, rubs, or gallops  Gastrointestinal: Positive bowel sounds, soft, nontender, nondistended  Musculoskeletal: No bilateral ankle edema  Psychiatric: Appropriate affect, cooperative  Neurologic: Oriented x 3, strength symmetric in all extremities, Cranial Nerves grossly intact to confrontation, speech clear  Skin: No rashes      Result Review:  I have personally reviewed the results from the time of this admission to 7/8/2024 13:57 EDT and agree with these findings:  [x]  Laboratory list / accordion  [x]  Microbiology  [x]  Radiology  [x]  EKG/Telemetry   []  Cardiology/Vascular   []  Pathology  []  Old records  []  Other:  Most notable findings include:   Labs  unremarkable  CT head and CTA head neck unremarkable    LAB RESULTS:      Lab 07/08/24  1149 07/08/24  1139 07/08/24  1138   WBC 6.22  --   --    HEMOGLOBIN 13.8  --   --    HEMOGLOBIN, POC  --  13.9  --    HEMATOCRIT 40.9  --   --    HEMATOCRIT POC  --  41  --    PLATELETS 244  --   --    NEUTROS ABS 4.12  --   --    IMMATURE GRANS (ABS) 0.01  --   --    LYMPHS ABS 1.30  --   --    MONOS ABS 0.57  --   --    EOS ABS 0.14  --   --    MCV 95.6  --   --    PROTIME  --   --  13.5   INR  --   --  1.1   APTT 29.1  --   --          Lab 07/08/24  1149 07/08/24  1139   SODIUM 138  --    POTASSIUM 4.3  --    CHLORIDE 102  --    CO2 24.0  --    ANION GAP 12.0  --    BUN 12  --    CREATININE 0.86 0.90   EGFR 73.7 69.8   GLUCOSE 137*  --    CALCIUM 10.0  --          Lab 07/08/24  1149   TOTAL PROTEIN 7.6   ALBUMIN 4.5   GLOBULIN 3.1   ALT (SGPT) 19   AST (SGOT) 26   BILIRUBIN 0.8   ALK PHOS 139*         Lab 07/08/24  1149   HSTROP T <6                 UA          10/16/2023    17:57 6/4/2024    21:41 7/8/2024    12:22   Urinalysis   Squamous Epithelial Cells, UA 3-6  0-2  0-2    Specific Gravity, UA 1.024  1.027  1.015    Ketones, UA Trace  Negative  Negative    Blood, UA Small (1+)  Negative  Negative    Leukocytes, UA Moderate (2+)  Moderate (2+)  Small (1+)    Nitrite, UA Negative  Negative  Negative    RBC, UA 3-5  0-2  0-2    WBC, UA 21-50  11-20  0-2    Bacteria, UA 1+  None Seen  None Seen        Microbiology Results (last 10 days)       ** No results found for the last 240 hours. **            XR Chest 1 View    Result Date: 7/8/2024  XR CHEST 1 VW Date of Exam: 7/8/2024 11:37 AM EDT Indication: Acute Stroke Protocol (onset < 12 hrs) Comparison: Chest radiograph 6/4/2024. Findings: Cardiomediastinal silhouette is unchanged. Lungs are clear without focal consolidation. No pleural effusion or pneumothorax. Osseous structures are unchanged without acute finding.     Impression: Impression: No acute cardiopulmonary  findings. Electronically Signed: Dangelo Ann MD  7/8/2024 12:19 PM EDT  Workstation ID: [id number]    CT Angiogram Head w AI Analysis of LVO    Result Date: 7/8/2024  CT ANGIOGRAM HEAD W AI ANALYSIS OF LVO, CT ANGIOGRAM NECK, CT CEREBRAL PERFUSION W WO CONTRAST Date of Exam: 7/8/2024 11:36 AM EDT Indication: Neuro Deficit, acute, Stroke suspected Neuro deficit, acute stroke suspected. Comparison: None available. Technique: CTA of the head was performed after the uneventful intravenous administration of 115 mL of Isovue-370. Reconstructed coronal and sagittal images were also obtained. In addition, a 3-D volume rendered image was created for interpretation. Automated exposure control and iterative reconstruction methods were used. FINDINGS: Vascular Findings: The right common carotid, internal carotid, middle cerebral, anterior cerebral, vertebral, and posterior cerebral arteries are patent without abrupt cut off or aneurysmal dilation. There is fetal origin of the right posterior cerebral artery. The left common carotid, internal carotid, middle cerebral, anterior cerebral, vertebral, and posterior cerebral arteries are patent without abrupt cut off or aneurysmal dilation. Diminutive intracranial portion of the left vertebral artery. Basilar artery appears patent and appears unremarkable. Non-vascular Findings: For description of nonvascular intracranial findings, please refer to the noncontrast head CT performed the same date. No acute abnormality is identified within the visualized soft tissue or bony structures of the neck. The visualized lung apices are clear. FINDINGS: CT Perfusion: CBF (<30%) volume: 0 mL Tmax (>6.0s) volume: 0 mL Mismatch volume: 0 mL Mismatch ratio: None     Impression: 1.No acute abnormality identified within the large arteries of the head or neck. 2.No significant stenosis of the bilateral internal carotid arteries. 3.CT perfusion study demonstrates no territorial ischemia or core infarct.  Electronically Signed: Nic Maharaj MD  7/8/2024 12:03 PM EDT  Workstation ID: [id number]    CT Angiogram Neck    Result Date: 7/8/2024  CT ANGIOGRAM HEAD W AI ANALYSIS OF LVO, CT ANGIOGRAM NECK, CT CEREBRAL PERFUSION W WO CONTRAST Date of Exam: 7/8/2024 11:36 AM EDT Indication: Neuro Deficit, acute, Stroke suspected Neuro deficit, acute stroke suspected. Comparison: None available. Technique: CTA of the head was performed after the uneventful intravenous administration of 115 mL of Isovue-370. Reconstructed coronal and sagittal images were also obtained. In addition, a 3-D volume rendered image was created for interpretation. Automated exposure control and iterative reconstruction methods were used. FINDINGS: Vascular Findings: The right common carotid, internal carotid, middle cerebral, anterior cerebral, vertebral, and posterior cerebral arteries are patent without abrupt cut off or aneurysmal dilation. There is fetal origin of the right posterior cerebral artery. The left common carotid, internal carotid, middle cerebral, anterior cerebral, vertebral, and posterior cerebral arteries are patent without abrupt cut off or aneurysmal dilation. Diminutive intracranial portion of the left vertebral artery. Basilar artery appears patent and appears unremarkable. Non-vascular Findings: For description of nonvascular intracranial findings, please refer to the noncontrast head CT performed the same date. No acute abnormality is identified within the visualized soft tissue or bony structures of the neck. The visualized lung apices are clear. FINDINGS: CT Perfusion: CBF (<30%) volume: 0 mL Tmax (>6.0s) volume: 0 mL Mismatch volume: 0 mL Mismatch ratio: None     Impression: 1.No acute abnormality identified within the large arteries of the head or neck. 2.No significant stenosis of the bilateral internal carotid arteries. 3.CT perfusion study demonstrates no territorial ischemia or core infarct. Electronically Signed:  Nic Maharaj MD  7/8/2024 12:03 PM EDT  Workstation ID: [id number]    CT CEREBRAL PERFUSION WITH & WITHOUT CONTRAST    Result Date: 7/8/2024  CT ANGIOGRAM HEAD W AI ANALYSIS OF LVO, CT ANGIOGRAM NECK, CT CEREBRAL PERFUSION W WO CONTRAST Date of Exam: 7/8/2024 11:36 AM EDT Indication: Neuro Deficit, acute, Stroke suspected Neuro deficit, acute stroke suspected. Comparison: None available. Technique: CTA of the head was performed after the uneventful intravenous administration of 115 mL of Isovue-370. Reconstructed coronal and sagittal images were also obtained. In addition, a 3-D volume rendered image was created for interpretation. Automated exposure control and iterative reconstruction methods were used. FINDINGS: Vascular Findings: The right common carotid, internal carotid, middle cerebral, anterior cerebral, vertebral, and posterior cerebral arteries are patent without abrupt cut off or aneurysmal dilation. There is fetal origin of the right posterior cerebral artery. The left common carotid, internal carotid, middle cerebral, anterior cerebral, vertebral, and posterior cerebral arteries are patent without abrupt cut off or aneurysmal dilation. Diminutive intracranial portion of the left vertebral artery. Basilar artery appears patent and appears unremarkable. Non-vascular Findings: For description of nonvascular intracranial findings, please refer to the noncontrast head CT performed the same date. No acute abnormality is identified within the visualized soft tissue or bony structures of the neck. The visualized lung apices are clear. FINDINGS: CT Perfusion: CBF (<30%) volume: 0 mL Tmax (>6.0s) volume: 0 mL Mismatch volume: 0 mL Mismatch ratio: None     Impression: 1.No acute abnormality identified within the large arteries of the head or neck. 2.No significant stenosis of the bilateral internal carotid arteries. 3.CT perfusion study demonstrates no territorial ischemia or core infarct. Electronically Signed:  Nic Maharaj MD  7/8/2024 12:03 PM EDT  Workstation ID: [id number]    CT Head Without Contrast Stroke Protocol    Result Date: 7/8/2024  CT HEAD WO CONTRAST STROKE PROTOCOL Date of Exam: 7/8/2024 11:33 AM EDT Indication: Neuro deficit, acute, stroke suspected Neuro Deficit, acute, Stroke suspected. Comparison: Brain MRI 6/5/2024 Technique: Axial CT images were obtained of the head without contrast administration.  Reconstructed coronal images were also obtained. Automated exposure control and iterative construction methods were used. Scan Time: 11:34 a.m. Results discussed with stroke team nurse navigator at 11:40 a.m. Findings: Previous exam report from 6/5/2024 indicated no acute intracranial abnormality. Today's study shows the calvarium to appear intact. Included paranasal sinuses and mastoids appear clear. There is appropriate degree of generalized cerebral atrophy for age. There is no evidence of mass, mass effect, hemorrhage, edema/infarct, hydrocephalus, or abnormal extra-axial collection. .     Impression: Impression: No evidence of acute intracranial disease. Electronically Signed: Nadir Wylie MD  7/8/2024 11:56 AM EDT  Workstation ID: [id number]     Results for orders placed during the hospital encounter of 06/04/24    Adult Transthoracic Echo Complete W/ Cont if Necessary Per Protocol (With Agitated Saline)    Interpretation Summary    Left ventricular systolic function is normal. Left ventricular ejection fraction appears to be 56 - 60%.    Left ventricular wall thickness is consistent with mild concentric hypertrophy.    Left ventricular diastolic function is consistent with (grade Ia w/high LAP) impaired relaxation.    The right ventricular cavity is borderline dilated.    Saline test results are negative.    Moderate aortic valve regurgitation is present.    Mild tricuspid valve regurgitation is present.      Assessment & Plan   Assessment & Plan       Generalized weakness    Essential hypertension     TIA (transient ischemic attack)        Clara larkin is a 69-year-old female with a history of breast cancer and hypertension and recent TIA who is here with weakness.  Stroke team is following    Weakness  Recent TIA  - Imaging and labs as above.  No new remarkable head imaging findings  - Check orthostatics-- possible dehydration?  --u/a and cxr unremarkable, clinical picture and hx does not seem c/w infection  - Stroke team has recommended admission for continued neuroevaluation  --further imaging/workup per stroke team  - Permissive hypertension for now  --cont asa, plavix, statin    Hypertension  - As above, permissive hypertension until normotensive blood pressure goals okay per neuro    Anxiety  --cont home meds        VTE Prophylaxis:  Mechanical VTE prophylaxis orders are signed & held.            CODE STATUS:    Code Status and Medical Interventions:   Ordered at: 07/08/24 1355     Level Of Support Discussed With:    Patient     Code Status (Patient has no pulse and is not breathing):    CPR (Attempt to Resuscitate)     Medical Interventions (Patient has pulse or is breathing):    Full Support       Expected Discharge   Expected Discharge Date: 7/9/2024; Expected Discharge Time:      Brianne Connolly MD  07/08/24

## 2024-07-08 NOTE — PLAN OF CARE
Goal Outcome Evaluation:  SLP evaluation completed. Will sign-off d/t cognitive-linguistic skills GWFL . Please see note for further details and recommendations.    Plan of Care Reviewed With: patient, family        Progress: improving         Anticipated Discharge Disposition (SLP): No further SLP services warranted    SLP Diagnosis: functional speech/language skills, functional cognitive-linguistic skills (07/08/24 1523)  SLP Diagnosis Comments: Patient with cognitive-linguisitic function GWFL. Family and patient do not report any changes from baseline. No SLP warranted (07/08/24 9666)

## 2024-07-08 NOTE — Clinical Note
Level of Care: Telemetry [5]   Diagnosis: Generalized weakness [503737]   Admitting Physician: JOSEPHINE KNAPP [[id number]]   Attending Physician: JOSEPHINE KNAPP [[id number]]   Bed Request Comments: 3e/f

## 2024-07-09 ENCOUNTER — APPOINTMENT (OUTPATIENT)
Dept: MRI IMAGING | Facility: HOSPITAL | Age: 69
End: 2024-07-09
Payer: MEDICARE

## 2024-07-09 ENCOUNTER — APPOINTMENT (OUTPATIENT)
Dept: NEUROLOGY | Facility: HOSPITAL | Age: 69
End: 2024-07-09
Payer: MEDICARE

## 2024-07-09 LAB
ANION GAP SERPL CALCULATED.3IONS-SCNC: 11 MMOL/L (ref 5–15)
BASOPHILS # BLD AUTO: 0.1 10*3/MM3 (ref 0–0.2)
BASOPHILS NFR BLD AUTO: 1.5 % (ref 0–1.5)
BUN SERPL-MCNC: 12 MG/DL (ref 8–23)
BUN/CREAT SERPL: 15 (ref 7–25)
CALCIUM SPEC-SCNC: 8.7 MG/DL (ref 8.6–10.5)
CHLORIDE SERPL-SCNC: 105 MMOL/L (ref 98–107)
CO2 SERPL-SCNC: 23 MMOL/L (ref 22–29)
CREAT SERPL-MCNC: 0.8 MG/DL (ref 0.57–1)
DEPRECATED RDW RBC AUTO: 49.5 FL (ref 37–54)
EGFRCR SERPLBLD CKD-EPI 2021: 80.4 ML/MIN/1.73
EOSINOPHIL # BLD AUTO: 0.21 10*3/MM3 (ref 0–0.4)
EOSINOPHIL NFR BLD AUTO: 3.2 % (ref 0.3–6.2)
ERYTHROCYTE [DISTWIDTH] IN BLOOD BY AUTOMATED COUNT: 13.8 % (ref 12.3–15.4)
GLUCOSE BLDC GLUCOMTR-MCNC: 109 MG/DL (ref 70–130)
GLUCOSE BLDC GLUCOMTR-MCNC: 99 MG/DL (ref 70–130)
GLUCOSE SERPL-MCNC: 88 MG/DL (ref 65–99)
HBA1C MFR BLD: 5.4 % (ref 4.8–5.6)
HCT VFR BLD AUTO: 38 % (ref 34–46.6)
HGB BLD-MCNC: 12.6 G/DL (ref 12–15.9)
IMM GRANULOCYTES # BLD AUTO: 0.02 10*3/MM3 (ref 0–0.05)
IMM GRANULOCYTES NFR BLD AUTO: 0.3 % (ref 0–0.5)
LYMPHOCYTES # BLD AUTO: 1.46 10*3/MM3 (ref 0.7–3.1)
LYMPHOCYTES NFR BLD AUTO: 22 % (ref 19.6–45.3)
MCH RBC QN AUTO: 32.1 PG (ref 26.6–33)
MCHC RBC AUTO-ENTMCNC: 33.2 G/DL (ref 31.5–35.7)
MCV RBC AUTO: 96.9 FL (ref 79–97)
MONOCYTES # BLD AUTO: 0.62 10*3/MM3 (ref 0.1–0.9)
MONOCYTES NFR BLD AUTO: 9.4 % (ref 5–12)
NEUTROPHILS NFR BLD AUTO: 4.22 10*3/MM3 (ref 1.7–7)
NEUTROPHILS NFR BLD AUTO: 63.6 % (ref 42.7–76)
NRBC BLD AUTO-RTO: 0 /100 WBC (ref 0–0.2)
PLATELET # BLD AUTO: 235 10*3/MM3 (ref 140–450)
PMV BLD AUTO: 9.6 FL (ref 6–12)
POTASSIUM SERPL-SCNC: 3.8 MMOL/L (ref 3.5–5.2)
RBC # BLD AUTO: 3.92 10*6/MM3 (ref 3.77–5.28)
SODIUM SERPL-SCNC: 139 MMOL/L (ref 136–145)
WBC NRBC COR # BLD AUTO: 6.63 10*3/MM3 (ref 3.4–10.8)

## 2024-07-09 PROCEDURE — 83036 HEMOGLOBIN GLYCOSYLATED A1C: CPT | Performed by: NURSE PRACTITIONER

## 2024-07-09 PROCEDURE — 95816 EEG AWAKE AND DROWSY: CPT | Performed by: PSYCHIATRY & NEUROLOGY

## 2024-07-09 PROCEDURE — 85025 COMPLETE CBC W/AUTO DIFF WBC: CPT | Performed by: STUDENT IN AN ORGANIZED HEALTH CARE EDUCATION/TRAINING PROGRAM

## 2024-07-09 PROCEDURE — 95816 EEG AWAKE AND DROWSY: CPT

## 2024-07-09 PROCEDURE — 97165 OT EVAL LOW COMPLEX 30 MIN: CPT

## 2024-07-09 PROCEDURE — 97161 PT EVAL LOW COMPLEX 20 MIN: CPT

## 2024-07-09 PROCEDURE — G0378 HOSPITAL OBSERVATION PER HR: HCPCS

## 2024-07-09 PROCEDURE — 82948 REAGENT STRIP/BLOOD GLUCOSE: CPT

## 2024-07-09 PROCEDURE — 80048 BASIC METABOLIC PNL TOTAL CA: CPT | Performed by: STUDENT IN AN ORGANIZED HEALTH CARE EDUCATION/TRAINING PROGRAM

## 2024-07-09 PROCEDURE — 70551 MRI BRAIN STEM W/O DYE: CPT

## 2024-07-09 PROCEDURE — 99232 SBSQ HOSP IP/OBS MODERATE 35: CPT | Performed by: FAMILY MEDICINE

## 2024-07-09 RX ORDER — METOPROLOL SUCCINATE 50 MG/1
50 TABLET, EXTENDED RELEASE ORAL DAILY
Status: DISCONTINUED | OUTPATIENT
Start: 2024-07-09 | End: 2024-07-10 | Stop reason: HOSPADM

## 2024-07-09 RX ADMIN — CLOPIDOGREL BISULFATE 75 MG: 75 TABLET ORAL at 08:25

## 2024-07-09 RX ADMIN — ATORVASTATIN CALCIUM 80 MG: 40 TABLET, FILM COATED ORAL at 20:12

## 2024-07-09 RX ADMIN — ESCITALOPRAM OXALATE 20 MG: 20 TABLET ORAL at 08:25

## 2024-07-09 RX ADMIN — METOPROLOL SUCCINATE 50 MG: 50 TABLET, EXTENDED RELEASE ORAL at 17:55

## 2024-07-09 RX ADMIN — Medication 10 ML: at 20:12

## 2024-07-09 RX ADMIN — ASPIRIN 81 MG: 81 TABLET, CHEWABLE ORAL at 08:25

## 2024-07-09 RX ADMIN — HYDROXYZINE HYDROCHLORIDE 25 MG: 25 TABLET ORAL at 08:25

## 2024-07-09 NOTE — SIGNIFICANT NOTE
NIH stroke scale increased from 0 to 8. Stroke navigator Monik Short and Dr. Cabrera at bedside.

## 2024-07-09 NOTE — THERAPY EVALUATION
Patient Name: Clara Gonzalez  : 1955    MRN: [id number]                              Today's Date: 2024       Admit Date: 2024    Visit Dx:     ICD-10-CM ICD-9-CM   1. Generalized weakness  R53.1 780.79   2. TIA (transient ischemic attack)  G45.9 435.9   3. Cerebrovascular accident (CVA), unspecified mechanism  I63.9 434.91     Patient Active Problem List   Diagnosis    Chest pain    anxiety and depression    Essential hypertension    Bereavement    GERD without esophagitis    Abnormal mammogram    Acute thoracic back pain    Allergic rhinitis    Perioral dermatitis    Tendinitis of left shoulder    Elevated blood sugar    Unresponsive episode    Malignant neoplasm of upper-outer quadrant of right breast in female, estrogen receptor positive    TIA (transient ischemic attack)    Generalized weakness     Past Medical History:   Diagnosis Date    Anxiety     Breast cancer     Breast injury 2023    right hematoma    Essential hypertension 2021    GERD without esophagitis 2019    TIA (transient ischemic attack) 2024     Past Surgical History:   Procedure Laterality Date    BREAST CYST ASPIRATION Left 2005    BREAST LUMPECTOMY      CHOLECYSTECTOMY      COLONOSCOPY      HYSTERECTOMY      OOPHORECTOMY        General Information       Row Name 24 1408          Physical Therapy Time and Intention    Document Type evaluation  -KR     Mode of Treatment physical therapy  -KR       Row Name 24 1408          General Information    Patient Profile Reviewed yes  -KR     Prior Level of Function independent:;all household mobility;community mobility;ADL's  no AD at baseline; currently receiving OP PT services  -KR     Existing Precautions/Restrictions fall  -KR     Barriers to Rehab medically complex  -KR       Row Name 24 1408          Living Environment    People in Home alone;other (see comments)  family lives down the road and can provide assist  -KR       Row Name  07/09/24 1408          Home Main Entrance    Number of Stairs, Main Entrance one  -KR     Stair Railings, Main Entrance none  -KR       Row Name 07/09/24 1408          Stairs Within Home, Primary    Number of Stairs, Within Home, Primary none  -KR       Row Name 07/09/24 1408          Cognition    Orientation Status (Cognition) oriented x 4  -KR       Row Name 07/09/24 1408          Safety Issues, Functional Mobility    Impairments Affecting Function (Mobility) balance;strength;endurance/activity tolerance  -KR               User Key  (r) = Recorded By, (t) = Taken By, (c) = Cosigned By      Initials Name Provider Type    Sully Zamora PT Physical Therapist                   Mobility       Row Name 07/09/24 1410          Sit-Stand Transfer    Sit-Stand [place] (Transfers) contact guard  -KR     Comment, (Sit-Stand Transfer) 1x from bed  -KR       Row Name 07/09/24 1410          Gait/Stairs (Locomotion)    [place] Level (Gait) contact guard  -KR     Distance in Feet (Gait) 80  -KR     Deviations/Abnormal Patterns (Gait) amy decreased;gait speed decreased;stride length decreased  -KR     Bilateral Gait Deviations forward flexed posture  -KR     Comment, (Gait/Stairs) Pt with absent arm swing and trunk rotation B. Cues for upright posture. No symptoms of lightheadedness reported with ambulation.  -KR               User Key  (r) = Recorded By, (t) = Taken By, (c) = Cosigned By      Initials Name Provider Type    Sully Zamora PT Physical Therapist                   Obj/Interventions       Row Name 07/09/24 1411          Range of Motion Comprehensive    General Range of Motion no range of motion deficits identified  -KR       Row Name 07/09/24 1411          Strength Comprehensive (MMT)    General Manual Muscle Testing (MMT) Assessment lower extremity strength deficits identified  -KR     Comment, General Manual Muscle Testing (MMT) Assessment L hip flexion 4-/5, otherwise LLE grossly 4+/5; RLE  grossly 4+/5  -KR       Row Name 07/09/24 1411          Motor Skills    Motor Skills coordination  -KR     Coordination other (see comments)  B toe taps WFL  -KR       Row Name 07/09/24 1411          Balance    Balance Assessment sitting static balance;sitting dynamic balance;standing static balance;standing dynamic balance  -KR     Static Sitting Balance independent  -KR     Dynamic Sitting Balance standby assist  -KR     Position, Sitting Balance unsupported;sitting in chair  -KR     Static Standing Balance contact guard  -KR     Dynamic Standing Balance contact guard  -KR     Position/Device Used, Standing Balance unsupported  -KR     Balance Interventions sitting;standing;sit to stand;static;dynamic  -KR       Row Name 07/09/24 1411          Sensory Assessment (Somatosensory)    Sensory Assessment (Somatosensory) bilateral LE  -KR     Bilateral LE Sensory Assessment general sensation;light touch awareness;light touch localization;proprioception;intact  -KR               User Key  (r) = Recorded By, (t) = Taken By, (c) = Cosigned By      Initials Name Provider Type    Sully Zamora, PT Physical Therapist                   Goals/Plan       Row Name 07/09/24 1417          Bed Mobility Goal 1 (PT)    Activity/Assistive Device (Bed Mobility Goal 1, PT) bed mobility activities, all  -KR     [place] Level/Cues Needed (Bed Mobility Goal 1, PT) independent  -KR     Time Frame (Bed Mobility Goal 1, PT) short term goal (STG);2 days  -KR       Row Name 07/09/24 1417          Transfer Goal 1 (PT)    Activity/Assistive Device (Transfer Goal 1, PT) sit-to-stand/stand-to-sit;bed-to-chair/chair-to-bed  -KR     [place] Level/Cues Needed (Transfer Goal 1, PT) independent  -KR     Time Frame (Transfer Goal 1, PT) long term goal (LTG);5 days  -KR       Row Name 07/09/24 1417          Gait Training Goal 1 (PT)    Activity/Assistive Device (Gait Training Goal 1, PT) gait (walking locomotion);improve balance and  speed;increase endurance/gait distance  -KR     [place] Level (Gait Training Goal 1, PT) independent  -KR     Distance (Gait Training Goal 1, PT) 150  -KR     Time Frame (Gait Training Goal 1, PT) long term goal (LTG);5 days  -KR       Row Name 07/09/24 1417          Stairs Goal 1 (PT)    Activity/Assistive Device (Stairs Goal 1, PT) stairs, all skills  -KR     [place] Level/Cues Needed (Stairs Goal 1, PT) independent  -KR     Number of Stairs (Stairs Goal 1, PT) 1  -KR     Time Frame (Stairs Goal 1, PT) long term goal (LTG);5 days  -KR       Row Name 07/09/24 1417          Therapy Assessment/Plan (PT)    Planned Therapy Interventions (PT) balance training;bed mobility training;home exercise program;postural re-education;gait training;transfer training;stretching;strengthening;stair training;ROM (range of motion);neuromuscular re-education  -KR               User Key  (r) = Recorded By, (t) = Taken By, (c) = Cosigned By      Initials Name Provider Type    KR Sully Bruno, PT Physical Therapist                   Clinical Impression       Row Name 07/09/24 1413          Pain    Pretreatment Pain Rating 0/10 - no pain  -KR     Posttreatment Pain Rating 0/10 - no pain  -KR       Row Name 07/09/24 1413          Plan of Care Review    Plan of Care Reviewed With patient;family  -KR     Outcome Evaluation Pt presents with LLE strength deficits, as well as, balance and endurance below baseline contributing to ambulation deficits. Pt with 21 pt drop in systolic BP supine to s/p ambulation, however pt asymptomatic. Pt will benefit from PT to address aforementioned deficits and return to PLOF. PT rec home with assist and resumption of OP PT services upon dc.  -KR       Row Name 07/09/24 1413          Therapy Assessment/Plan (PT)    Patient/Family Therapy Goals Statement (PT) to return home  -KR     Rehab Potential (PT) good, to achieve stated therapy goals  -KR     Criteria for Skilled Interventions Met (PT)  yes;skilled treatment is necessary  -KR     Therapy Frequency (PT) daily  -KR     Predicted Duration of Therapy Intervention (PT) 5 days  -KR       Row Name 07/09/24 1413          Vital Signs    Pre Systolic BP Rehab 162  sup  -KR     Pre Treatment Diastolic BP 85  -KR     Intra Systolic BP Rehab 159  stand  -KR     Intra Treatment Diastolic BP 92  -KR     Post Systolic BP Rehab 141  s/p ambulation  -KR     Post Treatment Diastolic BP 89  -KR     Pre Patient Position Sitting  -KR     Intra Patient Position Standing  -KR     Post Patient Position Sitting  -KR       Row Name 07/09/24 1413          Positioning and Restraints    Pre-Treatment Position in bed  -KR     Post Treatment Position chair  -KR     In Chair notified nsg;reclined;call light within reach;encouraged to call for assist;exit alarm on;waffle cushion;with family/caregiver;legs elevated  power box on chair alarm not working, RN notified.  -KR               User Key  (r) = Recorded By, (t) = Taken By, (c) = Cosigned By      Initials Name Provider Type    Sully Zamora, PT Physical Therapist                   Outcome Measures       Row Name 07/09/24 1418 07/09/24 0800       How much help from another person do you currently need...    Turning from your back to your side while in flat bed without using bedrails? 4  -KR 4  -JH (r)  EC (c)    Moving from lying on back to sitting on the side of a flat bed without bedrails? 3  -KR 4  -JH (r)  EC (c)    Moving to and from a bed to a chair (including a wheelchair)? 3  -KR 4  -JH (r)  EC (c)    Standing up from a chair using your arms (e.g., wheelchair, bedside chair)? 3  -KR 4  -JH (r)  EC (c)    Climbing 3-5 steps with a railing? 3  -KR 3  -JH (r)  EC (c)    To walk in hospital room? 3  -KR 4  -JH (r)  EC (c)    AM-PAC 6 Clicks Score (PT) 19  -KR 23  -JH    Highest Level of Mobility Goal 6 --> Walk 10 steps or more  -KR 7 --> Walk 25 feet or more  -JH      Row Name 07/09/24 1418          Modified [place]  Scale    Pre-Stroke Modified [place] Scale 6 - Unable to determine (UTD) from the medical record documentation  -KR     Modified [place] Scale 2 - Slight disability.  Unable to carry out all previous activities but able to look after own affairs without assistance.  -       Row Name 07/09/24 1418          Functional Assessment    Outcome Measure Options Modified [place]  -KR               User Key  (r) = Recorded By, (t) = Taken By, (c) = Cosigned By      Initials Name Provider Type    Sully Zamora, ELROY Physical Therapist    Kelsy Piña RN Registered Nurse    Curt Horton RNA Registered Nurse                                 Physical Therapy Education       Title: PT OT SLP Therapies (In Progress)       Topic: Physical Therapy (In Progress)       Point: Mobility training (Done)       Learning Progress Summary             Patient Acceptance, E, VU by KR at 7/9/2024 1418   Family Acceptance, E, VU by KR at 7/9/2024 1418                         Point: Home exercise program (Not Started)       Learner Progress:  Not documented in this visit.              Point: Body mechanics (Done)       Learning Progress Summary             Patient Acceptance, E, VU by KR at 7/9/2024 1418   Family Acceptance, E, VU by KR at 7/9/2024 1418                         Point: Precautions (Done)       Learning Progress Summary             Patient Acceptance, E, VU by KR at 7/9/2024 1418   Family Acceptance, E, VU by KR at 7/9/2024 1418                                         User Key       Initials Effective Dates Name Provider Type [facility] 12/30/22 -  Sully Bruno, ELROY Physical Therapist PT                  PT Recommendation and Plan  Planned Therapy Interventions (PT): balance training, bed mobility training, home exercise program, postural re-education, gait training, transfer training, stretching, strengthening, stair training, ROM (range of motion), neuromuscular re-education  Plan of Care Reviewed With: patient,  family  Outcome Evaluation: Pt presents with LLE strength deficits, as well as, balance and endurance below baseline contributing to ambulation deficits. Pt with 21 pt drop in systolic BP supine to s/p ambulation, however pt asymptomatic. Pt will benefit from PT to address aforementioned deficits and return to PLOF. PT rec home with assist and resumption of OP PT services upon dc.     Time Calculation:   PT Evaluation Complexity  History, PT Evaluation Complexity: 3 or more personal factors and/or comorbidities  Examination of Body Systems (PT Eval Complexity): total of 4 or more elements  Clinical Presentation (PT Evaluation Complexity): stable  Clinical Decision Making (PT Evaluation Complexity): low complexity  Overall Complexity (PT Evaluation Complexity): low complexity     PT Charges       Row Name 07/09/24 1420             Time Calculation    Start Time 1400  -KR      PT Received On 07/09/24  -KR      PT Goal Re-Cert Due Date 07/19/24  -KR         Untimed Charges    PT Eval/Re-eval Minutes 46  -KR         Total Minutes    Untimed Charges Total Minutes 46  -KR       Total Minutes 46  -KR                User Key  (r) = Recorded By, (t) = Taken By, (c) = Cosigned By      Initials Name Provider Type    Sully Zamora, PT Physical Therapist                  Therapy Charges for Today       Code Description Service Date Service Provider Modifiers Qty    17000687870 HC PT EVAL LOW COMPLEXITY 4 7/9/2024 Sully Bruno, PT GP 1            PT G-Codes  Outcome Measure Options: Modified [place]  AM-PAC 6 Clicks Score (PT): 19  Modified [place] Scale: 2 - Slight disability.  Unable to carry out all previous activities but able to look after own affairs without assistance.  PT Discharge Summary  Anticipated Discharge Disposition (PT): home with assist, home with outpatient therapy services    Sully Bruno PT  7/9/2024

## 2024-07-09 NOTE — PLAN OF CARE
Goal Outcome Evaluation:  Plan of Care Reviewed With: patient, son, daughter        Progress: no change  Outcome Evaluation: Pt presents near recent baseline with proximal LUE weakness, dynamic balance deficit, and decreased activity tolerance warranting skilled IP OT services to promote return to PLOF. Rec d/c to home with assist and continued OPPT.      Anticipated Discharge Disposition (OT): home with assist, home with outpatient therapy services

## 2024-07-09 NOTE — CASE MANAGEMENT/SOCIAL WORK
Discharge Planning Assessment  [facility]     Patient Name: Clara Gonzalez  MRN: [id number]  Today's Date: 7/9/2024    Admit Date: 7/8/2024    Plan: Home   Discharge Needs Assessment       Row Name 07/09/24 1604       Living Environment    People in Home alone    Primary Care Provided by self    Provides Primary Care For no one    Family Caregiver if Needed child(tila), adult    Family Caregiver Names Julee Ramires  Daughter  [phone]  Pola Gonzalez  Son  [phone]    Quality of Family Relationships helpful;involved;supportive    Able to Return to Prior Arrangements yes       Transition Planning    Patient/Family Anticipates Transition to home    Patient/Family Anticipated Services at Transition     Transportation Anticipated family or friend will provide       Discharge Needs Assessment    Equipment Currently Used at Home none                   Discharge Plan       Row Name 07/09/24 1605       Plan    Plan Home    Patient/Family in Agreement with Plan yes    Plan Comments Met with Ms. Gonzalez and her two adult children in her room, to initiate discharge planning. Ms. Gonzalez lives alone in [place] [place]. She verified she has Medicare and [employer] insurance. She has prescription coverage and uses [facility] to get her prescriptions filled. Her PCP is MEI Tran. Prior to admission, she was independent with ADL's and uses no medical equipment at home. She is not current with home health. Her plan is to go home at discharge. family will transport her. Await therapy recommendations to determine proper discharge placement or plan. CM will continue to follow.    Final Discharge Disposition Code 01 - home or self-care                  Continued Care and Services - Admitted Since 7/8/2024    No active coordination exists for this encounter.       Selected Continued Care - Prior Encounters Includes continued care and service providers with selected services from prior encounters from 4/9/2024 to  7/9/2024      Discharged on 6/6/2024 Admission date: 6/4/2024 - Discharge disposition: Home or Self Care      Therapy       Service Provider Selected Services Address Phone Fax Patient Preferred    [facility] [address] [place] [phone] --                          Expected Discharge Date and Time       Expected Discharge Date Expected Discharge Time    Jul 10, 2024            Demographic Summary       Row Name 07/09/24 1559       General Information    Admission Type observation    Arrived From emergency department    Referral Source admission list    Reason for Consult discharge planning    Preferred Language English       Contact Information    Permission Granted to Share Info With     Contact Information Obtained for                    Functional Status       Row Name 07/09/24 1603       Functional Status    Usual Activity Tolerance moderate    Current Activity Tolerance moderate       Functional Status, IADL    Medications independent    Meal Preparation independent    Housekeeping independent    Laundry independent    Shopping independent                   Psychosocial    No documentation.                  Abuse/Neglect    No documentation.                  Legal    No documentation.                  Substance Abuse    No documentation.                  Patient Forms    No documentation.                     Mackenzie Ann RN

## 2024-07-09 NOTE — THERAPY EVALUATION
Patient Name: Clara Gonzalez  : 1955    MRN: [id number]                              Today's Date: 2024       Admit Date: 2024    Visit Dx:     ICD-10-CM ICD-9-CM   1. Generalized weakness  R53.1 780.79   2. TIA (transient ischemic attack)  G45.9 435.9   3. Cerebrovascular accident (CVA), unspecified mechanism  I63.9 434.91     Patient Active Problem List   Diagnosis    Chest pain    anxiety and depression    Essential hypertension    Bereavement    GERD without esophagitis    Abnormal mammogram    Acute thoracic back pain    Allergic rhinitis    Perioral dermatitis    Tendinitis of left shoulder    Elevated blood sugar    Unresponsive episode    Malignant neoplasm of upper-outer quadrant of right breast in female, estrogen receptor positive    TIA (transient ischemic attack)    Generalized weakness     Past Medical History:   Diagnosis Date    Anxiety     Breast cancer     Breast injury 2023    right hematoma    Essential hypertension 2021    GERD without esophagitis 2019    TIA (transient ischemic attack) 2024     Past Surgical History:   Procedure Laterality Date    BREAST CYST ASPIRATION Left 2005    BREAST LUMPECTOMY      CHOLECYSTECTOMY      COLONOSCOPY      HYSTERECTOMY      OOPHORECTOMY        General Information       Row Name 24 1455          OT Time and Intention    Document Type evaluation  -CS     Mode of Treatment occupational therapy  -CS       Row Name 24 1455          General Information    Patient Profile Reviewed yes  -CS     Prior Level of Function independent:;all household mobility  reports no AD/DME at baseline for ADL completion and related tranfers  -CS     Existing Precautions/Restrictions fall;other (see comments)  Monitor BP, history of dizziness  -CS     Barriers to Rehab medically complex;previous functional deficit  -CS       Row Name 24 1455          Living Environment    People in Home alone;other (see comments)  Dtr live  down the road  -       Row Name 07/09/24 1455          Home Main Entrance    Number of Stairs, Main Entrance one  -CS     Stair Railings, Main Entrance none  -       Row Name 07/09/24 1455          Stairs Within Home, Primary    Number of Stairs, Within Home, Primary none  -       Row Name 07/09/24 1455          Cognition    Orientation Status (Cognition) oriented x 4  -CS       Row Name 07/09/24 1455          Safety Issues, Functional Mobility    Impairments Affecting Function (Mobility) balance;strength;endurance/activity tolerance  -               User Key  (r) = Recorded By, (t) = Taken By, (c) = Cosigned By      Initials Name Provider Type     Frantz Delgado OT Occupational Therapist                     Mobility/ADL's       Row Name 07/09/24 1457          Bed Mobility    Bed Mobility supine-sit;scooting/bridging  -     Scooting/Bridging [place] (Bed Mobility) supervision  -     Supine-Sit [place] (Bed Mobility) standby assist  -     Assistive Device (Bed Mobility) bed rails;head of bed elevated  -     Comment, (Bed Mobility) no dizziness reported in standing  -       Row Name 07/09/24 1457          Functional Mobility    Functional Mobility- Comment defer to PT for specifics, tolerated in-room and hallway mobility with no AD  -CS     Patient was able to Ambulate yes  -       Row Name 07/09/24 1457          Activities of Daily Living    BADL Assessment/Intervention lower body dressing;grooming;feeding  -       Row Name 07/09/24 1457          Lower Body Dressing Assessment/Training    [place] Level (Lower Body Dressing) don;socks;standby assist  -     Position (Lower Body Dressing) edge of bed sitting  -       Row Name 07/09/24 1457          Grooming Assessment/Training    [place] Level (Grooming) wash face, hands;set up  -     Position (Grooming) supported sitting  -       Row Name 07/09/24 1457          Self-Feeding Assessment/Training    [place] Level  (Feeding) feeding skills;liquids to mouth;prepare tray/open items;scoop food and bring to mouth;independent  -CS     Position (Self-Feeding) sitting up in bed  -CS               User Key  (r) = Recorded By, (t) = Taken By, (c) = Cosigned By      Initials Name Provider Type    CS Frantz Delgado, OT Occupational Therapist                   Obj/Interventions       Row Name 07/09/24 1500          Sensory Assessment (Somatosensory)    Sensory Assessment (Somatosensory) UE sensation intact;bilateral UE  -CS     Bilateral UE Sensory Assessment general sensation;light touch awareness;light touch localization;intact  -       Row Name 07/09/24 1500          Vision Assessment/Intervention    Visual Impairment/Limitations WFL  -CS       Row Name 07/09/24 1500          Range of Motion Comprehensive    General Range of Motion no range of motion deficits identified  -       Row Name 07/09/24 1500          Strength Comprehensive (MMT)    General Manual Muscle Testing (MMT) Assessment no strength deficits identified;upper extremity strength deficits identified  -     Comment, General Manual Muscle Testing (MMT) Assessment L shoulder 4/5, otherwise BUE grossly 5/5  -       Row Name 07/09/24 1500          Motor Skills    Motor Skills coordination;functional endurance  -CS     Coordination bilateral;upper extremity;finger to nose;bimanual skills;WFL  -CS     Functional Endurance O2 sats stable on RA  -CS       Row Name 07/09/24 1500          Balance    Balance Assessment sitting dynamic balance;sitting static balance;standing static balance;standing dynamic balance  -CS     Static Sitting Balance independent  -CS     Dynamic Sitting Balance standby assist  -CS     Position, Sitting Balance unsupported;sitting edge of bed  -CS     Static Standing Balance standby assist  -CS     Dynamic Standing Balance unable to assess  -CS     Position/Device Used, Standing Balance unsupported  -CS     Balance Interventions  sitting;standing;sit to stand;occupation based/functional task  -CS               User Key  (r) = Recorded By, (t) = Taken By, (c) = Cosigned By      Initials Name Provider Type    CS Frantz Delgado, OT Occupational Therapist                   Goals/Plan       Row Name 07/09/24 1512          Transfer Goal 1 (OT)    Activity/Assistive Device (Transfer Goal 1, OT) bed-to-chair/chair-to-bed;toilet  -CS     [place] Level/Cues Needed (Transfer Goal 1, OT) modified independence;independent  -CS     Time Frame (Transfer Goal 1, OT) short term goal (STG);3 days  -CS     Strategies/Barriers (Transfers Goal 1, OT) AD recs per PT  -CS     Progress/Outcome (Transfer Goal 1, OT) new goal  -CS       Row Name 07/09/24 1512          Dressing Goal 1 (OT)    Activity/Device (Dressing Goal 1, OT) lower body dressing  -CS     Time Frame (Dressing Goal 1, OT) long term goal (LTG);1 week  -CS     Strategies/Barriers (Dressing Goal 1, OT) dynamic sit-stand balance, activity tolerance and dizziness  -CS     Progress/Outcome (Dressing Goal 1, OT) new goal  -CS       Row Name 07/09/24 1512          Grooming Goal 1 (OT)    Activity/Device (Grooming Goal 1, OT) hair care;oral care;wash face, hands  -CS     [place] (Grooming Goal 1, OT) independent  -CS     Time Frame (Grooming Goal 1, OT) long term goal (LTG);1 week  -CS     Strategies/Barriers (Grooming Goal 1, OT) sinkside  -CS     Progress/Outcome (Grooming Goal 1, OT) new goal  -       Row Name 07/09/24 1512          Therapy Assessment/Plan (OT)    Planned Therapy Interventions (OT) activity tolerance training;functional balance retraining;occupation/activity based interventions;ROM/therapeutic exercise;strengthening exercise;transfer/mobility retraining;patient/caregiver education/training;neuromuscular control/coordination retraining;adaptive equipment training  -CS               User Key  (r) = Recorded By, (t) = Taken By, (c) = Cosigned By      Initials Name Provider Type     CS Frantz Delgado, OT Occupational Therapist                   Clinical Impression       Row Name 07/09/24 1502          Pain Assessment    Additional Documentation Pain Scale: FACES Pre/Post-Treatment (Group)  -       Row Name 07/09/24 1502          Pain Scale: FACES Pre/Post-Treatment    Pain: FACES Scale, Pretreatment 0-->no hurt  -CS     Posttreatment Pain Rating 0-->no hurt  -CS       Row Name 07/09/24 1502          Plan of Care Review    Plan of Care Reviewed With patient;son;daughter  -CS     Progress no change  -CS     Outcome Evaluation Pt presents near recent baseline with proximal LUE weakness, dynamic balance deficit, and decreased activity tolerance warranting skilled IP OT services to promote return to PLOF. Rec d/c to home with assist and continued OPPT.  -       Row Name 07/09/24 1502          Therapy Assessment/Plan (OT)    Patient/Family Therapy Goal Statement (OT) return home  -CS     Rehab Potential (OT) good, to achieve stated therapy goals  -CS     Criteria for Skilled Therapeutic Interventions Met (OT) yes;meets criteria;skilled treatment is necessary  -     Therapy Frequency (OT) daily  -       Row Name 07/09/24 1502          Therapy Plan Review/Discharge Plan (OT)    Anticipated Discharge Disposition (OT) home with assist;home with outpatient therapy services  -       Row Name 07/09/24 1502          Vital Signs    Pre Systolic BP Rehab 162  -CS     Pre Treatment Diastolic BP 85  -CS     Intra Systolic BP Rehab 159  -CS     Intra Treatment Diastolic BP 92  -CS     Post Systolic BP Rehab 141  -CS     Post Treatment Diastolic BP 89  -CS     O2 Delivery Pre Treatment room air  -CS     O2 Delivery Intra Treatment room air  -CS     O2 Delivery Post Treatment room air  -CS     Pre Patient Position Supine  -CS     Intra Patient Position Standing  -CS     Post Patient Position Sitting  -       Row Name 07/09/24 1502          Positioning and Restraints    Pre-Treatment Position in bed   -CS     Post Treatment Position chair  -CS     In Chair with PT;sitting  -CS               User Key  (r) = Recorded By, (t) = Taken By, (c) = Cosigned By      Initials Name Provider Type    CS Frantz Delgado, OT Occupational Therapist                   Outcome Measures       Row Name 07/09/24 1519          How much help from another is currently needed...    Putting on and taking off regular lower body clothing? 3  -CS     Bathing (including washing, rinsing, and drying) 3  -CS     Toileting (which includes using toilet bed pan or urinal) 3  -CS     Putting on and taking off regular upper body clothing 4  -CS     Taking care of personal grooming (such as brushing teeth) 4  -CS     Eating meals 4  -CS     AM-PAC 6 Clicks Score (OT) 21  -CS       Row Name 07/09/24 1418 07/09/24 0800       How much help from another person do you currently need...    Turning from your back to your side while in flat bed without using bedrails? 4  -KR 4  -JH (r)  EC (c)    Moving from lying on back to sitting on the side of a flat bed without bedrails? 3  -KR 4  -JH (r)  EC (c)    Moving to and from a bed to a chair (including a wheelchair)? 3  -KR 4  -JH (r)  EC (c)    Standing up from a chair using your arms (e.g., wheelchair, bedside chair)? 3  -KR 4  -JH (r)  EC (c)    Climbing 3-5 steps with a railing? 3  -KR 3  -JH (r)  EC (c)    To walk in hospital room? 3  -KR 4  -JH (r)  EC (c)    AM-PAC 6 Clicks Score (PT) 19  -KR 23  -JH    Highest Level of Mobility Goal 6 --> Walk 10 steps or more  -KR 7 --> Walk 25 feet or more  -      Row Name 07/09/24 1519 07/09/24 1418       Modified [place]ette Scale    Pre-Stroke Modified [place]ette Scale -- 6 - Unable to determine (UTD) from the medical record documentation  -KR    Modified [place] Scale 2 - Slight disability.  Unable to carry out all previous activities but able to look after own affairs without assistance.  -CS 2 - Slight disability.  Unable to carry out all previous activities but able  to look after own affairs without assistance.  -KR      Row Name 07/09/24 1519 07/09/24 1418       Functional Assessment    Outcome Measure Options AM-PAC 6 Clicks Daily Activity (OT);Modified [place]  -CS Modified [place]  -KR              User Key  (r) = Recorded By, (t) = Taken By, (c) = Cosigned By      Initials Name Provider Type    CS Frantz Delgado, OT Occupational Therapist    Sully Zamora, PT Physical Therapist    Kelsy Piña, RN Registered Nurse    Curt Horton RNA Registered Nurse                    Occupational Therapy Education       Title: PT OT SLP Therapies (In Progress)       Topic: Occupational Therapy (Not Started)       Point: Precautions (Not Started)       Description:   Instruct learner(s) on prescribed precautions during self-care and functional transfers.                  Learner Progress:  Not documented in this visit.                                  OT Recommendation and Plan  Planned Therapy Interventions (OT): activity tolerance training, functional balance retraining, occupation/activity based interventions, ROM/therapeutic exercise, strengthening exercise, transfer/mobility retraining, patient/caregiver education/training, neuromuscular control/coordination retraining, adaptive equipment training  Therapy Frequency (OT): daily  Plan of Care Review  Plan of Care Reviewed With: patient, son, daughter  Progress: no change  Outcome Evaluation: Pt presents near recent baseline with proximal LUE weakness, dynamic balance deficit, and decreased activity tolerance warranting skilled IP OT services to promote return to PLOF. Rec d/c to home with assist and continued OPPT.     Time Calculation:   Evaluation Complexity (OT)  Review Occupational Profile/Medical/Therapy History Complexity: brief/low complexity  Assessment, Occupational Performance/Identification of Deficit Complexity: 1-3 performance deficits  Clinical Decision Making Complexity (OT): problem focused assessment/low  complexity  Overall Complexity of Evaluation (OT): low complexity     Time Calculation- OT       Row Name 07/09/24 1520             Time Calculation- OT    OT Start Time 1348  -CS      OT Received On 07/09/24  -CS      OT Goal Re-Cert Due Date 07/19/24  -CS         Untimed Charges    OT Eval/Re-eval Minutes 47  -CS         Total Minutes    Untimed Charges Total Minutes 47  -CS       Total Minutes 47  -CS                User Key  (r) = Recorded By, (t) = Taken By, (c) = Cosigned By      Initials Name Provider Type    CS Frantz Delgado, OT Occupational Therapist                  Therapy Charges for Today       Code Description Service Date Service Provider Modifiers Qty    [id number] HC OT EVAL LOW COMPLEXITY 4 7/9/2024 Frantz Delgado OT GO 1                 Frantz Delgado OT  7/9/2024

## 2024-07-09 NOTE — PLAN OF CARE
Goal Outcome Evaluation:  Plan of Care Reviewed With: patient, family           Outcome Evaluation: Pt presents with LLE strength deficits, as well as, balance and endurance below baseline contributing to ambulation deficits. Pt with 21 pt drop in systolic BP supine to s/p ambulation, however pt asymptomatic. Pt will benefit from PT to address aforementioned deficits and return to PLOF. PT rec home with assist and resumption of OP PT services upon dc.      Anticipated Discharge Disposition (PT): home with assist, home with outpatient therapy services

## 2024-07-09 NOTE — SIGNIFICANT NOTE
Contacted by primary nurse and Dr. Cabrera regarding patient's inability to answer questions.    On my assessment the patient is able to answer all of my questions however she does seem to do so in a delayed manner and has a weak vocal quality.  She is generally weak however this seems to be effort dependent.  I do not feel that the patient is having an acute vascular event therefore we will forego CT head without contrast.    Discussed with Dr. Nicholas and Dr. Cabrera.  We will obtain a stat EEG and MRI brain without contrast.  Notified EEG tech at  0931.  We will follow-up on these results and give further recommendations.  Please call with any questions or concerns    Monik Schmidt MSN, APRN, AGACNP-BC, ANVC-BC  Stroke Neurology

## 2024-07-09 NOTE — PROGRESS NOTES
[facility]  PROGRESS NOTE    Patient Name: Clara Gonzalez  : 1955  MRN: [id number]    Date of Admission: 2024  Primary Care Physician: Velia James APRN    Subjective   Subjective     CC:  Weakness     HPI:  Patient is a 70 yo F seen and examined by me this AM after called by nursing staff for urgent evaluation. Patient became aphasic but remained awake and was able to follow commands. Discussed with stroke neurology who also presented urgently to bedside. Patient was to have MRI actually this AM and taken for further evaluation and plans for EEG as well to rule out possible seizure but no known or previous history. I have updated daughter as well at bedside.       Objective   Objective     Vital Signs:   Temp:  [98 °F (36.7 °C)-99.1 °F (37.3 °C)] 98.6 °F (37 °C)  Heart Rate:  [54-73] 62  Resp:  [16-18] 18  BP: (133-159)/(59-79) 159/76     Physical Exam:  Constitutional: Awake and alert, currently aphasic during my exam, will follow all commands, shakes head yes and no.   HENT: NCAT, EOMI, nares patent, mucous membranes moist  Respiratory: Clear to auscultation bilaterally, respiratory effort normal   Cardiovascular: RRR, no murmurs, rubs, or gallops  Gastrointestinal: Positive bowel sounds, soft, nontender, nondistended  Musculoskeletal: No bilateral ankle edema, no clubbing or cyanosis   Psychiatric: Appropriate affect, cooperative  Neurologic: Awake and alert, strength symmetric in all extremities, Cranial Nerves grossly intact to confrontation, aphasic   Skin: No rashes      Results Reviewed:  LAB RESULTS:      Lab 24  0420 24  1149 24  1139 24  1138   WBC 6.63 6.22  --   --    HEMOGLOBIN 12.6 13.8  --   --    HEMOGLOBIN, POC  --   --  13.9  --    HEMATOCRIT 38.0 40.9  --   --    HEMATOCRIT POC  --   --  41  --    PLATELETS 235 244  --   --    NEUTROS ABS 4.22 4.12  --   --    IMMATURE GRANS (ABS) 0.02 0.01  --   --     LYMPHS ABS 1.46 1.30  --   --    MONOS ABS 0.62 0.57  --   --    EOS ABS 0.21 0.14  --   --    MCV 96.9 95.6  --   --    PROTIME  --   --   --  13.5   APTT  --  29.1  --   --          Lab 07/09/24  0420 07/08/24  1149 07/08/24  1139   SODIUM 139 138  --    POTASSIUM 3.8 4.3  --    CHLORIDE 105 102  --    CO2 23.0 24.0  --    ANION GAP 11.0 12.0  --    BUN 12 12  --    CREATININE 0.80 0.86 0.90   EGFR 80.4 73.7 69.8   GLUCOSE 88 137*  --    CALCIUM 8.7 10.0  --    HEMOGLOBIN A1C 5.40  --   --          Lab 07/08/24  1149   TOTAL PROTEIN 7.6   ALBUMIN 4.5   GLOBULIN 3.1   ALT (SGPT) 19   AST (SGOT) 26   BILIRUBIN 0.8   ALK PHOS 139*         Lab 07/08/24  1149 07/08/24  1138   HSTROP T <6  --    PROTIME  --  13.5   INR  --  1.1                 Brief Urine Lab Results  (Last result in the past 365 days)        Color   Clarity   Blood   Leuk Est   Nitrite   Protein   CREAT   Urine HCG        07/08/24 1222 Yellow   Clear   Negative   Small (1+)   Negative   Negative                   Microbiology Results Abnormal       None            EEG    Result Date: 7/9/2024  Reason for referral: 69 y.o.female with consideration of seizures Technical Summary:  A 19 channel digital EEG was performed using the international 10-20 placement system, including eye leads and EKG leads. Duration: 23 minutes Findings: The patient is awake.  The background shows diffuse low amplitude intermixed theta and alpha activity present symmetrically over both hemispheres.  A clear posterior rhythm is not seen.  Hyperventilation is performed with good cooperation and does not change the background.  Stage II sleep is not seen.  Photic stimulation does not change the tracing.  No focal features or epileptiform activity are seen Video: Available Technical quality: Superior EKG: Regular, 70 bpm SUMMARY: Normal EEG in the awake state No focal features or epileptiform activity are seen     Impression: Normal study This report is transcribed using the  Dragon dictation system.      MRI Brain Without Contrast    Result Date: 7/9/2024  MRI BRAIN WO CONTRAST Date of Exam: 7/9/2024 10:19 AM EDT Indication: AMS, speech difficulty.  Comparison: None available. Technique:  Routine multiplanar/multisequence sequence images of the brain were obtained without contrast administration. FINDINGS: There are several tiny foci of T2/FLAIR signal hyperintensity within the bilateral frontoparietal white matter, nonspecific. Midline structures appear unremarkable. No significant mass effect, intracranial hemorrhage, or hydrocephalus is identified. Diffusion-weighted sequences demonstrate no acute infarct. Probable faint artifactual DWI signal within the left cerebellum (series 2, image 52). The visualized intracranial flow-voids appear unremarkable. Scattered paranasal sinus mucosal thickening is present. Bilateral lens prostheses are noted. The visualized superficial soft tissues and cervical spine demonstrate no significant abnormality.     Impression: 1.Several tiny foci of T2/FLAIR signal hyperintensity within the bilateral frontoparietal white matter. This is likely related to chronic microvascular ischemic change. 2.No diffusion restriction is identified to suggest acute infarct. 3.Please see above for additional details. Electronically Signed: Nic Maharaj MD  7/9/2024 11:41 AM EDT  Workstation ID: [id number]    XR Chest 1 View    Result Date: 7/8/2024  XR CHEST 1 VW Date of Exam: 7/8/2024 11:37 AM EDT Indication: Acute Stroke Protocol (onset < 12 hrs) Comparison: Chest radiograph 6/4/2024. Findings: Cardiomediastinal silhouette is unchanged. Lungs are clear without focal consolidation. No pleural effusion or pneumothorax. Osseous structures are unchanged without acute finding.     Impression: Impression: No acute cardiopulmonary findings. Electronically Signed: Dangelo Ann MD  7/8/2024 12:19 PM EDT  Workstation ID: [id number]    CT Angiogram Head w AI Analysis of  LVO    Result Date: 7/8/2024  CT ANGIOGRAM HEAD W AI ANALYSIS OF LVO, CT ANGIOGRAM NECK, CT CEREBRAL PERFUSION W WO CONTRAST Date of Exam: 7/8/2024 11:36 AM EDT Indication: Neuro Deficit, acute, Stroke suspected Neuro deficit, acute stroke suspected. Comparison: None available. Technique: CTA of the head was performed after the uneventful intravenous administration of 115 mL of Isovue-370. Reconstructed coronal and sagittal images were also obtained. In addition, a 3-D volume rendered image was created for interpretation. Automated exposure control and iterative reconstruction methods were used. FINDINGS: Vascular Findings: The right common carotid, internal carotid, middle cerebral, anterior cerebral, vertebral, and posterior cerebral arteries are patent without abrupt cut off or aneurysmal dilation. There is fetal origin of the right posterior cerebral artery. The left common carotid, internal carotid, middle cerebral, anterior cerebral, vertebral, and posterior cerebral arteries are patent without abrupt cut off or aneurysmal dilation. Diminutive intracranial portion of the left vertebral artery. Basilar artery appears patent and appears unremarkable. Non-vascular Findings: For description of nonvascular intracranial findings, please refer to the noncontrast head CT performed the same date. No acute abnormality is identified within the visualized soft tissue or bony structures of the neck. The visualized lung apices are clear. FINDINGS: CT Perfusion: CBF (<30%) volume: 0 mL Tmax (>6.0s) volume: 0 mL Mismatch volume: 0 mL Mismatch ratio: None     Impression: 1.No acute abnormality identified within the large arteries of the head or neck. 2.No significant stenosis of the bilateral internal carotid arteries. 3.CT perfusion study demonstrates no territorial ischemia or core infarct. Electronically Signed: Nic Maharaj MD  7/8/2024 12:03 PM EDT  Workstation ID: [id number]    CT Angiogram Neck    Result Date:  7/8/2024  CT ANGIOGRAM HEAD W AI ANALYSIS OF LVO, CT ANGIOGRAM NECK, CT CEREBRAL PERFUSION W WO CONTRAST Date of Exam: 7/8/2024 11:36 AM EDT Indication: Neuro Deficit, acute, Stroke suspected Neuro deficit, acute stroke suspected. Comparison: None available. Technique: CTA of the head was performed after the uneventful intravenous administration of 115 mL of Isovue-370. Reconstructed coronal and sagittal images were also obtained. In addition, a 3-D volume rendered image was created for interpretation. Automated exposure control and iterative reconstruction methods were used. FINDINGS: Vascular Findings: The right common carotid, internal carotid, middle cerebral, anterior cerebral, vertebral, and posterior cerebral arteries are patent without abrupt cut off or aneurysmal dilation. There is fetal origin of the right posterior cerebral artery. The left common carotid, internal carotid, middle cerebral, anterior cerebral, vertebral, and posterior cerebral arteries are patent without abrupt cut off or aneurysmal dilation. Diminutive intracranial portion of the left vertebral artery. Basilar artery appears patent and appears unremarkable. Non-vascular Findings: For description of nonvascular intracranial findings, please refer to the noncontrast head CT performed the same date. No acute abnormality is identified within the visualized soft tissue or bony structures of the neck. The visualized lung apices are clear. FINDINGS: CT Perfusion: CBF (<30%) volume: 0 mL Tmax (>6.0s) volume: 0 mL Mismatch volume: 0 mL Mismatch ratio: None     Impression: 1.No acute abnormality identified within the large arteries of the head or neck. 2.No significant stenosis of the bilateral internal carotid arteries. 3.CT perfusion study demonstrates no territorial ischemia or core infarct. Electronically Signed: Nic Maharaj MD  7/8/2024 12:03 PM EDT  Workstation ID: [id number]    CT CEREBRAL PERFUSION WITH & WITHOUT CONTRAST    Result Date:  7/8/2024  CT ANGIOGRAM HEAD W AI ANALYSIS OF LVO, CT ANGIOGRAM NECK, CT CEREBRAL PERFUSION W WO CONTRAST Date of Exam: 7/8/2024 11:36 AM EDT Indication: Neuro Deficit, acute, Stroke suspected Neuro deficit, acute stroke suspected. Comparison: None available. Technique: CTA of the head was performed after the uneventful intravenous administration of 115 mL of Isovue-370. Reconstructed coronal and sagittal images were also obtained. In addition, a 3-D volume rendered image was created for interpretation. Automated exposure control and iterative reconstruction methods were used. FINDINGS: Vascular Findings: The right common carotid, internal carotid, middle cerebral, anterior cerebral, vertebral, and posterior cerebral arteries are patent without abrupt cut off or aneurysmal dilation. There is fetal origin of the right posterior cerebral artery. The left common carotid, internal carotid, middle cerebral, anterior cerebral, vertebral, and posterior cerebral arteries are patent without abrupt cut off or aneurysmal dilation. Diminutive intracranial portion of the left vertebral artery. Basilar artery appears patent and appears unremarkable. Non-vascular Findings: For description of nonvascular intracranial findings, please refer to the noncontrast head CT performed the same date. No acute abnormality is identified within the visualized soft tissue or bony structures of the neck. The visualized lung apices are clear. FINDINGS: CT Perfusion: CBF (<30%) volume: 0 mL Tmax (>6.0s) volume: 0 mL Mismatch volume: 0 mL Mismatch ratio: None     Impression: 1.No acute abnormality identified within the large arteries of the head or neck. 2.No significant stenosis of the bilateral internal carotid arteries. 3.CT perfusion study demonstrates no territorial ischemia or core infarct. Electronically Signed: Nic Maharaj MD  7/8/2024 12:03 PM EDT  Workstation ID: [id number]    CT Head Without Contrast Stroke Protocol    Result Date:  7/8/2024  CT HEAD WO CONTRAST STROKE PROTOCOL Date of Exam: 7/8/2024 11:33 AM EDT Indication: Neuro deficit, acute, stroke suspected Neuro Deficit, acute, Stroke suspected. Comparison: Brain MRI 6/5/2024 Technique: Axial CT images were obtained of the head without contrast administration.  Reconstructed coronal images were also obtained. Automated exposure control and iterative construction methods were used. Scan Time: 11:34 a.m. Results discussed with stroke team nurse navigator at 11:40 a.m. Findings: Previous exam report from 6/5/2024 indicated no acute intracranial abnormality. Today's study shows the calvarium to appear intact. Included paranasal sinuses and mastoids appear clear. There is appropriate degree of generalized cerebral atrophy for age. There is no evidence of mass, mass effect, hemorrhage, edema/infarct, hydrocephalus, or abnormal extra-axial collection. .     Impression: Impression: No evidence of acute intracranial disease. Electronically Signed: Nadir Wylie MD  7/8/2024 11:56 AM EDT  Workstation ID: [id number]     Results for orders placed during the hospital encounter of 06/04/24    Adult Transthoracic Echo Complete W/ Cont if Necessary Per Protocol (With Agitated Saline)    Interpretation Summary    Left ventricular systolic function is normal. Left ventricular ejection fraction appears to be 56 - 60%.    Left ventricular wall thickness is consistent with mild concentric hypertrophy.    Left ventricular diastolic function is consistent with (grade Ia w/high LAP) impaired relaxation.    The right ventricular cavity is borderline dilated.    Saline test results are negative.    Moderate aortic valve regurgitation is present.    Mild tricuspid valve regurgitation is present.      Current medications:  Scheduled Meds:aspirin, 81 mg, Oral, Daily   Or  aspirin, 300 mg, Rectal, Daily  atorvastatin, 80 mg, Oral, Nightly  clopidogrel, 75 mg, Oral, Daily  escitalopram, 20 mg, Oral, Daily  sodium chloride,  10 mL, Intravenous, Q12H      Continuous Infusions:   PRN Meds:.  senna-docusate sodium **AND** polyethylene glycol **AND** bisacodyl **AND** bisacodyl    hydrOXYzine    sodium chloride    sodium chloride    Assessment & Plan   Assessment & Plan     Active Hospital Problems    Diagnosis  POA    **Generalized weakness [R53.1]  Yes    TIA (transient ischemic attack) [G45.9]  Yes    Essential hypertension [I10]  Yes      Resolved Hospital Problems   No resolved problems to display.        Brief Hospital Course to date:  Clara Gonzalez is a 69 y.o. female with a history of breast cancer and hypertension and recent TIA who is here with weakness. Called to bedside this AM during aphasic episode, discussed and stroke neurology evaluated as well, MRI actually scheduled this AM and patient taken down along with follow up EEG. Negative MRI brain for acute findings, negative EEG as well, plans for continued observation, PT/OT following as well. Continue to follow with stroke neurology at this time. Daughter updated at bedside and discussed case with stroke neurology today as well.      Weakness  Recent TIA  Aphasic episode on 7/9   - Imaging and labs as above.  No new remarkable head imaging findings  - Check orthostatics-- possible dehydration, orthostatics are pending at this time   --u/a and cxr unremarkable, clinical picture and hx does not seem c/w infection  - Stroke team has recommended admission for continued neuroevaluation  -- MRI brain negative and EEG negative, patient with apashic episode this AM but quickly resolved, ? Possible function overlay, will follow for now   - restart home blood pressure medications with negative MRI brain   --cont asa, plavix, statin     Hypertension  - restart home metoprolol      Anxiety  --cont home meds       Expected Discharge Location and Transportation: Likely home with outpatient PT/OT   Expected Discharge   Expected Discharge Date: 7/10/2024; Expected Discharge Time:      VTE  Prophylaxis:  Mechanical VTE prophylaxis orders are present.         AM-PAC 6 Clicks Score (PT): 19 (07/09/24 1418)    CODE STATUS:   Code Status and Medical Interventions:   Ordered at: 07/08/24 4283     Level Of Support Discussed With:    Patient     Code Status (Patient has no pulse and is not breathing):    CPR (Attempt to Resuscitate)     Medical Interventions (Patient has pulse or is breathing):    Full Support       ISAIAH Cabrera, DO  07/09/24

## 2024-07-10 ENCOUNTER — READMISSION MANAGEMENT (OUTPATIENT)
Dept: CALL CENTER | Facility: HOSPITAL | Age: 69
End: 2024-07-10
Payer: COMMERCIAL

## 2024-07-10 VITALS
HEIGHT: 59 IN | TEMPERATURE: 98 F | WEIGHT: 139 LBS | BODY MASS INDEX: 28.02 KG/M2 | OXYGEN SATURATION: 96 % | HEART RATE: 49 BPM | DIASTOLIC BLOOD PRESSURE: 83 MMHG | RESPIRATION RATE: 18 BRPM | SYSTOLIC BLOOD PRESSURE: 143 MMHG

## 2024-07-10 PROBLEM — R53.1 GENERALIZED WEAKNESS: Status: RESOLVED | Noted: 2024-07-08 | Resolved: 2024-07-10

## 2024-07-10 LAB
ALBUMIN SERPL-MCNC: 4 G/DL (ref 3.5–5.2)
ALBUMIN/GLOB SERPL: 1.7 G/DL
ALP SERPL-CCNC: 113 U/L (ref 39–117)
ALT SERPL W P-5'-P-CCNC: 19 U/L (ref 1–33)
ANION GAP SERPL CALCULATED.3IONS-SCNC: 11 MMOL/L (ref 5–15)
AST SERPL-CCNC: 23 U/L (ref 1–32)
BASOPHILS # BLD AUTO: 0.1 10*3/MM3 (ref 0–0.2)
BASOPHILS NFR BLD AUTO: 1.6 % (ref 0–1.5)
BILIRUB SERPL-MCNC: 0.7 MG/DL (ref 0–1.2)
BUN SERPL-MCNC: 12 MG/DL (ref 8–23)
BUN/CREAT SERPL: 16.7 (ref 7–25)
CALCIUM SPEC-SCNC: 8.9 MG/DL (ref 8.6–10.5)
CHLORIDE SERPL-SCNC: 105 MMOL/L (ref 98–107)
CO2 SERPL-SCNC: 24 MMOL/L (ref 22–29)
CREAT SERPL-MCNC: 0.72 MG/DL (ref 0.57–1)
DEPRECATED RDW RBC AUTO: 47.9 FL (ref 37–54)
EGFRCR SERPLBLD CKD-EPI 2021: 91.2 ML/MIN/1.73
EOSINOPHIL # BLD AUTO: 0.23 10*3/MM3 (ref 0–0.4)
EOSINOPHIL NFR BLD AUTO: 3.6 % (ref 0.3–6.2)
ERYTHROCYTE [DISTWIDTH] IN BLOOD BY AUTOMATED COUNT: 13.5 % (ref 12.3–15.4)
GLOBULIN UR ELPH-MCNC: 2.3 GM/DL
GLUCOSE SERPL-MCNC: 112 MG/DL (ref 65–99)
HCT VFR BLD AUTO: 37.5 % (ref 34–46.6)
HGB BLD-MCNC: 12.5 G/DL (ref 12–15.9)
IMM GRANULOCYTES # BLD AUTO: 0.01 10*3/MM3 (ref 0–0.05)
IMM GRANULOCYTES NFR BLD AUTO: 0.2 % (ref 0–0.5)
LYMPHOCYTES # BLD AUTO: 1.43 10*3/MM3 (ref 0.7–3.1)
LYMPHOCYTES NFR BLD AUTO: 22.3 % (ref 19.6–45.3)
MAGNESIUM SERPL-MCNC: 2.1 MG/DL (ref 1.6–2.4)
MCH RBC QN AUTO: 32.3 PG (ref 26.6–33)
MCHC RBC AUTO-ENTMCNC: 33.3 G/DL (ref 31.5–35.7)
MCV RBC AUTO: 96.9 FL (ref 79–97)
MONOCYTES # BLD AUTO: 0.61 10*3/MM3 (ref 0.1–0.9)
MONOCYTES NFR BLD AUTO: 9.5 % (ref 5–12)
NEUTROPHILS NFR BLD AUTO: 4.04 10*3/MM3 (ref 1.7–7)
NEUTROPHILS NFR BLD AUTO: 62.8 % (ref 42.7–76)
NRBC BLD AUTO-RTO: 0 /100 WBC (ref 0–0.2)
PLATELET # BLD AUTO: 231 10*3/MM3 (ref 140–450)
PMV BLD AUTO: 9.5 FL (ref 6–12)
POTASSIUM SERPL-SCNC: 4.1 MMOL/L (ref 3.5–5.2)
PROT SERPL-MCNC: 6.3 G/DL (ref 6–8.5)
RBC # BLD AUTO: 3.87 10*6/MM3 (ref 3.77–5.28)
SODIUM SERPL-SCNC: 140 MMOL/L (ref 136–145)
WBC NRBC COR # BLD AUTO: 6.42 10*3/MM3 (ref 3.4–10.8)

## 2024-07-10 PROCEDURE — 99213 OFFICE O/P EST LOW 20 MIN: CPT

## 2024-07-10 PROCEDURE — 99239 HOSP IP/OBS DSCHRG MGMT >30: CPT | Performed by: FAMILY MEDICINE

## 2024-07-10 PROCEDURE — 80053 COMPREHEN METABOLIC PANEL: CPT | Performed by: FAMILY MEDICINE

## 2024-07-10 PROCEDURE — 85025 COMPLETE CBC W/AUTO DIFF WBC: CPT | Performed by: FAMILY MEDICINE

## 2024-07-10 PROCEDURE — 83735 ASSAY OF MAGNESIUM: CPT | Performed by: FAMILY MEDICINE

## 2024-07-10 PROCEDURE — G0378 HOSPITAL OBSERVATION PER HR: HCPCS

## 2024-07-10 RX ORDER — ASPIRIN 81 MG/1
81 TABLET, CHEWABLE ORAL DAILY
Qty: 30 TABLET | Refills: 0 | Status: SHIPPED | OUTPATIENT
Start: 2024-07-11

## 2024-07-10 RX ADMIN — ESCITALOPRAM OXALATE 20 MG: 20 TABLET ORAL at 08:25

## 2024-07-10 RX ADMIN — ASPIRIN 81 MG: 81 TABLET, CHEWABLE ORAL at 08:25

## 2024-07-10 RX ADMIN — CLOPIDOGREL BISULFATE 75 MG: 75 TABLET ORAL at 08:25

## 2024-07-10 NOTE — PROGRESS NOTES
Stroke Progress Note       Chief Complaint:  AMS, speech difficulty, generalized weakness, dizziness     Subjective    Subjective     Subjective:  Patient was observed sitting upright in hospital bed with daughter at bedside.  She is in no apparent distress and no acute events overnight.  Initial symptoms of dizziness generalized weakness and speech difficulty have resolved and the patient currently feels she is at baseline.  No current headache or numbness.  patient reiterates significant life stressors and anxiety.     Review of Systems   Constitutional:  Negative for activity change and appetite change.   Neurological:  Negative for dizziness, facial asymmetry, light-headedness, numbness and headaches.   Hematological:  Bruises/bleeds easily.   Psychiatric/Behavioral:  Positive for sleep disturbance. Negative for self-injury and suicidal ideas. The patient is nervous/anxious.             Objective    Objective      Temp:  [98 °F (36.7 °C)-98.6 °F (37 °C)] 98 °F (36.7 °C)  Heart Rate:  [49-63] 49  Resp:  [18] 18  BP: (126-164)/(75-92) 143/83        Neurological Exam  Mental Status  Alert. Oriented to person, place and time. Oriented to person, place, and time. Speech is normal. Language is fluent with no aphasia.    Cranial Nerves  CN II: Visual fields full to confrontation.  CN III, IV, VI: Extraocular movements intact bilaterally. Normal lids and orbits bilaterally. Pupils equal round and reactive to light bilaterally.  CN V: Facial sensation is normal.  CN VII: Full and symmetric facial movement.  CN XI: Shoulder shrug strength is normal.  CN XII: Tongue midline without atrophy or fasciculations.    Motor  Normal muscle bulk throughout. No fasciculations present. Normal muscle tone. Strength is 5/5 throughout all four extremities.    Sensory  Light touch is normal in upper and lower extremities.     Coordination  Right: Finger-to-nose normal. Heel-to-shin normal.Left: Finger-to-nose normal. Heel-to-shin  normal.    Gait  Casual gait is normal including stance, stride, and arm swing.      Physical Exam  Constitutional:       General: She is not in acute distress.     Appearance: She is not ill-appearing.   HENT:      Head: Normocephalic and atraumatic.      Mouth/Throat:      Pharynx: Oropharynx is clear.   Eyes:      General: Lids are normal.      Extraocular Movements: Extraocular movements intact.      Pupils: Pupils are equal, round, and reactive to light.   Cardiovascular:      Rate and Rhythm: Normal rate.   Pulmonary:      Effort: Pulmonary effort is normal. No respiratory distress.   Musculoskeletal:      Right lower leg: No edema.      Left lower leg: No edema.   Skin:     General: Skin is warm.   Neurological:      General: No focal deficit present.      Mental Status: She is alert and oriented to person, place, and time. Mental status is at baseline.      Motor: Motor strength is normal.  Psychiatric:         Speech: Speech normal.       Interval: baseline  1a. Level of Consciousness: 0-->Alert, keenly responsive  1b. LOC Questions: 0-->Answers both questions correctly  1c. LOC Commands: 0-->Performs both tasks correctly  2. Best Gaze: 0-->Normal  3. Visual: 0-->No visual loss  4. Facial Palsy: 0-->Normal symmetrical movements  5a. Motor Arm, Left: 0-->No drift, limb holds 90 (or 45) degrees for full 10 secs  5b. Motor Arm, Right: 0-->No drift, limb holds 90 (or 45) degrees for full 10 secs  6a. Motor Leg, Left: 0-->No drift, leg holds 30 degree position for full 5 secs  6b. Motor Leg, Right: 0-->No drift, leg holds 30 degree position for full 5 secs  7. Limb Ataxia: 0-->Absent  8. Sensory: 0-->Normal, no sensory loss  9. Best Language: 0-->No aphasia, normal  10. Dysarthria: 0-->Normal  11. Extinction and Inattention (formerly Neglect): 0-->No abnormality    Total (NIH Stroke Scale): 0     CBC w/diff          7/8/2024    11:39 7/8/2024    11:49 7/9/2024    04:20 7/10/2024    05:39   CBC w/Diff   WBC  6.22  " 6.63  6.42    RBC  4.28  3.92  3.87    Hemoglobin 13.9  13.8  12.6  12.5    Hematocrit 41  40.9  38.0  37.5    MCV  95.6  96.9  96.9    MCH  32.2  32.1  32.3    MCHC  33.7  33.2  33.3    RDW  13.6  13.8  13.5    Platelets  244  235  231    Neutrophil Rel %  66.1  63.6  62.8    Immature Granulocyte Rel %  0.2  0.3  0.2    Lymphocyte Rel %  20.9  22.0  22.3    Monocyte Rel %  9.2  9.4  9.5    Eosinophil Rel %  2.3  3.2  3.6    Basophil Rel %  1.3  1.5  1.6       Basic Metabolic Panel    Sodium Sodium   Date Value Ref Range Status   07/10/2024 140 136 - 145 mmol/L Final   07/09/2024 139 136 - 145 mmol/L Final   07/08/2024 138 136 - 145 mmol/L Final      Potassium Potassium   Date Value Ref Range Status   07/10/2024 4.1 3.5 - 5.2 mmol/L Final   07/09/2024 3.8 3.5 - 5.2 mmol/L Final     Comment:     Slight hemolysis detected by analyzer. Result may be falsely elevated.   07/08/2024 4.3 3.5 - 5.2 mmol/L Final     Comment:     Slight hemolysis detected by analyzer. Result may be falsely elevated.      Chloride Chloride   Date Value Ref Range Status   07/10/2024 105 98 - 107 mmol/L Final   07/09/2024 105 98 - 107 mmol/L Final   07/08/2024 102 98 - 107 mmol/L Final      Bicarbonate No results found for: \"PLASMABICARB\"   BUN BUN   Date Value Ref Range Status   07/10/2024 12 8 - 23 mg/dL Final   07/09/2024 12 8 - 23 mg/dL Final   07/08/2024 12 8 - 23 mg/dL Final      Creatinine Creatinine   Date Value Ref Range Status   07/10/2024 0.72 0.57 - 1.00 mg/dL Final   07/09/2024 0.80 0.57 - 1.00 mg/dL Final   07/08/2024 0.86 0.57 - 1.00 mg/dL Final   07/08/2024 0.90 0.60 - 1.30 mg/dL Final      Calcium Calcium   Date Value Ref Range Status   07/10/2024 8.9 8.6 - 10.5 mg/dL Final   07/09/2024 8.7 8.6 - 10.5 mg/dL Final   07/08/2024 10.0 8.6 - 10.5 mg/dL Final      Glucose      No components found for: \"GLUCOSE.*\"        EEG    Result Date: 7/9/2024  Normal study This report is transcribed using the Dragon dictation system.      MRI " Brain Without Contrast    Result Date: 7/9/2024  1.Several tiny foci of T2/FLAIR signal hyperintensity within the bilateral frontoparietal white matter. This is likely related to chronic microvascular ischemic change. 2.No diffusion restriction is identified to suggest acute infarct. 3.Please see above for additional details. Electronically Signed: Nic Maharaj MD  7/9/2024 11:41 AM EDT  Workstation ID: [id number]    XR Chest 1 View    Result Date: 7/8/2024  Impression: No acute cardiopulmonary findings. Electronically Signed: Dangelo Ann MD  7/8/2024 12:19 PM EDT  Workstation ID: [id number]    CT Angiogram Head w AI Analysis of LVO    Result Date: 7/8/2024  1.No acute abnormality identified within the large arteries of the head or neck. 2.No significant stenosis of the bilateral internal carotid arteries. 3.CT perfusion study demonstrates no territorial ischemia or core infarct. Electronically Signed: Nic Maharaj MD  7/8/2024 12:03 PM EDT  Workstation ID: [id number]    CT Angiogram Neck    Result Date: 7/8/2024  1.No acute abnormality identified within the large arteries of the head or neck. 2.No significant stenosis of the bilateral internal carotid arteries. 3.CT perfusion study demonstrates no territorial ischemia or core infarct. Electronically Signed: Nic Maharaj MD  7/8/2024 12:03 PM EDT  Workstation ID: [id number]    CT CEREBRAL PERFUSION WITH & WITHOUT CONTRAST    Result Date: 7/8/2024  1.No acute abnormality identified within the large arteries of the head or neck. 2.No significant stenosis of the bilateral internal carotid arteries. 3.CT perfusion study demonstrates no territorial ischemia or core infarct. Electronically Signed: Nic Maharaj MD  7/8/2024 12:03 PM EDT  Workstation ID: [id number]    CT Head Without Contrast Stroke Protocol    Result Date: 7/8/2024  Impression: No evidence of acute intracranial disease. Electronically Signed: Nadir Wylie MD  7/8/2024 11:56 AM EDT  Workstation ID:  [id number]        Results Review:    I reviewed the patient's new clinical results.    Results for orders placed during the hospital encounter of 06/04/24    Adult Transthoracic Echo Complete W/ Cont if Necessary Per Protocol (With Agitated Saline)    Interpretation Summary    Left ventricular systolic function is normal. Left ventricular ejection fraction appears to be 56 - 60%.    Left ventricular wall thickness is consistent with mild concentric hypertrophy.    Left ventricular diastolic function is consistent with (grade Ia w/high LAP) impaired relaxation.    The right ventricular cavity is borderline dilated.    Saline test results are negative.    Moderate aortic valve regurgitation is present.    Mild tricuspid valve regurgitation is present.      Assessment/Plan     Assessment/Plan:  69-year-old female with a PMH significant for HTN, syncope, TIA (5 weeks ago), anxiety, breast cancer, and GERD who presents to the [facility] ED via EMS with complaints of AMS, right-sided headache, speech difficulty, and generalized weakness that began this morning at approximately 10 AM and resolved shortly after ED arrival.  She is not a candidate for TNK due to resolution of symptoms.  She is not a candidate for neurointervention as no LVO was noted on advanced imaging.  MRI No diffusion restrictionto suggest acute infarct.    Antiplatelet PTA: Plavix  Anticoagulant PTA: none      Transient speech difficulty and generalized weakness  - Possible TIA, complex migraine or functional in etiology  - Continue high-dose statin.   on 6/4/2024.  Goal less than 70 for secondary stroke prevention.   -Continue aspirin 81 mg daily  -Continue Plavix 75 mg daily (P2Y12 level is 31 indicating clopidogrel responder)  -Normalize blood pressure goals, management of home medication per PCP  -Recommend follow-up with PCP to adjust anxiety medication and schedule possible referral with behavioral health  -Will see neurostroke clinic at next  scheduled follow-up in approximately 3 months    Disposition: Anticipate discharge home today    Discussed the importance of medication compliance Plavix 75mg daily, Aspirin 81mg daily, and Atorvastatin 80mg nightly and lifestyle modifications adequate control of blood pressure, adequate control of cholesterol (goal LDL <70), increased physical activity, and implementation of healthy diet to help reduce the risk of future cerebrovascular events.  Also discussed the signs symptoms that would warrant the patient return back to the emergency department including unilateral weakness, unilateral numbness, visual disturbances, loss of balance, speech difficulties, and/or a sudden severe headache.  Patient acknowledged understanding.     Neurology stroke will sign off at this time. plan of care discussed with patient, patient's daughter, Dr. Nicholas and hospital medical team.  Please call with any questions or concerns    Alek Avitia PA-C  07/10/24  09:43 EDT

## 2024-07-10 NOTE — DISCHARGE SUMMARY
[facility] Medicine Services  DISCHARGE SUMMARY    Patient Name: Clara Gonzalez  : 1955  MRN: [id number]    Date of Admission: 2024 11:32 AM  Date of Discharge:  7/10/2024  Primary Care Physician: Velia James APRN    Consults       Date and Time Order Name Status Description    2024 11:33 AM Inpatient Neurology Consult Stroke Completed             Hospital Course     Presenting Problem: Weakness/ Aphasia     Active Hospital Problems    Diagnosis  POA    TIA (transient ischemic attack) [G45.9]  Yes    Essential hypertension [I10]  Yes      Resolved Hospital Problems    Diagnosis Date Resolved POA    **Generalized weakness [R53.1] 07/10/2024 Yes          Hospital Course:  Clara Gonzalez is a 69 y.o. female with a history of breast cancer and hypertension and recent TIA who is here with weakness. Called to bedside on AM of  during aphasic episode, discussed and stroke neurology evaluated as well, MRI actually scheduled this AM and patient taken down along with follow up EEG. Negative MRI brain for acute findings, negative EEG as well, plans for continued observation, PT/OT following as well. Continue to follow with stroke neurology at this time. Daughter updated at bedside and discussed case with stroke neurology today as well. Patient seen again on 7/10, discussed with stroke neurology and considered possible TIA but worried possible anxiety/functional component to episode as well. Recommended outpatient follow up with PCP as well as stroke neurology. Patient without complaint this AM, daughter again updated at bedside and plans for discharge to home today.      Weakness- resolved   Recent TIA  Aphasic episode on , ? Recurrent TIA vs possible anxiety  - Imaging and labs as above.  No new remarkable head imaging findings  --u/a and cxr unremarkable, clinical picture and hx does not seem c/w infection  - Stroke team has recommended admission for continued  neuroevaluation  -- MRI brain negative and EEG negative, patient with apashic episode on 7/9 but quickly resolved, ? Possible function overlay, plans for follow up with regular PCP and possible behavioral health as well.   - restart home blood pressure medications with negative MRI brain   --cont asa, plavix, statin     Hypertension  - restart home metoprolol      Anxiety  --cont home meds         Discharge Follow Up Recommendations for outpatient labs/diagnostics:   Follow up with PCP in 1 week   Follow up with stroke neurology in 1 month     Day of Discharge     HPI:   Patient is a 70 yo F seen and examined by me this AM, she is resting comfortably in bed and without acute complaints, plans for d/c to home today as above         Vital Signs:   Temp:  [98 °F (36.7 °C)-98.6 °F (37 °C)] 98 °F (36.7 °C)  Heart Rate:  [49-63] 49  Resp:  [18] 18  BP: (126-164)/(75-92) 143/83      Physical Exam:  Constitutional: No acute distress, awake, alert  HENT: NCAT, mucous membranes moist  Respiratory: Clear to auscultation bilaterally, respiratory effort normal   Cardiovascular: RRR, no murmurs, rubs, or gallops  Gastrointestinal: Positive bowel sounds, soft, nontender, nondistended  Musculoskeletal: No bilateral ankle edema  Psychiatric: Appropriate affect, cooperative  Neurologic: Oriented x 3, strength symmetric in all extremities, Cranial Nerves grossly intact to confrontation, speech clear  Skin: No rashes      Pertinent  and/or Most Recent Results     LAB RESULTS:      Lab 07/10/24  0539 07/09/24  0420 07/08/24  1149 07/08/24  1139 07/08/24  1138   WBC 6.42 6.63 6.22  --   --    HEMOGLOBIN 12.5 12.6 13.8  --   --    HEMOGLOBIN, POC  --   --   --  13.9  --    HEMATOCRIT 37.5 38.0 40.9  --   --    HEMATOCRIT POC  --   --   --  41  --    PLATELETS 231 235 244  --   --    NEUTROS ABS 4.04 4.22 4.12  --   --    IMMATURE GRANS (ABS) 0.01 0.02 0.01  --   --    LYMPHS ABS 1.43 1.46 1.30  --   --    MONOS ABS 0.61 0.62 0.57  --   --     EOS ABS 0.23 0.21 0.14  --   --    MCV 96.9 96.9 95.6  --   --    PROTIME  --   --   --   --  13.5   APTT  --   --  29.1  --   --          Lab 07/10/24  0539 07/09/24  0420 07/08/24  1149 07/08/24  1139   SODIUM 140 139 138  --    POTASSIUM 4.1 3.8 4.3  --    CHLORIDE 105 105 102  --    CO2 24.0 23.0 24.0  --    ANION GAP 11.0 11.0 12.0  --    BUN 12 12 12  --    CREATININE 0.72 0.80 0.86 0.90   EGFR 91.2 80.4 73.7 69.8   GLUCOSE 112* 88 137*  --    CALCIUM 8.9 8.7 10.0  --    MAGNESIUM 2.1  --   --   --    HEMOGLOBIN A1C  --  5.40  --   --          Lab 07/10/24  0539 07/08/24  1149   TOTAL PROTEIN 6.3 7.6   ALBUMIN 4.0 4.5   GLOBULIN 2.3 3.1   ALT (SGPT) 19 19   AST (SGOT) 23 26   BILIRUBIN 0.7 0.8   ALK PHOS 113 139*         Lab 07/08/24  1149 07/08/24  1138   HSTROP T <6  --    PROTIME  --  13.5   INR  --  1.1                 Brief Urine Lab Results  (Last result in the past 365 days)        Color   Clarity   Blood   Leuk Est   Nitrite   Protein   CREAT   Urine HCG        07/08/24 1222 Yellow   Clear   Negative   Small (1+)   Negative   Negative                 Microbiology Results (last 10 days)       ** No results found for the last 240 hours. **            EEG    Result Date: 7/9/2024  Reason for referral: 69 y.o.female with consideration of seizures Technical Summary:  A 19 channel digital EEG was performed using the international 10-20 placement system, including eye leads and EKG leads. Duration: 23 minutes Findings: The patient is awake.  The background shows diffuse low amplitude intermixed theta and alpha activity present symmetrically over both hemispheres.  A clear posterior rhythm is not seen.  Hyperventilation is performed with good cooperation and does not change the background.  Stage II sleep is not seen.  Photic stimulation does not change the tracing.  No focal features or epileptiform activity are seen Video: Available Technical quality: Superior EKG: Regular, 70 bpm SUMMARY: Normal EEG in  the awake state No focal features or epileptiform activity are seen     Normal study This report is transcribed using the Dragon dictation system.      MRI Brain Without Contrast    Result Date: 7/9/2024  MRI BRAIN WO CONTRAST Date of Exam: 7/9/2024 10:19 AM EDT Indication: AMS, speech difficulty.  Comparison: None available. Technique:  Routine multiplanar/multisequence sequence images of the brain were obtained without contrast administration. FINDINGS: There are several tiny foci of T2/FLAIR signal hyperintensity within the bilateral frontoparietal white matter, nonspecific. Midline structures appear unremarkable. No significant mass effect, intracranial hemorrhage, or hydrocephalus is identified. Diffusion-weighted sequences demonstrate no acute infarct. Probable faint artifactual DWI signal within the left cerebellum (series 2, image 52). The visualized intracranial flow-voids appear unremarkable. Scattered paranasal sinus mucosal thickening is present. Bilateral lens prostheses are noted. The visualized superficial soft tissues and cervical spine demonstrate no significant abnormality.     1.Several tiny foci of T2/FLAIR signal hyperintensity within the bilateral frontoparietal white matter. This is likely related to chronic microvascular ischemic change. 2.No diffusion restriction is identified to suggest acute infarct. 3.Please see above for additional details. Electronically Signed: Nic Maharaj MD  7/9/2024 11:41 AM EDT  Workstation ID: [id number]    XR Chest 1 View    Result Date: 7/8/2024  XR CHEST 1 VW Date of Exam: 7/8/2024 11:37 AM EDT Indication: Acute Stroke Protocol (onset < 12 hrs) Comparison: Chest radiograph 6/4/2024. Findings: Cardiomediastinal silhouette is unchanged. Lungs are clear without focal consolidation. No pleural effusion or pneumothorax. Osseous structures are unchanged without acute finding.     Impression: No acute cardiopulmonary findings. Electronically Signed: Dangelo Ann MD   7/8/2024 12:19 PM EDT  Workstation ID: [id number]    CT Angiogram Head w AI Analysis of LVO    Result Date: 7/8/2024  CT ANGIOGRAM HEAD W AI ANALYSIS OF LVO, CT ANGIOGRAM NECK, CT CEREBRAL PERFUSION W WO CONTRAST Date of Exam: 7/8/2024 11:36 AM EDT Indication: Neuro Deficit, acute, Stroke suspected Neuro deficit, acute stroke suspected. Comparison: None available. Technique: CTA of the head was performed after the uneventful intravenous administration of 115 mL of Isovue-370. Reconstructed coronal and sagittal images were also obtained. In addition, a 3-D volume rendered image was created for interpretation. Automated exposure control and iterative reconstruction methods were used. FINDINGS: Vascular Findings: The right common carotid, internal carotid, middle cerebral, anterior cerebral, vertebral, and posterior cerebral arteries are patent without abrupt cut off or aneurysmal dilation. There is fetal origin of the right posterior cerebral artery. The left common carotid, internal carotid, middle cerebral, anterior cerebral, vertebral, and posterior cerebral arteries are patent without abrupt cut off or aneurysmal dilation. Diminutive intracranial portion of the left vertebral artery. Basilar artery appears patent and appears unremarkable. Non-vascular Findings: For description of nonvascular intracranial findings, please refer to the noncontrast head CT performed the same date. No acute abnormality is identified within the visualized soft tissue or bony structures of the neck. The visualized lung apices are clear. FINDINGS: CT Perfusion: CBF (<30%) volume: 0 mL Tmax (>6.0s) volume: 0 mL Mismatch volume: 0 mL Mismatch ratio: None     1.No acute abnormality identified within the large arteries of the head or neck. 2.No significant stenosis of the bilateral internal carotid arteries. 3.CT perfusion study demonstrates no territorial ischemia or core infarct. Electronically Signed: Nic Maharaj MD  7/8/2024 12:03 PM  EDT  Workstation ID: [id number]    CT Angiogram Neck    Result Date: 7/8/2024  CT ANGIOGRAM HEAD W AI ANALYSIS OF LVO, CT ANGIOGRAM NECK, CT CEREBRAL PERFUSION W WO CONTRAST Date of Exam: 7/8/2024 11:36 AM EDT Indication: Neuro Deficit, acute, Stroke suspected Neuro deficit, acute stroke suspected. Comparison: None available. Technique: CTA of the head was performed after the uneventful intravenous administration of 115 mL of Isovue-370. Reconstructed coronal and sagittal images were also obtained. In addition, a 3-D volume rendered image was created for interpretation. Automated exposure control and iterative reconstruction methods were used. FINDINGS: Vascular Findings: The right common carotid, internal carotid, middle cerebral, anterior cerebral, vertebral, and posterior cerebral arteries are patent without abrupt cut off or aneurysmal dilation. There is fetal origin of the right posterior cerebral artery. The left common carotid, internal carotid, middle cerebral, anterior cerebral, vertebral, and posterior cerebral arteries are patent without abrupt cut off or aneurysmal dilation. Diminutive intracranial portion of the left vertebral artery. Basilar artery appears patent and appears unremarkable. Non-vascular Findings: For description of nonvascular intracranial findings, please refer to the noncontrast head CT performed the same date. No acute abnormality is identified within the visualized soft tissue or bony structures of the neck. The visualized lung apices are clear. FINDINGS: CT Perfusion: CBF (<30%) volume: 0 mL Tmax (>6.0s) volume: 0 mL Mismatch volume: 0 mL Mismatch ratio: None     1.No acute abnormality identified within the large arteries of the head or neck. 2.No significant stenosis of the bilateral internal carotid arteries. 3.CT perfusion study demonstrates no territorial ischemia or core infarct. Electronically Signed: Nic Maharaj MD  7/8/2024 12:03 PM EDT  Workstation ID: [id number]    CT  CEREBRAL PERFUSION WITH & WITHOUT CONTRAST    Result Date: 7/8/2024  CT ANGIOGRAM HEAD W AI ANALYSIS OF LVO, CT ANGIOGRAM NECK, CT CEREBRAL PERFUSION W WO CONTRAST Date of Exam: 7/8/2024 11:36 AM EDT Indication: Neuro Deficit, acute, Stroke suspected Neuro deficit, acute stroke suspected. Comparison: None available. Technique: CTA of the head was performed after the uneventful intravenous administration of 115 mL of Isovue-370. Reconstructed coronal and sagittal images were also obtained. In addition, a 3-D volume rendered image was created for interpretation. Automated exposure control and iterative reconstruction methods were used. FINDINGS: Vascular Findings: The right common carotid, internal carotid, middle cerebral, anterior cerebral, vertebral, and posterior cerebral arteries are patent without abrupt cut off or aneurysmal dilation. There is fetal origin of the right posterior cerebral artery. The left common carotid, internal carotid, middle cerebral, anterior cerebral, vertebral, and posterior cerebral arteries are patent without abrupt cut off or aneurysmal dilation. Diminutive intracranial portion of the left vertebral artery. Basilar artery appears patent and appears unremarkable. Non-vascular Findings: For description of nonvascular intracranial findings, please refer to the noncontrast head CT performed the same date. No acute abnormality is identified within the visualized soft tissue or bony structures of the neck. The visualized lung apices are clear. FINDINGS: CT Perfusion: CBF (<30%) volume: 0 mL Tmax (>6.0s) volume: 0 mL Mismatch volume: 0 mL Mismatch ratio: None     1.No acute abnormality identified within the large arteries of the head or neck. 2.No significant stenosis of the bilateral internal carotid arteries. 3.CT perfusion study demonstrates no territorial ischemia or core infarct. Electronically Signed: Nic Maharaj MD  7/8/2024 12:03 PM EDT  Workstation ID: [id number]    CT Head Without  Contrast Stroke Protocol    Result Date: 7/8/2024  CT HEAD WO CONTRAST STROKE PROTOCOL Date of Exam: 7/8/2024 11:33 AM EDT Indication: Neuro deficit, acute, stroke suspected Neuro Deficit, acute, Stroke suspected. Comparison: Brain MRI 6/5/2024 Technique: Axial CT images were obtained of the head without contrast administration.  Reconstructed coronal images were also obtained. Automated exposure control and iterative construction methods were used. Scan Time: 11:34 a.m. Results discussed with stroke team nurse navigator at 11:40 a.m. Findings: Previous exam report from 6/5/2024 indicated no acute intracranial abnormality. Today's study shows the calvarium to appear intact. Included paranasal sinuses and mastoids appear clear. There is appropriate degree of generalized cerebral atrophy for age. There is no evidence of mass, mass effect, hemorrhage, edema/infarct, hydrocephalus, or abnormal extra-axial collection. .     Impression: No evidence of acute intracranial disease. Electronically Signed: Nadir Wylie MD  7/8/2024 11:56 AM EDT  Workstation ID: [id number]             Results for orders placed during the hospital encounter of 06/04/24    Adult Transthoracic Echo Complete W/ Cont if Necessary Per Protocol (With Agitated Saline)    Interpretation Summary    Left ventricular systolic function is normal. Left ventricular ejection fraction appears to be 56 - 60%.    Left ventricular wall thickness is consistent with mild concentric hypertrophy.    Left ventricular diastolic function is consistent with (grade Ia w/high LAP) impaired relaxation.    The right ventricular cavity is borderline dilated.    Saline test results are negative.    Moderate aortic valve regurgitation is present.    Mild tricuspid valve regurgitation is present.      Plan for Follow-up of Pending Labs/Results: N/A     Discharge Details        Discharge Medications        New Medications        Instructions Start Date   aspirin 81 MG chewable  tablet   81 mg, Oral, Daily   Start Date: July 11, 2024            Continue These Medications        Instructions Start Date   atorvastatin 80 MG tablet  Commonly known as: LIPITOR   80 mg, Oral, Nightly      clopidogrel 75 MG tablet  Commonly known as: PLAVIX   75 mg, Oral, Daily      escitalopram 20 MG tablet  Commonly known as: LEXAPRO   20 mg, Oral, Daily      hydrOXYzine 10 MG tablet  Commonly known as: ATARAX   1-2 mg, Oral, 3 Times Daily PRN, Take 1 to 2 tablets three times a day as needed      metoprolol succinate XL 50 MG 24 hr tablet  Commonly known as: TOPROL-XL   50 mg, Oral, Daily             Stop These Medications      tamoxifen 10 MG tablet  Commonly known as: NOLVADEX     traZODone 50 MG tablet  Commonly known as: DESYREL              No Known Allergies      Discharge Disposition:  Home or Self Care    Diet:  Hospital:  Diet Order   Procedures    Diet: Cardiac; Healthy Heart (2-3 Na+); Texture: Regular (IDDSI 7); Fluid Consistency: Thin (IDDSI 0)            Activity:  Resume previous as tolerated     Restrictions or Other Recommendations:  Follow up appts as below        CODE STATUS:    Code Status and Medical Interventions:   Ordered at: 07/08/24 1355     Level Of Support Discussed With:    Patient     Code Status (Patient has no pulse and is not breathing):    CPR (Attempt to Resuscitate)     Medical Interventions (Patient has pulse or is breathing):    Full Support       Future Appointments   Date Time Provider Department Center   10/1/2024  8:30 AM Sarina Patton APRN [facility]   12/17/2024 10:30 AM [facility] FOLLOW-UP  [facility]   6/19/2025  9:00 AM Shawanda Carrizales APRN [facility] None       Additional Instructions for the Follow-ups that You Need to Schedule       Discharge Follow-up with PCP   As directed       Currently Documented PCP:    Velia James APRN    PCP Phone Number:    [phone]     Follow Up Details: Follow up with PCP in 1 week        Discharge  Follow-up with Specified Provider: Follow up with Stroke Neurology per their recommendations   As directed      To: Follow up with Stroke Neurology per their recommendations                      ISAIAH Cabrera DO  07/10/24      Time Spent on Discharge:  I spent  40  minutes on this discharge activity which included: face-to-face encounter with the patient, reviewing the data in the system, coordination of the care with the nursing staff as well as consultants, documentation, and entering orders.

## 2024-07-11 ENCOUNTER — READMISSION MANAGEMENT (OUTPATIENT)
Dept: CALL CENTER | Facility: HOSPITAL | Age: 69
End: 2024-07-11
Payer: COMMERCIAL

## 2024-07-11 NOTE — OUTREACH NOTE
Prep Survey      Flowsheet Row Responses   [facility] patient discharged from? [place]   Is LACE score < 7 ? Yes   Eligibility Readm Mgmt   Discharge diagnosis Weakness/Recent TIA   Does the patient have one of the following disease processes/diagnoses(primary or secondary)? Stroke   Does the patient have Home health ordered? No   Is there a DME ordered? No   Prep survey completed? Yes            LAN CROUCH - Registered Nurse

## 2024-07-11 NOTE — OUTREACH NOTE
Stroke Week 1 Survey      Flowsheet Row Responses   [facility] patient discharged from? [place]   Does the patient have one of the following disease processes/diagnoses(primary or secondary)? Stroke   Week 1 attempt successful? Yes   Call start time 0830   Call end time 0833   Discharge diagnosis Weakness/Recent TIA   Person spoke with today (if not patient) and relationship Julee   Meds reviewed with patient/caregiver? Yes   Does the patient have all medications ordered at discharge? N/A   Is the patient taking all medications as directed (includes completed medication regime)? Yes   Does the patient have a primary care provider?  Yes   Does the patient have an appointment with their PCP within 7 days of discharge? No   What is preventing the patient from scheduling follow up appointments within 7 days of discharge? Haven't had time  [Pt will be calling PCP today]   Nursing Interventions Advised patient to make appointment, Educated patient on importance of making appointment   Has the patient kept scheduled appointments due by today? N/A   The St[facility] at [facility] requests you follow up with them within 30 days for important follow up care. Please call [phone] to schedule this appointment. Thank you. Yes  [8/28/24 9 am]   Has home health visited the patient within 72 hours of discharge? N/A   Psychosocial issues? No   Does the patient require any assistance with activities of daily living such as eating, bathing, dressing, walking, etc.? No   Does the patient have any residual symptoms from stroke/TIA? No   Did the patient receive a copy of their discharge instructions? Yes   Nursing interventions Reviewed instructions with patient   What is the patient's perception of their health status since discharge? Returned to baseline/stable   Is the patient/caregiver able to teach back the risk factors for a stroke? History of TIAs   Is the patient/caregiver able to teach back the  hierarchy of who to call/visit for symptoms/problems? PCP, Specialist, Home health nurse, Urgent Care, ED, 911 Yes   Is the patient able to teach back FAST for Stroke? B alance: Watch for sudden loss of balance, E yes: Check for vision loss, F ace: Look for an uneven smile, A rm: Check if one arm is weak, S peech: Listen for slurred speech, T ezra: Call 9-1-1 right away   Week 1 call completed? Yes   Revoked No further contact(revokes)-requires comment   Graduated/Revoked comments Daughter reports Pt back to base line as s/s only last short period. No needs   Call end time [id number]            KIYA DUNCAN - Registered Nurse

## 2024-08-14 ENCOUNTER — OFFICE VISIT (OUTPATIENT)
Dept: BEHAVIORAL HEALTH | Facility: CLINIC | Age: 69
End: 2024-08-14
Payer: MEDICARE

## 2024-08-14 DIAGNOSIS — F43.9 TRAUMA AND STRESSOR-RELATED DISORDER: Primary | ICD-10-CM

## 2024-08-14 DIAGNOSIS — F43.21 GRIEF: ICD-10-CM

## 2024-08-14 NOTE — PROGRESS NOTES
"     New Patient Office Visit      Patient Name: Clara Gonzalez  : 1955   MRN: [id number]     Referring Provider: Velia James APRN    Chief Complaint:  Psychiatric evaluation related to:     ICD-10-CM ICD-9-CM   1. Trauma and stressor-related disorder  F43.9 309.81     308.9   2. Grief  F43.21 309.0        History of Present Illness:   Clara Gonzalez is a 69 y.o. female who is here today for psychiatric evaluation on referral from primary care provider related to depression.  Patient reports that she has been through a lot of things over the past several years which has been the source of her depressed mood.      Patient reports that everything started in [place], 2021 after her  passed.  She reports that he was going in for a cardiac procedure related to an arrhythmia.  However, she reports after they stopped his heart and then wanted to restart it he never came back.  She said she watched him die on the table.  She reports on 2022 when she was at [employer] she had a syncopal episode and went unconscious.  She reports that she was in the hospital for 3 days and was diagnosed with colitis.  In  she reports that she was diagnosed with breast cancer and had a lumpectomy in 2024.  Breast cancer 2024.  Over the past several months patient has been in the hospital related to possible TIAs.  However, she reports I have had all the tests done and nothing shows any type of neurological issue.  She reports also that these episodes usually are result of increased stress and panic.  She reports that she has experienced significant increase in overall anxiety and panic.  She reports that when she has her \"TIAs\" she will become very sweaty, feel dizzy, and have an elevated heart rate.  Patient reports 90 times she goes outside and works in the yard start sweating she thinks she is going to have another attack.  She reports that this become very debilitating.  She affirms that " "she has never appropriately grieve the loss of her  related to all the other medical issues of concern she has had throughout the years after his passing.  Stating \"it is like of had one thing after another after another.\"  Patient reports that she has also seen an increase in decreased mood, decreased motivation, worry, and nervousness.  Patient affirms that she still is severely impacted when she thinks about watching her  die.  She also reports that prior to that she witnessed both her parents die which had also been very traumatic for her.  She affirms avoidance related to these past memories.    No history of suicide ideation or suicide attempts.  No nightmares.    She reports that she is currently taking Lexapro 20 mg.  Reports no adverse effects.  She also reports that she had been prescribed hydroxyzine 10 mg to take as needed for panic.  She reports all his things to do is make her tired.      Subjective     Review of Systems:   Review of Systems   Constitutional:  Positive for appetite change and fatigue.   Respiratory: Negative.     Cardiovascular: Negative.    Genitourinary: Negative.    Musculoskeletal: Negative.    Neurological: Negative.    Psychiatric/Behavioral:  Positive for dysphoric mood and sleep disturbance. The patient is nervous/anxious.         Assessment Scores:   TONO-7 Score: TONO 7 Total Score: 18  PHQ-9 Score: PHQ-9: Brief Depression Severity Measure Score: 16     Psychiatric Review of Systems:   Mood: sad, depressed  Anxiety: anxious, worry   Karen: none  Psychosis: none  Other: none    Work History:   Highest level of education obtained: some college   Ever been active duty in the ? no  Patient's Occupation: [employer]     Interpersonal/Relational:  Marital Status:  ,  passed [place] 10, 2021.  Family Structure: lives by herself, has a son and daughter, grandchildren.   Support system:  children     Psychiatric History:   Medication: " Lexapro, Hydroxyzine, Zoloft   Hospitalization: none   Counseling/Therapy: none  Seizures: none  Suicide Attempts: none  Suicidal Ideation: none  Self-injurious behavior: none  Previous Diagnosis: depression, anxiety     History of Substance Use/Abuse:   Alcohol: none  Drugs: none  Caffeine: daily, tea, soda, coffee   Tobacco: none  Supplements: none    Family Psychiatric History:  none    Significant Life Events:   Has patient experienced any form of verbal, physical, emotional, or sexual abuse? no  Has patient experienced a death / loss of relationship? Yes, June 10th 2021  passed.   Has patient experienced a major accident or tragic events? Yes, patient witnessed both her parents and her  take their last breath in the hospital.     Triggers: (Persons/Places/Things/Events/Thought/Emotions): Patient reports when she feels physically tired, sweating, and starts breathing heavy from physical activity she starts become panicked and worried that she will have another panic attack.    Social History:   Social History     Socioeconomic History    Marital status:    Tobacco Use    Smoking status: Never     Passive exposure: Never    Smokeless tobacco: Never   Vaping Use    Vaping status: Never Used   Substance and Sexual Activity    Alcohol use: Never    Drug use: Never    Sexual activity: Defer        Past Medical History:   Past Medical History:   Diagnosis Date    Anxiety     Breast cancer     Breast injury 01/19/2023    right hematoma    Essential hypertension 07/02/2021    GERD without esophagitis 07/16/2019    TIA (transient ischemic attack) 06/04/2024       Past Surgical History:   Past Surgical History:   Procedure Laterality Date    BREAST CYST ASPIRATION Left 2005    BREAST LUMPECTOMY      CHOLECYSTECTOMY      COLONOSCOPY      HYSTERECTOMY      OOPHORECTOMY         Family History:   Family History   Problem Relation Age of Onset    Breast cancer Mother 80    Ovarian cancer Neg Hx     Colon  "cancer Neg Hx     Colon polyps Neg Hx     Esophageal cancer Neg Hx        Medications:     Current Outpatient Medications:     aspirin 81 MG chewable tablet, Chew and swallow 1 tablet by mouth Daily., Disp: 30 tablet, Rfl: 0    atorvastatin (LIPITOR) 80 MG tablet, Take 1 tablet by mouth Every Night., Disp: 90 tablet, Rfl: 0    clopidogrel (PLAVIX) 75 MG tablet, Take 1 tablet by mouth Daily., Disp: 30 tablet, Rfl: 3    escitalopram (LEXAPRO) 20 MG tablet, Take 1 tablet by mouth Daily., Disp: , Rfl:     hydrOXYzine (ATARAX) 10 MG tablet, Take 1-2 mg by mouth 3 (Three) Times a Day As Needed for Anxiety. Take 1 to 2 tablets three times a day as needed, Disp: , Rfl:     metoprolol succinate XL (TOPROL-XL) 50 MG 24 hr tablet, Take 1 tablet by mouth Daily., Disp: , Rfl:     lamoTRIgine (LaMICtal) 25 MG tablet, Take 1 tablet by mouth Every Night for 14 days, THEN 2 tablets Every Night for 28 days., Disp: 70 tablet, Rfl: 0    Allergies:   No Known Allergies      Objective     Physical Exam:  Vital Signs:   Vitals:    08/14/24 1003   BP: Comment: Cuff error   Weight: 63 kg (139 lb)   Height: 149.9 cm (59\")     Body mass index is 28.07 kg/m².     Physical Exam    Mental Status Exam:   Hygiene:   good  Cooperation:  Cooperative  Eye Contact:  Good  Psychomotor Behavior:  Appropriate  Affect: Directed  Mood: Sad, depressed, fluctuates  Speech:  Pressured  Thought Process:  Circum  Thought Content:  Mood congruent  Suicidal:  None  Homicidal:  None  Hallucinations:  None  Delusion:  None  Memory:  Intact  Orientation:  Grossly intact  Reliability:  good  Insight:  Fair  Judgement:  Fair  Impulse Control:  Fair  Physical/Medical Issues:  No      SUICIDE RISK ASSESSMENT/CSSRS:  1. Does patient have thoughts of suicide? no  2. Does patient have intent for suicide? no  3. Does patient have a current plan for suicide? no  4. History of suicide attempts: no  5. Family history of suicide or attempts: no  6. History of violent behaviors " towards others or property or thoughts of committing suicide: no  7. History of sexual aggression toward others: no  8. Access to firearms or weapons: no    Assessment / Plan      Visit Diagnosis/Orders Placed This Visit:  Diagnoses and all orders for this visit:    1. Trauma and stressor-related disorder (Primary)  -     lamoTRIgine (LaMICtal) 25 MG tablet; Take 1 tablet by mouth Every Night for 14 days, THEN 2 tablets Every Night for 28 days.  Dispense: 70 tablet; Refill: 0    2. Grief         Differential Diagnosis: Prolonged grief disorder, anxiety, MDD    PLAN:  Safety: No acute safety concerns  Risk Assessment: Risk of self-harm acutely is low. Risk of self-harm chronically is also low, but could be further elevated in the event of treatment noncompliance and/or AODA.  Initiate lamotrigine 25 mg nightly for 2 weeks.  Then increase to 50 mg.  Discussed grief process and emotional regulation.  Discussed self-care.    Treatment Plan/Goals: Continue supportive psychotherapy efforts and medications as indicated. Treatment and medication options discussed during today's visit. Patient ackowledged and verbally consented to continue with current treatment plan and was educated on the importance of compliance with treatment and follow-up appointments. Patient seems reasonably able to adhere to treatment plan.    Assisted Patient in processing above session content; acknowledged and normalized patient’s thoughts, feelings, and concerns.  Rationalized patient thought process regarding psychotropic interventions for behavior health symptomology.      Allowed Patient to freely discuss issues without interruption or judgement with unconditional positive regard, active listening skills, and empathy. Therapist provided a safe, confidential environment to facilitate the development of a positive therapeutic relationship and encouraged open, honest communication. Assisted Patient in identifying risk factors which would indicate  the need for higher level of care including thoughts to harm self or others and/or self-harming behavior and encouraged Patient to contact this office, call 911, or present to the nearest emergency room should any of these events occur. Discussed crisis intervention services and means to access. Patient adamantly and convincingly denies current suicidal or homicidal ideation or perceptual disturbance. Assisted Patient in processing session content; acknowledged and normalized Patient’s thoughts, feelings, and concerns by utilizing a person-centered approach in efforts to build appropriate rapport and a positive therapeutic relationship with open and honest communication.     Quality Measures:     TOBACCO USE:  Never smoker    I advised Clara of the risks of tobacco use.     Follow Up:   Return in about 1 month (around 9/14/2024) for Recheck.      MEI Scherer, PMHNP-BC

## 2024-08-15 VITALS — BODY MASS INDEX: 28.02 KG/M2 | WEIGHT: 139 LBS | HEIGHT: 59 IN

## 2024-08-15 RX ORDER — LAMOTRIGINE 25 MG/1
TABLET ORAL
Qty: 70 TABLET | Refills: 0 | Status: SHIPPED | OUTPATIENT
Start: 2024-08-15 | End: 2024-09-26

## 2024-08-27 PROBLEM — R29.90 EPISODE OF TRANSIENT NEUROLOGIC SYMPTOMS: Status: ACTIVE | Noted: 2024-08-27

## 2024-08-29 ENCOUNTER — TELEPHONE (OUTPATIENT)
Dept: BEHAVIORAL HEALTH | Facility: CLINIC | Age: 69
End: 2024-08-29
Payer: COMMERCIAL

## 2024-08-29 NOTE — TELEPHONE ENCOUNTER
Addy from [facility] para called ([phone]) regarding Pt's lamictal. He reports Pt is doing excellent on the 25mg dose- exercising, sleeping through the night, etc- and wants to confirm if she needs to proceed w/ dose increase as scheduled or should she stay at 25mg? Please advise

## 2024-09-17 ENCOUNTER — OFFICE VISIT (OUTPATIENT)
Dept: BEHAVIORAL HEALTH | Facility: CLINIC | Age: 69
End: 2024-09-17
Payer: MEDICARE

## 2024-09-17 VITALS
SYSTOLIC BLOOD PRESSURE: 158 MMHG | DIASTOLIC BLOOD PRESSURE: 85 MMHG | BODY MASS INDEX: 28.02 KG/M2 | WEIGHT: 139 LBS | HEART RATE: 64 BPM | HEIGHT: 59 IN

## 2024-09-17 DIAGNOSIS — F43.21 GRIEF: Primary | ICD-10-CM

## 2024-09-17 DIAGNOSIS — F43.9 TRAUMA AND STRESSOR-RELATED DISORDER: ICD-10-CM

## 2024-09-17 PROCEDURE — 99213 OFFICE O/P EST LOW 20 MIN: CPT

## 2024-09-17 PROCEDURE — 1159F MED LIST DOCD IN RCRD: CPT

## 2024-09-17 PROCEDURE — 3079F DIAST BP 80-89 MM HG: CPT

## 2024-09-17 PROCEDURE — 1160F RVW MEDS BY RX/DR IN RCRD: CPT

## 2024-09-17 PROCEDURE — 3077F SYST BP >= 140 MM HG: CPT

## 2024-09-17 RX ORDER — LAMOTRIGINE 25 MG/1
25 TABLET ORAL NIGHTLY
Qty: 30 TABLET | Refills: 1 | Status: SHIPPED | OUTPATIENT
Start: 2024-09-17

## 2024-10-29 ENCOUNTER — OFFICE VISIT (OUTPATIENT)
Dept: BEHAVIORAL HEALTH | Facility: CLINIC | Age: 69
End: 2024-10-29
Payer: MEDICARE

## 2024-10-29 VITALS
HEART RATE: 74 BPM | SYSTOLIC BLOOD PRESSURE: 149 MMHG | HEIGHT: 59 IN | DIASTOLIC BLOOD PRESSURE: 78 MMHG | BODY MASS INDEX: 28.02 KG/M2 | WEIGHT: 139 LBS

## 2024-10-29 DIAGNOSIS — F43.9 TRAUMA AND STRESSOR-RELATED DISORDER: Primary | ICD-10-CM

## 2024-10-29 DIAGNOSIS — F43.21 GRIEF: ICD-10-CM

## 2024-10-29 PROCEDURE — 1160F RVW MEDS BY RX/DR IN RCRD: CPT

## 2024-10-29 PROCEDURE — 3078F DIAST BP <80 MM HG: CPT

## 2024-10-29 PROCEDURE — 99213 OFFICE O/P EST LOW 20 MIN: CPT

## 2024-10-29 PROCEDURE — 3077F SYST BP >= 140 MM HG: CPT

## 2024-10-29 PROCEDURE — 1159F MED LIST DOCD IN RCRD: CPT

## 2024-10-29 RX ORDER — LAMOTRIGINE 25 MG/1
25 TABLET ORAL NIGHTLY
Qty: 90 TABLET | Refills: 1 | Status: SHIPPED | OUTPATIENT
Start: 2024-10-29

## 2024-10-29 NOTE — PROGRESS NOTES
"     Office  Follow Up Visit      Patient Name: Clara Gonzalez  : 1955   MRN: [id number]     Referring Provider: Velia James APRN    Chief Complaint: Medication follow-up    ICD-10-CM ICD-9-CM   1. Trauma and stressor-related disorder  F43.9 309.81     308.9   2. Grief  F43.21 309.0        History of Present Illness:   Clara Gonzalez is a 69 y.o. female who is here today for follow up related to trauma stress-related disorder and grief.  Current medication regimen includes lamotrigine 25 mg nightly.  And hydroxyzine 10 mg 3 times daily as needed.    Patient reports things continually have improved since her last follow-up.  She reports that she feels as if she is feeling \"like myself again.\"  She reports that she is now helping as a  and that she has been staying busy.  She reports no periods of anxiety or panic and affirms a good mood.  She reports that with the holidays coming up, they may be difficult at times related to her grief, but feels things have drastically improved in that regard.  She reports that she is learning to \"move ahead and live again.\"    Subjective      Review of Systems:   Review of Systems   Constitutional: Negative.    Respiratory: Negative.     Cardiovascular: Negative.    Gastrointestinal: Negative.    Genitourinary: Negative.    Musculoskeletal: Negative.    Neurological: Negative.    Psychiatric/Behavioral: Negative.         Patient History:   The following portions of the patient's history were reviewed and updated as appropriate: allergies, current medications, past family history, past medical history, past social history, past surgical history and problem list.     Social:  No change in interval history.    Medications:     Current Outpatient Medications:     lamoTRIgine (LaMICtal) 25 MG tablet, Take 1 tablet by mouth Every Night., Disp: 90 tablet, Rfl: 1    aspirin 81 MG chewable tablet, Chew and swallow 1 tablet by mouth Daily., Disp: 30 tablet, " "Rfl: 0    atorvastatin (LIPITOR) 80 MG tablet, Take 1 tablet by mouth Every Night., Disp: 90 tablet, Rfl: 0    clopidogrel (PLAVIX) 75 MG tablet, Take 1 tablet by mouth Daily., Disp: 30 tablet, Rfl: 3    hydrOXYzine (ATARAX) 10 MG tablet, Take 1-2 mg by mouth 3 (Three) Times a Day As Needed for Anxiety. Take 1 to 2 tablets three times a day as needed, Disp: , Rfl:     metoprolol succinate XL (TOPROL-XL) 50 MG 24 hr tablet, Take 1 tablet by mouth Daily., Disp: , Rfl:     Objective     Physical Exam:  Vital Signs:   Vitals:    10/29/24 1609   BP: 149/78   Pulse: 74   Weight: 63 kg (139 lb)   Height: 149.9 cm (59\")     Body mass index is 28.07 kg/m².     Mental Status Exam:   Hygiene:   good  Cooperation:  Cooperative  Eye Contact:  Good  Psychomotor Behavior:  Appropriate  Affect:  Appropriate  Mood: normal  Speech:  Normal  Thought Process:  Goal directed  Thought Content:  Normal  Suicidal:  None  Homicidal:  None  Hallucinations:  None  Delusion:  None  Memory:  Intact  Orientation:  Grossly intact  Reliability:  good  Insight:  Good  Judgement:  Good  Impulse Control:  Good  Physical/Medical Issues:  No      Assessment / Plan      Visit Diagnosis/Orders Placed This Visit:  Diagnoses and all orders for this visit:    1. Trauma and stressor-related disorder (Primary)  -     lamoTRIgine (LaMICtal) 25 MG tablet; Take 1 tablet by mouth Every Night.  Dispense: 90 tablet; Refill: 1    2. Grief         Differential:   N/A    Plan:   Stay the course.  Continue current plan of care.    Continue supportive psychotherapy efforts and medications as indicated. Treatment and medication options discussed during today's visit. Patient ackowledged and verbally consented to continue with current treatment plan and was educated on the importance of compliance with treatment and follow-up appointments. Patient seems reasonably able to adhere to treatment plan.      Medication Considerations:  Discussed medication options and treatment " plan of prescribed medication(s) as well as the risks, benefits, and potential side effects. Patient is agreeable to call the office with any worsening of symptoms or onset of side effects. Patient is agreeable to call 911 or go to the nearest ER should he/she begin having SI/HI.    Quality Measures:   Never smoker    I advised Clara Gonzalez of the risks of tobacco use.     Follow Up:   Return in about 3 months (around 1/29/2025) for Recheck.      MEI Scherer, PMHNP-BC

## 2024-11-02 ENCOUNTER — HOSPITAL ENCOUNTER (EMERGENCY)
Facility: HOSPITAL | Age: 69
Discharge: HOME | End: 2024-11-02
Attending: EMERGENCY MEDICINE
Payer: MEDICARE

## 2024-11-02 VITALS
TEMPERATURE: 98.1 F | OXYGEN SATURATION: 100 % | BODY MASS INDEX: 28 KG/M2 | HEIGHT: 59 IN | WEIGHT: 138.89 LBS | DIASTOLIC BLOOD PRESSURE: 84 MMHG | SYSTOLIC BLOOD PRESSURE: 173 MMHG | RESPIRATION RATE: 18 BRPM | HEART RATE: 66 BPM

## 2024-11-02 DIAGNOSIS — F43.9 STRESS: ICD-10-CM

## 2024-11-02 DIAGNOSIS — F41.0 PANIC ATTACK: ICD-10-CM

## 2024-11-02 DIAGNOSIS — F41.9 ANXIETY: Primary | ICD-10-CM

## 2024-11-02 PROCEDURE — 99283 EMERGENCY DEPT VISIT LOW MDM: CPT

## 2024-11-02 RX ORDER — METOPROLOL SUCCINATE 25 MG/1
50 TABLET, EXTENDED RELEASE ORAL
Status: DISCONTINUED | OUTPATIENT
Start: 2024-11-02 | End: 2024-11-02 | Stop reason: HOSPADM

## 2024-11-02 RX ORDER — HYDROXYZINE HYDROCHLORIDE 25 MG/1
25 TABLET, FILM COATED ORAL ONCE
Status: COMPLETED | OUTPATIENT
Start: 2024-11-02 | End: 2024-11-02

## 2024-11-02 RX ADMIN — HYDROXYZINE HYDROCHLORIDE 25 MG: 25 TABLET ORAL at 13:01

## 2024-11-02 RX ADMIN — METOPROLOL SUCCINATE 50 MG: 25 TABLET, EXTENDED RELEASE ORAL at 13:01

## 2024-11-02 NOTE — ED PROVIDER NOTES
"Subjective   History of Present Illness  68 yo female with PMHx of anxiety, breast cancer, TIA presents to the ED via EMS with c/o anxiety. 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 Pt reports that she did not eat breakfast this morning and only drank coffee. Stated that she started to feel her HR increase which scared her and caused her to have some SOA/hyperventilation. States that she becomes very anxious and scared when she starts feeling \"lousy\" d/t recent clinical journey. Denies CP.    History provided by:  Patient and EMS personnel      Review of Systems    Past Medical History:   Diagnosis Date    Anxiety     Breast cancer     Breast injury 01/19/2023    right hematoma    Essential hypertension 07/02/2021    GERD without esophagitis 07/16/2019    TIA (transient ischemic attack) 06/04/2024       No Known Allergies    Past Surgical History:   Procedure Laterality Date    BREAST CYST ASPIRATION Left 2005    BREAST LUMPECTOMY      CHOLECYSTECTOMY      COLONOSCOPY      HYSTERECTOMY      OOPHORECTOMY         Family History   Problem Relation Age of Onset    Breast cancer Mother 80    Ovarian cancer Neg Hx     Colon cancer Neg Hx     Colon polyps Neg Hx     Esophageal cancer Neg Hx        Social History     Socioeconomic History    Marital status:    Tobacco Use    Smoking status: Never     Passive exposure: Never    Smokeless tobacco: Never   Vaping Use    Vaping status: Never Used   Substance and Sexual Activity    Alcohol use: Never    Drug use: Never    Sexual activity: Defer           Objective   Physical Exam  Vitals and nursing note reviewed.   Constitutional:       Appearance: Normal appearance.   HENT:      Head: Normocephalic and atraumatic.   Eyes:      Extraocular Movements: Extraocular movements intact.      Conjunctiva/sclera: Conjunctivae normal.      Pupils: Pupils are equal, round, and " reactive to light.   Cardiovascular:      Rate and Rhythm: Regular rhythm. Tachycardia present.      Pulses: Normal pulses.      Heart sounds: Normal heart sounds.   Pulmonary:      Effort: Pulmonary effort is normal. No respiratory distress.      Breath sounds: Normal breath sounds.   Abdominal:      General: Abdomen is flat. Bowel sounds are normal.      Palpations: Abdomen is soft.   Neurological:      Mental Status: She is alert and oriented to person, place, and time.   Psychiatric:         Mood and Affect: Mood normal.         Behavior: Behavior normal.         Procedures           ED Course  ED Course as of 11/02/24 1655   Sat Nov 02, 2024   1349 Patient reports she is feeling much better and states she is ready go home.  Patient states she feels that she has had anxiety and panic attacks secondary to recent noncompliance with medications related to a recent virus.  She has no other concerns at this time is that she is ready to go home.  Patient appears to be stable and without acute emergent condition.  Patient is to return to the ER for any concerns.  Otherwise, she needs follow-up with her doctors for ongoing management and care.  She is encouraged to take her medications as prescribed and she agrees. [RS]      ED Course User Index  [RS] Omi Denny MD                                               Medical Decision Making  Problems Addressed:  Anxiety: complicated acute illness or injury  Panic attack: complicated acute illness or injury  Stress: complicated acute illness or injury    Amount and/or Complexity of Data Reviewed  Independent Historian: EMS    Risk  Prescription drug management.        Final diagnoses:   Anxiety   Panic attack   Stress       ED Disposition  ED Disposition       ED Disposition   Discharge    Condition   Stable    Comment   --               Velia James, APRN  [address] [place]  [facility] [address]  [phone]    Schedule an appointment as soon as possible for a  visit       [facility] EMERGENCY DEPARTMENT  [address] [facility] [address] [phone]    As needed, If symptoms worsen or ANY concerns.         Medication List      No changes were made to your prescriptions during this visit.            Omi Denny MD  11/02/24 6166

## 2024-12-17 ENCOUNTER — HOSPITAL ENCOUNTER (OUTPATIENT)
Dept: MAMMOGRAPHY | Facility: HOSPITAL | Age: 69
Discharge: HOME | End: 2024-12-17
Admitting: SURGERY
Payer: MEDICARE

## 2024-12-17 ENCOUNTER — TELEPHONE (OUTPATIENT)
Dept: MAMMOGRAPHY | Facility: HOSPITAL | Age: 69
End: 2024-12-17
Payer: COMMERCIAL

## 2024-12-17 DIAGNOSIS — Z17.0 MALIGNANT NEOPLASM OF UPPER-OUTER QUADRANT OF RIGHT BREAST IN FEMALE, ESTROGEN RECEPTOR POSITIVE: ICD-10-CM

## 2024-12-17 DIAGNOSIS — R92.8 ABNORMAL MAMMOGRAM: ICD-10-CM

## 2024-12-17 DIAGNOSIS — C50.411 MALIGNANT NEOPLASM OF UPPER-OUTER QUADRANT OF RIGHT BREAST IN FEMALE, ESTROGEN RECEPTOR POSITIVE: ICD-10-CM

## 2024-12-17 PROCEDURE — 77066 DX MAMMO INCL CAD BI: CPT | Performed by: RADIOLOGY

## 2024-12-17 PROCEDURE — 77066 DX MAMMO INCL CAD BI: CPT

## 2024-12-17 PROCEDURE — G0279 TOMOSYNTHESIS, MAMMO: HCPCS

## 2024-12-17 PROCEDURE — G0279 TOMOSYNTHESIS, MAMMO: HCPCS | Performed by: RADIOLOGY

## 2024-12-17 NOTE — TELEPHONE ENCOUNTER
Medication clearance form successfully faxed to [facility] at patient request. Provider office was contacted, spoke with Rosa Maria; verified fax # is correct; aware the patient will be scheduled for the procedure after the medication clearance is completed & returned. Verbalized understanding.

## 2024-12-20 ENCOUNTER — TELEPHONE (OUTPATIENT)
Dept: MAMMOGRAPHY | Facility: HOSPITAL | Age: 69
End: 2024-12-20
Payer: COMMERCIAL

## 2024-12-20 NOTE — TELEPHONE ENCOUNTER
Follow up call with  regarding medication clearance that was faxed, spoke with [place]. Reinforced that patient will be scheduled for biopsy once medication clearance is received, instructed to call [facility] with any questions, verbalized understanding.

## 2024-12-26 ENCOUNTER — TRANSCRIBE ORDERS (OUTPATIENT)
Dept: ADMINISTRATIVE | Facility: HOSPITAL | Age: 69
End: 2024-12-26
Payer: COMMERCIAL

## 2024-12-26 ENCOUNTER — TELEPHONE (OUTPATIENT)
Dept: MAMMOGRAPHY | Facility: HOSPITAL | Age: 69
End: 2024-12-26
Payer: COMMERCIAL

## 2024-12-26 DIAGNOSIS — R92.8 ABNORMAL MAMMOGRAM: Primary | ICD-10-CM

## 2024-12-26 NOTE — TELEPHONE ENCOUNTER
Patient notified that medication clearance received from provider. Questions answered, support given. Patient aware to hold aspirin and plavix x 3 days prior to biopsy & is to restart 24 hours after the biopsy. Patient aware to contact prescribing provider with any medication questions. Patient verbalized understanding. [facility] scheduling notified to contact patient and schedule procedure.

## 2025-01-14 ENCOUNTER — HOSPITAL ENCOUNTER (OUTPATIENT)
Dept: MAMMOGRAPHY | Facility: HOSPITAL | Age: 70
Discharge: HOME | End: 2025-01-14
Payer: MEDICARE

## 2025-01-14 DIAGNOSIS — R92.8 ABNORMAL MAMMOGRAM: ICD-10-CM

## 2025-01-14 PROCEDURE — 88305 TISSUE EXAM BY PATHOLOGIST: CPT | Performed by: RADIOLOGY

## 2025-01-14 PROCEDURE — 25010000002 LIDOCAINE 1 % SOLUTION: Performed by: RADIOLOGY

## 2025-01-14 PROCEDURE — 88341 IMHCHEM/IMCYTCHM EA ADD ANTB: CPT | Performed by: RADIOLOGY

## 2025-01-14 PROCEDURE — C1819 TISSUE LOCALIZATION-EXCISION: HCPCS

## 2025-01-14 PROCEDURE — 25010000002 LIDOCAINE 1% - EPINEPHRINE 1:100000 1 %-1:100000 SOLUTION: Performed by: RADIOLOGY

## 2025-01-14 RX ORDER — LIDOCAINE HYDROCHLORIDE AND EPINEPHRINE 10; 10 MG/ML; UG/ML
10 INJECTION, SOLUTION INFILTRATION; PERINEURAL ONCE
Status: COMPLETED | OUTPATIENT
Start: 2025-01-14 | End: 2025-01-14

## 2025-01-14 RX ORDER — LIDOCAINE HYDROCHLORIDE 10 MG/ML
15 INJECTION, SOLUTION INFILTRATION; PERINEURAL ONCE
Status: COMPLETED | OUTPATIENT
Start: 2025-01-14 | End: 2025-01-14

## 2025-01-14 RX ADMIN — LIDOCAINE HYDROCHLORIDE,EPINEPHRINE BITARTRATE 8 ML: 10; .01 INJECTION, SOLUTION INFILTRATION; PERINEURAL at 10:06

## 2025-01-14 RX ADMIN — LIDOCAINE HYDROCHLORIDE 8 ML: 10 INJECTION, SOLUTION INFILTRATION; PERINEURAL at 10:05

## 2025-01-16 ENCOUNTER — OFFICE VISIT (OUTPATIENT)
Dept: BEHAVIORAL HEALTH | Facility: CLINIC | Age: 70
End: 2025-01-16
Payer: MEDICARE

## 2025-01-16 VITALS — WEIGHT: 138 LBS | BODY MASS INDEX: 27.82 KG/M2 | HEIGHT: 59 IN

## 2025-01-16 DIAGNOSIS — F43.21 GRIEF: ICD-10-CM

## 2025-01-16 DIAGNOSIS — F43.9 TRAUMA AND STRESSOR-RELATED DISORDER: Primary | ICD-10-CM

## 2025-01-16 RX ORDER — LAMOTRIGINE 25 MG/1
25 TABLET ORAL NIGHTLY
Qty: 90 TABLET | Refills: 1 | Status: SHIPPED | OUTPATIENT
Start: 2025-01-16

## 2025-01-16 RX ORDER — HYDROXYZINE HYDROCHLORIDE 10 MG/1
10 TABLET, FILM COATED ORAL 3 TIMES DAILY PRN
Qty: 90 TABLET | Refills: 2 | Status: SHIPPED | OUTPATIENT
Start: 2025-01-16

## 2025-01-16 NOTE — PROGRESS NOTES
"     Office  Follow Up Visit      Patient Name: Clara Gonzalez  : 1955   MRN: [id number]     Referring Provider: Velia James APRN    Chief Complaint: Medication follow-up    ICD-10-CM ICD-9-CM   1. Trauma and stressor-related disorder  F43.9 309.81     308.9   2. Grief  F43.21 309.0        History of Present Illness:   Clara Gonzalez is a 69 y.o. female who is here today for follow up related to medication management of trauma stress-related disorder and grief.  Medication regimen includes lamotrigine 25 mg nightly and hydroxyzine 10 mg 3 times daily as needed.    Patient reports that things been going well for the most part since her last follow-up.  Patient reports that she did experience 1 panic attack in which she went to the ER for evaluation on 2024.  She reports that she had been dealing with a lot of work stress, did not eat that morning or have much fluid to drink that day.  Patient states \"once they took me to the hospital and evaluated me, I felt really embarrassed.\"    Patient reports that [place] went well and that her and her family are able to talk about her  without any excessive sadness and felt \"happy with Ronel.\"    Patient reports continued difficulties finding time to prioritize herself and her needs.  She reports that she constantly still feels that she has to do for others and the other people's needs in order to get her own needs met.  She reports that she has never considered what \"she wants.\"  Patient reports that her granddaughter asked her that same question, what do you want grandma?  However, she reports that she has not been able to answer it to this day.      Subjective      Review of Systems:   Review of Systems   Constitutional: Negative.    Respiratory: Negative.     Cardiovascular: Negative.    Gastrointestinal: Negative.    Genitourinary: Negative.    Musculoskeletal: Negative.    Neurological: Negative.    Psychiatric/Behavioral: Negative.   " "      Patient History:   The following portions of the patient's history were reviewed and updated as appropriate: allergies, current medications, past family history, past medical history, past social history, past surgical history and problem list.     Social:  No change in interval history.    Medications:     Current Outpatient Medications:     hydrOXYzine (ATARAX) 10 MG tablet, Take 1 tablet by mouth 3 (Three) Times a Day As Needed for Anxiety., Disp: 90 tablet, Rfl: 2    lamoTRIgine (LaMICtal) 25 MG tablet, Take 1 tablet by mouth Every Night., Disp: 90 tablet, Rfl: 1    aspirin 81 MG chewable tablet, Chew and swallow 1 tablet by mouth Daily., Disp: 30 tablet, Rfl: 0    atorvastatin (LIPITOR) 80 MG tablet, Take 1 tablet by mouth Every Night., Disp: 90 tablet, Rfl: 0    clopidogrel (PLAVIX) 75 MG tablet, Take 1 tablet by mouth Daily., Disp: 30 tablet, Rfl: 3    metoprolol succinate XL (TOPROL-XL) 50 MG 24 hr tablet, Take 1 tablet by mouth Daily., Disp: , Rfl:     Objective     Physical Exam:  Vital Signs:   Vitals:    01/16/25 1612   Weight: 62.6 kg (138 lb)   Height: 149.9 cm (59\")     Body mass index is 27.87 kg/m².     Mental Status Exam:   Hygiene:   good  Cooperation:  Cooperative  Eye Contact:  Good  Psychomotor Behavior:  Appropriate  Affect:  Appropriate  Mood: normal  Speech:  Normal  Thought Process:  Goal directed  Thought Content:  Normal  Suicidal:  None  Homicidal:  None  Hallucinations:  None  Delusion:  None  Memory:  Intact  Orientation:  Grossly intact  Reliability:  good  Insight:  Good  Judgement:  Good  Impulse Control:  Good  Physical/Medical Issues:  No      Assessment / Plan      Visit Diagnosis/Orders Placed This Visit:  Diagnoses and all orders for this visit:    1. Trauma and stressor-related disorder (Primary)  -     lamoTRIgine (LaMICtal) 25 MG tablet; Take 1 tablet by mouth Every Night.  Dispense: 90 tablet; Refill: 1  -     hydrOXYzine (ATARAX) 10 MG tablet; Take 1 tablet by mouth " 3 (Three) Times a Day As Needed for Anxiety.  Dispense: 90 tablet; Refill: 2    2. Grief         Differential:   N/A    Plan:   Continue current medication regimen.  Recommendation: The new codependency by Jesi Canales.  Discussed stress management and self-care.  Grief processing.  Encouraged journaling, and setting personal short-term and long-term goals.      Continue supportive psychotherapy efforts and medications as indicated. Treatment and medication options discussed during today's visit. Patient ackowledged and verbally consented to continue with current treatment plan and was educated on the importance of compliance with treatment and follow-up appointments. Patient seems reasonably able to adhere to treatment plan.      Medication Considerations:  Discussed medication options and treatment plan of prescribed medication(s) as well as the risks, benefits, and potential side effects. Patient is agreeable to call the office with any worsening of symptoms or onset of side effects. Patient is agreeable to call 911 or go to the nearest ER should he/she begin having SI/HI.    Quality Measures:   Never smoker    I advised Clara Gonzalez of the risks of tobacco use.     Follow Up:   Return in about 3 months (around 4/16/2025) for Recheck.      MEI Scherer, PMHNP-BC

## 2025-01-17 ENCOUNTER — TELEPHONE (OUTPATIENT)
Dept: MAMMOGRAPHY | Facility: HOSPITAL | Age: 70
End: 2025-01-17
Payer: COMMERCIAL

## 2025-01-17 NOTE — TELEPHONE ENCOUNTER
Patient notified of pathology results and recommendation. Verbalizes understanding. States having some discomfort, denies needing analgesics.  Denies signs and symptoms of infection. Questions answered, support given, verbalized understanding. Verified patient has [facility] scheduling number to call and schedule recommended follow-up.

## 2025-04-04 ENCOUNTER — APPOINTMENT (OUTPATIENT)
Dept: CT IMAGING | Facility: HOSPITAL | Age: 70
End: 2025-04-04
Payer: MEDICARE

## 2025-04-04 ENCOUNTER — APPOINTMENT (OUTPATIENT)
Dept: MRI IMAGING | Facility: HOSPITAL | Age: 70
End: 2025-04-04
Payer: MEDICARE

## 2025-04-04 ENCOUNTER — HOSPITAL ENCOUNTER (INPATIENT)
Facility: HOSPITAL | Age: 70
LOS: 1 days | Discharge: HOME | End: 2025-04-05
Attending: EMERGENCY MEDICINE | Admitting: STUDENT IN AN ORGANIZED HEALTH CARE EDUCATION/TRAINING PROGRAM
Payer: MEDICARE

## 2025-04-04 ENCOUNTER — APPOINTMENT (OUTPATIENT)
Dept: GENERAL RADIOLOGY | Facility: HOSPITAL | Age: 70
End: 2025-04-04
Payer: MEDICARE

## 2025-04-04 DIAGNOSIS — G08 DURAL VENOUS SINUS THROMBOSIS: Primary | ICD-10-CM

## 2025-04-04 DIAGNOSIS — R51.9 NONINTRACTABLE HEADACHE, UNSPECIFIED CHRONICITY PATTERN, UNSPECIFIED HEADACHE TYPE: ICD-10-CM

## 2025-04-04 DIAGNOSIS — J10.1 INFLUENZA A: ICD-10-CM

## 2025-04-04 DIAGNOSIS — R42 DIZZINESS: ICD-10-CM

## 2025-04-04 LAB
ALBUMIN SERPL-MCNC: 3.9 G/DL (ref 3.5–5.2)
ALBUMIN/GLOB SERPL: 1.6 G/DL
ALP SERPL-CCNC: 81 U/L (ref 39–117)
ALT SERPL W P-5'-P-CCNC: 13 U/L (ref 1–33)
ANION GAP SERPL CALCULATED.3IONS-SCNC: 11 MMOL/L (ref 5–15)
APTT PPP: 24.7 SECONDS (ref 60–90)
AST SERPL-CCNC: 14 U/L (ref 1–32)
B PARAPERT DNA SPEC QL NAA+PROBE: NOT DETECTED
B PERT DNA SPEC QL NAA+PROBE: NOT DETECTED
BACTERIA UR QL AUTO: NORMAL /HPF
BASOPHILS # BLD AUTO: 0.04 10*3/MM3 (ref 0–0.2)
BASOPHILS NFR BLD AUTO: 0.4 % (ref 0–1.5)
BILIRUB SERPL-MCNC: 0.5 MG/DL (ref 0–1.2)
BILIRUB UR QL STRIP: NEGATIVE
BUN SERPL-MCNC: 11 MG/DL (ref 8–23)
BUN/CREAT SERPL: 13.1 (ref 7–25)
C PNEUM DNA NPH QL NAA+NON-PROBE: NOT DETECTED
CALCIUM SPEC-SCNC: 8.4 MG/DL (ref 8.6–10.5)
CHLORIDE SERPL-SCNC: 105 MMOL/L (ref 98–107)
CLARITY UR: CLEAR
CO2 SERPL-SCNC: 22 MMOL/L (ref 22–29)
COLOR UR: YELLOW
CREAT SERPL-MCNC: 0.84 MG/DL (ref 0.57–1)
DEPRECATED RDW RBC AUTO: 44.2 FL (ref 37–54)
EGFRCR SERPLBLD CKD-EPI 2021: 75.3 ML/MIN/1.73
EOSINOPHIL # BLD AUTO: 0.03 10*3/MM3 (ref 0–0.4)
EOSINOPHIL NFR BLD AUTO: 0.3 % (ref 0.3–6.2)
ERYTHROCYTE [DISTWIDTH] IN BLOOD BY AUTOMATED COUNT: 12.5 % (ref 12.3–15.4)
FLUAV H3 RNA NPH QL NAA+PROBE: DETECTED
FLUBV RNA ISLT QL NAA+PROBE: NOT DETECTED
GLOBULIN UR ELPH-MCNC: 2.4 GM/DL
GLUCOSE SERPL-MCNC: 107 MG/DL (ref 65–99)
GLUCOSE UR STRIP-MCNC: NEGATIVE MG/DL
HADV DNA SPEC NAA+PROBE: NOT DETECTED
HCOV 229E RNA SPEC QL NAA+PROBE: NOT DETECTED
HCOV HKU1 RNA SPEC QL NAA+PROBE: NOT DETECTED
HCOV NL63 RNA SPEC QL NAA+PROBE: NOT DETECTED
HCOV OC43 RNA SPEC QL NAA+PROBE: NOT DETECTED
HCT VFR BLD AUTO: 41.3 % (ref 34–46.6)
HGB BLD-MCNC: 13.6 G/DL (ref 12–15.9)
HGB UR QL STRIP.AUTO: NEGATIVE
HMPV RNA NPH QL NAA+NON-PROBE: NOT DETECTED
HOLD SPECIMEN: NORMAL
HPIV1 RNA ISLT QL NAA+PROBE: NOT DETECTED
HPIV2 RNA SPEC QL NAA+PROBE: NOT DETECTED
HPIV3 RNA NPH QL NAA+PROBE: NOT DETECTED
HPIV4 P GENE NPH QL NAA+PROBE: NOT DETECTED
HYALINE CASTS UR QL AUTO: NORMAL /LPF
IMM GRANULOCYTES # BLD AUTO: 0.05 10*3/MM3 (ref 0–0.05)
IMM GRANULOCYTES NFR BLD AUTO: 0.5 % (ref 0–0.5)
INR PPP: 1.01 (ref 0.89–1.12)
KETONES UR QL STRIP: ABNORMAL
LEUKOCYTE ESTERASE UR QL STRIP.AUTO: ABNORMAL
LIPASE SERPL-CCNC: 18 U/L (ref 13–60)
LYMPHOCYTES # BLD AUTO: 1.13 10*3/MM3 (ref 0.7–3.1)
LYMPHOCYTES NFR BLD AUTO: 11 % (ref 19.6–45.3)
M PNEUMO IGG SER IA-ACNC: NOT DETECTED
MAGNESIUM SERPL-MCNC: 2.1 MG/DL (ref 1.6–2.4)
MCH RBC QN AUTO: 31.5 PG (ref 26.6–33)
MCHC RBC AUTO-ENTMCNC: 32.9 G/DL (ref 31.5–35.7)
MCV RBC AUTO: 95.6 FL (ref 79–97)
MONOCYTES # BLD AUTO: 0.53 10*3/MM3 (ref 0.1–0.9)
MONOCYTES NFR BLD AUTO: 5.1 % (ref 5–12)
NEUTROPHILS NFR BLD AUTO: 8.52 10*3/MM3 (ref 1.7–7)
NEUTROPHILS NFR BLD AUTO: 82.7 % (ref 42.7–76)
NITRITE UR QL STRIP: NEGATIVE
NRBC BLD AUTO-RTO: 0 /100 WBC (ref 0–0.2)
NT-PROBNP SERPL-MCNC: 185.1 PG/ML (ref 0–900)
PH UR STRIP.AUTO: >=9 [PH] (ref 5–8)
PLATELET # BLD AUTO: 246 10*3/MM3 (ref 140–450)
PMV BLD AUTO: 9.1 FL (ref 6–12)
POTASSIUM SERPL-SCNC: 4.1 MMOL/L (ref 3.5–5.2)
PROT SERPL-MCNC: 6.3 G/DL (ref 6–8.5)
PROT UR QL STRIP: ABNORMAL
PROTHROMBIN TIME: 14 SECONDS (ref 12.2–15.3)
QT INTERVAL: 424 MS
QTC INTERVAL: 440 MS
RBC # BLD AUTO: 4.32 10*6/MM3 (ref 3.77–5.28)
RBC # UR STRIP: NORMAL /HPF
REF LAB TEST METHOD: NORMAL
RHINOVIRUS RNA SPEC NAA+PROBE: NOT DETECTED
RSV RNA NPH QL NAA+NON-PROBE: NOT DETECTED
SARS-COV-2 RNA NPH QL NAA+NON-PROBE: NOT DETECTED
SODIUM SERPL-SCNC: 138 MMOL/L (ref 136–145)
SP GR UR STRIP: 1.02 (ref 1–1.03)
SQUAMOUS #/AREA URNS HPF: NORMAL /HPF
TROPONIN T SERPL HS-MCNC: <6 NG/L
UFH PPP CHRO-ACNC: 0.1 IU/ML (ref 0.3–0.7)
UROBILINOGEN UR QL STRIP: ABNORMAL
WBC # UR STRIP: NORMAL /HPF
WBC NRBC COR # BLD AUTO: 10.3 10*3/MM3 (ref 3.4–10.8)
WHOLE BLOOD HOLD COAG: NORMAL
WHOLE BLOOD HOLD SPECIMEN: NORMAL

## 2025-04-04 PROCEDURE — 85730 THROMBOPLASTIN TIME PARTIAL: CPT

## 2025-04-04 PROCEDURE — 25010000002 DIAZEPAM PER 5 MG: Performed by: EMERGENCY MEDICINE

## 2025-04-04 PROCEDURE — 99285 EMERGENCY DEPT VISIT HI MDM: CPT

## 2025-04-04 PROCEDURE — 80053 COMPREHEN METABOLIC PANEL: CPT | Performed by: PHYSICIAN ASSISTANT

## 2025-04-04 PROCEDURE — 85610 PROTHROMBIN TIME: CPT

## 2025-04-04 PROCEDURE — 25510000001 IOPAMIDOL PER 1 ML: Performed by: INTERNAL MEDICINE

## 2025-04-04 PROCEDURE — 83690 ASSAY OF LIPASE: CPT | Performed by: PHYSICIAN ASSISTANT

## 2025-04-04 PROCEDURE — 85520 HEPARIN ASSAY: CPT

## 2025-04-04 PROCEDURE — 25010000002 PROCHLORPERAZINE 10 MG/2ML SOLUTION

## 2025-04-04 PROCEDURE — 70551 MRI BRAIN STEM W/O DYE: CPT

## 2025-04-04 PROCEDURE — 84484 ASSAY OF TROPONIN QUANT: CPT | Performed by: PHYSICIAN ASSISTANT

## 2025-04-04 PROCEDURE — 25010000002 MAGNESIUM SULFATE IN D5W 1G/100ML (PREMIX) 1-5 GM/100ML-% SOLUTION

## 2025-04-04 PROCEDURE — 25010000002 DIPHENHYDRAMINE PER 50 MG

## 2025-04-04 PROCEDURE — 83880 ASSAY OF NATRIURETIC PEPTIDE: CPT | Performed by: PHYSICIAN ASSISTANT

## 2025-04-04 PROCEDURE — 93005 ELECTROCARDIOGRAM TRACING: CPT | Performed by: PHYSICIAN ASSISTANT

## 2025-04-04 PROCEDURE — 81001 URINALYSIS AUTO W/SCOPE: CPT | Performed by: PHYSICIAN ASSISTANT

## 2025-04-04 PROCEDURE — 85025 COMPLETE CBC W/AUTO DIFF WBC: CPT | Performed by: PHYSICIAN ASSISTANT

## 2025-04-04 PROCEDURE — 25810000003 SODIUM CHLORIDE 0.9 % SOLUTION: Performed by: PHYSICIAN ASSISTANT

## 2025-04-04 PROCEDURE — 25010000002 FAMOTIDINE 10 MG/ML SOLUTION: Performed by: PHYSICIAN ASSISTANT

## 2025-04-04 PROCEDURE — 99222 1ST HOSP IP/OBS MODERATE 55: CPT

## 2025-04-04 PROCEDURE — 70496 CT ANGIOGRAPHY HEAD: CPT

## 2025-04-04 PROCEDURE — 99222 1ST HOSP IP/OBS MODERATE 55: CPT | Performed by: NURSE PRACTITIONER

## 2025-04-04 PROCEDURE — 36415 COLL VENOUS BLD VENIPUNCTURE: CPT

## 2025-04-04 PROCEDURE — 25810000003 SODIUM CHLORIDE 0.9 % SOLUTION

## 2025-04-04 PROCEDURE — 0202U NFCT DS 22 TRGT SARS-COV-2: CPT | Performed by: PHYSICIAN ASSISTANT

## 2025-04-04 PROCEDURE — 25010000002 ONDANSETRON PER 1 MG: Performed by: PHYSICIAN ASSISTANT

## 2025-04-04 PROCEDURE — 83735 ASSAY OF MAGNESIUM: CPT | Performed by: PHYSICIAN ASSISTANT

## 2025-04-04 PROCEDURE — 25010000002 HEPARIN (PORCINE) 25000-0.45 UT/250ML-% SOLUTION

## 2025-04-04 PROCEDURE — 71045 X-RAY EXAM CHEST 1 VIEW: CPT

## 2025-04-04 RX ORDER — SODIUM CHLORIDE 9 MG/ML
125 INJECTION, SOLUTION INTRAVENOUS CONTINUOUS
Status: DISCONTINUED | OUTPATIENT
Start: 2025-04-04 | End: 2025-04-05 | Stop reason: HOSPADM

## 2025-04-04 RX ORDER — SODIUM CHLORIDE 0.9 % (FLUSH) 0.9 %
10 SYRINGE (ML) INJECTION EVERY 12 HOURS SCHEDULED
Status: DISCONTINUED | OUTPATIENT
Start: 2025-04-04 | End: 2025-04-05 | Stop reason: HOSPADM

## 2025-04-04 RX ORDER — ONDANSETRON 2 MG/ML
4 INJECTION INTRAMUSCULAR; INTRAVENOUS ONCE
Status: COMPLETED | OUTPATIENT
Start: 2025-04-04 | End: 2025-04-04

## 2025-04-04 RX ORDER — BISACODYL 5 MG/1
5 TABLET, DELAYED RELEASE ORAL DAILY PRN
Status: DISCONTINUED | OUTPATIENT
Start: 2025-04-04 | End: 2025-04-05 | Stop reason: HOSPADM

## 2025-04-04 RX ORDER — ESCITALOPRAM OXALATE 20 MG/1
20 TABLET ORAL DAILY
COMMUNITY

## 2025-04-04 RX ORDER — METOPROLOL SUCCINATE 25 MG/1
50 TABLET, EXTENDED RELEASE ORAL DAILY
Status: CANCELLED | OUTPATIENT
Start: 2025-04-04

## 2025-04-04 RX ORDER — ONDANSETRON 4 MG/1
4 TABLET, ORALLY DISINTEGRATING ORAL EVERY 6 HOURS PRN
Status: DISCONTINUED | OUTPATIENT
Start: 2025-04-04 | End: 2025-04-05 | Stop reason: HOSPADM

## 2025-04-04 RX ORDER — SODIUM CHLORIDE 0.9 % (FLUSH) 0.9 %
10 SYRINGE (ML) INJECTION AS NEEDED
Status: CANCELLED | OUTPATIENT
Start: 2025-04-04

## 2025-04-04 RX ORDER — FAMOTIDINE 10 MG/ML
20 INJECTION, SOLUTION INTRAVENOUS ONCE
Status: COMPLETED | OUTPATIENT
Start: 2025-04-04 | End: 2025-04-04

## 2025-04-04 RX ORDER — BISACODYL 10 MG
10 SUPPOSITORY, RECTAL RECTAL DAILY PRN
Status: DISCONTINUED | OUTPATIENT
Start: 2025-04-04 | End: 2025-04-05 | Stop reason: HOSPADM

## 2025-04-04 RX ORDER — ESCITALOPRAM OXALATE 20 MG/1
20 TABLET ORAL DAILY
Status: DISCONTINUED | OUTPATIENT
Start: 2025-04-04 | End: 2025-04-05 | Stop reason: HOSPADM

## 2025-04-04 RX ORDER — SODIUM CHLORIDE 9 MG/ML
40 INJECTION, SOLUTION INTRAVENOUS AS NEEDED
Status: CANCELLED | OUTPATIENT
Start: 2025-04-04

## 2025-04-04 RX ORDER — HYDROXYZINE HYDROCHLORIDE 10 MG/1
10 TABLET, FILM COATED ORAL 3 TIMES DAILY PRN
Status: DISCONTINUED | OUTPATIENT
Start: 2025-04-04 | End: 2025-04-05 | Stop reason: HOSPADM

## 2025-04-04 RX ORDER — ONDANSETRON 2 MG/ML
4 INJECTION INTRAMUSCULAR; INTRAVENOUS EVERY 6 HOURS PRN
Status: DISCONTINUED | OUTPATIENT
Start: 2025-04-04 | End: 2025-04-05 | Stop reason: HOSPADM

## 2025-04-04 RX ORDER — IOPAMIDOL 755 MG/ML
100 INJECTION, SOLUTION INTRAVASCULAR
Status: COMPLETED | OUTPATIENT
Start: 2025-04-04 | End: 2025-04-04

## 2025-04-04 RX ORDER — MAGNESIUM SULFATE 1 G/100ML
1 INJECTION INTRAVENOUS ONCE
Status: COMPLETED | OUTPATIENT
Start: 2025-04-04 | End: 2025-04-04

## 2025-04-04 RX ORDER — ASPIRIN 81 MG/1
81 TABLET, CHEWABLE ORAL DAILY
Status: DISCONTINUED | OUTPATIENT
Start: 2025-04-04 | End: 2025-04-05 | Stop reason: HOSPADM

## 2025-04-04 RX ORDER — SODIUM CHLORIDE 0.9 % (FLUSH) 0.9 %
10 SYRINGE (ML) INJECTION EVERY 12 HOURS SCHEDULED
Status: CANCELLED | OUTPATIENT
Start: 2025-04-04

## 2025-04-04 RX ORDER — HEPARIN SODIUM 10000 [USP'U]/100ML
12 INJECTION, SOLUTION INTRAVENOUS
Status: DISCONTINUED | OUTPATIENT
Start: 2025-04-04 | End: 2025-04-04

## 2025-04-04 RX ORDER — AMOXICILLIN 250 MG
2 CAPSULE ORAL 2 TIMES DAILY PRN
Status: DISCONTINUED | OUTPATIENT
Start: 2025-04-04 | End: 2025-04-05 | Stop reason: HOSPADM

## 2025-04-04 RX ORDER — ACETAMINOPHEN 500 MG
1000 TABLET ORAL EVERY 8 HOURS PRN
Status: DISCONTINUED | OUTPATIENT
Start: 2025-04-04 | End: 2025-04-05 | Stop reason: HOSPADM

## 2025-04-04 RX ORDER — DIPHENHYDRAMINE HYDROCHLORIDE 50 MG/ML
25 INJECTION, SOLUTION INTRAMUSCULAR; INTRAVENOUS ONCE
Status: COMPLETED | OUTPATIENT
Start: 2025-04-04 | End: 2025-04-04

## 2025-04-04 RX ORDER — LAMOTRIGINE 100 MG/1
25 TABLET ORAL NIGHTLY
Status: DISCONTINUED | OUTPATIENT
Start: 2025-04-04 | End: 2025-04-05 | Stop reason: HOSPADM

## 2025-04-04 RX ORDER — SODIUM CHLORIDE 0.9 % (FLUSH) 0.9 %
10 SYRINGE (ML) INJECTION AS NEEDED
Status: DISCONTINUED | OUTPATIENT
Start: 2025-04-04 | End: 2025-04-05 | Stop reason: HOSPADM

## 2025-04-04 RX ORDER — MECLIZINE HYDROCHLORIDE 25 MG/1
25 TABLET ORAL ONCE
Status: COMPLETED | OUTPATIENT
Start: 2025-04-04 | End: 2025-04-04

## 2025-04-04 RX ORDER — ATORVASTATIN CALCIUM 40 MG/1
80 TABLET, FILM COATED ORAL NIGHTLY
Status: CANCELLED | OUTPATIENT
Start: 2025-04-04

## 2025-04-04 RX ORDER — SODIUM CHLORIDE 9 MG/ML
40 INJECTION, SOLUTION INTRAVENOUS AS NEEDED
Status: DISCONTINUED | OUTPATIENT
Start: 2025-04-04 | End: 2025-04-05 | Stop reason: HOSPADM

## 2025-04-04 RX ORDER — CEFDINIR 300 MG/1
300 CAPSULE ORAL 2 TIMES DAILY
COMMUNITY
Start: 2025-03-29

## 2025-04-04 RX ORDER — PROCHLORPERAZINE EDISYLATE 5 MG/ML
10 INJECTION INTRAMUSCULAR; INTRAVENOUS ONCE
Status: COMPLETED | OUTPATIENT
Start: 2025-04-04 | End: 2025-04-04

## 2025-04-04 RX ORDER — POLYETHYLENE GLYCOL 3350 17 G/17G
17 POWDER, FOR SOLUTION ORAL DAILY PRN
Status: DISCONTINUED | OUTPATIENT
Start: 2025-04-04 | End: 2025-04-05 | Stop reason: HOSPADM

## 2025-04-04 RX ORDER — DIAZEPAM 10 MG/2ML
5 INJECTION, SOLUTION INTRAMUSCULAR; INTRAVENOUS ONCE
Status: COMPLETED | OUTPATIENT
Start: 2025-04-04 | End: 2025-04-04

## 2025-04-04 RX ORDER — CLOPIDOGREL BISULFATE 75 MG/1
75 TABLET ORAL DAILY
Status: DISCONTINUED | OUTPATIENT
Start: 2025-04-04 | End: 2025-04-05 | Stop reason: HOSPADM

## 2025-04-04 RX ORDER — CEFDINIR 300 MG/1
300 CAPSULE ORAL 2 TIMES DAILY
Status: DISCONTINUED | OUTPATIENT
Start: 2025-04-04 | End: 2025-04-05 | Stop reason: HOSPADM

## 2025-04-04 RX ADMIN — FAMOTIDINE 20 MG: 10 INJECTION, SOLUTION INTRAVENOUS at 16:12

## 2025-04-04 RX ADMIN — ASPIRIN 81 MG: 81 TABLET, CHEWABLE ORAL at 19:59

## 2025-04-04 RX ADMIN — CEFDINIR 300 MG: 300 CAPSULE ORAL at 21:19

## 2025-04-04 RX ADMIN — SODIUM CHLORIDE 125 ML/HR: 9 INJECTION, SOLUTION INTRAVENOUS at 20:06

## 2025-04-04 RX ADMIN — DIPHENHYDRAMINE HYDROCHLORIDE 25 MG: 50 INJECTION INTRAMUSCULAR; INTRAVENOUS at 17:29

## 2025-04-04 RX ADMIN — HEPARIN SODIUM 12 UNITS/KG/HR: 10000 INJECTION, SOLUTION INTRAVENOUS at 17:35

## 2025-04-04 RX ADMIN — PROCHLORPERAZINE EDISYLATE 10 MG: 5 INJECTION INTRAMUSCULAR; INTRAVENOUS at 17:29

## 2025-04-04 RX ADMIN — CLOPIDOGREL BISULFATE 75 MG: 75 TABLET ORAL at 20:00

## 2025-04-04 RX ADMIN — LAMOTRIGINE 25 MG: 100 TABLET ORAL at 19:58

## 2025-04-04 RX ADMIN — ESCITALOPRAM OXALATE 20 MG: 20 TABLET ORAL at 19:58

## 2025-04-04 RX ADMIN — DIAZEPAM 5 MG: 10 INJECTION, SOLUTION INTRAMUSCULAR; INTRAVENOUS at 14:47

## 2025-04-04 RX ADMIN — SODIUM CHLORIDE 1000 ML: 9 INJECTION, SOLUTION INTRAVENOUS at 13:54

## 2025-04-04 RX ADMIN — MAGNESIUM SULFATE 1 G: 1 INJECTION INTRAVENOUS at 17:32

## 2025-04-04 RX ADMIN — ONDANSETRON 4 MG: 2 INJECTION INTRAMUSCULAR; INTRAVENOUS at 13:55

## 2025-04-04 RX ADMIN — Medication 10 ML: at 20:03

## 2025-04-04 RX ADMIN — IOPAMIDOL 85 ML: 755 INJECTION, SOLUTION INTRAVENOUS at 17:52

## 2025-04-04 RX ADMIN — MECLIZINE HYDROCHLORIDE 25 MG: 25 TABLET ORAL at 13:53

## 2025-04-04 NOTE — PLAN OF CARE
Goal Outcome Evaluation:  Plan of Care Reviewed With: patient VSS skin cdi ambulates well with staff general weakness family bedside will continue to monitor

## 2025-04-04 NOTE — H&P
[facility]  HISTORY AND PHYSICAL    Patient Name: Clara Gonzalez  : 1955  MRN: [id number]  Primary Care Physician: Velia James APRN  Date of admission: 2025      Subjective   Subjective     Chief Complaint:  Dizziness and nausea     HPI:  Clara Gonzalez is a 70 y.o. female with PMH of breast cancer s/p lumpectomy, HTN, recent Flu A and strep throat, anxiety, depression, GERD, vertigo, TIA. Patient has been feeling unwell since 3/22. She went to PCP and was started on steroids and abx on 3/29 for Flu and strep throat. She has been taking her medication periodically but not consistently due to feeling bad. She has been having dizziness with associated nausea that is different from her usual vertigo. She has not been eating or drinking well and having some diarrhea. Today her dizziness and nausea was worse and she was brought to the ED. She also has been more sleepy. She has some generalized weakness.     In ED: trop <6, creat 0.84, HGB 13.6, WBC 10.30,       Personal History     Past Medical History:   Diagnosis Date    Anxiety     Breast cancer     Breast injury 2023    right hematoma    Essential hypertension 2021    GERD without esophagitis 2019    TIA (transient ischemic attack) 2024         Oncology Problem List:  Malignant neoplasm of upper-outer quadrant of right breast in female,   estrogen receptor positive (2024; Status: Active)      Past Surgical History:   Procedure Laterality Date    BREAST BIOPSY Right 10/21/2024    MRI guided    BREAST BIOPSY Right 2024    stereo    BREAST CYST ASPIRATION Left     BREAST LUMPECTOMY Right 2024    CHOLECYSTECTOMY      COLONOSCOPY      HYSTERECTOMY      OOPHORECTOMY         Family History: family history includes Breast cancer (age of onset: 80) in her mother.     Social History:  reports that she has never smoked. She has never been exposed to tobacco smoke.  She has never used smokeless tobacco. She reports that she does not drink alcohol and does not use drugs.        Medications:  Available home medication information reviewed.  aspirin, atorvastatin, cefdinir, clopidogrel, escitalopram, hydrOXYzine, lamoTRIgine, and metoprolol succinate XL    No Known Allergies    Objective   Objective     Vital Signs:   Temp:  [98.3 °F (36.8 °C)-99.5 °F (37.5 °C)] 99.5 °F (37.5 °C)  Heart Rate:  [57-80] 74  Resp:  [16] 16  BP: (141-168)/(71-90) 152/80  Total (NIH Stroke Scale): 1    Physical Exam   Constitutional: Awake, alert  Eyes: PERRLA, sclerae anicteric, no conjunctival injection  HENT: NCAT, mucous membranes moist  Neck: Supple, no thyromegaly, no lymphadenopathy, trachea midline  Respiratory: Clear to auscultation bilaterally, nonlabored respirations room air   Cardiovascular: RRR, no murmurs, rubs, or gallops, palpable pedal pulses bilaterally  Gastrointestinal: Positive bowel sounds, soft, nontender, nondistended  Musculoskeletal: No bilateral ankle edema, no clubbing or cyanosis to extremities  Psychiatric: Appropriate affect, cooperative  Neurologic: Oriented x 3, strength symmetric in all extremities, , speech clear  Skin: No rashes      Result Review:  I have personally reviewed the results from the time of this admission to 4/4/2025 21:41 EDT and agree with these findings:  [x]  Laboratory list / accordion  []  Microbiology  [x]  Radiology  []  EKG/Telemetry   []  Cardiology/Vascular   []  Pathology  []  Old records  []  Other:  Most notable findings include:       LAB RESULTS:      Lab 04/04/25  1544   WBC 10.30   HEMOGLOBIN 13.6   HEMATOCRIT 41.3   PLATELETS 246   NEUTROS ABS 8.52*   IMMATURE GRANS (ABS) 0.05   LYMPHS ABS 1.13   MONOS ABS 0.53   EOS ABS 0.03   MCV 95.6   PROTIME 14.0   INR 1.01   APTT 24.7*   HEPARIN ANTI-XA 0.10*         Lab 04/04/25  1544   SODIUM 138   POTASSIUM 4.1   CHLORIDE 105   CO2 22.0   ANION GAP 11.0   BUN 11   CREATININE 0.84   EGFR 75.3    GLUCOSE 107*   CALCIUM 8.4*   MAGNESIUM 2.1         Lab 04/04/25  1544   TOTAL PROTEIN 6.3   ALBUMIN 3.9   GLOBULIN 2.4   ALT (SGPT) 13   AST (SGOT) 14   BILIRUBIN 0.5   ALK PHOS 81   LIPASE 18         Lab 04/04/25  1544   PROBNP 185.1   HSTROP T <6                 UA          6/4/2024    21:41 7/8/2024    12:22 4/4/2025    15:35   Urinalysis   Squamous Epithelial Cells, UA 0-2  0-2  0-2    Specific Gravity, UA 1.027  1.015  1.019    Ketones, UA Negative  Negative  Trace    Blood, UA Negative  Negative  Negative    Leukocytes, UA Moderate (2+)  Small (1+)  Small (1+)    Nitrite, UA Negative  Negative  Negative    RBC, UA 0-2  0-2  0-2    WBC, UA 11-20  0-2  0-2    Bacteria, UA None Seen  None Seen  None Seen        Microbiology Results (last 10 days)       Procedure Component Value - Date/Time    COVID PRE-OP / PRE-PROCEDURE SCREENING ORDER (NO ISOLATION) - Swab, Nasopharynx [[id number]]  (Abnormal) Collected: 04/04/25 1357    Lab Status: Final result Specimen: Swab from Nasopharynx Updated: 04/04/25 1500    Narrative:      The following orders were created for panel order COVID PRE-OP / PRE-PROCEDURE SCREENING ORDER (NO ISOLATION) - Swab, Nasopharynx.  Procedure                               Abnormality         Status                     ---------                               -----------         ------                     Respiratory Panel PCR w/...[[id number]]  Abnormal            Final result                 Please view results for these tests on the individual orders.    Respiratory Panel PCR w/COVID-19(SARS-CoV-2) OLEG/JARET/KRISTIAN/PAD/COR/MELANY In-House, NP Swab in UTM/VTM, 2 HR TAT - Swab, Nasopharynx [[id number]]  (Abnormal) Collected: 04/04/25 1357    Lab Status: Final result Specimen: Swab from Nasopharynx Updated: 04/04/25 1500     ADENOVIRUS, PCR Not Detected     Coronavirus 229E Not Detected     Coronavirus HKU1 Not Detected     Coronavirus NL63 Not Detected     Coronavirus OC43 Not Detected     COVID19 Not  Detected     Human Metapneumovirus Not Detected     Human Rhinovirus/Enterovirus Not Detected     Influenza A H3 Detected     Influenza B PCR Not Detected     Parainfluenza Virus 1 Not Detected     Parainfluenza Virus 2 Not Detected     Parainfluenza Virus 3 Not Detected     Parainfluenza Virus 4 Not Detected     RSV, PCR Not Detected     Bordetella pertussis pcr Not Detected     Bordetella parapertussis PCR Not Detected     Chlamydophila pneumoniae PCR Not Detected     Mycoplasma pneumo by PCR Not Detected    Narrative:      In the setting of a positive respiratory panel with a viral infection PLUS a negative procalcitonin without other underlying concern for bacterial infection, consider observing off antibiotics or discontinuation of antibiotics and continue supportive care. If the respiratory panel is positive for atypical bacterial infection (Bordetella pertussis, Chlamydophila pneumoniae, or Mycoplasma pneumoniae), consider antibiotic de-escalation to target atypical bacterial infection.            CT venogram head  Result Date: 4/4/2025  CT VENOGRAM HEAD Date of Exam: 4/4/2025 5:45 PM EDT Indication: eval for left sigmoid sinus thrombus. Comparison: Brain MRI 4/4/2025 Technique: Axial CT images were obtained of the head without contrast administration.  Automated exposure control and iterative construction methods were used. CTV of the head was performed after the uneventful intravenous administration of 85 mL Isovue-370. Reconstructed coronal and sagittal images were also obtained. In addition, a 3-D volume rendered image was created for interpretation. Automated exposure control and iterative reconstruction methods were used. Findings: Noncontrast head CT: No acute intracranial hemorrhage.Intact appearing gray-white differentiation.No extra-axial fluid collection.No significant mass effect. No hydrocephalus. Mild generalized parenchymal volume loss. Scattered areas of periventricular and subcortical white  matter hypoattenuation, nonspecific, perhaps from small vessel ischemic/hypertensive changes in a patient of this age.There are intracranial atherosclerotic calcifications. Mild scattered mucosal thickening in the paranasal sinuses.Mastoid air cells are essentially clear.. Bilateral lens replacements. No acute or aggressive appearing bony or extracranial soft tissue process. CT Venogram: Normal contrast opacification of the superior sagittal sinus, straight sinus, bilateral transverse sinuses, bilateral sigmoid sinuses, and imaged internal jugular veins.     Impression: Impression: No evidence of dural venous sinus thrombosis. No acute intracranial findings. Electronically Signed: Antoine Chapman  4/4/2025 6:14 PM EDT  Workstation ID: [id number]    MRI Brain Without Contrast  Result Date: 4/4/2025  MRI BRAIN WO CONTRAST Date of Exam: 4/4/2025 3:00 PM EDT Indication: dizziness for 24 hours.  Comparison: 7/9/2024 Technique:  Routine multiplanar/multisequence sequence images of the brain were obtained without contrast administration. Findings: There is no diffusion restriction to suggest acute infarct. There is no evidence of acute or chronic intracranial hemorrhage. No mass effect or midline shift. No abnormal extra-axial collections. Similar trace nonspecific white matter signal abnormality. The basal ganglia, brainstem and cerebellum appear within normal limits. Midline structures are intact. No significant cortical abnormality is identified. Calvarial and superficial soft tissue signal is within normal limits. Orbits appear unremarkable. The paranasal sinuses and the mastoid air cells appear well aerated. There is increased signal intensity on water weighted sequences within the left transverse and sigmoid sinus. While this may be related to slow venous flow, dural venous thrombosis is a consideration follow-up MR venogram or CT venogram recommended for further assessment.     Impression: Impression: 1.Increased  signal intensity within the left transverse and sigmoid sinus concerning for dural venous sinus thrombosis. MR or CT venogram recommended to further assess. Electronically Signed: Albert Buchanan MD  4/4/2025 3:38 PM EDT  Workstation ID: [id number]    XR Chest 1 View  Result Date: 4/4/2025  XR CHEST 1 VW Date of Exam: 4/4/2025 1:02 PM EDT Indication: CP Comparison: None available. Findings: There are no airspace consolidations. No pleural fluid. No pneumothorax. The pulmonary vasculature appears within normal limits. The cardiac and mediastinal silhouette appear unremarkable. No acute osseous abnormality identified.     Impression: Impression: No radiographic evidence of acute pulmonary process Electronically Signed: Carter Quevedo MD  4/4/2025 1:31 PM EDT  Workstation ID: [id number]      Results for orders placed during the hospital encounter of 06/04/24    Adult Transthoracic Echo Complete W/ Cont if Necessary Per Protocol (With Agitated Saline)    Interpretation Summary    Left ventricular systolic function is normal. Left ventricular ejection fraction appears to be 56 - 60%.    Left ventricular wall thickness is consistent with mild concentric hypertrophy.    Left ventricular diastolic function is consistent with (grade Ia w/high LAP) impaired relaxation.    The right ventricular cavity is borderline dilated.    Saline test results are negative.    Moderate aortic valve regurgitation is present.    Mild tricuspid valve regurgitation is present.      Assessment & Plan   Assessment & Plan       Dural venous sinus thrombosis    anxiety and depression    Essential hypertension    GERD without esophagitis    Influenza A    Left transverse and Sigmoid Dural venous sinus thrombosis   Dizziness   Headache   -- stroke neurology following   ---MRI brain 4/4/24 revealed increased signal intensity within the left transverse and sigmoid sinus concerning for dural venous sinus thrombosis.   -- heparin drip STOPPED since CTV is  negative  --migraine cocktail   -- MRI of brain neg for stroke   -- consult PT.OT.CM.Speech  --ECHO in am     Hx of TIA  -- on ASA and plavix will defer to Neurology on continuing medication   -- cont statin     HTN  HLD  -- allow autoregulation of blood pressure   -- cont statin     Hx of breast cancer    Recent strep throat and Flu A  -- cont cefdinir to complete 10 day course   -- no tamiflu had symptoms for over 2 weeks     Anxiety and depression   -- cont lexapro, lamictal and atarax     VTE Prophylaxis:hep sq    CODE STATUS:    Code Status and Medical Interventions: CPR (Attempt to Resuscitate); Full Support   Ordered at: 04/04/25 1758     Code Status (Patient has no pulse and is not breathing):    CPR (Attempt to Resuscitate)     Medical Interventions (Patient has pulse or is breathing):    Full Support     Level Of Support Discussed With:    Patient       Expected Discharge   Expected Discharge Date: 4/8/2025; Expected Discharge Time:      MEI Archuleta  4/4/25    Patient seen briefly, agree with above with a few updates.  oDnita Mary MD  04/04/25

## 2025-04-04 NOTE — PROGRESS NOTES
Pharmacy to Dose Heparin Infusion Note    Clara Gonzalez is a 70 y.o. female receiving heparin infusion.     Therapy for (VTE/Cardiac): Cardiac  Patient Weight: 62.6  Initial Bolus (Y/N): No  Any Bolus (Y/N): No     Signs or Symptoms of Bleeding: No    Cardiac or Other (Not VTE)   Initial Bolus: 60 units/kg (Max 4,000 units)  Initial rate: 12 units/kg/hr (Max 1,000 units/hr)   Anti-Xa Bolus   Dose Infusion Hold   Time Infusion Rate Change (units/kg/hr) Repeat  Anti-Xa    < 0.11 50 units/kg  (4000 units Max) None Increase by  3 units/kg/hr 6 hours   0.11- 0.19 25 units/kg  (2000 units Max) None Increase by  2 units/kg/hr 6 hours   0.2 - 0.29 0 None Increase by  1 units/kg/hr 6 hours   0.3 - 0.5 0 None No Change 6 hours (after 2 consecutive levels in range check qAM)   0.51 - 0.6 0 None Decrease by  1 units/kg/hr 6 hours   0.61 - 0.8 0 30 minutes Decrease by  2 units/kg/hr 6 hours   0.81 - 1 0 60 minutes Decrease by  3 units/kg/hr 6 hours   >1 0 Hold  After Anti-Xa less than 0.5 decrease previous rate by  4 units/kg/hr  Every 2 hours until Anti-Xa  less than 0.5 then when infusion restarts in 6 hours     Results from last 7 days   Lab Units 04/04/25  1544   HEMOGLOBIN g/dL 13.6   HEMATOCRIT % 41.3   PLATELETS 10*3/mm3 246       Date   Time   Anti-Xa Current Rate (units/kg/hr) Rate Change   (units/kg/hr) New Rate (units/kg/hr) Repeat  Anti-Xa Comments /  Pump Check    4/4 1657 pending -- +12 12 2300 DW RN                                                                                                                                                                                                              [place] Miller, PharmD  4/4/2025  16:57 EDT

## 2025-04-04 NOTE — PLAN OF CARE
Goal Outcome Evaluation:  Plan of Care Reviewed With: patient, familynVSS pt very weak positive flu x 1 week per family x 1 assist voids well no skin issues will continue to monitor

## 2025-04-04 NOTE — ED NOTES
Clara Gonzalez    Nursing Report ED to Floor:  Mental status: alert and oriented x4  Ambulatory status: x1  Oxygen Therapy: room air  Cardiac Rhythm: normal sinus  Admitted from: ed/home  Safety Concerns:  fall risk  Precautions: droplet  Social Issues: na  ED Room #:  14    ED Nurse Phone Extension - [phone] or may call [phone].      HPI:   Chief Complaint   Patient presents with    Chest Pain       Past Medical History:  Past Medical History:   Diagnosis Date    Anxiety     Breast cancer     Breast injury 01/19/2023    right hematoma    Essential hypertension 07/02/2021    GERD without esophagitis 07/16/2019    TIA (transient ischemic attack) 06/04/2024        Past Surgical History:  Past Surgical History:   Procedure Laterality Date    BREAST BIOPSY Right 10/21/2024    MRI guided    BREAST BIOPSY Right 01/19/2024    stereo    BREAST CYST ASPIRATION Left 2005    BREAST LUMPECTOMY Right 02/09/2024    CHOLECYSTECTOMY      COLONOSCOPY      HYSTERECTOMY      OOPHORECTOMY          Admitting Doctor:   Donita Mary MD    Consulting Provider(s):  Consults       No orders found from 3/6/2025 to 4/5/2025.             Admitting Diagnosis:   The primary encounter diagnosis was Dural venous sinus thrombosis. Diagnoses of Dizziness and Influenza A were also pertinent to this visit.    Most Recent Vitals:   Vitals:    04/04/25 1530 04/04/25 1600 04/04/25 1630 04/04/25 1700   BP: 168/84 163/78 141/71 152/81   BP Location:       Patient Position:       Pulse:  65 57 67   Resp:       Temp:       TempSrc:       SpO2:  98% 96% 98%   Weight:       Height:           Active LDAs/IV Access:   Lines, Drains & Airways       Active LDAs       Name Placement date Placement time Site Days    Peripheral IV 04/04/25 Left Antecubital 04/04/25  --  Antecubital  less than 1                    Labs (abnormal labs have a star):   Labs Reviewed   RESPIRATORY PANEL PCR W/ COVID-19 (SARS-COV-2), NP SWAB IN UTM/VTP, 2 HR TAT - Abnormal; Notable for the  following components:       Result Value    Influenza A H3 Detected (*)     All other components within normal limits    Narrative:     In the setting of a positive respiratory panel with a viral infection PLUS a negative procalcitonin without other underlying concern for bacterial infection, consider observing off antibiotics or discontinuation of antibiotics and continue supportive care. If the respiratory panel is positive for atypical bacterial infection (Bordetella pertussis, Chlamydophila pneumoniae, or Mycoplasma pneumoniae), consider antibiotic de-escalation to target atypical bacterial infection.   COMPREHENSIVE METABOLIC PANEL - Abnormal; Notable for the following components:    Glucose 107 (*)     Calcium 8.4 (*)     All other components within normal limits    Narrative:     GFR Categories in Chronic Kidney Disease (CKD)      GFR Category          GFR (mL/min/1.73)    Interpretation  G1                     90 or greater         Normal or high (1)  G2                      60-89                Mild decrease (1)  G3a                   45-59                Mild to moderate decrease  G3b                   30-44                Moderate to severe decrease  G4                    15-29                Severe decrease  G5                    14 or less           Kidney failure          (1)In the absence of evidence of kidney disease, neither GFR category G1 or G2 fulfill the criteria for CKD.    eGFR calculation 2021 CKD-EPI creatinine equation, which does not include race as a factor   URINALYSIS W/ MICROSCOPIC IF INDICATED (NO CULTURE) - Abnormal; Notable for the following components:    pH, UA >=9.0 (*)     Ketones, UA Trace (*)     Protein, UA 30 mg/dL (1+) (*)     Leuk Esterase, UA Small (1+) (*)     All other components within normal limits   CBC WITH AUTO DIFFERENTIAL - Abnormal; Notable for the following components:    Neutrophil % 82.7 (*)     Lymphocyte % 11.0 (*)     Neutrophils, Absolute 8.52 (*)     All  other components within normal limits   HEPARIN ANTI XA - Abnormal; Notable for the following components:    Heparin Anti-Xa (UFH) 0.10 (*)     All other components within normal limits   APTT - Abnormal; Notable for the following components:    PTT 24.7 (*)     All other components within normal limits    Narrative:     PTT = The equivalent PTT values for the therapeutic range of heparin levels at 0.3 to 0.5 U/ml are 60 to 70 seconds.   LIPASE - Normal   BNP (IN-HOUSE) - Normal    Narrative:     This assay is used as an aid in the diagnosis of individuals suspected of having heart failure. It can be used as an aid in the diagnosis of acute decompensated heart failure (ADHF) in patients presenting with signs and symptoms of ADHF to the emergency department (ED). In addition, NT-proBNP of <300 pg/mL indicates ADHF is not likely.    Age Range Result Interpretation  NT-proBNP Concentration (pg/mL:      <50             Positive            >450                   Gray                 300-450                    Negative             <300    50-75           Positive            >900                  Gray                300-900                  Negative            <300      >75             Positive            >1800                  Gray                300-1800                  Negative            <300   TROPONIN - Normal    Narrative:     High Sensitive Troponin T Reference Range:  <14.0 ng/L- Negative Female for AMI  <22.0 ng/L- Negative Male for AMI  >=14 - Abnormal Female indicating possible myocardial injury.  >=22 - Abnormal Male indicating possible myocardial injury.   Clinicians would have to utilize clinical acumen, EKG, Troponin, and serial changes to determine if it is an Acute Myocardial Infarction or myocardial injury due to an underlying chronic condition.        MAGNESIUM - Normal   PROTIME-INR - Normal   COVID PRE-OP / PRE-PROCEDURE SCREENING ORDER (NO ISOLATION)    Narrative:     The following orders were  created for panel order COVID PRE-OP / PRE-PROCEDURE SCREENING ORDER (NO ISOLATION) - Swab, Nasopharynx.  Procedure                               Abnormality         Status                     ---------                               -----------         ------                     Respiratory Panel PCR w/...[[id number]]  Abnormal            Final result                 Please view results for these tests on the individual orders.   RAINBOW DRAW    Narrative:     The following orders were created for panel order [place] Draw.  Procedure                               Abnormality         Status                     ---------                               -----------         ------                     Green Top (Gel)[[id number]]                                  Final result               Lavender Top[[id number]]                                     Final result               Gold Top - SST[[id number]]                                   Final result               [place] Top[[id number]]                                         Final result               Light Blue Top[[id number]]                                   Final result                 Please view results for these tests on the individual orders.   URINALYSIS, MICROSCOPIC ONLY   HEPARIN ANTI XA   CBC AND DIFFERENTIAL    Narrative:     The following orders were created for panel order CBC & Differential.  Procedure                               Abnormality         Status                     ---------                               -----------         ------                     CBC Auto Differential[[id number]]        Abnormal            Final result                 Please view results for these tests on the individual orders.   GREEN TOP   LAVENDER TOP   GOLD TOP - SST   GRAY TOP   LIGHT BLUE TOP       Meds Given in ED:   Medications   heparin 72331 units/250 mL (100 units/mL) in 0.45 % NaCl infusion (has no administration in time range)   Pharmacy to Dose Heparin (has no  administration in time range)   acetaminophen (TYLENOL) tablet 1,000 mg (has no administration in time range)   magnesium sulfate in D5W 1g/100mL (PREMIX) (has no administration in time range)   prochlorperazine (COMPAZINE) injection 10 mg (has no administration in time range)   diphenhydrAMINE (BENADRYL) injection 25 mg (has no administration in time range)   sodium chloride 0.9 % bolus 1,000 mL (0 mL Intravenous Stopped 4/4/25 1705)   meclizine (ANTIVERT) tablet 25 mg (25 mg Oral Given 4/4/25 1353)   ondansetron (ZOFRAN) injection 4 mg (4 mg Intravenous Given 4/4/25 1355)   diazePAM (VALIUM) injection 5 mg (5 mg Intravenous Given 4/4/25 1447)   famotidine (PEPCID) injection 20 mg (20 mg Intravenous Given 4/4/25 1612)     heparin, 12 Units/kg/hr  Pharmacy to Dose Heparin,          Last NIH score:                                                          Dysphagia screening results:  Patient Factors Component (Dysphagia:Stroke or Rule-out)  Best Eye Response: 4-->(E4) spontaneous (04/04/25 1301)  Best Motor Response: 6-->(M6) obeys commands (04/04/25 1301)  Best Verbal Response: 5-->(V5) oriented (04/04/25 1301)  [place] Coma Scale Score: 15 (04/04/25 1301)     [place] Coma Scale:  No data recorded     CIWA:        Restraint Type:            Isolation Status:  Droplet

## 2025-04-04 NOTE — ED PROVIDER NOTES
Subjective   History of Present Illness  Pt is a 71 yo female presenting to ED by EMS with complaints of dizziness and generalized weakness. PMHx significant for breast cancer s/p lumpectomy, HTN, TIA, Anxiety and Depression. Pt explains she was recently diagnosed with Flu A and Strep at [facility] and given Cefdinir which she started 3-29-25 but no Tamiflu since she had been sick for about a week prior. She reports over last few days intermittent chest pain, generalized weakness, dizziness and cough. She repots the dizziness has been worse since yesterday and different than her typical vertigo. Denies head injury. She is on ASA and Plavix. Denies vision changes, headache, numbness, confusion, fever, vomiting or abdominal pain. Denies tobacco or ETOH use.     History provided by:  Medical records, patient and EMS personnel      Review of Systems   Constitutional:  Positive for fatigue. Negative for chills and fever.   HENT:  Negative for congestion, sore throat and trouble swallowing.    Eyes:  Negative for pain and visual disturbance.   Respiratory:  Positive for cough. Negative for shortness of breath.    Cardiovascular:  Positive for chest pain. Negative for leg swelling.   Gastrointestinal:  Positive for diarrhea (a few episodes) and nausea. Negative for abdominal pain, constipation and vomiting.   Genitourinary:  Negative for difficulty urinating, dysuria, flank pain and hematuria.   Musculoskeletal:  Negative for arthralgias, back pain and neck pain.   Skin:  Negative for rash and wound.   Neurological:  Positive for dizziness and weakness (generalized). Negative for syncope, speech difficulty, numbness and headaches.   Psychiatric/Behavioral:  Negative for confusion.    All other systems reviewed and are negative.      Past Medical History:   Diagnosis Date    Anxiety     Breast cancer     Breast injury 01/19/2023    right hematoma    Essential hypertension 07/02/2021    GERD without esophagitis 07/16/2019    TIA  (transient ischemic attack) 06/04/2024       No Known Allergies    Past Surgical History:   Procedure Laterality Date    BREAST BIOPSY Right 10/21/2024    MRI guided    BREAST BIOPSY Right 01/19/2024    stereo    BREAST CYST ASPIRATION Left 2005    BREAST LUMPECTOMY Right 02/09/2024    CHOLECYSTECTOMY      COLONOSCOPY      HYSTERECTOMY      OOPHORECTOMY         Family History   Problem Relation Age of Onset    Breast cancer Mother 80    Ovarian cancer Neg Hx     Colon cancer Neg Hx     Colon polyps Neg Hx     Esophageal cancer Neg Hx        Social History     Socioeconomic History    Marital status:    Tobacco Use    Smoking status: Never     Passive exposure: Never    Smokeless tobacco: Never   Vaping Use    Vaping status: Never Used   Substance and Sexual Activity    Alcohol use: Never    Drug use: Never    Sexual activity: Defer           Objective   Physical Exam  Vitals and nursing note reviewed.   Constitutional:       General: She is not in acute distress.     Appearance: She is well-developed.   HENT:      Head: Atraumatic.      Nose: Nose normal.   Eyes:      General: Lids are normal.      Extraocular Movements: Extraocular movements intact.      Conjunctiva/sclera: Conjunctivae normal.      Pupils: Pupils are equal, round, and reactive to light.   Cardiovascular:      Rate and Rhythm: Normal rate and regular rhythm.      Heart sounds: Normal heart sounds.   Pulmonary:      Effort: Pulmonary effort is normal.      Breath sounds: Normal breath sounds. No wheezing.   Abdominal:      General: There is no distension.      Palpations: Abdomen is soft.      Tenderness: There is no abdominal tenderness. There is no guarding or rebound.   Musculoskeletal:         General: No tenderness. Normal range of motion.      Cervical back: Normal range of motion and neck supple.   Skin:     General: Skin is warm and dry.      Findings: No erythema or rash.   Neurological:      Mental Status: She is alert and oriented  to person, place, and time.      Cranial Nerves: Cranial nerves 2-12 are intact.      Sensory: Sensation is intact. No sensory deficit.      Motor: Motor function is intact.   Psychiatric:         Speech: Speech normal.         Behavior: Behavior normal.         Procedures           ED Course  ED Course as of 04/04/25 2202 Fri Apr 04, 2025   1312  I personally reviewed and interpreted labs results and went over with patient. [RT]   1428 XR Chest 1 View  I personally reviewed the single view of the chest.  My interpretation there is no lobar infiltrate visualized. [RS]   1547 Influenza A H3(!): Detected  Viral panel positive for influenza A. [RS]   1610 Discussed patient with Dr. Denny and MRI findings.  [RT]   1617 proBNP: 185.1 [RT]   1617 HS Troponin T: <6 [RT]   1617 Glucose(!): 107 [RT]   1617 Creatinine: 0.84 [RT]   1617 Potassium: 4.1 [RT]   1617 Anion Gap: 11.0 [RT]   1619 Discussed patient / MRI findings with Hailey Weston PA-C with Dr. Hdez. She will call back to ED to discuss recommendations.  [RT]   1633 Dr. Hdez recommends contacting stroke team for further workup.  [RT]   1635 Discussed patient with stroke team.  [RT]   1641 Discussed admission with hospitalist Dr. Mary  [RT]   1643 Re-examined patient several times throughout ED course. Patient still complaining of symptoms and multiple rounds of meds given. Discussed results and tx plan for admission for further evaluation.  [RT]      ED Course User Index  [RS] Omi Denny MD  [RT] Ghislaine Mooney PA        Recent Results (from the past 24 hours)   ECG 12 Lead Chest Pain    Collection Time: 04/04/25  1:22 PM   Result Value Ref Range    QT Interval 424 ms    QTC Interval 440 ms   Respiratory Panel PCR w/COVID-19(SARS-CoV-2) OLEG/JARET/KRISTIAN/PAD/COR/MELANY In-House, NP Swab in UTM/VTM, 2 HR TAT - Swab, Nasopharynx    Collection Time: 04/04/25  1:57 PM    Specimen: Nasopharynx; Swab   Result Value Ref Range    ADENOVIRUS, PCR Not  Detected Not Detected    Coronavirus 229E Not Detected Not Detected    Coronavirus HKU1 Not Detected Not Detected    Coronavirus NL63 Not Detected Not Detected    Coronavirus OC43 Not Detected Not Detected    COVID19 Not Detected Not Detected - Ref. Range    Human Metapneumovirus Not Detected Not Detected    Human Rhinovirus/Enterovirus Not Detected Not Detected    Influenza A H3 Detected (A) Not Detected    Influenza B PCR Not Detected Not Detected    Parainfluenza Virus 1 Not Detected Not Detected    Parainfluenza Virus 2 Not Detected Not Detected    Parainfluenza Virus 3 Not Detected Not Detected    Parainfluenza Virus 4 Not Detected Not Detected    RSV, PCR Not Detected Not Detected    Bordetella pertussis pcr Not Detected Not Detected    Bordetella parapertussis PCR Not Detected Not Detected    Chlamydophila pneumoniae PCR Not Detected Not Detected    Mycoplasma pneumo by PCR Not Detected Not Detected   Urinalysis With Microscopic If Indicated (No Culture) - Urine, Clean Catch    Collection Time: 04/04/25  3:35 PM    Specimen: Urine, Clean Catch   Result Value Ref Range    Color, UA Yellow Yellow, Straw    Appearance, UA Clear Clear    pH, UA >=9.0 (H) 5.0 - 8.0    Specific Gravity, UA 1.019 1.001 - 1.030    Glucose, UA Negative Negative    Ketones, UA Trace (A) Negative    Bilirubin, UA Negative Negative    Blood, UA Negative Negative    Protein, UA 30 mg/dL (1+) (A) Negative    Leuk Esterase, UA Small (1+) (A) Negative    Nitrite, UA Negative Negative    Urobilinogen, UA 0.2 E.U./dL 0.2 - 1.0 E.U./dL   Urinalysis, Microscopic Only - Urine, Clean Catch    Collection Time: 04/04/25  3:35 PM    Specimen: Urine, Clean Catch   Result Value Ref Range    RBC, UA 0-2 None Seen, 0-2 /HPF    WBC, UA 0-2 None Seen, 0-2 /HPF    Bacteria, UA None Seen None Seen, Trace /HPF    Squamous Epithelial Cells, UA 0-2 None Seen, 0-2 /HPF    Hyaline Casts, UA None Seen 0 - 6 /LPF    Methodology Automated Microscopy    Comprehensive  Metabolic Panel    Collection Time: 04/04/25  3:44 PM    Specimen: Blood   Result Value Ref Range    Glucose 107 (H) 65 - 99 mg/dL    BUN 11 8 - 23 mg/dL    Creatinine 0.84 0.57 - 1.00 mg/dL    Sodium 138 136 - 145 mmol/L    Potassium 4.1 3.5 - 5.2 mmol/L    Chloride 105 98 - 107 mmol/L    CO2 22.0 22.0 - 29.0 mmol/L    Calcium 8.4 (L) 8.6 - 10.5 mg/dL    Total Protein 6.3 6.0 - 8.5 g/dL    Albumin 3.9 3.5 - 5.2 g/dL    ALT (SGPT) 13 1 - 33 U/L    AST (SGOT) 14 1 - 32 U/L    Alkaline Phosphatase 81 39 - 117 U/L    Total Bilirubin 0.5 0.0 - 1.2 mg/dL    Globulin 2.4 gm/dL    A/G Ratio 1.6 g/dL    BUN/Creatinine Ratio 13.1 7.0 - 25.0    Anion Gap 11.0 5.0 - 15.0 mmol/L    eGFR 75.3 >60.0 mL/min/1.73   Lipase    Collection Time: 04/04/25  3:44 PM    Specimen: Blood   Result Value Ref Range    Lipase 18 13 - 60 U/L   BNP    Collection Time: 04/04/25  3:44 PM    Specimen: Blood   Result Value Ref Range    proBNP 185.1 0.0 - 900.0 pg/mL   High Sensitivity Troponin T    Collection Time: 04/04/25  3:44 PM    Specimen: Blood   Result Value Ref Range    HS Troponin T <6 <14 ng/L   CBC Auto Differential    Collection Time: 04/04/25  3:44 PM    Specimen: Blood   Result Value Ref Range    WBC 10.30 3.40 - 10.80 10*3/mm3    RBC 4.32 3.77 - 5.28 10*6/mm3    Hemoglobin 13.6 12.0 - 15.9 g/dL    Hematocrit 41.3 34.0 - 46.6 %    MCV 95.6 79.0 - 97.0 fL    MCH 31.5 26.6 - 33.0 pg    MCHC 32.9 31.5 - 35.7 g/dL    RDW 12.5 12.3 - 15.4 %    RDW-SD 44.2 37.0 - 54.0 fl    MPV 9.1 6.0 - 12.0 fL    Platelets 246 140 - 450 10*3/mm3    Neutrophil % 82.7 (H) 42.7 - 76.0 %    Lymphocyte % 11.0 (L) 19.6 - 45.3 %    Monocyte % 5.1 5.0 - 12.0 %    Eosinophil % 0.3 0.3 - 6.2 %    Basophil % 0.4 0.0 - 1.5 %    Immature Grans % 0.5 0.0 - 0.5 %    Neutrophils, Absolute 8.52 (H) 1.70 - 7.00 10*3/mm3    Lymphocytes, Absolute 1.13 0.70 - 3.10 10*3/mm3    Monocytes, Absolute 0.53 0.10 - 0.90 10*3/mm3    Eosinophils, Absolute 0.03 0.00 - 0.40 10*3/mm3     Basophils, Absolute 0.04 0.00 - 0.20 10*3/mm3    Immature Grans, Absolute 0.05 0.00 - 0.05 10*3/mm3    nRBC 0.0 0.0 - 0.2 /100 WBC   Green Top (Gel)    Collection Time: 04/04/25  3:44 PM   Result Value Ref Range    Extra Tube Hold for add-ons.    Lavender Top    Collection Time: 04/04/25  3:44 PM   Result Value Ref Range    Extra Tube hold for add-on    Gold Top - SST    Collection Time: 04/04/25  3:44 PM   Result Value Ref Range    Extra Tube Hold for add-ons.    Gray Top    Collection Time: 04/04/25  3:44 PM   Result Value Ref Range    Extra Tube Hold for add-ons.    Light Blue Top    Collection Time: 04/04/25  3:44 PM   Result Value Ref Range    Extra Tube Hold for add-ons.    Magnesium    Collection Time: 04/04/25  3:44 PM    Specimen: Blood   Result Value Ref Range    Magnesium 2.1 1.6 - 2.4 mg/dL   Heparin Anti-Xa    Collection Time: 04/04/25  3:44 PM    Specimen: Blood   Result Value Ref Range    Heparin Anti-Xa (UFH) 0.10 (L) 0.30 - 0.70 IU/ml   Protime-INR    Collection Time: 04/04/25  3:44 PM    Specimen: Blood   Result Value Ref Range    Protime 14.0 12.2 - 15.3 Seconds    INR 1.01 0.89 - 1.12   aPTT    Collection Time: 04/04/25  3:44 PM    Specimen: Blood   Result Value Ref Range    PTT 24.7 (L) 60.0 - 90.0 seconds     Note: In addition to lab results from this visit, the labs listed above may include labs taken at another facility or during a different encounter within the last 24 hours. Please correlate lab times with ED admission and discharge times for further clarification of the services performed during this visit.    CT venogram head   Final Result   Impression:   No evidence of dural venous sinus thrombosis.      No acute intracranial findings.         Electronically Signed: Antoine Chapman     4/4/2025 6:14 PM EDT     Workstation ID: [id number]      MRI Brain Without Contrast   Final Result   Impression:   1.Increased signal intensity within the left transverse and sigmoid sinus concerning for  dural venous sinus thrombosis. MR or CT venogram recommended to further assess.            Electronically Signed: Albert Buchanan MD     4/4/2025 3:38 PM EDT     Workstation ID: [id number]      XR Chest 1 View   Final Result   Impression:   No radiographic evidence of acute pulmonary process      Electronically Signed: Carter Quevedo MD     4/4/2025 1:31 PM EDT     Workstation ID: [id number]        Vitals:    04/04/25 1630 04/04/25 1700 04/04/25 1730 04/04/25 1823   BP: 141/71 152/81  152/80   BP Location:    Right arm   Patient Position:    Lying   Pulse: 57 67 64 74   Resp:    16   Temp:    99.5 °F (37.5 °C)   TempSrc:    Oral   SpO2: 96% 98% 99% 98%   Weight:       Height:         Medications   acetaminophen (TYLENOL) tablet 1,000 mg (has no administration in time range)   escitalopram (LEXAPRO) tablet 20 mg (20 mg Oral Given 4/4/25 1958)   hydrOXYzine (ATARAX) tablet 10 mg (has no administration in time range)   lamoTRIgine (LaMICtal) tablet 25 mg ( Oral Canceled Entry 4/4/25 2006)   sodium chloride 0.9 % flush 10 mL (10 mL Intravenous Given 4/4/25 2003)   sodium chloride 0.9 % flush 10 mL (has no administration in time range)   sodium chloride 0.9 % infusion 40 mL (has no administration in time range)   Potassium Replacement - Follow Nurse / BPA Driven Protocol (has no administration in time range)   Magnesium Standard Dose Replacement - Follow Nurse / BPA Driven Protocol (has no administration in time range)   Phosphorus Replacement - Follow Nurse / BPA Driven Protocol (has no administration in time range)   Calcium Replacement - Follow Nurse / BPA Driven Protocol (has no administration in time range)   sennosides-docusate (PERICOLACE) 8.6-50 MG per tablet 2 tablet (has no administration in time range)     And   polyethylene glycol (MIRALAX) packet 17 g (has no administration in time range)     And   bisacodyl (DULCOLAX) EC tablet 5 mg (has no administration in time range)     And   bisacodyl (DULCOLAX) suppository  10 mg (has no administration in time range)   ondansetron ODT (ZOFRAN-ODT) disintegrating tablet 4 mg (has no administration in time range)     Or   ondansetron (ZOFRAN) injection 4 mg (has no administration in time range)   cefdinir (OMNICEF) capsule 300 mg (300 mg Oral Given 4/4/25 2119)   sodium chloride 0.9 % infusion (125 mL/hr Intravenous New Bag 4/4/25 2006)   aspirin chewable tablet 81 mg (81 mg Oral Given 4/4/25 1959)   clopidogrel (PLAVIX) tablet 75 mg (75 mg Oral Given 4/4/25 2000)   sodium chloride 0.9 % bolus 1,000 mL (0 mL Intravenous Stopped 4/4/25 1705)   meclizine (ANTIVERT) tablet 25 mg (25 mg Oral Given 4/4/25 1353)   ondansetron (ZOFRAN) injection 4 mg (4 mg Intravenous Given 4/4/25 1355)   diazePAM (VALIUM) injection 5 mg (5 mg Intravenous Given 4/4/25 1447)   famotidine (PEPCID) injection 20 mg (20 mg Intravenous Given 4/4/25 1612)   magnesium sulfate in D5W 1g/100mL (PREMIX) (1 g Intravenous New Bag 4/4/25 1732)   prochlorperazine (COMPAZINE) injection 10 mg (10 mg Intravenous Given 4/4/25 1729)   diphenhydrAMINE (BENADRYL) injection 25 mg (25 mg Intravenous Given 4/4/25 1729)   iopamidol (ISOVUE-370) 76 % injection 100 mL (85 mL Intravenous Given 4/4/25 1752)     ECG/EMG Results (last 24 hours)       Procedure Component Value Units Date/Time    ECG 12 Lead Chest Pain [[id number]] Collected: 04/04/25 1322     Updated: 04/04/25 1352     QT Interval 424 ms      QTC Interval 440 ms     Narrative:      Test Reason : Chest Pain  Blood Pressure :   */*   mmHG  Vent. Rate :  65 BPM     Atrial Rate :  65 BPM     P-R Int : 112 ms          QRS Dur :  78 ms      QT Int : 424 ms       P-R-T Axes :  28   8 -12 degrees    QTcB Int : 440 ms    Normal sinus rhythm  Nonspecific ST abnormality  Abnormal ECG  When compared with ECG of 08-Jul-2024 12:20,  Nonspecific T wave abnormality, improved in Anterolateral leads  Confirmed by KAREN ROSS MD ([id number]) on 4/4/2025 1:52:42 PM    Referred By: ed md            Confirmed By: KAREN ROSS MD          ECG 12 Lead Chest Pain   Final Result   Test Reason : Chest Pain   Blood Pressure :   */*   mmHG   Vent. Rate :  65 BPM     Atrial Rate :  65 BPM      P-R Int : 112 ms          QRS Dur :  78 ms       QT Int : 424 ms       P-R-T Axes :  28   8 -12 degrees     QTcB Int : 440 ms      Normal sinus rhythm   Nonspecific ST abnormality   Abnormal ECG   When compared with ECG of 08-Jul-2024 12:20,   Nonspecific T wave abnormality, improved in Anterolateral leads   Confirmed by KAREN ROSS MD ([id number]) on 4/4/2025 1:52:42 PM      Referred By: ed md           Confirmed By: KAREN ROSS MD                                  Total (NIH Stroke Scale): 1                      Medical Decision Making  Pt is a 71 yo female presenting to ED with complaints of Flu symptoms, dizziness, CP and weakness. I had a discussion with the patient / family regarding ED course, diagnosis, diagnostic results and treatment plan including medications and admission.    DDx  ACS, NSTEMI, CVA, UTI, Anemia, Electrolyte abnormality, Vertigo, Flu, Covid, Pneumonia     Problems Addressed:  Dizziness: complicated acute illness or injury  Dural venous sinus thrombosis: complicated acute illness or injury  Influenza A: complicated acute illness or injury    Amount and/or Complexity of Data Reviewed  External Data Reviewed: notes.     Details: Reviewed previous non ED visits including prior labs, imaging, available notes, medications, allergies and surgical hx.   1-16-25 Behavioral health anxiety / grief follow up  Labs: ordered. Decision-making details documented in ED Course.  Radiology: ordered and independent interpretation performed. Decision-making details documented in ED Course.  ECG/medicine tests: ordered and independent interpretation performed. Decision-making details documented in ED Course.    Risk  Prescription drug management.  Decision regarding hospitalization.        Final diagnoses:   Dural venous sinus  thrombosis   Dizziness   Influenza A       ED Disposition  ED Disposition       ED Disposition   Decision to Admit    Condition   --    Comment   Level of Care: Telemetry [5]   Diagnosis: Dural venous sinus thrombosis [846314]   Admitting Physician: JULIÁN LINCOLN [[id number]]   Attending Physician: JULIÁN LINCOLN [[id number]]   Is patient appropriate for Inpatient Observation Unit?: No [0]                 No follow-up provider specified.       Medication List      No changes were made to your prescriptions during this visit.            Ghislaine Mooney PA  04/04/25 [id number]

## 2025-04-04 NOTE — CONSULTS
Stroke Consult Note    Patient Name: Clara Gonzalez   MRN: [id number]  Age: 70 y.o.  Sex: female  : 1955    Primary Care Physician: Velia James APRN  Referring Physician:  RAEGAN Chi    TIME STROKE TEAM CALLED: 1635 EST     TIME PATIENT SEEN: 1640 EST    Handedness: Right   Race:       Chief Complaint/Reason for Consultation: Left transverse and sigmoid sinus concerning for dural venous sinus thrombosis on MRI    HPI: Clara Gonzalez is a 70-year-old female with past medical history of HTN, remote breast cancer s/p lumpectomy, anxiety/depression and multiple remote TIAs () presented to [facility] emergency department via private vehicle for further evaluation of dizziness, generalized weakness and headache(rates 5-6/10).  She has been feeling unwell for a couple weeks, she was diagnosed with flu A and strep and was given cefdinir on 3/29/2025.  She tells me that she has been in the bed for about 2 weeks with generalized weakness, dizziness, headache, nausea and cough. She reports the dizziness has been a lot worse since yesterday, describes the sensation as room spinning.  Stroke neurology was consulted after MRI resulted concerning for dural venous sinus thrombosis within the left transverse and sigmoid sinus.  She will be admitted to the hospitalist service for further evaluation.    Last known well: Has been feeling unwell for a couple weeks, dizziness worsened yesterday.     Review of Systems   Constitutional:  Positive for fatigue.   HENT:  Positive for sinus pressure and sore throat. Negative for congestion and sinus pain.    Respiratory:  Positive for cough.    Cardiovascular:  Positive for chest pain.   Gastrointestinal:  Positive for nausea. Negative for abdominal pain and vomiting.   Neurological:  Positive for dizziness, weakness and headaches. Negative for tremors, seizures, syncope, facial asymmetry, speech difficulty, light-headedness and numbness.       Past Medical History:   Diagnosis Date    Anxiety     Breast cancer     Breast injury 01/19/2023    right hematoma    Essential hypertension 07/02/2021    GERD without esophagitis 07/16/2019    TIA (transient ischemic attack) 06/04/2024     Past Surgical History:   Procedure Laterality Date    BREAST BIOPSY Right 10/21/2024    MRI guided    BREAST BIOPSY Right 01/19/2024    stereo    BREAST CYST ASPIRATION Left 2005    BREAST LUMPECTOMY Right 02/09/2024    CHOLECYSTECTOMY      COLONOSCOPY      HYSTERECTOMY      OOPHORECTOMY       Family History   Problem Relation Age of Onset    Breast cancer Mother 80    Ovarian cancer Neg Hx     Colon cancer Neg Hx     Colon polyps Neg Hx     Esophageal cancer Neg Hx      Social History     Socioeconomic History    Marital status:    Tobacco Use    Smoking status: Never     Passive exposure: Never    Smokeless tobacco: Never   Vaping Use    Vaping status: Never Used   Substance and Sexual Activity    Alcohol use: Never    Drug use: Never    Sexual activity: Defer     No Known Allergies  Prior to Admission medications    Medication Sig Start Date End Date Taking? Authorizing Provider   aspirin 81 MG chewable tablet Chew and swallow 1 tablet by mouth Daily. 7/11/24   STACY Cabrera DO   atorvastatin (LIPITOR) 80 MG tablet Take 1 tablet by mouth Every Night. 6/6/24   Nadir Stafford MD   clopidogrel (PLAVIX) 75 MG tablet Take 1 tablet by mouth Daily. 6/6/24   Erick Flores MD   hydrOXYzine (ATARAX) 10 MG tablet Take 1 tablet by mouth 3 (Three) Times a Day As Needed for Anxiety. 1/16/25   Jed Reyez APRN   lamoTRIgine (LaMICtal) 25 MG tablet Take 1 tablet by mouth Every Night. 1/16/25   Jed Reyez APRN   metoprolol succinate XL (TOPROL-XL) 50 MG 24 hr tablet Take 1 tablet by mouth Daily.    Provider, MD Yanira         Temp:  [98.3 °F (36.8 °C)] 98.3 °F (36.8 °C)  Heart Rate:  [57-80] 57  Resp:  [16] 16  BP: (141-168)/(71-90) 141/71  Neurological  Exam  Mental Status  Awake, alert and oriented to person, place and time. Oriented to person, place, time and situation. Recent and remote memory are intact. Speech is normal. Language is fluent with no aphasia. Attention and concentration are normal.    Cranial Nerves  CN II: Visual fields full to confrontation.  CN III, IV, VI: Extraocular movements intact bilaterally. Pupils equal round and reactive to light bilaterally.  CN V: Facial sensation is normal.  CN VII: Full and symmetric facial movement.  CN VIII: Hearing appears intact.  CN XII: Tongue midline without atrophy or fasciculations.    Motor  Normal muscle bulk throughout. Normal muscle tone. Strength is 5/5 in all four extremities except as noted.  BLE 4/5.    Sensory  Sensation is intact to light touch, pinprick, vibration and proprioception in all four extremities. No right-sided hemispatial neglect. No left-sided hemispatial neglect.    Coordination    No dysmetria noted.    Gait    Not observed.      Physical Exam  Vitals and nursing note reviewed.   Constitutional:       Appearance: She is ill-appearing.   HENT:      Head: Normocephalic and atraumatic.   Eyes:      Extraocular Movements: Extraocular movements intact.      Pupils: Pupils are equal, round, and reactive to light.   Cardiovascular:      Rate and Rhythm: Normal rate and regular rhythm.   Pulmonary:      Effort: No respiratory distress.   Skin:     General: Skin is warm and dry.   Neurological:      Sensory: No sensory deficit.      Motor: Weakness present.      Coordination: Coordination normal.   Psychiatric:         Speech: Speech normal.       Acute Stroke Data    Thrombolytic Inclusion / Exclusion Criteria    Time: 16:40 EDT  Person Administering Scale: MEI Pena      YES NO INCLUSION CRITERIA CLASS I   [] [x]   Suspected diagnosis of acute ischemic stroke with measureable neurological deficit.  Low NIHSS with disabling stroke symptoms.   [] [x]   Onset of stroke symptoms <  3 hours before beginning treatment >/ 18 years old  Stroke symptom onset = time patient was last seen well or without symptoms (LKW)   [] [x]   Onset of symptoms between 3-4.5 hours: >/= 80 years old (safe Class IIa) with history of   both diabetes and prior CVA  (reasonable Class IIb) AND NIHSS </= 25  *If not eligible for IV Thrombolytic consider neuro intervention for LKW within 24 hours     YES NO EXCLUSION CRITERIA (CONTRAINDICATIONS) CLASS III EVIDENCE HARM   [] []   Blood pressure >185/110 medically refractory to IV medications   [] []   Active bleeding at a non-compressible site   [] []   Active intracranial hemorrhage (ICH)   [] []   Symptoms suggestive of subarachnoid hemorrhage (SAH)   [] []   GI bleed within 21 days   [] []   Ischemic stroke within 3 months   [] []   Severe head trauma within 3 months    [] []   Intracranial or intraspinal surgery within 3 months   [] []   Current GI malignancy   [] []   Intracranial neoplasm   [] []   Infective endocarditis   [] []   Aortic arch dissection   [] []   Active coagulopathy with  INR >1.7, platelets <100,000, PTT > 40 sec, PT > 15 sec   *For warfarin, administration can begin before blood tests resulted. Discontinue for above values.    [] []   Treatment dose* of LMWH (Lovenox) in last 24 hours  *prophylactic dosages are not a contraindication   [] []   Concurrent use of antiplatelet agents' glycoprotein inhibitors IIb/IIIa (Integrilin, etc.)   [] []   Thrombin or factor Xa inhibitors (Eliquis, Xarelto, Arixtra) taken in last 48 hours     YES NO CLASS II: AIS WITH THE FOLLOWING CONDITIONS -   TREATMENT RISKS SHOULD BE WEIGHED AGAINST POSSIBLE BENEFITS.    [] []   Major trauma in last 14 days, recent major surgery in last 14 days, intracranial arterial dissection, giant unruptured and unsecured intracranial aneurysm, pericarditis     [] []   The risks and benefits have been discussed with the patient or family related to the administration of IV thrombolytic  therapy for stroke symptoms.   [] []   I have discussed and reviewed the patient's case and imaging with the attending prior to IV thrombolytic therapy.   TIME N/A Time IV thrombolytic administered       Hospital Meds:  Scheduled-   Infusions- No current facility-administered medications for this encounter.     PRNs-     Functional Status Prior to Current Stroke/Semmes Score: 0-1    NIH Stroke Scale  Time: 16:40 EDT  Person Administering Scale: MEI Pena  Interval: baseline  1a. Level of Consciousness: 1-->Not alert, but arousable by minor stimulation to obey, answer, or respond  1b. LOC Questions: 0-->Answers both questions correctly  1c. LOC Commands: 0-->Performs both tasks correctly  2. Best Gaze: 0-->Normal  3. Visual: 0-->No visual loss  4. Facial Palsy: 0-->Normal symmetrical movements  5a. Motor Arm, Left: 0-->No drift, limb holds 90 (or 45) degrees for full 10 secs  5b. Motor Arm, Right: 0-->No drift, limb holds 90 (or 45) degrees for full 10 secs  6a. Motor Leg, Left: 0-->No drift, leg holds 30 degree position for full 5 secs  6b. Motor Leg, Right: 0-->No drift, leg holds 30 degree position for full 5 secs  7. Limb Ataxia: 0-->Absent  8. Sensory: 0-->Normal, no sensory loss  9. Best Language: 0-->No aphasia, normal  10. Dysarthria: 0-->Normal  11. Extinction and Inattention (formerly Neglect): 0-->No abnormality    Total (NIH Stroke Scale): 1      Results Reviewed:  I have personally reviewed current lab, radiology, and data.  MRI Brain Without Contrast  Result Date: 4/4/2025  Impression: 1.Increased signal intensity within the left transverse and sigmoid sinus concerning for dural venous sinus thrombosis. MR or CT venogram recommended to further assess. Electronically Signed: Albert Buchanan MD  4/4/2025 3:38 PM EDT  Workstation ID: [id number]    XR Chest 1 View  Result Date: 4/4/2025  Impression: No radiographic evidence of acute pulmonary process Electronically Signed: Carter Quevedo MD  4/4/2025  1:31 PM EDT  Workstation ID: [id number]    WBC   Date Value Ref Range Status   04/04/2025 10.30 3.40 - 10.80 10*3/mm3 Final     RBC   Date Value Ref Range Status   04/04/2025 4.32 3.77 - 5.28 10*6/mm3 Final     Hemoglobin   Date Value Ref Range Status   04/04/2025 13.6 12.0 - 15.9 g/dL Final     Hematocrit   Date Value Ref Range Status   04/04/2025 41.3 34.0 - 46.6 % Final     MCV   Date Value Ref Range Status   04/04/2025 95.6 79.0 - 97.0 fL Final     MCH   Date Value Ref Range Status   04/04/2025 31.5 26.6 - 33.0 pg Final     MCHC   Date Value Ref Range Status   04/04/2025 32.9 31.5 - 35.7 g/dL Final     RDW   Date Value Ref Range Status   04/04/2025 12.5 12.3 - 15.4 % Final     RDW-SD   Date Value Ref Range Status   04/04/2025 44.2 37.0 - 54.0 fl Final     MPV   Date Value Ref Range Status   04/04/2025 9.1 6.0 - 12.0 fL Final     Platelets   Date Value Ref Range Status   04/04/2025 246 140 - 450 10*3/mm3 Final     Neutrophil %   Date Value Ref Range Status   04/04/2025 82.7 (H) 42.7 - 76.0 % Final     Lymphocyte %   Date Value Ref Range Status   04/04/2025 11.0 (L) 19.6 - 45.3 % Final     Monocyte %   Date Value Ref Range Status   04/04/2025 5.1 5.0 - 12.0 % Final     Eosinophil %   Date Value Ref Range Status   04/04/2025 0.3 0.3 - 6.2 % Final     Basophil %   Date Value Ref Range Status   04/04/2025 0.4 0.0 - 1.5 % Final     Immature Grans %   Date Value Ref Range Status   04/04/2025 0.5 0.0 - 0.5 % Final     Neutrophils, Absolute   Date Value Ref Range Status   04/04/2025 8.52 (H) 1.70 - 7.00 10*3/mm3 Final     Lymphocytes, Absolute   Date Value Ref Range Status   04/04/2025 1.13 0.70 - 3.10 10*3/mm3 Final     Monocytes, Absolute   Date Value Ref Range Status   04/04/2025 0.53 0.10 - 0.90 10*3/mm3 Final     Eosinophils, Absolute   Date Value Ref Range Status   04/04/2025 0.03 0.00 - 0.40 10*3/mm3 Final     Basophils, Absolute   Date Value Ref Range Status   04/04/2025 0.04 0.00 - 0.20 10*3/mm3 Final     Immature  Grans, Absolute   Date Value Ref Range Status   04/04/2025 0.05 0.00 - 0.05 10*3/mm3 Final     nRBC   Date Value Ref Range Status   04/04/2025 0.0 0.0 - 0.2 /100 WBC Final     Lab Results   Component Value Date    GLUCOSE 107 (H) 04/04/2025    BUN 11 04/04/2025    CREATININE 0.84 04/04/2025     04/04/2025    K 4.1 04/04/2025     04/04/2025    CALCIUM 8.4 (L) 04/04/2025    PROTEINTOT 6.3 04/04/2025    ALBUMIN 3.9 04/04/2025    ALT 13 04/04/2025    AST 14 04/04/2025    ALKPHOS 81 04/04/2025    BILITOT 0.5 04/04/2025    GLOB 2.4 04/04/2025    AGRATIO 1.6 04/04/2025    BCR 13.1 04/04/2025    ANIONGAP 11.0 04/04/2025    EGFR 75.3 04/04/2025       Assessment/Plan:    This is a 70-year-old female with past medical history of HTN, remote breast cancer s/p lumpectomy, anxiety/depression and multiple remote TIAs (2024) presented to [facility] emergency department via private vehicle for further evaluation of dizziness, generalized weakness, nausea and headache(rates 5-6/10).  She has been feeling unwell for a couple weeks, she was diagnosed with flu A and strep and was given cefdinir on 3/29/2025.  Stroke neurology was consulted after MRI resulted concerning for dural venous sinus thrombosis within the left transverse and sigmoid sinus.  She will be admitted to the hospitalist service for further evaluation.    Antiplatelet PTA: Aspirin 81 mg, Plavix 75 mg  Anticoagulant PTA: None        #Headache  #Dizziness  -TIA/CVA order set without thrombolytic therapy has been initiated  -NPO until bedside nursing dysphagia screen completed  -MRI brain 4/4/24 revealed increased signal intensity within the left transverse and sigmoid sinus concerning for dural venous sinus thrombosis.  -CT venogram head pending  -Migraine cocktail   -Tylenol 1 g every 8 hours PRN  -TTE pending  -A1c and lipid panel in AM  -Meds: Low dose heparin drip, no bolus ever  -Can hold antiplatelets for now  -Activity as tolerated, fall  risk precautions  -PT/OT/SLP evaluation    #Essential hypertension,  -Allow autoregulation of blood pressure for adequate cerebral blood flow, SBP goal <180    #  Hyperlipidemia  -Lipid panel in AM  -Atorvastatin 80mg nightly    Plan of care was discussed with RAEGAN Scanlon (emergency), Dr. Garcia (vascular neurologist), patient, patient's daughter at the bedside and hospitalist service.  Stroke neurology will continue to follow.  Please call with any questions or concerns.  Thank you for this consult.     Nakita Clifford, MEI  April 4, 2025  16:40 EDT     Addendum 1906  New differential diagnosis includes complex migraine vs metabolic disarrangement vs encephalopathy vs seizure    CT venogram head was performed which showed no evidence of dural venous sinus thrombosis, no acute intracranial abnormality.  Discussed case and imaging with Dr. Garcia (vascular neurologist) who recommends discontinuing heparin drip, continuing patient on her home DAPT, getting an EEG and maintaining adequate hydration overnight.  I went to the bedside to discuss these findings with patient, primary nurse, patient's daughter and her son at bedside.  They all verbalized understanding and are agreeable with plan of care.      CT venogram head  Result Date: 4/4/2025  Impression: No evidence of dural venous sinus thrombosis. No acute intracranial findings. Electronically Signed: Antoine Chapman  4/4/2025 6:14 PM EDT  Workstation ID: [id number]    -EEG, routine  -Continue home aspirin 81 mg daily and Plavix 75 mg daily  - Normal saline at 125ml/hr  -Otherwise plan as above.

## 2025-04-05 ENCOUNTER — READMISSION MANAGEMENT (OUTPATIENT)
Dept: CALL CENTER | Facility: HOSPITAL | Age: 70
End: 2025-04-05
Payer: MEDICARE

## 2025-04-05 VITALS
DIASTOLIC BLOOD PRESSURE: 84 MMHG | OXYGEN SATURATION: 95 % | WEIGHT: 138.01 LBS | RESPIRATION RATE: 16 BRPM | TEMPERATURE: 98.3 F | HEART RATE: 58 BPM | SYSTOLIC BLOOD PRESSURE: 145 MMHG | HEIGHT: 59 IN | BODY MASS INDEX: 27.82 KG/M2

## 2025-04-05 PROBLEM — R51.9 HEADACHE: Status: ACTIVE | Noted: 2025-04-04

## 2025-04-05 PROBLEM — E86.0 DEHYDRATION: Status: ACTIVE | Noted: 2025-04-05

## 2025-04-05 LAB
ANION GAP SERPL CALCULATED.3IONS-SCNC: 7 MMOL/L (ref 5–15)
BASOPHILS # BLD AUTO: 0.05 10*3/MM3 (ref 0–0.2)
BASOPHILS NFR BLD AUTO: 0.6 % (ref 0–1.5)
BUN SERPL-MCNC: 8 MG/DL (ref 8–23)
BUN/CREAT SERPL: 7.8 (ref 7–25)
CALCIUM SPEC-SCNC: 8.2 MG/DL (ref 8.6–10.5)
CHLORIDE SERPL-SCNC: 109 MMOL/L (ref 98–107)
CO2 SERPL-SCNC: 24 MMOL/L (ref 22–29)
CREAT SERPL-MCNC: 1.03 MG/DL (ref 0.57–1)
DEPRECATED RDW RBC AUTO: 45.5 FL (ref 37–54)
EGFRCR SERPLBLD CKD-EPI 2021: 59 ML/MIN/1.73
EOSINOPHIL # BLD AUTO: 0.18 10*3/MM3 (ref 0–0.4)
EOSINOPHIL NFR BLD AUTO: 2.2 % (ref 0.3–6.2)
ERYTHROCYTE [DISTWIDTH] IN BLOOD BY AUTOMATED COUNT: 12.8 % (ref 12.3–15.4)
GLUCOSE SERPL-MCNC: 100 MG/DL (ref 65–99)
HCT VFR BLD AUTO: 36.6 % (ref 34–46.6)
HGB BLD-MCNC: 12 G/DL (ref 12–15.9)
IMM GRANULOCYTES # BLD AUTO: 0.04 10*3/MM3 (ref 0–0.05)
IMM GRANULOCYTES NFR BLD AUTO: 0.5 % (ref 0–0.5)
LYMPHOCYTES # BLD AUTO: 1.5 10*3/MM3 (ref 0.7–3.1)
LYMPHOCYTES NFR BLD AUTO: 18.4 % (ref 19.6–45.3)
MCH RBC QN AUTO: 31.6 PG (ref 26.6–33)
MCHC RBC AUTO-ENTMCNC: 32.8 G/DL (ref 31.5–35.7)
MCV RBC AUTO: 96.3 FL (ref 79–97)
MONOCYTES # BLD AUTO: 0.6 10*3/MM3 (ref 0.1–0.9)
MONOCYTES NFR BLD AUTO: 7.4 % (ref 5–12)
NEUTROPHILS NFR BLD AUTO: 5.78 10*3/MM3 (ref 1.7–7)
NEUTROPHILS NFR BLD AUTO: 70.9 % (ref 42.7–76)
NRBC BLD AUTO-RTO: 0 /100 WBC (ref 0–0.2)
PA ADP PRP-ACNC: 183 PRU
PLATELET # BLD AUTO: 234 10*3/MM3 (ref 140–450)
PMV BLD AUTO: 9.1 FL (ref 6–12)
POTASSIUM SERPL-SCNC: 4.4 MMOL/L (ref 3.5–5.2)
RBC # BLD AUTO: 3.8 10*6/MM3 (ref 3.77–5.28)
SODIUM SERPL-SCNC: 140 MMOL/L (ref 136–145)
WBC NRBC COR # BLD AUTO: 8.15 10*3/MM3 (ref 3.4–10.8)

## 2025-04-05 PROCEDURE — 99233 SBSQ HOSP IP/OBS HIGH 50: CPT | Performed by: STUDENT IN AN ORGANIZED HEALTH CARE EDUCATION/TRAINING PROGRAM

## 2025-04-05 PROCEDURE — 85576 BLOOD PLATELET AGGREGATION: CPT

## 2025-04-05 PROCEDURE — 85025 COMPLETE CBC W/AUTO DIFF WBC: CPT | Performed by: NURSE PRACTITIONER

## 2025-04-05 PROCEDURE — 80048 BASIC METABOLIC PNL TOTAL CA: CPT | Performed by: NURSE PRACTITIONER

## 2025-04-05 PROCEDURE — 99239 HOSP IP/OBS DSCHRG MGMT >30: CPT | Performed by: STUDENT IN AN ORGANIZED HEALTH CARE EDUCATION/TRAINING PROGRAM

## 2025-04-05 RX ORDER — ATORVASTATIN CALCIUM 40 MG/1
80 TABLET, FILM COATED ORAL NIGHTLY
Status: DISCONTINUED | OUTPATIENT
Start: 2025-04-05 | End: 2025-04-05 | Stop reason: HOSPADM

## 2025-04-05 RX ADMIN — ESCITALOPRAM OXALATE 20 MG: 20 TABLET ORAL at 08:52

## 2025-04-05 RX ADMIN — BISACODYL 5 MG: 5 TABLET, COATED ORAL at 08:52

## 2025-04-05 RX ADMIN — CEFDINIR 300 MG: 300 CAPSULE ORAL at 08:51

## 2025-04-05 RX ADMIN — CLOPIDOGREL BISULFATE 75 MG: 75 TABLET ORAL at 08:52

## 2025-04-05 RX ADMIN — ASPIRIN 81 MG: 81 TABLET, CHEWABLE ORAL at 08:52

## 2025-04-05 NOTE — PLAN OF CARE
Goal Outcome Evaluation:  Plan of Care Reviewed With: patient        Progress: improving POC pt dcd home with family ,daughter bedside to transport updated educated with POC and dc instructions with follow up

## 2025-04-05 NOTE — PROGRESS NOTES
Stroke Progress Note       Chief Complaint: Headache    Subjective    Subjective     Subjective:  The patient is lying down in the bed in NAD. Family were at the bedside. The patient stated that she is back to her normal self and is feeling quite better compared to yesterday.  Denies having any new stroke or strokelike symptoms.  I discussed with the patient and her family the imaging findings and what could possibly explain her symptoms.  All patient's questions and concerns were answered.    No other acute complains at this time    Review of Systems   Neurological: Negative for tremors.   Objective      Temp:  [97.8 °F (36.6 °C)-99.5 °F (37.5 °C)] 98.3 °F (36.8 °C)  Heart Rate:  [54-74] 58  Resp:  [16] 16  BP: (140-168)/(71-84) 145/84    Objective    GEN: lying in bed; in NAD  HENT: normocephalic, non-erythematous oropharynx    NEURO:  Mental Status: A&O x 3, interactive, able to follow commands  Speech: Intact Articulation  CN 2-12:  II - PERRLA  III, IV, VI - EOMI  V - Facial sensation intact  VII -no gross facial asymmetry  VIII - Auditory acuity intact  XII - Tongue protrudes midline    Motor:  RUE: 5/5  LUE: 5/5  RLE: 5/5  LLE: 5/5    Sensory: intact light touch throughout  Reflexes: negative Rocha's sign BL  Coordination: no ataxia with finger-to-nose testing  Gait/Station: deferred       Results Review:    I reviewed the patient's new clinical results.    WBC   Date Value Ref Range Status   04/05/2025 8.15 3.40 - 10.80 10*3/mm3 Final     RBC   Date Value Ref Range Status   04/05/2025 3.80 3.77 - 5.28 10*6/mm3 Final     Hemoglobin   Date Value Ref Range Status   04/05/2025 12.0 12.0 - 15.9 g/dL Final     Hematocrit   Date Value Ref Range Status   04/05/2025 36.6 34.0 - 46.6 % Final     MCV   Date Value Ref Range Status   04/05/2025 96.3 79.0 - 97.0 fL Final     MCH   Date Value Ref Range Status   04/05/2025 31.6 26.6 - 33.0 pg Final     MCHC   Date Value Ref Range Status   04/05/2025 32.8 31.5 - 35.7 g/dL  Final     RDW   Date Value Ref Range Status   04/05/2025 12.8 12.3 - 15.4 % Final     RDW-SD   Date Value Ref Range Status   04/05/2025 45.5 37.0 - 54.0 fl Final     MPV   Date Value Ref Range Status   04/05/2025 9.1 6.0 - 12.0 fL Final     Platelets   Date Value Ref Range Status   04/05/2025 234 140 - 450 10*3/mm3 Final     Neutrophil %   Date Value Ref Range Status   04/05/2025 70.9 42.7 - 76.0 % Final     Lymphocyte %   Date Value Ref Range Status   04/05/2025 18.4 (L) 19.6 - 45.3 % Final     Monocyte %   Date Value Ref Range Status   04/05/2025 7.4 5.0 - 12.0 % Final     Eosinophil %   Date Value Ref Range Status   04/05/2025 2.2 0.3 - 6.2 % Final     Basophil %   Date Value Ref Range Status   04/05/2025 0.6 0.0 - 1.5 % Final     Immature Grans %   Date Value Ref Range Status   04/05/2025 0.5 0.0 - 0.5 % Final     Neutrophils, Absolute   Date Value Ref Range Status   04/05/2025 5.78 1.70 - 7.00 10*3/mm3 Final     Lymphocytes, Absolute   Date Value Ref Range Status   04/05/2025 1.50 0.70 - 3.10 10*3/mm3 Final     Monocytes, Absolute   Date Value Ref Range Status   04/05/2025 0.60 0.10 - 0.90 10*3/mm3 Final     Eosinophils, Absolute   Date Value Ref Range Status   04/05/2025 0.18 0.00 - 0.40 10*3/mm3 Final     Basophils, Absolute   Date Value Ref Range Status   04/05/2025 0.05 0.00 - 0.20 10*3/mm3 Final     Immature Grans, Absolute   Date Value Ref Range Status   04/05/2025 0.04 0.00 - 0.05 10*3/mm3 Final     nRBC   Date Value Ref Range Status   04/05/2025 0.0 0.0 - 0.2 /100 WBC Final       Lab Results   Component Value Date    GLUCOSE 100 (H) 04/05/2025    BUN 8 04/05/2025    CREATININE 1.03 (H) 04/05/2025     04/05/2025    K 4.4 04/05/2025     (H) 04/05/2025    CALCIUM 8.2 (L) 04/05/2025    PROTEINTOT 6.3 04/04/2025    ALBUMIN 3.9 04/04/2025    ALT 13 04/04/2025    AST 14 04/04/2025    ALKPHOS 81 04/04/2025    BILITOT 0.5 04/04/2025    GLOB 2.4 04/04/2025    AGRATIO 1.6 04/04/2025    BCR 7.8  04/05/2025    ANIONGAP 7.0 04/05/2025    EGFR 59.0 (L) 04/05/2025     CT venogram head  Result Date: 4/4/2025  Impression: No evidence of dural venous sinus thrombosis. No acute intracranial findings. Electronically Signed: Antoine Chapman  4/4/2025 6:14 PM EDT  Workstation ID: [id number]    MRI Brain Without Contrast  Result Date: 4/4/2025  Impression: 1.Increased signal intensity within the left transverse and sigmoid sinus concerning for dural venous sinus thrombosis. MR or CT venogram recommended to further assess. Electronically Signed: Albert Buchanan MD  4/4/2025 3:38 PM EDT  Workstation ID: [id number]    XR Chest 1 View  Result Date: 4/4/2025  Impression: No radiographic evidence of acute pulmonary process Electronically Signed: Carter Quevedo MD  4/4/2025 1:31 PM EDT  Workstation ID: [id number]    Results for orders placed during the hospital encounter of 06/04/24    Adult Transthoracic Echo Complete W/ Cont if Necessary Per Protocol (With Agitated Saline)    Interpretation Summary    Left ventricular systolic function is normal. Left ventricular ejection fraction appears to be 56 - 60%.    Left ventricular wall thickness is consistent with mild concentric hypertrophy.    Left ventricular diastolic function is consistent with (grade Ia w/high LAP) impaired relaxation.    The right ventricular cavity is borderline dilated.    Saline test results are negative.    Moderate aortic valve regurgitation is present.    Mild tricuspid valve regurgitation is present.      -MRI brain images from 4/4/2025 were personally reviewed and showed no ischemic or hemorrhagic stroke.  There was initially concern for left transverse venous sinus thrombosis  -CT venogram from 4/4/2025 images were personally reviewed and were unremarkable  - P2 Y12 level from 4/5/2025 was 183      Assessment/Plan     This is a 70-year-old female with past medical history of HTN, remote breast cancer s/p lumpectomy, anxiety/depression and multiple remote  TIAs (2024) presented to [facility] emergency department via private vehicle for further evaluation of dizziness, generalized weakness, nausea and headache(rates 5-6/10).  She has been feeling unwell for a couple weeks, she was diagnosed with flu A and strep and was given cefdinir on 3/29/2025.  Stroke neurology was consulted after MRI resulted concerning for dural venous sinus thrombosis within the left transverse and sigmoid sinus.  She will be admitted to the hospitalist service for further evaluation.     Antiplatelet PTA: Aspirin 81 mg, Plavix 75 mg  Anticoagulant PTA: None               #Headache  #Dizziness  -Etiology of patient's symptoms likely due to dehydration in the setting of viral prodrome.  --MRI brain images from 4/4/2025 were personally reviewed and showed no ischemic or hemorrhagic stroke.  There was initially concern for left transverse venous sinus thrombosis  -CT venogram from 4/4/2025 images were personally reviewed and were unremarkable  - P2 Y12 level from 4/5/2025 was 183    Recommendation  -Migraine cocktail as needed  -Tylenol 1 g every 8 hours PRN  -Meds: Resume home DAPT with aspirin 81 mg Plavix 75 mg daily for secondary stroke prevention.  -Activity as tolerated, fall risk precautions  -PT/OT/SLP evaluation  -Follow-up with general neurology clinic for headaches     #Essential hypertension,  - Target blood pressure goals of normotension     # Hyperlipidemia  -Atorvastatin 80mg nightly    Patient education: call 911 or present to emergency department with any stroke symptom, including unilateral face, arm, or leg weakness, numbness, or paresthesias, unilateral facial droop, speech deficits, dizziness with nausea, vomiting, nystagmus, and incoordination, visual deficits, or severe onset headache.    Stroke will sign off. Please call for any further questions or concerns  =================================  Rafael Garcia MD, Msc, PhD  Vascular Neurologist  [facility]  [facility]

## 2025-04-05 NOTE — DISCHARGE SUMMARY
[facility] Medicine Services  DISCHARGE SUMMARY    Patient Name: Clara Gonzalez  : 1955  MRN: [id number]    Date of Admission: 2025 12:53 PM  Date of Discharge: 2025  Primary Care Physician: Velia James APRN    Consults       No orders found for last 30 day(s).            Hospital Course     Presenting Problem: headache    Active Hospital Problems    Diagnosis  POA    **Headache [R51.9]  Yes    Dehydration [E86.0]  Unknown    Influenza A [J10.1]  Unknown    anxiety and depression [F41.0]  Yes    Essential hypertension [I10]  Yes    GERD without esophagitis [K21.9]  Yes      Resolved Hospital Problems   No resolved problems to display.          Hospital Course:  Clara Gonzalez is a 70 y.o. female with history of HTN, remote breast cancer status postlumpectomy, anxiety, depression, prior TIAs, who presented for evaluation of dizziness, generalized weakness, headache.  She was diagnosed with flu A and strep on 3/29/2025 and was started on cefdinir outpatient.  MRI was concerning for possible dural venous thrombosis in the left transverse and sigmoid sinus.  Heparin drip was started initially.  CT venogram head was performed and showed no evidence of dural venous thrombosis and no acute intracranial abnormality.  Following CT scan results, heparin drip was stopped and patient was continued on DAPT.     Headache likely d/t dehydration  Dehydration  Prior TIA  -Continue aspirin, Plavix, statin  - Status post IVF; encouraged hydration and compliance with home medications   -Stroke neurology followed; discussed w Dr. Garcia, who was okay w DC home today from neuro perspective with follow-up with headache clinic     HTN-continue home medication as tolerated  HLD-continue statin  Recent strep throat-continue cefdinir to complete course  Recent influenza A-no Tamiflu given symptoms for over 2 weeks  Anxiety, depression-continue Lexapro, Lamictal      Discharge Follow Up  Recommendations for outpatient labs/diagnostics:  Follow-up with primary care doctor in 5 to 7 days for hospitalization, repeat labs (CBC, CMP)  Follow-up with Dr. Farnsworth in 2-3 months regarding headache    Day of Discharge     HPI:   The patient reports feeling well today.  Headache resolved.  No visual changes.  No weakness.  No numbness or tingling.  No chest pain or shortness of breath.  Has a lingering cough from the flu.  No GI or  symptoms currently.    Review of Systems  As above      Vital Signs:   Temp:  [97.8 °F (36.6 °C)-99.5 °F (37.5 °C)] 98.3 °F (36.8 °C)  Heart Rate:  [54-75] 58  Resp:  [16] 16  BP: (140-168)/(71-84) 145/84      Physical Exam:  Constitutional: No acute distress, awake, alert  HENT: NCAT, mucous membranes moist  Respiratory: Clear to auscultation bilaterally, respiratory effort normal   Cardiovascular: RRR  Gastrointestinal: Positive bowel sounds, soft, nontender, nondistended  Musculoskeletal: No bilateral ankle edema  Psychiatric: Appropriate affect, cooperative  Neurologic: Alert, oriented, symmetric facies, moves all extremities, speech clear  Skin: No rashes    Pertinent  and/or Most Recent Results     LAB RESULTS:      Lab 04/05/25  1008 04/04/25  1544   WBC 8.15 10.30   HEMOGLOBIN 12.0 13.6   HEMATOCRIT 36.6 41.3   PLATELETS 234 246   NEUTROS ABS 5.78 8.52*   IMMATURE GRANS (ABS) 0.04 0.05   LYMPHS ABS 1.50 1.13   MONOS ABS 0.60 0.53   EOS ABS 0.18 0.03   MCV 96.3 95.6   PROTIME  --  14.0   APTT  --  24.7*   HEPARIN ANTI-XA  --  0.10*         Lab 04/05/25  1008 04/04/25  1544   SODIUM 140 138   POTASSIUM 4.4 4.1   CHLORIDE 109* 105   CO2 24.0 22.0   ANION GAP 7.0 11.0   BUN 8 11   CREATININE 1.03* 0.84   EGFR 59.0* 75.3   GLUCOSE 100* 107*   CALCIUM 8.2* 8.4*   MAGNESIUM  --  2.1         Lab 04/04/25  1544   TOTAL PROTEIN 6.3   ALBUMIN 3.9   GLOBULIN 2.4   ALT (SGPT) 13   AST (SGOT) 14   BILIRUBIN 0.5   ALK PHOS 81   LIPASE 18         Lab 04/04/25  1544   PROBNP 185.1   HSTROP T  <6   PROTIME 14.0   INR 1.01                 Brief Urine Lab Results  (Last result in the past 365 days)        Color   Clarity   Blood   Leuk Est   Nitrite   Protein   CREAT   Urine HCG        04/04/25 1535 Yellow   Clear   Negative   Small (1+)   Negative   30 mg/dL (1+)                 Microbiology Results (last 10 days)       Procedure Component Value - Date/Time    COVID PRE-OP / PRE-PROCEDURE SCREENING ORDER (NO ISOLATION) - Swab, Nasopharynx [[id number]]  (Abnormal) Collected: 04/04/25 1357    Lab Status: Final result Specimen: Swab from Nasopharynx Updated: 04/04/25 1500    Narrative:      The following orders were created for panel order COVID PRE-OP / PRE-PROCEDURE SCREENING ORDER (NO ISOLATION) - Swab, Nasopharynx.  Procedure                               Abnormality         Status                     ---------                               -----------         ------                     Respiratory Panel PCR w/...[[id number]]  Abnormal            Final result                 Please view results for these tests on the individual orders.    Respiratory Panel PCR w/COVID-19(SARS-CoV-2) OLEG/JARET/KRISTIAN/PAD/COR/MELANY In-House, NP Swab in UTM/VTM, 2 HR TAT - Swab, Nasopharynx [[id number]]  (Abnormal) Collected: 04/04/25 1357    Lab Status: Final result Specimen: Swab from Nasopharynx Updated: 04/04/25 1500     ADENOVIRUS, PCR Not Detected     Coronavirus 229E Not Detected     Coronavirus HKU1 Not Detected     Coronavirus NL63 Not Detected     Coronavirus OC43 Not Detected     COVID19 Not Detected     Human Metapneumovirus Not Detected     Human Rhinovirus/Enterovirus Not Detected     Influenza A H3 Detected     Influenza B PCR Not Detected     Parainfluenza Virus 1 Not Detected     Parainfluenza Virus 2 Not Detected     Parainfluenza Virus 3 Not Detected     Parainfluenza Virus 4 Not Detected     RSV, PCR Not Detected     Bordetella pertussis pcr Not Detected     Bordetella parapertussis PCR Not Detected      Chlamydophila pneumoniae PCR Not Detected     Mycoplasma pneumo by PCR Not Detected    Narrative:      In the setting of a positive respiratory panel with a viral infection PLUS a negative procalcitonin without other underlying concern for bacterial infection, consider observing off antibiotics or discontinuation of antibiotics and continue supportive care. If the respiratory panel is positive for atypical bacterial infection (Bordetella pertussis, Chlamydophila pneumoniae, or Mycoplasma pneumoniae), consider antibiotic de-escalation to target atypical bacterial infection.            CT venogram head  Result Date: 4/4/2025  CT VENOGRAM HEAD Date of Exam: 4/4/2025 5:45 PM EDT Indication: eval for left sigmoid sinus thrombus. Comparison: Brain MRI 4/4/2025 Technique: Axial CT images were obtained of the head without contrast administration.  Automated exposure control and iterative construction methods were used. CTV of the head was performed after the uneventful intravenous administration of 85 mL Isovue-370. Reconstructed coronal and sagittal images were also obtained. In addition, a 3-D volume rendered image was created for interpretation. Automated exposure control and iterative reconstruction methods were used. Findings: Noncontrast head CT: No acute intracranial hemorrhage.Intact appearing gray-white differentiation.No extra-axial fluid collection.No significant mass effect. No hydrocephalus. Mild generalized parenchymal volume loss. Scattered areas of periventricular and subcortical white matter hypoattenuation, nonspecific, perhaps from small vessel ischemic/hypertensive changes in a patient of this age.There are intracranial atherosclerotic calcifications. Mild scattered mucosal thickening in the paranasal sinuses.Mastoid air cells are essentially clear.. Bilateral lens replacements. No acute or aggressive appearing bony or extracranial soft tissue process. CT Venogram: Normal contrast opacification of the  superior sagittal sinus, straight sinus, bilateral transverse sinuses, bilateral sigmoid sinuses, and imaged internal jugular veins.     Impression: No evidence of dural venous sinus thrombosis. No acute intracranial findings. Electronically Signed: Antoine AMAURI Chapman  4/4/2025 6:14 PM EDT  Workstation ID: [id number]    MRI Brain Without Contrast  Result Date: 4/4/2025  MRI BRAIN WO CONTRAST Date of Exam: 4/4/2025 3:00 PM EDT Indication: dizziness for 24 hours.  Comparison: 7/9/2024 Technique:  Routine multiplanar/multisequence sequence images of the brain were obtained without contrast administration. Findings: There is no diffusion restriction to suggest acute infarct. There is no evidence of acute or chronic intracranial hemorrhage. No mass effect or midline shift. No abnormal extra-axial collections. Similar trace nonspecific white matter signal abnormality. The basal ganglia, brainstem and cerebellum appear within normal limits. Midline structures are intact. No significant cortical abnormality is identified. Calvarial and superficial soft tissue signal is within normal limits. Orbits appear unremarkable. The paranasal sinuses and the mastoid air cells appear well aerated. There is increased signal intensity on water weighted sequences within the left transverse and sigmoid sinus. While this may be related to slow venous flow, dural venous thrombosis is a consideration follow-up MR venogram or CT venogram recommended for further assessment.     Impression: 1.Increased signal intensity within the left transverse and sigmoid sinus concerning for dural venous sinus thrombosis. MR or CT venogram recommended to further assess. Electronically Signed: Albert Buchanan MD  4/4/2025 3:38 PM EDT  Workstation ID: [id number]    XR Chest 1 View  Result Date: 4/4/2025  XR CHEST 1 VW Date of Exam: 4/4/2025 1:02 PM EDT Indication: CP Comparison: None available. Findings: There are no airspace consolidations. No pleural fluid. No  pneumothorax. The pulmonary vasculature appears within normal limits. The cardiac and mediastinal silhouette appear unremarkable. No acute osseous abnormality identified.     Impression: No radiographic evidence of acute pulmonary process Electronically Signed: Carter Quevedo MD  4/4/2025 1:31 PM EDT  Workstation ID: [id number]              Results for orders placed during the hospital encounter of 06/04/24    Adult Transthoracic Echo Complete W/ Cont if Necessary Per Protocol (With Agitated Saline)    Interpretation Summary    Left ventricular systolic function is normal. Left ventricular ejection fraction appears to be 56 - 60%.    Left ventricular wall thickness is consistent with mild concentric hypertrophy.    Left ventricular diastolic function is consistent with (grade Ia w/high LAP) impaired relaxation.    The right ventricular cavity is borderline dilated.    Saline test results are negative.    Moderate aortic valve regurgitation is present.    Mild tricuspid valve regurgitation is present.      Plan for Follow-up of Pending Labs/Results:     Discharge Details        Discharge Medications        Continue These Medications        Instructions Start Date   aspirin 81 MG chewable tablet   Chew and swallow 1 tablet by mouth Daily.      atorvastatin 80 MG tablet  Commonly known as: LIPITOR   80 mg, Oral, Nightly      cefdinir 300 MG capsule  Commonly known as: OMNICEF   300 mg, Oral, 2 Times Daily      clopidogrel 75 MG tablet  Commonly known as: PLAVIX   75 mg, Oral, Daily      escitalopram 20 MG tablet  Commonly known as: LEXAPRO   20 mg, Oral, Daily      hydrOXYzine 10 MG tablet  Commonly known as: ATARAX   10 mg, Oral, 3 Times Daily PRN      lamoTRIgine 25 MG tablet  Commonly known as: LaMICtal   25 mg, Oral, Nightly      metoprolol succinate XL 50 MG 24 hr tablet  Commonly known as: TOPROL-XL   50 mg, Oral, Daily               No Known Allergies      Discharge Disposition:  Home or Self  Care    Diet:  Hospital:  Diet Order   Procedures    Diet: Regular/House; Fluid Consistency: Thin (IDDSI 0)       Diet Instructions       Diet: Regular/House Diet; Regular (IDDSI 7); Thin (IDDSI 0)      Discharge Diet: Regular/House Diet    Texture: Regular (IDDSI 7)    Fluid Consistency: Thin (IDDSI 0)             Activity:      Restrictions or Other Recommendations:         CODE STATUS:    Code Status and Medical Interventions: CPR (Attempt to Resuscitate); Full Support   Ordered at: 04/04/25 1751     Code Status (Patient has no pulse and is not breathing):    CPR (Attempt to Resuscitate)     Medical Interventions (Patient has pulse or is breathing):    Full Support     Level Of Support Discussed With:    Patient       Future Appointments   Date Time Provider Department Center   4/16/2025  4:00 PM Jed Reyez APRN [facility]   6/19/2025  9:00 AM Shawanda Carrizales APRN [facility] None       Additional Instructions for the Follow-ups that You Need to Schedule       Ambulatory Referral to Neurology   As directed      Call MD With Problems / Concerns   As directed      Please seek medical attention for any of the following: Difficulty breathing, chest pain, passing out, strokelike symptoms, worsening headaches, and/or any other concerning symptoms    Order Comments: Please seek medical attention for any of the following: Difficulty breathing, chest pain, passing out, strokelike symptoms, worsening headaches, and/or any other concerning symptoms         Discharge Follow-up with PCP   As directed       Currently Documented PCP:    Velia James APRN    PCP Phone Number:    [phone]     Follow Up Details: Follow-up with primary care doctor in 5 to 7 days for hospitalization, repeat labs (CBC, CMP)        Discharge Follow-up with Specified Provider: Follow-up with Dr. Farnsworth in 2-3 months regarding headache - please provide patient w their clinic number (referral placed on DC)   As directed       To: Follow-up with Dr. Farnsworth in 2-3 months regarding headache - please provide patient w their clinic number (referral placed on DC)                      Tenisha Courtney MD  04/05/25      Time Spent on Discharge:  I spent  35  minutes on this discharge activity which included: face-to-face encounter with the patient, reviewing the data in the system, coordination of the care with the nursing staff as well as consultants, documentation, and entering orders.

## 2025-04-05 NOTE — PLAN OF CARE
Goal Outcome Evaluation:      VSS on RA. A&Ox4. NSR to Sinus Jim on the monitor. Ambulates to the bathroom with gait belt and stand by assist. No complaints of pain.

## 2025-04-06 NOTE — OUTREACH NOTE
Prep Survey      Flowsheet Row Responses   [facility] facility patient discharged from? [facility]   Is LACE score < 7 ? No   Eligibility Readm Mgmt   Discharge diagnosis Headache   Does the patient have one of the following disease processes/diagnoses(primary or secondary)? Other   Does the patient have Home health ordered? No   Is there a DME ordered? No   Prep survey completed? Yes            LAN CROUCH - Registered Nurse

## 2025-04-10 ENCOUNTER — READMISSION MANAGEMENT (OUTPATIENT)
Dept: CALL CENTER | Facility: HOSPITAL | Age: 70
End: 2025-04-10
Payer: MEDICARE

## 2025-04-10 NOTE — OUTREACH NOTE
Medical Week 1 Survey      Flowsheet Row Responses   [facility] patient discharged from? [place]   Does the patient have one of the following disease processes/diagnoses(primary or secondary)? Other   Week 1 attempt successful? Yes   Call start time 1359   Revoke Decline to participate   Discharge diagnosis Headache            EFFIE STOKES - Registered Nurse

## 2025-04-16 ENCOUNTER — OFFICE VISIT (OUTPATIENT)
Dept: BEHAVIORAL HEALTH | Facility: CLINIC | Age: 70
End: 2025-04-16
Payer: MEDICARE

## 2025-04-16 VITALS — BODY MASS INDEX: 27.82 KG/M2 | HEIGHT: 59 IN | WEIGHT: 138 LBS

## 2025-04-16 DIAGNOSIS — F43.9 TRAUMA AND STRESSOR-RELATED DISORDER: Primary | ICD-10-CM

## 2025-04-16 RX ORDER — LAMOTRIGINE 25 MG/1
25 TABLET ORAL NIGHTLY
Qty: 90 TABLET | Refills: 1 | Status: SHIPPED | OUTPATIENT
Start: 2025-04-16

## 2025-04-16 NOTE — PROGRESS NOTES
Office  Follow Up Visit      Patient Name: Clara Gonzalez  : 1955   MRN: [id number]     Referring Provider: Velia James APRN    Chief Complaint: Medication follow-up    ICD-10-CM ICD-9-CM   1. Trauma and stressor-related disorder  F43.9 309.81     308.9        History of Present Illness:   Clara Gonzalez is a 70 y.o. female who is here today for follow up related to medication management of trauma and stress related disorder.  History of anxiety.  Medication regimen includes lamotrigine 25 mg nightly, hydroxyzine 10 mg 3 times daily as needed, and escitalopram 20 mg daily as prescribed by her PCP.    Patient reports things been going well since her last follow-up.  She reports good mood stability and no concerns related to her medication.  Patient also reports that she did purchase the new codependency workbook by Jesi Canales as recommended and has been reading through the book and recognizing codependent traits and herself.  Reports that she is been more mindful of setting appropriate boundaries, realizing she cannot fix everything in every body, and has to learn to say no at times.  Patient reports that it feels foreign but she is trying prioritize her own needs when able.  Patient reports she is satisfied with current plan of care.      Subjective      Review of Systems:   Review of Systems   Constitutional: Negative.    Respiratory: Negative.     Cardiovascular: Negative.    Genitourinary: Negative.    Musculoskeletal: Negative.    Neurological: Negative.    Psychiatric/Behavioral: Negative.         Patient History:   The following portions of the patient's history were reviewed and updated as appropriate: allergies, current medications, past family history, past medical history, past social history, past surgical history and problem list.     Social:  No change in interval history.    Medications:     Current Outpatient Medications:     lamoTRIgine (LaMICtal) 25 MG tablet, Take 1  "tablet by mouth Every Night., Disp: 90 tablet, Rfl: 1    aspirin 81 MG chewable tablet, Chew and swallow 1 tablet by mouth Daily., Disp: 30 tablet, Rfl: 0    atorvastatin (LIPITOR) 80 MG tablet, Take 1 tablet by mouth Every Night., Disp: 90 tablet, Rfl: 0    cefdinir (OMNICEF) 300 MG capsule, Take 1 capsule by mouth 2 (Two) Times a Day., Disp: , Rfl:     clopidogrel (PLAVIX) 75 MG tablet, Take 1 tablet by mouth Daily., Disp: 30 tablet, Rfl: 3    escitalopram (LEXAPRO) 20 MG tablet, Take 1 tablet by mouth Daily., Disp: , Rfl:     hydrOXYzine (ATARAX) 10 MG tablet, Take 1 tablet by mouth 3 (Three) Times a Day As Needed for Anxiety., Disp: 90 tablet, Rfl: 2    metoprolol succinate XL (TOPROL-XL) 50 MG 24 hr tablet, Take 1 tablet by mouth Daily., Disp: , Rfl:     Objective     Physical Exam:  Vital Signs:   Vitals:    04/16/25 1615   Weight: 62.6 kg (138 lb)   Height: 149.9 cm (59\")     Body mass index is 27.87 kg/m².     Mental Status Exam:   Hygiene:   good  Cooperation:  Cooperative  Eye Contact:  Good  Psychomotor Behavior:  Appropriate  Affect:  Appropriate  Mood: normal  Speech:  Normal  Thought Process:  Goal directed  Thought Content:  Normal  Suicidal:  None  Homicidal:  None  Hallucinations:  None  Delusion:  None  Memory:  Intact  Orientation:  Grossly intact  Reliability:  good  Insight:  Good  Judgement:  Good  Impulse Control:  Good  Physical/Medical Issues:  No      Assessment / Plan      Visit Diagnosis/Orders Placed This Visit:  Diagnoses and all orders for this visit:    1. Trauma and stressor-related disorder (Primary)  -     lamoTRIgine (LaMICtal) 25 MG tablet; Take 1 tablet by mouth Every Night.  Dispense: 90 tablet; Refill: 1         Differential:   N/A    Plan:   Stay the course.  Continue current plan of care.    Continue supportive psychotherapy efforts and medications as indicated. Treatment and medication options discussed during today's visit. Patient ackowledged and verbally consented to " continue with current treatment plan and was educated on the importance of compliance with treatment and follow-up appointments. Patient seems reasonably able to adhere to treatment plan.      Medication Considerations:  Discussed medication options and treatment plan of prescribed medication(s) as well as the risks, benefits, and potential side effects. Patient is agreeable to call the office with any worsening of symptoms or onset of side effects. Patient is agreeable to call 911 or go to the nearest ER should he/she begin having SI/HI.    Quality Measures:   Never smoker    I advised Clara Gonzalez of the risks of tobacco use.     Follow Up:   Return in about 3 months (around 7/16/2025) for Recheck.      MEI Scherer, PMHNP-BC      *Part of this note may be an electronic transcription/translation of spoken language to printed text using the Dragon Dictation System.

## 2025-04-18 ENCOUNTER — TELEPHONE (OUTPATIENT)
Dept: NEUROLOGY | Facility: CLINIC | Age: 70
End: 2025-04-18
Payer: MEDICARE

## 2025-04-18 NOTE — TELEPHONE ENCOUNTER
Caller: Clara Gonzalez    Relationship to patient: Self    Best call back number:   Telephone Information:   Mobile [phone]         Chief complaint: PT NEEDED A 2-3 HOSP F/U -  IS BOOKED UNTIL 8-8-25. PLEASE ADVISE IF OKAY OR NEEDS TO BE SEEN SOONER    Type of visit: HOSP F/U

## 2025-05-21 DIAGNOSIS — F43.9 TRAUMA AND STRESSOR-RELATED DISORDER: ICD-10-CM

## 2025-05-21 RX ORDER — HYDROXYZINE HYDROCHLORIDE 10 MG/1
10 TABLET, FILM COATED ORAL 3 TIMES DAILY PRN
Qty: 270 TABLET | Refills: 0 | Status: SHIPPED | OUTPATIENT
Start: 2025-05-21

## 2025-06-17 ENCOUNTER — OFFICE VISIT (OUTPATIENT)
Dept: BEHAVIORAL HEALTH | Facility: CLINIC | Age: 70
End: 2025-06-17
Payer: MEDICARE

## 2025-06-17 VITALS — BODY MASS INDEX: 27.82 KG/M2 | HEIGHT: 59 IN | WEIGHT: 138 LBS

## 2025-06-17 DIAGNOSIS — F43.9 TRAUMA AND STRESSOR-RELATED DISORDER: ICD-10-CM

## 2025-06-17 RX ORDER — LAMOTRIGINE 25 MG/1
25 TABLET ORAL NIGHTLY
Qty: 90 TABLET | Refills: 1 | Status: SHIPPED | OUTPATIENT
Start: 2025-06-17

## 2025-06-17 NOTE — PROGRESS NOTES
"     Office  Follow Up Visit      Patient Name: Clara Gonzalez  : 1955   MRN: [id number]     Referring Provider: Velia James APRN    Chief Complaint: Medication follow-up    ICD-10-CM ICD-9-CM   1. Trauma and stressor-related disorder  F43.9 309.81     308.9        History of Present Illness:   Clara Gonzalez is a 70 y.o. female who is here today for follow up related to medication management of trauma and stress related disorder.  Medication regimen includes lamotrigine 25 mg nightly, hydroxyzine 10 mg 3 times daily.  History of anxiety depression as prescribed escitalopram 20 mg daily by her PCP.    Patient reports that her last month has not been going as well as 1 previous.  She reports that she has been reverting back to old behaviors, not being able to tell people no.  She reports that her original plan was take the summer off from school but volunteered to help out because they were shorthanded.  She reports that she is also helping watch her 2 great grandsons and feels as if she has no time for the things.  She reports that she is constantly still struggling to tell people no and that \"I do not want to disappoint them.\"  She reports she is struggling navigating all her tasks and finding time to unwind and relax.  Patient affirms that she understands her behaviors are detrimental and that she needs to start prioritizing her needs and rest.  He reports that her medication continues to be helpful in managing her symptoms.  However, she knows a lot of her symptoms related to stress and her continued behaviors.      Subjective      Review of Systems:   Review of Systems   Constitutional:  Positive for fatigue.   Respiratory: Negative.     Cardiovascular: Negative.    Gastrointestinal: Negative.    Genitourinary: Negative.    Musculoskeletal: Negative.    Neurological: Negative.    Psychiatric/Behavioral: Negative.         Patient History:   The following portions of the patient's history were " "reviewed and updated as appropriate: allergies, current medications, past family history, past medical history, past social history, past surgical history and problem list.     Social:  No change in interval history.    Medications:     Current Outpatient Medications:     lamoTRIgine (LaMICtal) 25 MG tablet, Take 1 tablet by mouth Every Night., Disp: 90 tablet, Rfl: 1    aspirin 81 MG chewable tablet, Chew and swallow 1 tablet by mouth Daily., Disp: 30 tablet, Rfl: 0    atorvastatin (LIPITOR) 80 MG tablet, Take 1 tablet by mouth Every Night., Disp: 90 tablet, Rfl: 0    cefdinir (OMNICEF) 300 MG capsule, Take 1 capsule by mouth 2 (Two) Times a Day., Disp: , Rfl:     clopidogrel (PLAVIX) 75 MG tablet, Take 1 tablet by mouth Daily., Disp: 30 tablet, Rfl: 3    escitalopram (LEXAPRO) 20 MG tablet, Take 1 tablet by mouth Daily., Disp: , Rfl:     hydrOXYzine (ATARAX) 10 MG tablet, TAKE 1 TABLET BY MOUTH 3 TIMES A DAY AS NEEDED FOR ANXIETY., Disp: 270 tablet, Rfl: 0    metoprolol succinate XL (TOPROL-XL) 50 MG 24 hr tablet, Take 1 tablet by mouth Daily., Disp: , Rfl:     Objective     Physical Exam:  Vital Signs:   Vitals:    06/17/25 1355   Weight: 62.6 kg (138 lb)   Height: 149.9 cm (59\")     Body mass index is 27.87 kg/m².     Mental Status Exam:   Hygiene:   good  Cooperation:  Cooperative  Eye Contact:  Good  Psychomotor Behavior:  Appropriate  Affect:  Appropriate  Mood: fluctates  Speech:  Pressured  Thought Process:  Circum  Thought Content:  Mood congruent  Suicidal:  None  Homicidal:  None  Hallucinations:  None  Delusion:  None  Memory:  Intact  Orientation:  Grossly intact  Reliability:  good  Insight:  Fair  Judgement:  Fair  Impulse Control:  Fair  Physical/Medical Issues:  No      Assessment / Plan      Visit Diagnosis/Orders Placed This Visit:  Diagnoses and all orders for this visit:    1. Trauma and stressor-related disorder  -     lamoTRIgine (LaMICtal) 25 MG tablet; Take 1 tablet by mouth Every Night.  " Dispense: 90 tablet; Refill: 1         Differential:   Stress response    Plan:   Continue current medication regimen.  Discussed codependent traits and learning to say no.  Stress management and self-care.    Continue supportive psychotherapy efforts and medications as indicated. Treatment and medication options discussed during today's visit. Patient ackowledged and verbally consented to continue with current treatment plan and was educated on the importance of compliance with treatment and follow-up appointments. Patient seems reasonably able to adhere to treatment plan.      Medication Considerations:  Discussed medication options and treatment plan of prescribed medication(s) as well as the risks, benefits, and potential side effects. Patient is agreeable to call the office with any worsening of symptoms or onset of side effects. Patient is agreeable to call 911 or go to the nearest ER should he/she begin having SI/HI.    Quality Measures:   Never smoker    I advised Clara Gonzalez of the risks of tobacco use.     Follow Up:   Return in about 3 months (around 9/17/2025) for Recheck.      MEI Scherer, PMHNP-BC      *Part of this note may be an electronic transcription/translation of spoken language to printed text using the Dragon Dictation System.

## 2025-06-19 ENCOUNTER — OFFICE VISIT (OUTPATIENT)
Dept: RADIATION ONCOLOGY | Facility: HOSPITAL | Age: 70
End: 2025-06-19
Payer: MEDICARE

## 2025-06-19 ENCOUNTER — HOSPITAL ENCOUNTER (OUTPATIENT)
Dept: RADIATION ONCOLOGY | Facility: HOSPITAL | Age: 70
Setting detail: RADIATION/ONCOLOGY SERIES
Discharge: HOME | End: 2025-06-19
Payer: MEDICARE

## 2025-06-19 VITALS
RESPIRATION RATE: 16 BRPM | WEIGHT: 143 LBS | HEIGHT: 59 IN | OXYGEN SATURATION: 97 % | BODY MASS INDEX: 28.83 KG/M2 | TEMPERATURE: 97.8 F | DIASTOLIC BLOOD PRESSURE: 83 MMHG | HEART RATE: 70 BPM | SYSTOLIC BLOOD PRESSURE: 174 MMHG

## 2025-06-19 DIAGNOSIS — C50.411 MALIGNANT NEOPLASM OF UPPER-OUTER QUADRANT OF RIGHT BREAST IN FEMALE, ESTROGEN RECEPTOR POSITIVE: Primary | ICD-10-CM

## 2025-06-19 DIAGNOSIS — Z17.0 MALIGNANT NEOPLASM OF UPPER-OUTER QUADRANT OF RIGHT BREAST IN FEMALE, ESTROGEN RECEPTOR POSITIVE: Primary | ICD-10-CM

## 2025-06-19 PROCEDURE — G0463 HOSPITAL OUTPT CLINIC VISIT: HCPCS

## 2025-06-19 NOTE — PROGRESS NOTES
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 Lilibeth Hernandez on 2024.  Final pathology revealed residual 1.2 cm area of DCIS, intermediate grade, hormone receptor positive. The superior margin was negative by 1 mm and the posterior margin by 3 mm. All other margins were widely negative.      She completed adjuvant radiation under the care of Dr. Fortino Durant, receiving 40 GY/15 fractions to the right breast with an additional 10 GY/5 fraction lumpectomy cavity boost.  She completed on 2024 and is here for her annual follow-up.    She is not on endocrine therapy as advised by Dr. Masters due to her history of TIAs. No recent TIAs.    She did have an abnormal breast mammogram 2024 recommending a left breast biopsy.  She underwent left breast biopsy 2025, final pathology revealed fibroadenoma with no atypia.    No new breast concerns today. 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    Patient reports she is now otherwise doing well. No further health concerns. Reports she is able to do everything she needs to do.    MEDICATIONS: Medication reconciliation for the patient was reviewed and confirmed in the electronic medical record.    Review of Systems:   Per HPI    Physical Exam:   Physical Exam  Constitutional:       Appearance: Normal appearance. She is normal weight.   HENT:      Head:  "Normocephalic.      Nose: Nose normal.      Mouth/Throat:      Mouth: Mucous membranes are moist.   Cardiovascular:      Rate and Rhythm: Normal rate.   Pulmonary:      Effort: Pulmonary effort is normal.   Chest:   Breasts:     Right: Normal.      Left: Normal.      Comments: Well healed periareolar right breast surgical scar  Abdominal:      General: Abdomen is flat.   Musculoskeletal:         General: Normal range of motion.      Cervical back: Normal range of motion.   Skin:     General: Skin is warm and dry.   Neurological:      General: No focal deficit present.      Mental Status: She is alert and oriented to person, place, and time.   Psychiatric:         Mood and Affect: Mood normal.         Behavior: Behavior normal.         VITAL SIGNS:   Vitals:    06/19/25 0907   BP: 174/83  Comment: hasnt taken meds yet   Pulse: 70   Resp: 16   Temp: 97.8 °F (36.6 °C)   TempSrc: Skin   SpO2: 97%   Weight: 64.9 kg (143 lb)   Height: 149.9 cm (59\")   PainSc: 0-No pain                 KPS %:  100    The following portions of the patient's history were reviewed and updated as appropriate: allergies, current medications, past family history, past medical history, past social history, past surgical history and problem list.            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     Following right breast lumpectomy, she underwent adjuvant radiotherapy on the right as part of her breast conserving treatment, completing approximately 1 year ago ago.  She tolerated treatment well.  Recommend the use of topical Vitamin E.  Recommend maintaining a healthy lifestyle with a well balanced diet, calcium and vitamin D for osteoporosis prevention, and exercise as well as continue with recommended health and cancer screening guidelines.  The patient is not on endocrine therapy due to her history of TIAs as directed by Dr Masters.  We discussed follow-up " intervals, including biannual diagnostic mammograms to alternate between the right and bilateral breasts, biannual clinical breast exams, and monthly self breast exams.  She is due for follow-up bilateral mammogram postlumpectomy protocol and recent left breast biopsy.  Placed orders today.    RECOMMENDATIONS: Follow-up office visit in about 1 year      I spent a total of 39 minutes on todays visit, with more than 20 minutes in direct face to face communication, and the remainder of the time spent in reviewing the relevant history, records, available imaging, and for documentation.             MEI Felton    Errors in dictation may reflect use of voice recognition software and not all errors in transcription may have been detected prior to signing.

## 2025-06-30 LAB — NCCN CRITERIA FLAG: NORMAL

## 2025-07-18 ENCOUNTER — HOSPITAL ENCOUNTER (OUTPATIENT)
Dept: MAMMOGRAPHY | Facility: HOSPITAL | Age: 70
Discharge: HOME | End: 2025-07-18
Payer: MEDICARE

## 2025-07-18 DIAGNOSIS — Z17.0 MALIGNANT NEOPLASM OF UPPER-OUTER QUADRANT OF RIGHT BREAST IN FEMALE, ESTROGEN RECEPTOR POSITIVE: ICD-10-CM

## 2025-07-18 DIAGNOSIS — C50.411 MALIGNANT NEOPLASM OF UPPER-OUTER QUADRANT OF RIGHT BREAST IN FEMALE, ESTROGEN RECEPTOR POSITIVE: ICD-10-CM

## 2025-07-29 ENCOUNTER — APPOINTMENT (OUTPATIENT)
Dept: CT IMAGING | Facility: HOSPITAL | Age: 70
End: 2025-07-29
Payer: MEDICARE

## 2025-07-29 ENCOUNTER — HOSPITAL ENCOUNTER (EMERGENCY)
Facility: HOSPITAL | Age: 70
Discharge: HOME | End: 2025-07-29
Attending: EMERGENCY MEDICINE | Admitting: EMERGENCY MEDICINE
Payer: MEDICARE

## 2025-07-29 VITALS
TEMPERATURE: 99.2 F | HEART RATE: 81 BPM | SYSTOLIC BLOOD PRESSURE: 140 MMHG | WEIGHT: 142 LBS | HEIGHT: 60 IN | OXYGEN SATURATION: 96 % | RESPIRATION RATE: 18 BRPM | BODY MASS INDEX: 27.88 KG/M2 | DIASTOLIC BLOOD PRESSURE: 85 MMHG

## 2025-07-29 DIAGNOSIS — K52.9 GASTROENTERITIS: Primary | ICD-10-CM

## 2025-07-29 DIAGNOSIS — I10 ESSENTIAL HYPERTENSION: ICD-10-CM

## 2025-07-29 DIAGNOSIS — R10.13 EPIGASTRIC PAIN: ICD-10-CM

## 2025-07-29 LAB
ALBUMIN SERPL-MCNC: 4.2 G/DL (ref 3.5–5.2)
ALBUMIN/GLOB SERPL: 1.7 G/DL
ALP SERPL-CCNC: 105 U/L (ref 39–117)
ALT SERPL W P-5'-P-CCNC: 13 U/L (ref 1–33)
ANION GAP SERPL CALCULATED.3IONS-SCNC: 13.3 MMOL/L (ref 5–15)
AST SERPL-CCNC: 23 U/L (ref 1–32)
BASOPHILS # BLD AUTO: 0.05 10*3/MM3 (ref 0–0.2)
BASOPHILS NFR BLD AUTO: 0.6 % (ref 0–1.5)
BILIRUB SERPL-MCNC: 0.3 MG/DL (ref 0–1.2)
BUN SERPL-MCNC: 11.1 MG/DL (ref 8–23)
BUN/CREAT SERPL: 13.2 (ref 7–25)
CALCIUM SPEC-SCNC: 9.4 MG/DL (ref 8.6–10.5)
CHLORIDE SERPL-SCNC: 106 MMOL/L (ref 98–107)
CO2 SERPL-SCNC: 21.7 MMOL/L (ref 22–29)
CREAT SERPL-MCNC: 0.84 MG/DL (ref 0.57–1)
DEPRECATED RDW RBC AUTO: 46.5 FL (ref 37–54)
EGFRCR SERPLBLD CKD-EPI 2021: 75.3 ML/MIN/1.73
EOSINOPHIL # BLD AUTO: 0.07 10*3/MM3 (ref 0–0.4)
EOSINOPHIL NFR BLD AUTO: 0.9 % (ref 0.3–6.2)
ERYTHROCYTE [DISTWIDTH] IN BLOOD BY AUTOMATED COUNT: 12.9 % (ref 12.3–15.4)
GLOBULIN UR ELPH-MCNC: 2.5 GM/DL
GLUCOSE SERPL-MCNC: 132 MG/DL (ref 65–99)
HCT VFR BLD AUTO: 41.7 % (ref 34–46.6)
HGB BLD-MCNC: 13.6 G/DL (ref 12–15.9)
IMM GRANULOCYTES # BLD AUTO: 0.03 10*3/MM3 (ref 0–0.05)
IMM GRANULOCYTES NFR BLD AUTO: 0.4 % (ref 0–0.5)
LIPASE SERPL-CCNC: 21 U/L (ref 13–60)
LYMPHOCYTES # BLD AUTO: 0.81 10*3/MM3 (ref 0.7–3.1)
LYMPHOCYTES NFR BLD AUTO: 10.3 % (ref 19.6–45.3)
MCH RBC QN AUTO: 31.5 PG (ref 26.6–33)
MCHC RBC AUTO-ENTMCNC: 32.6 G/DL (ref 31.5–35.7)
MCV RBC AUTO: 96.5 FL (ref 79–97)
MONOCYTES # BLD AUTO: 0.27 10*3/MM3 (ref 0.1–0.9)
MONOCYTES NFR BLD AUTO: 3.4 % (ref 5–12)
NEUTROPHILS NFR BLD AUTO: 6.6 10*3/MM3 (ref 1.7–7)
NEUTROPHILS NFR BLD AUTO: 84.4 % (ref 42.7–76)
NRBC BLD AUTO-RTO: 0 /100 WBC (ref 0–0.2)
PLATELET # BLD AUTO: 245 10*3/MM3 (ref 140–450)
PMV BLD AUTO: 9.3 FL (ref 6–12)
POTASSIUM SERPL-SCNC: 3.6 MMOL/L (ref 3.5–5.2)
PROT SERPL-MCNC: 6.7 G/DL (ref 6–8.5)
RBC # BLD AUTO: 4.32 10*6/MM3 (ref 3.77–5.28)
SODIUM SERPL-SCNC: 141 MMOL/L (ref 136–145)
TROPONIN T SERPL HS-MCNC: <6 NG/L
WBC NRBC COR # BLD AUTO: 7.83 10*3/MM3 (ref 3.4–10.8)

## 2025-07-29 PROCEDURE — 25010000002 MIDAZOLAM PER 1 MG: Performed by: EMERGENCY MEDICINE

## 2025-07-29 PROCEDURE — 80053 COMPREHEN METABOLIC PANEL: CPT | Performed by: EMERGENCY MEDICINE

## 2025-07-29 PROCEDURE — 85025 COMPLETE CBC W/AUTO DIFF WBC: CPT | Performed by: EMERGENCY MEDICINE

## 2025-07-29 PROCEDURE — 74176 CT ABD & PELVIS W/O CONTRAST: CPT

## 2025-07-29 PROCEDURE — 83690 ASSAY OF LIPASE: CPT | Performed by: EMERGENCY MEDICINE

## 2025-07-29 PROCEDURE — 96374 THER/PROPH/DIAG INJ IV PUSH: CPT

## 2025-07-29 PROCEDURE — 84484 ASSAY OF TROPONIN QUANT: CPT | Performed by: EMERGENCY MEDICINE

## 2025-07-29 PROCEDURE — 25810000003 SODIUM CHLORIDE 0.9 % SOLUTION: Performed by: EMERGENCY MEDICINE

## 2025-07-29 PROCEDURE — 99284 EMERGENCY DEPT VISIT MOD MDM: CPT

## 2025-07-29 PROCEDURE — 25010000002 ONDANSETRON PER 1 MG: Performed by: EMERGENCY MEDICINE

## 2025-07-29 PROCEDURE — 96375 TX/PRO/DX INJ NEW DRUG ADDON: CPT

## 2025-07-29 RX ORDER — MIDAZOLAM HYDROCHLORIDE 1 MG/ML
2 INJECTION, SOLUTION INTRAMUSCULAR; INTRAVENOUS ONCE
Status: COMPLETED | OUTPATIENT
Start: 2025-07-29 | End: 2025-07-29

## 2025-07-29 RX ORDER — ONDANSETRON 2 MG/ML
4 INJECTION INTRAMUSCULAR; INTRAVENOUS ONCE
Status: COMPLETED | OUTPATIENT
Start: 2025-07-29 | End: 2025-07-29

## 2025-07-29 RX ORDER — ONDANSETRON 4 MG/1
4 TABLET, ORALLY DISINTEGRATING ORAL EVERY 8 HOURS PRN
Qty: 15 TABLET | Refills: 0 | Status: SHIPPED | OUTPATIENT
Start: 2025-07-29 | End: 2025-08-03

## 2025-07-29 RX ORDER — DICYCLOMINE HYDROCHLORIDE 10 MG/1
10 CAPSULE ORAL 3 TIMES DAILY PRN
Qty: 15 CAPSULE | Refills: 0 | Status: SHIPPED | OUTPATIENT
Start: 2025-07-29 | End: 2025-08-03

## 2025-07-29 RX ORDER — SODIUM CHLORIDE 0.9 % (FLUSH) 0.9 %
10 SYRINGE (ML) INJECTION AS NEEDED
Status: DISCONTINUED | OUTPATIENT
Start: 2025-07-29 | End: 2025-07-29 | Stop reason: HOSPADM

## 2025-07-29 RX ADMIN — SODIUM CHLORIDE 1000 ML: 9 INJECTION, SOLUTION INTRAVENOUS at 04:00

## 2025-07-29 RX ADMIN — ONDANSETRON 4 MG: 2 INJECTION, SOLUTION INTRAMUSCULAR; INTRAVENOUS at 03:59

## 2025-07-29 RX ADMIN — MIDAZOLAM HYDROCHLORIDE 2 MG: 1 INJECTION, SOLUTION INTRAMUSCULAR; INTRAVENOUS at 03:59

## 2025-07-29 NOTE — ED PROVIDER NOTES
Subjective   History of Present Illness  This is a 70-year-old female with past medical history of anxiety presenting to the emergency department with some abdominal pain, nausea and vomiting.  Patient states that she woke up extremely anxious she is very nauseous she proceeded to vomit afterwards.  Patient also had some loose stools.  She continues to have some shaking and feels very anxious.  She denies any fevers or chills.  No headache or change in vision.  No focal weakness.  No chest pain or shortness of breath    History provided by:  Patient and EMS personnel   used: No        Review of Systems   Constitutional:  Negative for chills and fever.   HENT:  Negative for congestion, ear pain and sore throat.    Eyes:  Negative for visual disturbance.   Respiratory:  Negative for shortness of breath.    Cardiovascular:  Negative for chest pain.   Gastrointestinal:  Positive for abdominal pain, diarrhea, nausea and vomiting.   Genitourinary:  Negative for difficulty urinating.   Musculoskeletal:  Negative for arthralgias.   Skin:  Negative for rash.   Neurological:  Negative for dizziness, weakness and numbness.   Psychiatric/Behavioral:  Negative for agitation.        Past Medical History:   Diagnosis Date    Anxiety     Breast cancer     Breast injury 01/19/2023    right hematoma    Essential hypertension 07/02/2021    GERD without esophagitis 07/16/2019    TIA (transient ischemic attack) 06/04/2024       No Known Allergies    Past Surgical History:   Procedure Laterality Date    BREAST BIOPSY Right 10/21/2024    MRI guided    BREAST BIOPSY Right 01/19/2024    stereo    BREAST CYST ASPIRATION Left 2005    BREAST LUMPECTOMY Right 02/09/2024    CHOLECYSTECTOMY      COLONOSCOPY      HYSTERECTOMY      OOPHORECTOMY         Family History   Problem Relation Age of Onset    Breast cancer Mother 80    Ovarian cancer Neg Hx     Colon cancer Neg Hx     Colon polyps Neg Hx     Esophageal cancer Neg Hx         Social History     Socioeconomic History    Marital status:    Tobacco Use    Smoking status: Never     Passive exposure: Never    Smokeless tobacco: Never   Vaping Use    Vaping status: Never Used   Substance and Sexual Activity    Alcohol use: Never    Drug use: Never    Sexual activity: Defer           Objective   Physical Exam  Vitals and nursing note reviewed.   Constitutional:       General: She is not in acute distress.     Appearance: She is not ill-appearing or toxic-appearing.   Eyes:      Conjunctiva/sclera: Conjunctivae normal.   Cardiovascular:      Rate and Rhythm: Normal rate and regular rhythm.   Pulmonary:      Effort: Pulmonary effort is normal. No respiratory distress.   Abdominal:      General: Abdomen is flat. There is no distension.      Palpations: There is no mass.      Tenderness: There is abdominal tenderness (epigastric). There is no guarding or rebound.   Musculoskeletal:         General: No deformity. Normal range of motion.   Skin:     General: Skin is warm.      Findings: No rash.   Neurological:      General: No focal deficit present.      Mental Status: She is alert and oriented to person, place, and time.      Motor: No weakness.         Procedures           ED Course  ED Course as of 07/29/25 0612   Tue Jul 29, 2025   0517 BP: 136/81 [JK]   0517 Temp: 99.2 °F (37.3 °C) [JK]   0517 Temp src: Oral [JK]   0517 Heart Rate: 101 [JK]   0517 Resp: 18 [JK]   0517 SpO2: 97 % [JK]   0517 Device (Oxygen Therapy): room air  Interpretation:  Patient's repeat vitals, telemetry tracing, and pulse oximetry tracing were directly viewed and interpreted by myself.   O2 sat 97% on room air, interpreted as normal.  Telemetry rhythm strip revealed a rate of 101 bpm, interpreted as sinus tachycardia [JK]   0517 CBC & Differential(!) [JK]   0517 Comprehensive Metabolic Panel(!) [JK]   0517 High Sensitivity Troponin T [JK]   0517 Lipase  Interpretation:  Laboratory studies were reviewed and  interpreted directly by myself.  CMP, CBC, troponin, lipase normal [JK]   0611 CT Abdomen Pelvis Without Contrast  Interpretation:  Imaging was directly visualized by myself, per my interpretations, CT abdomen pelvis showed a diarrheal state.  No other acute process. [JK]   0611 On reevaluation, the patient states that they are feeling much better.  Patient tolerated by mouth challenge without difficulty.  They are not complaining of any new abdominal pain.  Repeat examination did not show any significant guarding or rebound.  No evidence of peritonitis.  No acute no tenderness.  Patient was given strict return precautions for acute abdomen.  They need to be reevaluated within 24 hours for acute abdomen by PCP or return to the emergency department for reexamination.     [JK]   0611 I had a discussion with the patient/family regarding diagnosis, diagnostic results, treatment plan, and medications. The patient/family indicated understanding of these instructions. I spent adequate time at the bedside prior to discharge necessary to discuss the aftercare instructions, giving patient education, providing explanations of the results of our evaluations/findings, and my decision making to assure that the patient/family understand the plan of care. Time was allotted to answer questions at that time and throughout the ED course. Patient is required to maintain timely follow up, as discussed. I also discussed the potential for the development of an acute emergent condition requiring further evaluation, return to the ER, admission, or even surgical intervention.  I encouraged the patient to return to the emergency department immediately for any concerns, worsening symptoms, new complaints, or if symptoms persist and they are unable to seek follow-up in a timely fashion. The patient/family expressed understanding and agreement with this plan    Shared decision making:   After full review of the patient's clinical presentation,  review of any work-up including but not limited to laboratory studies and radiology obtained, I had a discussion with the patient.  Treatment options were discussed as well as the risks, benefits and consequences.  I discussed all findings with the patient and family members if available.  During the discussion, treatment goals were understood by all as well as any misconceptions which were addressed with the patient.  Ample time was given for any questions they may have had.  They are in agreement with the treatment plan as well as final disposition. [JK]      ED Course User Index  [JK] Ervin Walker MD                                                       Medical Decision Making  This is a 70-year-old female with a history of anxiety presenting to the emergency department with some nausea vomiting diarrhea.  Patient appears extremely anxious on initial presentation.  Symptoms seem most consistent with a gastritis.  She has some mild tenderness but no acute abdomen or peritonitis.  Overall, the patient is nontoxic.  Afebrile.  IV access was established in the patient.  Placed on continuous telemetry monitoring.  Given the patient's presentation, differential is broad and will require further evaluation.  Workup initiated.      Differential diagnosis: Peptic ulcer disease, dyspepsia, GERD, pancreatitis, biliary colic, electrolyte abnormality, acute kidney injury, anxiety, gastritis      Amount and/or Complexity of Data Reviewed  Independent Historian: EMS  External Data Reviewed: labs, radiology, ECG and notes.  Labs: ordered. Decision-making details documented in ED Course.  Radiology: ordered and independent interpretation performed. Decision-making details documented in ED Course.    Risk  Prescription drug management.        Final diagnoses:   Gastroenteritis   Essential hypertension   Epigastric pain       ED Disposition  ED Disposition       ED Disposition   Discharge    Condition   Stable    Comment    --               Velia James, APRN  [address]  [place] [id number] [phone]    Call in 1 day           Medication List        New Prescriptions      dicyclomine 10 MG capsule  Commonly known as: BENTYL  Take 1 capsule by mouth 3 (Three) Times a Day As Needed (diarrhea, pain) for up to 5 days.     ondansetron ODT 4 MG disintegrating tablet  Commonly known as: ZOFRAN-ODT  Place 1 tablet on the tongue Every 8 (Eight) Hours As Needed for Nausea or Vomiting for up to 5 days.               Where to Get Your Medications        These medications were sent to [facility] #[id number] - [place], [place] - [address] [place] [address] AT CORNER OF [facility] - [phone] [facility] [phone] [address] [address]      Hours: 24-hours Phone: [phone]   dicyclomine 10 MG capsule  ondansetron ODT 4 MG disintegrating tablet            Ervin Walker MD  07/29/25 0649

## 2025-08-04 ENCOUNTER — HOSPITAL ENCOUNTER (OUTPATIENT)
Dept: MAMMOGRAPHY | Facility: HOSPITAL | Age: 70
Discharge: HOME | End: 2025-08-04
Admitting: RADIOLOGY
Payer: MEDICARE

## 2025-08-04 DIAGNOSIS — C50.411 MALIGNANT NEOPLASM OF UPPER-OUTER QUADRANT OF RIGHT BREAST IN FEMALE, ESTROGEN RECEPTOR POSITIVE: ICD-10-CM

## 2025-08-04 DIAGNOSIS — Z17.0 MALIGNANT NEOPLASM OF UPPER-OUTER QUADRANT OF RIGHT BREAST IN FEMALE, ESTROGEN RECEPTOR POSITIVE: ICD-10-CM

## 2025-08-04 PROCEDURE — 77065 DX MAMMO INCL CAD UNI: CPT | Performed by: RADIOLOGY

## 2025-08-04 PROCEDURE — 77065 DX MAMMO INCL CAD UNI: CPT
